# Patient Record
Sex: FEMALE | Race: WHITE | NOT HISPANIC OR LATINO | Employment: OTHER | URBAN - METROPOLITAN AREA
[De-identification: names, ages, dates, MRNs, and addresses within clinical notes are randomized per-mention and may not be internally consistent; named-entity substitution may affect disease eponyms.]

---

## 2017-01-19 ENCOUNTER — OFFICE VISIT (OUTPATIENT)
Dept: FAMILY MEDICINE | Facility: OTHER | Age: 37
End: 2017-01-19
Payer: COMMERCIAL

## 2017-01-19 ENCOUNTER — TELEPHONE (OUTPATIENT)
Dept: FAMILY MEDICINE | Facility: OTHER | Age: 37
End: 2017-01-19

## 2017-01-19 VITALS
SYSTOLIC BLOOD PRESSURE: 114 MMHG | TEMPERATURE: 98.8 F | HEIGHT: 63 IN | WEIGHT: 193.4 LBS | OXYGEN SATURATION: 98 % | HEART RATE: 78 BPM | RESPIRATION RATE: 12 BRPM | BODY MASS INDEX: 34.27 KG/M2 | DIASTOLIC BLOOD PRESSURE: 70 MMHG

## 2017-01-19 DIAGNOSIS — L50.8 URTICARIA, ACUTE: Primary | ICD-10-CM

## 2017-01-19 PROCEDURE — 99213 OFFICE O/P EST LOW 20 MIN: CPT | Performed by: NURSE PRACTITIONER

## 2017-01-19 RX ORDER — HYDROXYZINE HYDROCHLORIDE 25 MG/1
25-50 TABLET, FILM COATED ORAL EVERY 6 HOURS PRN
Qty: 30 TABLET | Refills: 0 | Status: SHIPPED | OUTPATIENT
Start: 2017-01-19 | End: 2017-02-16

## 2017-01-19 RX ORDER — PREDNISONE 20 MG/1
20 TABLET ORAL 2 TIMES DAILY
Qty: 10 TABLET | Refills: 0 | Status: SHIPPED | OUTPATIENT
Start: 2017-01-19 | End: 2017-01-25

## 2017-01-19 ASSESSMENT — PAIN SCALES - GENERAL: PAINLEVEL: MODERATE PAIN (4)

## 2017-01-19 ASSESSMENT — ANXIETY QUESTIONNAIRES
GAD7 TOTAL SCORE: 15
7. FEELING AFRAID AS IF SOMETHING AWFUL MIGHT HAPPEN: 2 = MORE THAN HALF THE DAYS
GAD7 TOTAL SCORE: 15

## 2017-01-19 ASSESSMENT — PATIENT HEALTH QUESTIONNAIRE - PHQ9
SUM OF ALL RESPONSES TO PHQ QUESTIONS 1-9: 5
10. IF YOU CHECKED OFF ANY PROBLEMS, HOW DIFFICULT HAVE THESE PROBLEMS MADE IT FOR YOU TO DO YOUR WORK, TAKE CARE OF THINGS AT HOME, OR GET ALONG WITH OTHER PEOPLE: SOMEWHAT DIFFICULT

## 2017-01-19 NOTE — MR AVS SNAPSHOT
After Visit Summary   1/19/2017    Ms. Ashley Mcdaniels    MRN: [id number]           Patient Information     Date Of Birth          1980        Visit Information        Provider Department      1/19/2017 1:10 PM Rosaura Garcia APRN CNP [facility]        Today's Diagnoses     Hives    -  1       Care Instructions    - Start an antihistamine daily- Try zyrtec or allegra since Claritin did not help.   - Start prednisone medication daily X5 days, start tomorrow AM so you do not have insomnia affects. Let me know if you have any worsening symptoms or side effects from this medication as discussed in clinic.   - Recommend trying benadryl cream over the counter for hives  - Take atarax PRN do not take when driving or operating machinery.   - Return to clinic next week for your allergist appointment- January 25th at 10:00am.     SHARIF Neal CNP          Follow-ups after your visit        Additional Services     ALLERGY/ASTHMA ADULT REFERRAL       Your provider has referred you to: [facility]: [facility] [phone] [email or web address]    Please be aware that coverage of these services is subject to the terms and limitations of your health insurance plan.  Call member services at your health plan with any benefit or coverage questions.      Please bring the following with you to your appointment:    (1) Any X-Rays, CTs or MRIs which have been performed.  Contact the facility where they were done to arrange for  prior to your scheduled appointment.    (2) List of current medications  (3) This referral request   (4) Any documents/labs given to you for this referral                  Your next 10 appointments already scheduled     Jan 25, 2017 10:00 AM   New Visit with Ian Cash DO   [facility] ([facility]    [address]  [facility] [id number] [phone]              Who  "to contact     If you have questions or need follow up information about today's clinic visit or your schedule please contact [facility] directly at [phone].  Normal or non-critical lab and imaging results will be communicated to you by TechLivehart, letter or phone within 4 business days after the clinic has received the results. If you do not hear from us within 7 days, please contact the clinic through TechLivehart or phone. If you have a critical or abnormal lab result, we will notify you by phone as soon as possible.  Submit refill requests through CardioFocus or call your pharmacy and they will forward the refill request to us. Please allow 3 business days for your refill to be completed.          Additional Information About Your Visit        CardioFocus Information     CardioFocus gives you secure access to your electronic health record. If you see a primary care provider, you can also send messages to your care team and make appointments. If you have questions, please call your primary care clinic.  If you do not have a primary care provider, please call [phone] and they will assist you.        Care EveryWhere ID     This is your Care EveryWhere ID. This could be used by other organizations to access your [place] medical records  [id number]        Your Vitals Were     Pulse Temperature Respirations Height BMI (Body Mass Index) Pulse Oximetry    78 98.8  F (37.1  C) (Temporal) 12 5' 2.91\" (1.598 m) 34.35 kg/m2 98%       Blood Pressure from Last 3 Encounters:   01/19/17 114/70   10/26/16 100/70   09/02/16 100/60    Weight from Last 3 Encounters:   01/19/17 193 lb 6.4 oz (87.726 kg)   10/26/16 192 lb 8 oz (87.317 kg)   09/02/16 193 lb (87.544 kg)              We Performed the Following     ALLERGY/ASTHMA ADULT REFERRAL          Today's Medication Changes          These changes are accurate as of: 1/19/17  1:42 PM.  If you have any questions, ask your nurse or doctor.               Start taking these " medicines.        Dose/Directions    hydrOXYzine 25 MG tablet   Commonly known as:  ATARAX   Used for:  Hives   Started by:  Rosaura Garcia APRN CNP        Dose:  25-50 mg   Take 1-2 tablets (25-50 mg) by mouth every 6 hours as needed for itching   Quantity:  30 tablet   Refills:  0       predniSONE 20 MG tablet   Commonly known as:  DELTASONE   Used for:  Hives   Started by:  Rosaura Garcia APRN CNP        Dose:  20 mg   Take 1 tablet (20 mg) by mouth 2 times daily   Quantity:  10 tablet   Refills:  0            Where to get your medicines      These medications were sent to [facility] [place] - [address]   [address] [place] [address]     Phone:  [phone]    - hydrOXYzine 25 MG tablet  - predniSONE 20 MG tablet             Primary Care Provider Office Phone # Fax #    Melva ANDIE You PA-C [phone]       [address]  [facility] [address]        Thank you!     Thank you for choosing [facility]  for your care. Our goal is always to provide you with excellent care. Hearing back from our patients is one way we can continue to improve our services. Please take a few minutes to complete the written survey that you may receive in the mail after your visit with us. Thank you!             Your Updated Medication List - Protect others around you: Learn how to safely use, store and throw away your medicines at www.disposemymeds.org.          This list is accurate as of: 1/19/17  1:42 PM.  Always use your most recent med list.                   Brand Name Dispense Instructions for use    acetaminophen 500 MG tablet    TYLENOL     Take 1,000 mg by mouth       amoxicillin-clavulanate 875-125 MG per tablet    AUGMENTIN         B COMPLEX FORMULA 1 Tabs      Take 1 tablet by mouth       buPROPion 300 MG 24 hr tablet    WELLBUTRIN XL    90 tablet    Take 1 tablet (300 mg) by mouth every  morning       hydrOXYzine 25 MG tablet    ATARAX    30 tablet    Take 1-2 tablets (25-50 mg) by mouth every 6 hours as needed for itching       ibuprofen 200 MG tablet    ADVIL/MOTRIN     Take 400 mg by mouth       levonorgestrel 20 MCG/24HR IUD    MIRENA     1 each (20 mcg) by Intrauterine route continuous       levothyroxine 100 MCG tablet    SYNTHROID/LEVOTHROID    90 tablet    Take 1 tablet (100 mcg) by mouth daily       predniSONE 20 MG tablet    DELTASONE    10 tablet    Take 1 tablet (20 mg) by mouth 2 times daily       valACYclovir 1000 mg tablet    VALTREX    21 tablet    Take 1 tablet (1,000 mg) by mouth 3 times daily

## 2017-01-19 NOTE — PATIENT INSTRUCTIONS
- Start an antihistamine daily- Try zyrtec or allegra since Claritin did not help.   - Start prednisone medication daily X5 days, start tomorrow AM so you do not have insomnia affects. Let me know if you have any worsening symptoms or side effects from this medication as discussed in clinic.   - Recommend trying benadryl cream over the counter for hives  - Take atarax PRN do not take when driving or operating machinery.   - Return to clinic next week for your allergist appointment- January 25th at 10:00am.     SHARIF Neal CNP

## 2017-01-19 NOTE — NURSING NOTE
"Chief Complaint   Patient presents with     Derm Problem       Initial /70 mmHg  Pulse 78  Temp(Src) 98.8  F (37.1  C) (Temporal)  Resp 12  Ht 5' 2.91\" (1.598 m)  Wt 193 lb 6.4 oz (87.726 kg)  BMI 34.35 kg/m2  SpO2 98% Estimated body mass index is 34.35 kg/(m^2) as calculated from the following:    Height as of this encounter: 5' 2.91\" (1.598 m).    Weight as of this encounter: 193 lb 6.4 oz (87.726 kg).  BP completed using cuff size: daniel Welsh CMA (AAMA)    "

## 2017-01-19 NOTE — TELEPHONE ENCOUNTER
I spoke with patient.   She has had what she believes to be hives intermittently for a few months.   Currently she has large, red, blotchy patches on her face and neck.   There are no blisters or lesions.   She will follow up with her appointment that is scheduled at 1:10.     Miladis Robin, RN, BSN

## 2017-01-20 ASSESSMENT — ANXIETY QUESTIONNAIRES: GAD7 TOTAL SCORE: 15

## 2017-01-20 ASSESSMENT — PATIENT HEALTH QUESTIONNAIRE - PHQ9: SUM OF ALL RESPONSES TO PHQ QUESTIONS 1-9: 5

## 2017-01-25 ENCOUNTER — OFFICE VISIT (OUTPATIENT)
Dept: ALLERGY | Facility: OTHER | Age: 37
End: 2017-01-25
Payer: COMMERCIAL

## 2017-01-25 VITALS
SYSTOLIC BLOOD PRESSURE: 100 MMHG | HEIGHT: 63 IN | DIASTOLIC BLOOD PRESSURE: 60 MMHG | BODY MASS INDEX: 34.29 KG/M2 | OXYGEN SATURATION: 99 % | WEIGHT: 193.5 LBS | HEART RATE: 60 BPM

## 2017-01-25 DIAGNOSIS — L50.8 CHRONIC URTICARIA: Primary | ICD-10-CM

## 2017-01-25 LAB
BASOPHILS # BLD AUTO: 0 10E9/L (ref 0–0.2)
BASOPHILS NFR BLD AUTO: 0.2 %
DIFFERENTIAL METHOD BLD: NORMAL
EOSINOPHIL # BLD AUTO: 0.2 10E9/L (ref 0–0.7)
EOSINOPHIL NFR BLD AUTO: 2.3 %
ERYTHROCYTE [DISTWIDTH] IN BLOOD BY AUTOMATED COUNT: 14.6 % (ref 10–15)
HCT VFR BLD AUTO: 41.3 % (ref 35–47)
HGB BLD-MCNC: 13.8 G/DL (ref 11.7–15.7)
LYMPHOCYTES # BLD AUTO: 2.2 10E9/L (ref 0.8–5.3)
LYMPHOCYTES NFR BLD AUTO: 24.4 %
MCH RBC QN AUTO: 28.6 PG (ref 26.5–33)
MCHC RBC AUTO-ENTMCNC: 33.4 G/DL (ref 31.5–36.5)
MCV RBC AUTO: 86 FL (ref 78–100)
MONOCYTES # BLD AUTO: 0.7 10E9/L (ref 0–1.3)
MONOCYTES NFR BLD AUTO: 7.7 %
NEUTROPHILS # BLD AUTO: 5.9 10E9/L (ref 1.6–8.3)
NEUTROPHILS NFR BLD AUTO: 65.4 %
PLATELET # BLD AUTO: 336 10E9/L (ref 150–450)
RBC # BLD AUTO: 4.82 10E12/L (ref 3.8–5.2)
TSH SERPL DL<=0.05 MIU/L-ACNC: 0.37 MU/L (ref 0.4–4)
WBC # BLD AUTO: 9 10E9/L (ref 4–11)

## 2017-01-25 PROCEDURE — 84443 ASSAY THYROID STIM HORMONE: CPT | Performed by: ALLERGY & IMMUNOLOGY

## 2017-01-25 PROCEDURE — 86800 THYROGLOBULIN ANTIBODY: CPT | Performed by: ALLERGY & IMMUNOLOGY

## 2017-01-25 PROCEDURE — 82306 VITAMIN D 25 HYDROXY: CPT | Performed by: ALLERGY & IMMUNOLOGY

## 2017-01-25 PROCEDURE — 88185 FLOWCYTOMETRY/TC ADD-ON: CPT | Mod: 90 | Performed by: ALLERGY & IMMUNOLOGY

## 2017-01-25 PROCEDURE — 99000 SPECIMEN HANDLING OFFICE-LAB: CPT | Performed by: ALLERGY & IMMUNOLOGY

## 2017-01-25 PROCEDURE — 99243 OFF/OP CNSLTJ NEW/EST LOW 30: CPT | Performed by: ALLERGY & IMMUNOLOGY

## 2017-01-25 PROCEDURE — 85025 COMPLETE CBC W/AUTO DIFF WBC: CPT | Performed by: ALLERGY & IMMUNOLOGY

## 2017-01-25 PROCEDURE — 86162 COMPLEMENT TOTAL (CH50): CPT | Mod: 90 | Performed by: ALLERGY & IMMUNOLOGY

## 2017-01-25 PROCEDURE — 36415 COLL VENOUS BLD VENIPUNCTURE: CPT | Performed by: ALLERGY & IMMUNOLOGY

## 2017-01-25 PROCEDURE — 86376 MICROSOMAL ANTIBODY EACH: CPT | Performed by: ALLERGY & IMMUNOLOGY

## 2017-01-25 PROCEDURE — 88184 FLOWCYTOMETRY/ TC 1 MARKER: CPT | Mod: 90 | Performed by: ALLERGY & IMMUNOLOGY

## 2017-01-25 PROCEDURE — 86003 ALLG SPEC IGE CRUDE XTRC EA: CPT | Performed by: ALLERGY & IMMUNOLOGY

## 2017-01-25 RX ORDER — CETIRIZINE HYDROCHLORIDE 10 MG/1
10 TABLET ORAL 2 TIMES DAILY
Qty: 90 TABLET | Refills: 3 | Status: SHIPPED | OUTPATIENT
Start: 2017-01-25 | End: 2017-02-16

## 2017-01-25 NOTE — PATIENT INSTRUCTIONS
If you have any questions regarding your allergies, asthma, or what we discussed during your visit today please call the allergy clinic or contact us via PromoRepublic.    [facility] ([place], [place]): [phone]  [facility]: [phone]  [place] [facility]: [phone]  [facility]: [phone]  [facility]: [phone]  [facility]: [phone]    1. Blood work today.   2. Start taking cetirizine 10mg by mouth twice daily. If hives persist then would increase to 20mg by mouth twice daily. If hives persist call our office.   3. Okay to stay off prednisone and hydroxyzine.   4. Return to clinic in 3 months.

## 2017-01-25 NOTE — MR AVS SNAPSHOT
After Visit Summary   1/25/2017    Ms. Ashley Mcdaniels    MRN: [id number]           Patient Information     Date Of Birth          1980        Visit Information        Provider Department      1/25/2017 10:00 AM Ian Cash, DO [facility]        Today's Diagnoses     Chronic urticaria    -  1       Care Instructions    If you have any questions regarding your allergies, asthma, or what we discussed during your visit today please call the allergy clinic or contact us via Clipik.    [facility] ([place], [place]): [phone]  [facility]: [phone]  [facility]: [phone]  [facility]: [phone]  [facility]: [phone]  [facility]: [phone]    1. Blood work today.   2. Start taking cetirizine 10mg by mouth twice daily. If hives persist then would increase to 20mg by mouth twice daily. If hives persist call our office.   3. Okay to stay off prednisone and hydroxyzine.   4. Return to clinic in 3 months.          Follow-ups after your visit        Who to contact     If you have questions or need follow up information about today's clinic visit or your schedule please contact [facility] directly at [phone].  Normal or non-critical lab and imaging results will be communicated to you by Sc[employer] Music Grouphart, letter or phone within 4 business days after the clinic has received the results. If you do not hear from us within 7 days, please contact the clinic through Sc[employer] Grouphart or phone. If you have a critical or abnormal lab result, we will notify you by phone as soon as possible.  Submit refill requests through Clipik or call your pharmacy and they will forward the refill request to us. Please allow 3 business days for your refill to be completed.          Additional Information About Your Visit        Clipik Information     Clipik gives you secure access to your electronic health  "record. If you see a primary care provider, you can also send messages to your care team and make appointments. If you have questions, please call your primary care clinic.  If you do not have a primary care provider, please call [phone] and they will assist you.        Care EveryWhere ID     This is your Care EveryWhere ID. This could be used by other organizations to access your [place] medical records  [id number]        Your Vitals Were     Pulse Height BMI (Body Mass Index) Pulse Oximetry          60 5' 3\" (1.6 m) 34.29 kg/m2 99%         Blood Pressure from Last 3 Encounters:   01/25/17 100/60   01/19/17 114/70   10/26/16 100/70    Weight from Last 3 Encounters:   01/25/17 193 lb 8 oz (87.771 kg)   01/19/17 193 lb 6.4 oz (87.726 kg)   10/26/16 192 lb 8 oz (87.317 kg)              We Performed the Following     Anti IgE Receptor Antibody     Anti thyroglobulin antibody     CBC with platelets differential     Complement total     Thyroid peroxidase antibody     TSH     Vitamin D Deficiency        Primary Care Provider Office Phone # Fax #    Melva L PENNIE You [phone]       [address]  [facility] [address]        Thank you!     Thank you for choosing [facility]  for your care. Our goal is always to provide you with excellent care. Hearing back from our patients is one way we can continue to improve our services. Please take a few minutes to complete the written survey that you may receive in the mail after your visit with us. Thank you!             Your Updated Medication List - Protect others around you: Learn how to safely use, store and throw away your medicines at www.disposemymeds.org.          This list is accurate as of: 1/25/17 10:27 AM.  Always use your most recent med list.                   Brand Name Dispense Instructions for use    acetaminophen 500 MG tablet    TYLENOL     Take 1,000 mg by mouth       B " COMPLEX FORMULA 1 Tabs      Take 1 tablet by mouth       buPROPion 300 MG 24 hr tablet    WELLBUTRIN XL    90 tablet    Take 1 tablet (300 mg) by mouth every morning       hydrOXYzine 25 MG tablet    ATARAX    30 tablet    Take 1-2 tablets (25-50 mg) by mouth every 6 hours as needed for itching       ibuprofen 200 MG tablet    ADVIL/MOTRIN     Take 400 mg by mouth       levonorgestrel 20 MCG/24HR IUD    MIRENA     1 each (20 mcg) by Intrauterine route continuous       levothyroxine 100 MCG tablet    SYNTHROID/LEVOTHROID    90 tablet    Take 1 tablet (100 mcg) by mouth daily       predniSONE 20 MG tablet    DELTASONE    10 tablet    Take 1 tablet (20 mg) by mouth 2 times daily       valACYclovir 1000 mg tablet    VALTREX    21 tablet    Take 1 tablet (1,000 mg) by mouth 3 times daily

## 2017-01-25 NOTE — ASSESSMENT & PLAN NOTE
Hives daily for the last 2 months. No angioedema. No clear associations with hives. History of hives 10 years ago that last 1-1.5 years. Oral antihistamine is helpful. No scarring or discoloration. Hives last less than 24 hours. Discussed with patient that etiology of hives is most likely autoimmune and that cause is found 1-2% of the time. Tried avoiding wheat and no change in hives. History of hypothyroidism. Has been having hair loss, weight gain and alternating constipation and diarrhea.     - Cetirizine 10 mg by mouth twice daily. If hives persist increase to 20 mg by mouth twice daily. If hives persist instructed her to call our office. At that point would add either montelukast and/or Zantac b.i.d.  - Will taper off cetirizine in 3 months.   - Lab evaluation: TSH, thyroid autoantibodies, vitamin d, total complement and anti-IgE. She requested milk IgE to be checked. I do not think causing hives, but she wished to rule out.   - If patient's thyroid is abnormal she may benefit from medication adjustment. Hives have been correlated with abnormal thyroid in a few studies.   - Return to clinic in 3 months.

## 2017-01-25 NOTE — NURSING NOTE
"Chief Complaint   Patient presents with     Consult     hives       Initial /60 mmHg  Pulse 60  Ht 5' 3\" (1.6 m)  Wt 193 lb 8 oz (87.771 kg)  BMI 34.29 kg/m2  SpO2 99% Estimated body mass index is 34.29 kg/(m^2) as calculated from the following:    Height as of this encounter: 5' 3\" (1.6 m).    Weight as of this encounter: 193 lb 8 oz (87.771 kg).  BP completed using cuff size: daniel Barraza MA      "

## 2017-01-25 NOTE — PROGRESS NOTES
Ashley Mcdaniels is a 36 year old White female with previous medical history significant for Crohn's disease, hypothyroidism on thyroid hormone replacement, depression, anxiety and chronic urticaria. Ashley Mcdaniels is being seen today for evaluation of chronic hives. The patient is being seen in consultation at the request of Rosaura OLGUIN CNP.     The patient reports that for the last couple of months she has had daily erythematous, raised, pruritic lesions. These lesions can occur anywhere on her body including her face. She has denied angioedema occurring. Each individual lesion last less than 24 hours. The lesions are pruritic. They do not burn and they are not painful. The lesions do not scar relief any discoloration. She reports that if she itches at her skin she will develop welts. The patient reports that stress exacerbates the hives. She denies hives being exacerbated by water, heat, cold, pressure, vibration. The patient has been taking cetirizine 10 mg by mouth daily and hydroxyzine on an as needed basis. The hydroxyzine has been helpful, but it makes her tired. She was recently prescribed prednisone, but she did not use. She has been prescribed prednisone on 2 occasions that she her for hives. This has been helpful, but hives returned when she stops. The patient reports alternating constipation and diarrhea. She reports she has been having hair loss. She has gained 30 pounds in the last 1 year. The patient denies fevers or chills. She has a history of hypothyroidism and is on thyroid hormone replacement. Her most recent TSH was near the lower limit of normal. This was last checked in November 2016. The patient is unclear if she has ever had thyroid autoantibodies sent. The patient previously had hives 10 years ago that started during pregnancy and persisted for 1-1.5 years after delivery. These hives are present every day. She had no angioedema associated. These hives resolved with antihistamine. She  has not started any new medications, herbal supplements. No new soaps, lotions, shampoos or detergents. She tried taking gluten out of her diet and this made no difference on hives development. She questions whether or not these hives are associated with milk. She consumes cheese multiple times a day. She has not clearly noted a correlation with milk. She has not avoided milk.     The patient has no history of asthma, eczema, food allergies, medications allergies or hives.     ENVIRONMENTAL HISTORY: [place] is heated with a forced air. They does have central air conditioning. The patient's bedroom is furnished with feather/wool bedding or pillows and carpeting in bedroom.  Pets inside the house include 2 dog(s). There is not history of cockroach or mice infestation. There is/are 0 smokers in the house.  The house does not have a damp basement.           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      Low back pain      GERD (gastroesophageal reflux disease)      Crohn's colitis (H)      Family History   Problem Relation Age of Onset     HEART DISEASE Father      CAD, CHF     DIABETES Maternal Grandmother      Alcoholism Father      Asthma Father      Hypertension Father      Hyperlipidemia Father      Substance Abuse Maternal Grandmother      Substance Abuse Maternal Grandfather      Substance Abuse Paternal Grandfather      Hyperlipidemia Maternal Grandmother      Hypertension Maternal Grandmother      Asthma Paternal Grandmother      HEART DISEASE Paternal Grandmother      DIABETES Paternal Grandmother      Hypertension Paternal Grandmother      Arthritis Mother      MENTAL ILLNESS Brother      Substance Abuse Brother      Past Surgical History   Procedure  Laterality Date     Lap adjustable gastric band  May 2007     Cholecystectomy  Feb 2007     Dilation and curettage  May 2003     Non-OB     Hc tooth extraction w/forcep  Nov 2006       REVIEW OF SYSTEMS:  General: negative for weight gain. negative for weight loss. negative for changes in sleep.   Ears: negative for fullness. negative for hearing loss. negative for dizziness.   Nose: negative for snoring.negative for changes in smell. negative for drainage.   Eyes: negative for eye watering. negative for eye itching. negative for vision changes. negative for eye redness.  Throat: negative for hoarseness. negative for sore throat. negative for trouble swallowing.   Lungs: negative for shortness of breath.negative for wheezing. negative for sputum production.   Cardiovascular: negative for chest pain. negative for swelling of ankles. negative for fast or irregular heartbeat.   Gastrointestinal: negative for nausea. negative for heartburn. positive  for acid reflux.   Musculoskeletal: positive  for joint pain. negative for joint stiffness. negative for joint swelling.   Neurologic: negative for seizures. negative for fainting. negative for weakness.   Psychiatric: positive  for changes in mood. positive  for anxiety.   Endocrine: negative for cold intolerance. negative for heat intolerance. negative for tremors.   Lymphatic: negative for lower extremity swelling. negative for lymph node swelling.   Hematologic: negative for easy bruising. negative for easy bleeding.  Integumentary: positive  for rash. negative for scaling. negative for nail changes.       Current outpatient prescriptions:      cetirizine (ZYRTEC) 10 MG tablet, Take 1 tablet (10 mg) by mouth 2 times daily, Disp: 90 tablet, Rfl: 3     hydrOXYzine (ATARAX) 25 MG tablet, Take 1-2 tablets (25-50 mg) by mouth every 6 hours as needed for itching, Disp: 30 tablet, Rfl: 0     acetaminophen (TYLENOL) 500 MG tablet, Take 1,000 mg by mouth, Disp: , Rfl:       ibuprofen (ADVIL,MOTRIN) 200 MG tablet, Take 400 mg by mouth, Disp: , Rfl:      B Complex-Folic Acid (B COMPLEX FORMULA 1) TABS, Take 1 tablet by mouth, Disp: , Rfl:      valACYclovir (VALTREX) 1000 mg tablet, Take 1 tablet (1,000 mg) by mouth 3 times daily, Disp: 21 tablet, Rfl: 3     levothyroxine (SYNTHROID,LEVOTHROID) 100 MCG tablet, Take 1 tablet (100 mcg) by mouth daily, Disp: 90 tablet, Rfl: 1     buPROPion (WELLBUTRIN XL) 300 MG 24 hr tablet, Take 1 tablet (300 mg) by mouth every morning, Disp: 90 tablet, Rfl: 3     levonorgestrel (MIRENA) 20 MCG/24HR IUD, 1 each (20 mcg) by Intrauterine route continuous, Disp: , Rfl:     There is no immunization history on file for this patient.  No Known Allergies      EXAM:   Constitutional:  Appears well-developed and well-nourished. No distress.   HEENT:   Head: Normocephalic.   Right Ear: External ear normal. TM normal  Left Ear: External ear normal. TM normal  Mouth/Throat: No oropharyngeal exudate present.   No cobblestoning of posterior oropharynx.   Nasal tissue pink and normal appearing.  No rhinorrhea noted.    Eyes: Conjunctivae are non-erythematous   No maxillary or frontal sinus tenderness to palpation.   Cardiovascular: Normal rate, regular rhythm and normal heart sounds. Exam reveals no gallop and no friction rub.   No murmur heard.  Respiratory: Effort normal and breath sounds normal. No respiratory distress. No wheezes. No rales.   Musculoskeletal: Normal range of motion.   Lymphadenopathy:   No cervical adenopathy.   No lower extremity edema.   Neuro: Oriented to person, place, and time.  Skin: Skin is warm and dry. Flat erythema on bilateral forearms. Erythematous patch on upper chest.   Psychiatric: Normal mood and affect.     Nursing note and vitals reviewed.      ASSESSMENT/PLAN:  Problem List Items Addressed This Visit        Musculoskeletal and Integumentary    Chronic urticaria - Primary     Hives daily for the last 2 months. No angioedema. No  clear associations with hives. History of hives 10 years ago that last 1-1.5 years. Oral antihistamine is helpful. No scarring or discoloration. Hives last less than 24 hours. Discussed with patient that etiology of hives is most likely autoimmune and that cause is found 1-2% of the time. Tried avoiding wheat and no change in hives. History of hypothyroidism. Has been having hair loss, weight gain and alternating constipation and diarrhea.     - Cetirizine 10 mg by mouth twice daily. If hives persist increase to 20 mg by mouth twice daily. If hives persist instructed her to call our office. At that point would add either montelukast and/or Zantac b.i.d.  - Will taper off cetirizine in 3 months.   - Lab evaluation: TSH, thyroid autoantibodies, vitamin d, total complement and anti-IgE. She requested milk IgE to be checked. I do not think causing hives, but she wished to rule out.   - If patient's thyroid is abnormal she may benefit from medication adjustment. Hives have been correlated with abnormal thyroid in a few studies.   - Return to clinic in 3 months.          Relevant Medications    cetirizine (ZYRTEC) 10 MG tablet    Other Relevant Orders    Complement total (Completed)    CBC with platelets differential (Completed)    Vitamin D Deficiency (Completed)    Anti IgE Receptor Antibody (Completed)    Thyroid peroxidase antibody (Completed)    Anti thyroglobulin antibody (Completed)    TSH (Completed)    Allergen milk IgE (Completed)          Chart documentation with Dragon Voice recognition Software. Although reviewed after completion, some words and grammatical errors may remain.    Ian Cash,    Allergy/Immunology  [facility], [place] and [place]

## 2017-01-25 NOTE — Clinical Note
Thanks for sending the patient. Please see note for details. I started her on cetirizine 10mg PO bid. Will possibly increased to 20mg PO bid. Want her hive free for 3 months and then will attempt to taper. Lab evaluation is being done. Suspect autoimmune basis to hives. Thanks!  Ian Cash

## 2017-01-26 LAB — DEPRECATED CALCIDIOL+CALCIFEROL SERPL-MC: 32 UG/L (ref 20–75)

## 2017-01-27 ENCOUNTER — TELEPHONE (OUTPATIENT)
Dept: FAMILY MEDICINE | Facility: OTHER | Age: 37
End: 2017-01-27

## 2017-01-27 DIAGNOSIS — E03.9 HYPOTHYROIDISM, UNSPECIFIED TYPE: Primary | ICD-10-CM

## 2017-01-27 LAB
CH50 SERPL-ACNC: 194
COW MILK IGE QN: NORMAL KU/L
THYROGLOB AB SERPL IA-ACNC: <20 IU/ML (ref 0–40)
THYROPEROXIDASE AB SERPL-ACNC: <10 IU/ML

## 2017-01-27 NOTE — TELEPHONE ENCOUNTER
Received staff message from , Patient had TSH checked for work up of her urticaria. Given the TSH is slightly lower than normal I would recommend recheck in 3 weeks if still below normal we will adjust her Synthroid medication.   Please set up lab appointment and notify patient.     SHARIF Neal CNP

## 2017-02-01 LAB
CIU ASSOCIATED BASOPHIL ACTIVATION: 5
IGE RECEP AB COMMENT: NORMAL

## 2017-02-15 DIAGNOSIS — E03.9 HYPOTHYROIDISM, UNSPECIFIED TYPE: ICD-10-CM

## 2017-02-15 LAB — TSH SERPL DL<=0.005 MIU/L-ACNC: 1.14 MU/L (ref 0.4–4)

## 2017-02-15 PROCEDURE — 36415 COLL VENOUS BLD VENIPUNCTURE: CPT | Performed by: NURSE PRACTITIONER

## 2017-02-15 PROCEDURE — 84443 ASSAY THYROID STIM HORMONE: CPT | Performed by: NURSE PRACTITIONER

## 2017-02-16 ENCOUNTER — MYC MEDICAL ADVICE (OUTPATIENT)
Dept: ALLERGY | Facility: OTHER | Age: 37
End: 2017-02-16

## 2017-02-16 DIAGNOSIS — L50.8 URTICARIA, ACUTE: ICD-10-CM

## 2017-02-16 DIAGNOSIS — L50.8 CHRONIC URTICARIA: ICD-10-CM

## 2017-02-16 RX ORDER — CETIRIZINE HYDROCHLORIDE 10 MG/1
10 TABLET ORAL 2 TIMES DAILY
Qty: 90 TABLET | Refills: 3 | Status: SHIPPED | OUTPATIENT
Start: 2017-02-16 | End: 2017-02-20

## 2017-02-16 RX ORDER — HYDROXYZINE HYDROCHLORIDE 25 MG/1
25-50 TABLET, FILM COATED ORAL EVERY 6 HOURS PRN
Qty: 30 TABLET | Refills: 3 | Status: SHIPPED | OUTPATIENT
Start: 2017-02-16 | End: 2018-04-10

## 2017-02-16 NOTE — TELEPHONE ENCOUNTER
Pt states medication was left in [place] while on vacation.  RN replied to patient mychart.  No refills of atarax.  Routing to provider to review/adivse.  Medication/pharmacy enedina'd up.   Char Choi RN

## 2017-02-20 ENCOUNTER — TELEPHONE (OUTPATIENT)
Dept: FAMILY MEDICINE | Facility: OTHER | Age: 37
End: 2017-02-20

## 2017-02-20 DIAGNOSIS — L50.8 CHRONIC URTICARIA: ICD-10-CM

## 2017-02-20 RX ORDER — CETIRIZINE HYDROCHLORIDE 10 MG/1
10 TABLET ORAL DAILY
Qty: 90 TABLET | Refills: 3 | Status: SHIPPED | OUTPATIENT
Start: 2017-02-20 | End: 2018-03-02

## 2017-02-20 NOTE — TELEPHONE ENCOUNTER
Patient's insurance will not cover Cetirizine at twice daily dosing. Please change to once daily or begin PA qty limits process.  [facility] [phone] ID# [id number]  [place], [facility]  [phone]

## 2017-05-26 ENCOUNTER — TELEPHONE (OUTPATIENT)
Dept: FAMILY MEDICINE | Facility: OTHER | Age: 37
End: 2017-05-26

## 2017-05-26 ENCOUNTER — OFFICE VISIT (OUTPATIENT)
Dept: FAMILY MEDICINE | Facility: OTHER | Age: 37
End: 2017-05-26
Payer: COMMERCIAL

## 2017-05-26 VITALS
WEIGHT: 200.8 LBS | BODY MASS INDEX: 35.57 KG/M2 | RESPIRATION RATE: 16 BRPM | HEART RATE: 76 BPM | DIASTOLIC BLOOD PRESSURE: 66 MMHG | SYSTOLIC BLOOD PRESSURE: 118 MMHG | TEMPERATURE: 98.2 F

## 2017-05-26 DIAGNOSIS — F41.1 GAD (GENERALIZED ANXIETY DISORDER): ICD-10-CM

## 2017-05-26 DIAGNOSIS — F33.0 MAJOR DEPRESSIVE DISORDER, RECURRENT EPISODE, MILD (H): Primary | ICD-10-CM

## 2017-05-26 LAB
ALBUMIN SERPL-MCNC: 3.5 G/DL (ref 3.4–5)
ALP SERPL-CCNC: 70 U/L (ref 40–150)
ALT SERPL W P-5'-P-CCNC: 21 U/L (ref 0–50)
ANION GAP SERPL CALCULATED.3IONS-SCNC: 5 MMOL/L (ref 3–14)
AST SERPL W P-5'-P-CCNC: 17 U/L (ref 0–45)
BILIRUB SERPL-MCNC: 0.2 MG/DL (ref 0.2–1.3)
BUN SERPL-MCNC: 8 MG/DL (ref 7–30)
CALCIUM SERPL-MCNC: 8.9 MG/DL (ref 8.5–10.1)
CHLORIDE SERPL-SCNC: 106 MMOL/L (ref 94–109)
CO2 SERPL-SCNC: 30 MMOL/L (ref 20–32)
CREAT SERPL-MCNC: 0.78 MG/DL (ref 0.52–1.04)
GFR SERPL CREATININE-BSD FRML MDRD: 83 ML/MIN/1.7M2
GLUCOSE SERPL-MCNC: 75 MG/DL (ref 70–99)
POTASSIUM SERPL-SCNC: 3.9 MMOL/L (ref 3.4–5.3)
PROT SERPL-MCNC: 7.6 G/DL (ref 6.8–8.8)
SODIUM SERPL-SCNC: 141 MMOL/L (ref 133–144)

## 2017-05-26 PROCEDURE — 99213 OFFICE O/P EST LOW 20 MIN: CPT | Performed by: NURSE PRACTITIONER

## 2017-05-26 PROCEDURE — 36415 COLL VENOUS BLD VENIPUNCTURE: CPT | Performed by: NURSE PRACTITIONER

## 2017-05-26 PROCEDURE — 80053 COMPREHEN METABOLIC PANEL: CPT | Performed by: NURSE PRACTITIONER

## 2017-05-26 RX ORDER — CITALOPRAM HYDROBROMIDE 20 MG/1
20 TABLET ORAL DAILY
Qty: 30 TABLET | Refills: 0 | Status: SHIPPED | OUTPATIENT
Start: 2017-05-26 | End: 2017-08-18

## 2017-05-26 ASSESSMENT — ANXIETY QUESTIONNAIRES
6. BECOMING EASILY ANNOYED OR IRRITABLE: NEARLY EVERY DAY
IF YOU CHECKED OFF ANY PROBLEMS ON THIS QUESTIONNAIRE, HOW DIFFICULT HAVE THESE PROBLEMS MADE IT FOR YOU TO DO YOUR WORK, TAKE CARE OF THINGS AT HOME, OR GET ALONG WITH OTHER PEOPLE: EXTREMELY DIFFICULT
1. FEELING NERVOUS, ANXIOUS, OR ON EDGE: NEARLY EVERY DAY
5. BEING SO RESTLESS THAT IT IS HARD TO SIT STILL: MORE THAN HALF THE DAYS
3. WORRYING TOO MUCH ABOUT DIFFERENT THINGS: NEARLY EVERY DAY
2. NOT BEING ABLE TO STOP OR CONTROL WORRYING: NEARLY EVERY DAY
GAD7 TOTAL SCORE: 20
7. FEELING AFRAID AS IF SOMETHING AWFUL MIGHT HAPPEN: NEARLY EVERY DAY

## 2017-05-26 ASSESSMENT — PATIENT HEALTH QUESTIONNAIRE - PHQ9: 5. POOR APPETITE OR OVEREATING: NEARLY EVERY DAY

## 2017-05-26 NOTE — NURSING NOTE
"Chief Complaint   Patient presents with     Anxiety       Initial /66  Pulse 76  Temp 98.2  F (36.8  C) (Temporal)  Resp 16  Wt 200 lb 12.8 oz (91.1 kg)  BMI 35.57 kg/m2 Estimated body mass index is 35.57 kg/(m^2) as calculated from the following:    Height as of 1/25/17: 5' 3\" (1.6 m).    Weight as of this encounter: 200 lb 12.8 oz (91.1 kg).  Medication Reconciliation: complete   Aga Schaefer CMA    "

## 2017-05-26 NOTE — PATIENT INSTRUCTIONS
- Start celexa 20mg daily in the AM.   - Continue Wellbutrin 300mg XL daily  - Return to clinic in 3-4 weeks.   - Continue with Atarax as needed.   - Continue counseling      SHARIF Neal CNP    Patient Education    Citalopram Hydrobromide Oral solution    Citalopram Hydrobromide Oral tablet  Citalopram Hydrobromide Oral tablet  What is this medicine?  CITALOPRAM (sye JAKE oh pram) is a medicine for depression.  This medicine may be used for other purposes; ask your health care provider or pharmacist if you have questions.  What should I tell my health care provider before I take this medicine?  They need to know if you have any of these conditions:    bipolar disorder or a family history of bipolar disorder    diabetes    glaucoma    heart disease    history of irregular heartbeat    kidney or liver disease    low levels of magnesium or potassium in the blood    receiving electroconvulsive therapy    seizures (convulsions)    suicidal thoughts or a previous suicide attempt    an unusual or allergic reaction to citalopram, escitalopram, other medicines, foods, dyes, or preservatives    pregnant or trying to become pregnant    breast-feeding  How should I use this medicine?  Take this medicine by mouth with a glass of water. Follow the directions on the prescription label. You can take it with or without food. Take your medicine at regular intervals. Do not take your medicine more often than directed. Do not stop taking this medicine suddenly except upon the advice of your doctor. Stopping this medicine too quickly may cause serious side effects or your condition may worsen.  A special MedGuide will be given to you by the pharmacist with each prescription and refill. Be sure to read this information carefully each time.  Talk to your pediatrician regarding the use of this medicine in children. Special care may be needed.  Patients over 60 years old may have a stronger reaction and need a smaller  dose.  Overdosage: If you think you have taken too much of this medicine contact a poison control center or emergency room at once.  NOTE: This medicine is only for you. Do not share this medicine with others.  What if I miss a dose?  If you miss a dose, take it as soon as you can. If it is almost time for your next dose, take only that dose. Do not take double or extra doses.  What may interact with this medicine?  Do not take this medicine with any of the following medications:    cisapride    dofetlide    dronedarone    escitalopram    linezolid    MAOIs like Carbex, Eldepryl, Marplan, Nardil, and Parnate    methylene blue (injected into a vein)    pimozide    posaconazole    thioridazine    ziprasidone  This medicine may also interact with the following medications:    alcohol    aspirin and aspirin-like medicines    carbamazepine    certain medicines for depression, anxiety, or psychotic disturbances    certain medicines for fungal infections like ketoconazole and itraconazole    certain medicines used to treat infections like chloroquine, clarithromycin, erythromycin, pentamidine    certain medicines for migraine headaches like almotriptan, eletriptan, frovatriptan, naratriptan, rizatriptan, sumatriptan, zolmitriptan    cimetidine    diuretics    fentanyl    furazolidone    isoniazid    lithium    medicines for sleep    medicines that treat or prevent blood clots like warfarin, enoxaparin, and dalteparin    methadone    metoprolol    NSAIDs, medicines for pain and inflammation, like ibuprofen or naproxen    omeprazole    other medicines that prolong the QT interval (cause an abnormal heart rhythm)    procarbazine    rasagiline    supplements like Endy's wort, kava kava, valerian    tramadol    tryptophan  This list may not describe all possible interactions. Give your health care provider a list of all the medicines, herbs, non-prescription drugs, or dietary supplements you use. Also tell them if you  smoke, drink alcohol, or use illegal drugs. Some items may interact with your medicine.  What should I watch for while using this medicine?  Tell your doctor if your symptoms do not get better or if they get worse. Visit your doctor or health care professional for regular checks on your progress. Because it may take several weeks to see the full effects of this medicine, it is important to continue your treatment as prescribed by your doctor.  Patients and their families should watch out for new or worsening thoughts of suicide or depression. Also watch out for sudden changes in feelings such as feeling anxious, agitated, panicky, irritable, hostile, aggressive, impulsive, severely restless, overly excited and hyperactive, or not being able to sleep. If this happens, especially at the beginning of treatment or after a change in dose, call your health care professional.  You may get drowsy or dizzy. Do not drive, use machinery, or do anything that needs mental alertness until you know how this medicine affects you. Do not stand or sit up quickly, especially if you are an older patient. This reduces the risk of dizzy or fainting spells. Alcohol may interfere with the effect of this medicine. Avoid alcoholic drinks.  Your mouth may get dry. Chewing sugarless gum or sucking hard candy, and drinking plenty of water will help. Contact your doctor if the problem does not go away or is severe.  What side effects may I notice from receiving this medicine?  Side effects that you should report to your doctor or health care professional as soon as possible:    allergic reactions like skin rash, itching or hives, swelling of the face, lips, or tongue    chest pain    confusion    dizziness    fast, irregular heartbeat    fast talking and excited feelings or actions that are out of control    feeling faint or lightheaded, falls    hallucination, loss of contact with reality    seizures    shortness of breath    suicidal thoughts  or other mood changes    unusual bleeding or bruising  Side effects that usually do not require medical attention (report to your doctor or health care professional if they continue or are bothersome):    blurred vision    change in appetite    change in sex drive or performance    headache    increased sweating    nausea    trouble sleeping  This list may not describe all possible side effects. Call your doctor for medical advice about side effects. You may report side effects to FDA at 1-460-RUO-2106.  Where should I keep my medicine?  Keep out of reach of children.  Store at room temperature between 15 and 30 degrees C (59 and 86 degrees F). Throw away any unused medicine after the expiration date.  NOTE:This sheet is a summary. It may not cover all possible information. If you have questions about this medicine, talk to your doctor, pharmacist, or health care provider. Copyright  2016 [employer]

## 2017-05-26 NOTE — TELEPHONE ENCOUNTER
Got note from KV to call patient and let her know that she can start the citalopram. If patient has any questions please let us know.   LM for patient to return our call, please give below message.   Sayda Welsh CMA ([facility])

## 2017-05-26 NOTE — TELEPHONE ENCOUNTER
Please give both messages,LM for patient to return our call, please give below message.   Sayda Welsh CMA ([facility])

## 2017-05-26 NOTE — TELEPHONE ENCOUNTER
----- Message from SHARIF Gonzalez CNP sent at 5/26/2017  3:43 PM CDT -----  Please let patient know that her chemistry labs are completely normal. Continue with medication Wellbutrin 300mg XL and Celexa 20mg daily follow up win 1 month.     SHARIF Neal CNP

## 2017-05-26 NOTE — PROGRESS NOTES
SUBJECTIVE:                                                    Ashley Mcdaniels is a 36 year old female who presents to clinic today for the following health issues:      HPI      Depression and Anxiety Follow-Up    Status since last visit: Worsened anxiety    Other associated symptoms:shaky    Complicating factors:     Significant life event: Father diagnosed with cancer. Things at home are difficult. Stressed out at work.      Current substance abuse: None    Can't function on the atarax. Only taken at night if needed.   Starting to feel shaky during the day due to anxiety.     PHQ-9 SCORE 9/2/2016 11/3/2016 1/19/2017   Total Score MyChart - - 5 (Mild depression)   Total Score 6 4 -     YUE-7 SCORE 6/3/2016 9/2/2016 1/19/2017   Total Score - - 15 (severe anxiety)   Total Score 12 5 -        PHQ-9  English      PHQ-9   Any Language     GAD7       Problem list and histories reviewed & adjusted, as indicated.  Additional history: as documented      ROS:  C: NEGATIVE for fever, chills, change in weight    OBJECTIVE:                                                    /66  Pulse 76  Temp 98.2  F (36.8  C) (Temporal)  Resp 16  Wt 200 lb 12.8 oz (91.1 kg)  BMI 35.57 kg/m2  Body mass index is 35.57 kg/(m^2).  GENERAL: healthy, alert and no distress  NEURO: Normal strength and tone, mentation intact and speech normal  PSYCH: mentation appears normal, affect normal/bright    Diagnostic Test Results:  CMP pending.      ASSESSMENT/PLAN:                                                        1. Major depressive disorder, recurrent episode, mild (H)  - Discussed adding additional medication to the Wellbutrin for additional coverage of her mood disorder. We discussed that she has given the Wellbutrin 300mg XL enough time without improvements.   - Discussed new medication (Citalopram) and given side effects along with hand out provided.   - Patient will also continue her counseling.   - citalopram (CELEXA) 20 MG tablet;  Take 1 tablet (20 mg) by mouth daily  Dispense: 30 tablet; Refill: 0  - Comprehensive metabolic panel    2. YUE (generalized anxiety disorder)  - Atarax as needed for sleep and anxiety attacks, she declined needing refills of this.   - As noted above will start Celexa.   - citalopram (CELEXA) 20 MG tablet; Take 1 tablet (20 mg) by mouth daily  Dispense: 30 tablet; Refill: 0  - Comprehensive metabolic panel      I also discussed this with patient PCP who is in agreement with the above plan.     The patient indicates understanding of these issues and agrees with the plan.    Patient Instructions   - Start celexa 20mg daily in the AM.   - Continue Wellbutrin 300mg XL daily  - Return to clinic in 3-4 weeks.   - Continue with Atarax as needed.   - Continue counseling      SHARIF Neal CNP    Patient Education    Citalopram Hydrobromide Oral solution    Citalopram Hydrobromide Oral tablet  Citalopram Hydrobromide Oral tablet  What is this medicine?  CITALOPRAM (sye JAKE oh pram) is a medicine for depression.  This medicine may be used for other purposes; ask your health care provider or pharmacist if you have questions.  What should I tell my health care provider before I take this medicine?  They need to know if you have any of these conditions:    bipolar disorder or a family history of bipolar disorder    diabetes    glaucoma    heart disease    history of irregular heartbeat    kidney or liver disease    low levels of magnesium or potassium in the blood    receiving electroconvulsive therapy    seizures (convulsions)    suicidal thoughts or a previous suicide attempt    an unusual or allergic reaction to citalopram, escitalopram, other medicines, foods, dyes, or preservatives    pregnant or trying to become pregnant    breast-feeding  How should I use this medicine?  Take this medicine by mouth with a glass of water. Follow the directions on the prescription label. You can take it with or without food. Take  your medicine at regular intervals. Do not take your medicine more often than directed. Do not stop taking this medicine suddenly except upon the advice of your doctor. Stopping this medicine too quickly may cause serious side effects or your condition may worsen.  A special MedGuide will be given to you by the pharmacist with each prescription and refill. Be sure to read this information carefully each time.  Talk to your pediatrician regarding the use of this medicine in children. Special care may be needed.  Patients over 60 years old may have a stronger reaction and need a smaller dose.  Overdosage: If you think you have taken too much of this medicine contact a poison control center or emergency room at once.  NOTE: This medicine is only for you. Do not share this medicine with others.  What if I miss a dose?  If you miss a dose, take it as soon as you can. If it is almost time for your next dose, take only that dose. Do not take double or extra doses.  What may interact with this medicine?  Do not take this medicine with any of the following medications:    cisapride    dofetlide    dronedarone    escitalopram    linezolid    MAOIs like Carbex, Eldepryl, Marplan, Nardil, and Parnate    methylene blue (injected into a vein)    pimozide    posaconazole    thioridazine    ziprasidone  This medicine may also interact with the following medications:    alcohol    aspirin and aspirin-like medicines    carbamazepine    certain medicines for depression, anxiety, or psychotic disturbances    certain medicines for fungal infections like ketoconazole and itraconazole    certain medicines used to treat infections like chloroquine, clarithromycin, erythromycin, pentamidine    certain medicines for migraine headaches like almotriptan, eletriptan, frovatriptan, naratriptan, rizatriptan, sumatriptan, zolmitriptan    cimetidine    diuretics    fentanyl    furazolidone    isoniazid    lithium    medicines for sleep    medicines  that treat or prevent blood clots like warfarin, enoxaparin, and dalteparin    methadone    metoprolol    NSAIDs, medicines for pain and inflammation, like ibuprofen or naproxen    omeprazole    other medicines that prolong the QT interval (cause an abnormal heart rhythm)    procarbazine    rasagiline    supplements like [place]'s wort, kava kava, valerian    tramadol    tryptophan  This list may not describe all possible interactions. Give your health care provider a list of all the medicines, herbs, non-prescription drugs, or dietary supplements you use. Also tell them if you smoke, drink alcohol, or use illegal drugs. Some items may interact with your medicine.  What should I watch for while using this medicine?  Tell your doctor if your symptoms do not get better or if they get worse. Visit your doctor or health care professional for regular checks on your progress. Because it may take several weeks to see the full effects of this medicine, it is important to continue your treatment as prescribed by your doctor.  Patients and their families should watch out for new or worsening thoughts of suicide or depression. Also watch out for sudden changes in feelings such as feeling anxious, agitated, panicky, irritable, hostile, aggressive, impulsive, severely restless, overly excited and hyperactive, or not being able to sleep. If this happens, especially at the beginning of treatment or after a change in dose, call your health care professional.  You may get drowsy or dizzy. Do not drive, use machinery, or do anything that needs mental alertness until you know how this medicine affects you. Do not stand or sit up quickly, especially if you are an older patient. This reduces the risk of dizzy or fainting spells. Alcohol may interfere with the effect of this medicine. Avoid alcoholic drinks.  Your mouth may get dry. Chewing sugarless gum or sucking hard candy, and drinking plenty of water will help. Contact your doctor  if the problem does not go away or is severe.  What side effects may I notice from receiving this medicine?  Side effects that you should report to your doctor or health care professional as soon as possible:    allergic reactions like skin rash, itching or hives, swelling of the face, lips, or tongue    chest pain    confusion    dizziness    fast, irregular heartbeat    fast talking and excited feelings or actions that are out of control    feeling faint or lightheaded, falls    hallucination, loss of contact with reality    seizures    shortness of breath    suicidal thoughts or other mood changes    unusual bleeding or bruising  Side effects that usually do not require medical attention (report to your doctor or health care professional if they continue or are bothersome):    blurred vision    change in appetite    change in sex drive or performance    headache    increased sweating    nausea    trouble sleeping  This list may not describe all possible side effects. Call your doctor for medical advice about side effects. You may report side effects to FDA at 7-223-FDA-6449.  Where should I keep my medicine?  Keep out of reach of children.  Store at room temperature between 15 and 30 degrees C (59 and 86 degrees F). Throw away any unused medicine after the expiration date.  NOTE:This sheet is a summary. It may not cover all possible information. If you have questions about this medicine, talk to your doctor, pharmacist, or health care provider. Copyright  2016 [employer] Standard            SHARIF Neal [facility] (formerly [facility])

## 2017-05-26 NOTE — MR AVS SNAPSHOT
After Visit Summary   5/26/2017    Ms. Ashley Mcdaniels    MRN: [id number]           Patient Information     Date Of Birth          1980        Visit Information        Provider Department      5/26/2017 11:40 AM Rosaura Garcia APRN CNP [facility]        Today's Diagnoses     Major depressive disorder, recurrent episode, mild (H)    -  1    YUE (generalized anxiety disorder)          Care Instructions    - Start celexa 20mg daily in the AM.   - Continue Wellbutrin 300mg XL daily  - Return to clinic in 3-4 weeks.   - Continue with Atarax as needed.   - Continue counseling      SHARIF Neal CNP    Patient Education    Citalopram Hydrobromide Oral solution    Citalopram Hydrobromide Oral tablet  Citalopram Hydrobromide Oral tablet  What is this medicine?  CITALOPRAM (sye JAKE oh pram) is a medicine for depression.  This medicine may be used for other purposes; ask your health care provider or pharmacist if you have questions.  What should I tell my health care provider before I take this medicine?  They need to know if you have any of these conditions:    bipolar disorder or a family history of bipolar disorder    diabetes    glaucoma    heart disease    history of irregular heartbeat    kidney or liver disease    low levels of magnesium or potassium in the blood    receiving electroconvulsive therapy    seizures (convulsions)    suicidal thoughts or a previous suicide attempt    an unusual or allergic reaction to citalopram, escitalopram, other medicines, foods, dyes, or preservatives    pregnant or trying to become pregnant    breast-feeding  How should I use this medicine?  Take this medicine by mouth with a glass of water. Follow the directions on the prescription label. You can take it with or without food. Take your medicine at regular intervals. Do not take your medicine more often than directed. Do not stop taking this medicine suddenly except upon the advice of your  doctor. Stopping this medicine too quickly may cause serious side effects or your condition may worsen.  A special MedGuide will be given to you by the pharmacist with each prescription and refill. Be sure to read this information carefully each time.  Talk to your pediatrician regarding the use of this medicine in children. Special care may be needed.  Patients over 60 years old may have a stronger reaction and need a smaller dose.  Overdosage: If you think you have taken too much of this medicine contact a poison control center or emergency room at once.  NOTE: This medicine is only for you. Do not share this medicine with others.  What if I miss a dose?  If you miss a dose, take it as soon as you can. If it is almost time for your next dose, take only that dose. Do not take double or extra doses.  What may interact with this medicine?  Do not take this medicine with any of the following medications:    cisapride    dofetlide    dronedarone    escitalopram    linezolid    MAOIs like Carbex, Eldepryl, Marplan, Nardil, and Parnate    methylene blue (injected into a vein)    pimozide    posaconazole    thioridazine    ziprasidone  This medicine may also interact with the following medications:    alcohol    aspirin and aspirin-like medicines    carbamazepine    certain medicines for depression, anxiety, or psychotic disturbances    certain medicines for fungal infections like ketoconazole and itraconazole    certain medicines used to treat infections like chloroquine, clarithromycin, erythromycin, pentamidine    certain medicines for migraine headaches like almotriptan, eletriptan, frovatriptan, naratriptan, rizatriptan, sumatriptan, zolmitriptan    cimetidine    diuretics    fentanyl    furazolidone    isoniazid    lithium    medicines for sleep    medicines that treat or prevent blood clots like warfarin, enoxaparin, and dalteparin    methadone    metoprolol    NSAIDs, medicines for pain and inflammation, like  ibuprofen or naproxen    omeprazole    other medicines that prolong the QT interval (cause an abnormal heart rhythm)    procarbazine    rasagiline    supplements like [place]'s wort, kava kava, valerian    tramadol    tryptophan  This list may not describe all possible interactions. Give your health care provider a list of all the medicines, herbs, non-prescription drugs, or dietary supplements you use. Also tell them if you smoke, drink alcohol, or use illegal drugs. Some items may interact with your medicine.  What should I watch for while using this medicine?  Tell your doctor if your symptoms do not get better or if they get worse. Visit your doctor or health care professional for regular checks on your progress. Because it may take several weeks to see the full effects of this medicine, it is important to continue your treatment as prescribed by your doctor.  Patients and their families should watch out for new or worsening thoughts of suicide or depression. Also watch out for sudden changes in feelings such as feeling anxious, agitated, panicky, irritable, hostile, aggressive, impulsive, severely restless, overly excited and hyperactive, or not being able to sleep. If this happens, especially at the beginning of treatment or after a change in dose, call your health care professional.  You may get drowsy or dizzy. Do not drive, use machinery, or do anything that needs mental alertness until you know how this medicine affects you. Do not stand or sit up quickly, especially if you are an older patient. This reduces the risk of dizzy or fainting spells. Alcohol may interfere with the effect of this medicine. Avoid alcoholic drinks.  Your mouth may get dry. Chewing sugarless gum or sucking hard candy, and drinking plenty of water will help. Contact your doctor if the problem does not go away or is severe.  What side effects may I notice from receiving this medicine?  Side effects that you should report to your  doctor or health care professional as soon as possible:    allergic reactions like skin rash, itching or hives, swelling of the face, lips, or tongue    chest pain    confusion    dizziness    fast, irregular heartbeat    fast talking and excited feelings or actions that are out of control    feeling faint or lightheaded, falls    hallucination, loss of contact with reality    seizures    shortness of breath    suicidal thoughts or other mood changes    unusual bleeding or bruising  Side effects that usually do not require medical attention (report to your doctor or health care professional if they continue or are bothersome):    blurred vision    change in appetite    change in sex drive or performance    headache    increased sweating    nausea    trouble sleeping  This list may not describe all possible side effects. Call your doctor for medical advice about side effects. You may report side effects to FDA at 5-926-OMJ-1277.  Where should I keep my medicine?  Keep out of reach of children.  Store at room temperature between 15 and 30 degrees C (59 and 86 degrees F). Throw away any unused medicine after the expiration date.  NOTE:This sheet is a summary. It may not cover all possible information. If you have questions about this medicine, talk to your doctor, pharmacist, or health care provider. Copyright  2016 Gold Standard                Follow-ups after your visit        Who to contact     If you have questions or need follow up information about today's clinic visit or your schedule please contact [facility] directly at [phone].  Normal or non-critical lab and imaging results will be communicated to you by MyChart, letter or phone within 4 business days after the clinic has received the results. If you do not hear from us within 7 days, please contact the clinic through MyChart or phone. If you have a critical or abnormal lab result, we will notify you by phone as soon as possible.  Submit  refill requests through [employer] or call your pharmacy and they will forward the refill request to us. Please allow 3 business days for your refill to be completed.          Additional Information About Your Visit        [employer] Information     [employer] gives you secure access to your electronic health record. If you see a primary care provider, you can also send messages to your care team and make appointments. If you have questions, please call your primary care clinic.  If you do not have a primary care provider, please call [phone] and they will assist you.        Care EveryWhere ID     This is your Care EveryWhere ID. This could be used by other organizations to access your [place] medical records  [id number]        Your Vitals Were     Pulse Temperature Respirations BMI (Body Mass Index)          76 98.2  F (36.8  C) (Temporal) 16 35.57 kg/m2         Blood Pressure from Last 3 Encounters:   05/26/17 118/66   01/25/17 100/60   01/19/17 114/70    Weight from Last 3 Encounters:   05/26/17 200 lb 12.8 oz (91.1 kg)   01/25/17 193 lb 8 oz (87.8 kg)   01/19/17 193 lb 6.4 oz (87.7 kg)              Today, you had the following     No orders found for display       Primary Care Provider Office Phone # Fax #    Melva ANDIE You PA-C [phone]       [facility] [address]  [place] [address]        Thank you!     Thank you for choosing [facility]  for your care. Our goal is always to provide you with excellent care. Hearing back from our patients is one way we can continue to improve our services. Please take a few minutes to complete the written survey that you may receive in the mail after your visit with us. Thank you!             Your Updated Medication List - Protect others around you: Learn how to safely use, store and throw away your medicines at www.disposemymeds.org.          This list is accurate as of: 5/26/17 12:21 PM.  Always use  your most recent med list.                   Brand Name Dispense Instructions for use    acetaminophen 500 MG tablet    TYLENOL     Take 1,000 mg by mouth       B COMPLEX FORMULA 1 Tabs      Take 1 tablet by mouth       buPROPion 300 MG 24 hr tablet    WELLBUTRIN XL    90 tablet    Take 1 tablet (300 mg) by mouth every morning       cetirizine 10 MG tablet    zyrTEC    90 tablet    Take 1 tablet (10 mg) by mouth daily       hydrOXYzine 25 MG tablet    ATARAX    30 tablet    Take 1-2 tablets (25-50 mg) by mouth every 6 hours as needed for itching       ibuprofen 200 MG tablet    ADVIL/MOTRIN     Take 400 mg by mouth       levonorgestrel 20 MCG/24HR IUD    MIRENA     1 each (20 mcg) by Intrauterine route continuous       levothyroxine 100 MCG tablet    SYNTHROID/LEVOTHROID    90 tablet    Take 1 tablet (100 mcg) by mouth daily       valACYclovir 1000 mg tablet    VALTREX    21 tablet    Take 1 tablet (1,000 mg) by mouth 3 times daily

## 2017-05-27 ASSESSMENT — PATIENT HEALTH QUESTIONNAIRE - PHQ9: SUM OF ALL RESPONSES TO PHQ QUESTIONS 1-9: 16

## 2017-05-27 ASSESSMENT — ANXIETY QUESTIONNAIRES: GAD7 TOTAL SCORE: 20

## 2017-08-15 NOTE — PROGRESS NOTES
SUBJECTIVE:                                                    Ashley Mcdaniels is a 37 year old female who presents to clinic today for the following health issues:  Patient is not taking the Celexa regularly. She is not taking the Levothyroxine     History of Present Illness   Depression & Anxiety Follow-up:     Depression/Anxiety:  Depression & Anxiety    Status since last visit::  Worsened    Other associated symptoms of depression and anxiety::  YES    Significant life event::  YES    Current substance use::  Alcohol    - Dad  a couple weeks ago  - Can't stay focus  - Live feels in chaos   - Used adderall in the past with focus (Dr. 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   - Stopped levothyroxine since didn't think it helped and interacted with omeprazole, tried switching time and still didn't help so just got frustrated and quit     - Got scared after picked up RX for celexa so never took   - Wellbutrin mostly controlling symptoms   - Feels more level   - Feels like can't stay present in a conversation     Patient would like to discuss frequent heartburn   - on and off omeprazole for years   - Worsened recently needing every single day   - Ranitidine didn't work   - Combo pill did   - NO EGD   - By 9 am symptoms are bad   - Rare ETOH use   - Taking omeprazole in morning     - Former smoker now!     - Way better results in the past with [place] [facility]   - Nails dry and brittle, losing hair   - Levo also made GERD worse   - No chocking or feeling like can't swallow         PHQ-9 SCORE 2017   Total Score MyChart 5 (Mild depression) - 15 (Moderately severe depression)   Total Score - 16 15     YUE-7 SCORE 2017   Total Score 15 (severe anxiety) - 16 (severe anxiety)   Total Score - 20 16       PHQ-9  English  PHQ-9   Any Language  GAD7    Answers for HPI/ROS submitted by the patient on 2017    If you checked off any problems, how difficult have these problems made it for you to do your work, take care of things at home, or get along with other people?: Extremely difficult  PHQ9 TOTAL SCORE: 15  YUE 7 TOTAL SCORE: 16    Problem list and histories reviewed & adjusted, as indicated.  Additional history: as documented    Labs reviewed in EPIC    ROS:  Constitutional, HEENT, cardiovascular, pulmonary, gi and gu systems are negative, except as otherwise noted.      OBJECTIVE:   /76 (BP Location: Right arm, Patient Position: Chair, Cuff Size: Adult Regular)  Pulse 69  Temp 98.2  F (36.8  C) (Oral)  Resp 22  Wt 201 lb 3.2 oz (91.3 kg)  SpO2 98%  BMI 35.64 kg/m2  Body mass index is 35.64 kg/(m^2).  GENERAL APPEARANCE: healthy, alert and no distress  EYES: Eyes grossly normal to inspection, PERRLA, conjunctivae and sclerae without injection or discharge, EOM intact   RESP: Lungs clear to auscultation - no rales, rhonchi or wheezes    CV: Regular rates and rhythm, normal S1 S2, no S3 or S4, no murmur, click or rub  MS: No musculoskeletal defects are noted and gait is age appropriate without ataxia   SKIN: No suspicious lesions or rashes, hydration status appears adeuqate with normal skin turgor   PSYCH: Alert and oriented x3; speech- coherent , normal rate and volume; able to articulate logical thoughts, able to abstract reason, no tangential thoughts, no hallucinations or delusions, mentation appears normal, Mood is euthymic. Affect is appropriate for this mood state and bright. Thought content is free of suicidal ideation, hallucinations, and delusions. Dress is adequate and upkept. Eye contact is good during conversation.       Diagnostic Test Results:  none     ASSESSMENT/PLAN:       ICD-10-CM    1. Major depressive disorder, recurrent episode, mild (H) F33.0    2. YUE (generalized anxiety disorder) F41.1    3. Gastroesophageal reflux disease without esophagitis K21.9 esomeprazole (NEXIUM) 40 MG CR  capsule   4. Acquired hypothyroidism E03.9 TSH with free T4 reflex   5. ADHD (attention deficit hyperactivity disorder), inattentive type F90.0 amphetamine-dextroamphetamine (ADDERALL XR) 20 MG per 24 hr capsule     1 & 2. Mood   - Patient feels things are mostly stable with Wellbutrin, declines further treatment   - Continue counseling, will continue to monitor     3. GERD   - Will have patient stop Omeprazole and switch to Nexium, discussed use and side effects   - Discussed PPI vs H2RA, may add in Ranitidine if symptoms not controlled   - Discussed if can't control symptoms, may need EGD  - Recheck 2-4 weeks     4. Hypothyroid   - Patient with adverse reaction to Levothyroxine, did well on [place] thyroid in the past   - Since off medication, will get new labs today and then restart [place] (was previously on 60 mg)   - Recheck labs every 6-8 weeks until stable   - Discussed how thyroid can affect mood and concentration     5. ADHD   - Confirmed diagnosis and testing via Care Everywhere  -  reviewed no concerns   - Was previously on Ritalin (made her angry) and Adderall XR 25 mg   - Will start at Adderall XR 20 mg, reviewed use and side effects   - Recheck in 2-4 weeks   - Plan for CSA and Utox at that point    The patient indicates understanding of these issues and agrees with the plan.    Follow up: 2-4 weeks       Melva You PA-C  [facility]

## 2017-08-18 ENCOUNTER — OFFICE VISIT (OUTPATIENT)
Dept: FAMILY MEDICINE | Facility: OTHER | Age: 37
End: 2017-08-18
Payer: COMMERCIAL

## 2017-08-18 VITALS
DIASTOLIC BLOOD PRESSURE: 76 MMHG | HEART RATE: 69 BPM | OXYGEN SATURATION: 98 % | RESPIRATION RATE: 22 BRPM | TEMPERATURE: 98.2 F | SYSTOLIC BLOOD PRESSURE: 114 MMHG | WEIGHT: 201.2 LBS | BODY MASS INDEX: 35.64 KG/M2

## 2017-08-18 DIAGNOSIS — K21.9 GASTROESOPHAGEAL REFLUX DISEASE WITHOUT ESOPHAGITIS: ICD-10-CM

## 2017-08-18 DIAGNOSIS — F33.0 MAJOR DEPRESSIVE DISORDER, RECURRENT EPISODE, MILD (H): Primary | ICD-10-CM

## 2017-08-18 DIAGNOSIS — F90.0 ADHD (ATTENTION DEFICIT HYPERACTIVITY DISORDER), INATTENTIVE TYPE: ICD-10-CM

## 2017-08-18 DIAGNOSIS — F41.1 GAD (GENERALIZED ANXIETY DISORDER): ICD-10-CM

## 2017-08-18 DIAGNOSIS — E03.9 ACQUIRED HYPOTHYROIDISM: ICD-10-CM

## 2017-08-18 LAB — TSH SERPL DL<=0.005 MIU/L-ACNC: 1.27 MU/L (ref 0.4–4)

## 2017-08-18 PROCEDURE — 36415 COLL VENOUS BLD VENIPUNCTURE: CPT | Performed by: PHYSICIAN ASSISTANT

## 2017-08-18 PROCEDURE — 84443 ASSAY THYROID STIM HORMONE: CPT | Performed by: PHYSICIAN ASSISTANT

## 2017-08-18 PROCEDURE — 99214 OFFICE O/P EST MOD 30 MIN: CPT | Performed by: PHYSICIAN ASSISTANT

## 2017-08-18 RX ORDER — DEXTROAMPHETAMINE SACCHARATE, AMPHETAMINE ASPARTATE MONOHYDRATE, DEXTROAMPHETAMINE SULFATE AND AMPHETAMINE SULFATE 5; 5; 5; 5 MG/1; MG/1; MG/1; MG/1
20 CAPSULE, EXTENDED RELEASE ORAL DAILY
Qty: 30 CAPSULE | Refills: 0 | Status: SHIPPED | OUTPATIENT
Start: 2017-08-18 | End: 2017-09-19

## 2017-08-18 RX ORDER — ESOMEPRAZOLE MAGNESIUM 40 MG/1
40 CAPSULE, DELAYED RELEASE ORAL
Qty: 90 CAPSULE | Refills: 1 | Status: SHIPPED | OUTPATIENT
Start: 2017-08-18 | End: 2019-04-02

## 2017-08-18 ASSESSMENT — ANXIETY QUESTIONNAIRES
7. FEELING AFRAID AS IF SOMETHING AWFUL MIGHT HAPPEN: SEVERAL DAYS
GAD7 TOTAL SCORE: 16
GAD7 TOTAL SCORE: 16
3. WORRYING TOO MUCH ABOUT DIFFERENT THINGS: MORE THAN HALF THE DAYS
GAD7 TOTAL SCORE: 16
1. FEELING NERVOUS, ANXIOUS, OR ON EDGE: NEARLY EVERY DAY
6. BECOMING EASILY ANNOYED OR IRRITABLE: MORE THAN HALF THE DAYS
5. BEING SO RESTLESS THAT IT IS HARD TO SIT STILL: MORE THAN HALF THE DAYS
4. TROUBLE RELAXING: NEARLY EVERY DAY
7. FEELING AFRAID AS IF SOMETHING AWFUL MIGHT HAPPEN: SEVERAL DAYS
2. NOT BEING ABLE TO STOP OR CONTROL WORRYING: NEARLY EVERY DAY

## 2017-08-18 ASSESSMENT — PATIENT HEALTH QUESTIONNAIRE - PHQ9
10. IF YOU CHECKED OFF ANY PROBLEMS, HOW DIFFICULT HAVE THESE PROBLEMS MADE IT FOR YOU TO DO YOUR WORK, TAKE CARE OF THINGS AT HOME, OR GET ALONG WITH OTHER PEOPLE: EXTREMELY DIFFICULT
SUM OF ALL RESPONSES TO PHQ QUESTIONS 1-9: 15
SUM OF ALL RESPONSES TO PHQ QUESTIONS 1-9: 15

## 2017-08-18 NOTE — PATIENT INSTRUCTIONS
- Labs today for thyroid with plan to re-start [place]   - Stop Omeprazole, start Nexium      For breakthrough symptoms, add in Ranitidine 75 mg or 150 mg     - Trial of Adderall XR 20 mg     - Recheck 2-4 weeks

## 2017-08-18 NOTE — LETTER
My Depression Action Plan  Name: Ashley Mcdaniels   Date of Birth 1980  Date: 8/15/2017    My doctor: Melva You   My clinic: [address] [address] [phone]          GREEN    ZONE   Good Control    What it looks like:     Things are going generally well. You have normal up s and down s. You may even feel depressed from time to time, but bad moods usually last less than a day.   What you need to do:  1. Continue to care for yourself (see self care plan)  2. Check your depression survival kit and update it as needed  3. Follow your physician s recommendations including any medication.  4. Do not stop taking medication unless you consult with your physician first.           YELLOW         ZONE Getting Worse    What it looks like:     Depression is starting to interfere with your life.     It may be hard to get out of bed; you may be starting to isolate yourself from others.    Symptoms of depression are starting to last most all day and this has happened for several days.     You may have suicidal thoughts but they are not constant.   What you need to do:     1. Call your care team, your response to treatment will improve if you keep your care team informed of your progress. Yellow periods are signs an adjustment may need to be made.     2. Continue your self-care, even if you have to fake it!    3. Talk to someone in your support network    4. Open up your depression survival kit           RED    ZONE Medical Alert - Get Help    What it looks like:     Depression is seriously interfering with your life.     You may experience these or other symptoms: You can t get out of bed most days, can t work or engage in other necessary activities, you have trouble taking care of basic hygiene, or basic responsibilities, thoughts of suicide or death that will not go away, self-injurious behavior.     What you need to do:  1. Call your  care team and request a same-day appointment. If they are not available (weekends or after hours) call your local crisis line, emergency room or 911.      Electronically signed by: Nivia Golden, August 15, 2017    Depression Self Care Plan / Survival Kit    Self-Care for Depression  Here s the deal. Your body and mind are really not as separate as most people think.  What you do and think affects how you feel and how you feel influences what you do and think. This means if you do things that people who feel good do, it will help you feel better.  Sometimes this is all it takes.  There is also a place for medication and therapy depending on how severe your depression is, so be sure to consult with your medical provider and/ or Behavioral Health Consultant if your symptoms are worsening or not improving.     In order to better manage my stress, I will:    Exercise  Get some form of exercise, every day. This will help reduce pain and release endorphins, the  feel good  chemicals in your brain. This is almost as good as taking antidepressants!  This is not the same as joining a gym and then never going! (they count on that by the way ) It can be as simple as just going for a walk or doing some gardening, anything that will get you moving.      Hygiene   Maintain good hygiene (Get out of bed in the morning, Make your bed, Brush your teeth, Take a shower, and Get dressed like you were going to work, even if you are unemployed).  If your clothes don't fit try to get ones that do.    Diet  I will strive to eat foods that are good for me, drink plenty of water, and avoid excessive sugar, caffeine, alcohol, and other mood-altering substances.  Some foods that are helpful in depression are: complex carbohydrates, B vitamins, flaxseed, fish or fish oil, fresh fruits and vegetables.    Psychotherapy  I agree to participate in Individual Therapy (if recommended).    Medication  If prescribed medications, I agree to take them.   Missing doses can result in serious side effects.  I understand that drinking alcohol, or other illicit drug use, may cause potential side effects.  I will not stop my medication abruptly without first discussing it with my provider.    Staying Connected With Others  I will stay in touch with my friends, family members, and my primary care provider/team.    Use your imagination  Be creative.  We all have a creative side; it doesn t matter if it s oil painting, sand castles, or mud pies! This will also kick up the endorphins.    Witness Beauty  (AKA stop and smell the roses) Take a look outside, even in mid-winter. Notice colors, textures. Watch the squirrels and birds.     Service to others  Be of service to others.  There is always someone else in need.  By helping others we can  get out of ourselves  and remember the really important things.  This also provides opportunities for practicing all the other parts of the program.    Humor  Laugh and be silly!  Adjust your TV habits for less news and crime-drama and more comedy.    Control your stress  Try breathing deep, massage therapy, biofeedback, and meditation. Find time to relax each day.     My support system    Clinic Contact:  Phone number:    Contact 1:  Phone number:    Contact 2:  Phone number:    Jehovah's witness/:  Phone number:    Therapist:  Phone number:    Local crisis center:    Phone number:    Other community support:  Phone number:

## 2017-08-18 NOTE — MR AVS SNAPSHOT
After Visit Summary   8/18/2017    Ms. Ashley Mcdaniels    MRN: [id number]           Patient Information     Date Of Birth          1980        Visit Information        Provider Department      8/18/2017 9:30 AM Melva You PA-C [facility]        Today's Diagnoses     Major depressive disorder, recurrent episode, mild (H)    -  1    YUE (generalized anxiety disorder)        Gastroesophageal reflux disease without esophagitis        Acquired hypothyroidism        ADHD (attention deficit hyperactivity disorder), inattentive type          Care Instructions    - Labs today for thyroid with plan to re-start [place]   - Stop Omeprazole, start Nexium      For breakthrough symptoms, add in Ranitidine 75 mg or 150 mg     - Trial of Adderall XR 20 mg     - Recheck 2-4 weeks               Follow-ups after your visit        Who to contact     If you have questions or need follow up information about today's clinic visit or your schedule please contact [facility] directly at [phone].  Normal or non-critical lab and imaging results will be communicated to you by [employer], letter or phone within 4 business days after the clinic has received the results. If you do not hear from us within 7 days, please contact the clinic through [employer] or phone. If you have a critical or abnormal lab result, we will notify you by phone as soon as possible.  Submit refill requests through [employer] or call your pharmacy and they will forward the refill request to us. Please allow 3 business days for your refill to be completed.          Additional Information About Your Visit        MyChart Information     [employer] gives you secure access to your electronic health record. If you see a primary care provider, you can also send messages to your care team and make appointments. If you have questions, please call your primary care clinic.  If you do not have a primary care provider,  please call [phone] and they will assist you.        Care EveryWhere ID     This is your Care EveryWhere ID. This could be used by other organizations to access your [facility] medical records  [id number]        Your Vitals Were     Pulse Temperature Respirations Pulse Oximetry BMI (Body Mass Index)       69 98.2  F (36.8  C) (Oral) 22 98% 35.64 kg/m2        Blood Pressure from Last 3 Encounters:   08/18/17 114/76   05/26/17 118/66   01/25/17 100/60    Weight from Last 3 Encounters:   08/18/17 201 lb 3.2 oz (91.3 kg)   05/26/17 200 lb 12.8 oz (91.1 kg)   01/25/17 193 lb 8 oz (87.8 kg)              We Performed the Following     DEPRESSION ACTION PLAN (DAP)     TSH with free T4 reflex          Today's Medication Changes          These changes are accurate as of: 8/18/17 10:21 AM.  If you have any questions, ask your nurse or doctor.               Start taking these medicines.        Dose/Directions    amphetamine-dextroamphetamine 20 MG per 24 hr capsule   Commonly known as:  ADDERALL XR   Used for:  ADHD (attention deficit hyperactivity disorder), inattentive type   Started by:  Melva You PA-C        Dose:  20 mg   Take 1 capsule (20 mg) by mouth daily   Quantity:  30 capsule   Refills:  0       esomeprazole 40 MG CR capsule   Commonly known as:  nexIUM   Used for:  Gastroesophageal reflux disease without esophagitis   Started by:  Melva You PA-C        Dose:  40 mg   Take 1 capsule (40 mg) by mouth every morning (before breakfast) Take 30-60 minutes before eating.   Quantity:  90 capsule   Refills:  1            Where to get your medicines      These medications were sent to [facility] - [id number]   [address] [place] [address]     Phone:  [phone]     esomeprazole 40 MG CR capsule         Some of these will need a paper prescription and others can be bought over the counter.  Ask your nurse if you have  questions.     Bring a paper prescription for each of these medications     amphetamine-dextroamphetamine 20 MG per 24 hr capsule                Primary Care Provider Office Phone # Fax #    Melva CHAVES PENNIE You [phone]       [address] [place] [address] [address]  [facility] [address]        Equal Access to Services     ERICKA WEINSTEIN : Hadii aad ku hadasho Soomaali, waaxda luqadaha, qaybta kaalmada adeegyada, waxay jooin hayaan adechris naranjojusticeoleg emmanuel . So [facility] [phone].    ATENCIÓN: Si habla español, tiene a keita disposición servicios gratuitos de asistencia lingüística. Alexi al [phone].    We comply with applicable federal civil rights laws and [place] laws. We do not discriminate on the basis of race, color, national origin, age, disability sex, sexual orientation or gender identity.            Thank you!     Thank you for choosing [facility]  for your care. Our goal is always to provide you with excellent care. Hearing back from our patients is one way we can continue to improve our services. Please take a few minutes to complete the written survey that you may receive in the mail after your visit with us. Thank you!             Your Updated Medication List - Protect others around you: Learn how to safely use, store and throw away your medicines at www.disposemymeds.org.          This list is accurate as of: 8/18/17 10:21 AM.  Always use your most recent med list.                   Brand Name Dispense Instructions for use Diagnosis    acetaminophen 500 MG tablet    TYLENOL     Take 1,000 mg by mouth        amphetamine-dextroamphetamine 20 MG per 24 hr capsule    ADDERALL XR    30 capsule    Take 1 capsule (20 mg) by mouth daily    ADHD (attention deficit hyperactivity disorder), inattentive type       B COMPLEX FORMULA 1 Tabs      Take 1 tablet by mouth        buPROPion 300 MG 24 hr tablet    WELLBUTRIN XL    90 tablet    Take 1 tablet (300 mg) by mouth every morning    YUE  (generalized anxiety disorder), Major depressive disorder, recurrent episode, mild (H)       cetirizine 10 MG tablet    zyrTEC    90 tablet    Take 1 tablet (10 mg) by mouth daily    Chronic urticaria       esomeprazole 40 MG CR capsule    nexIUM    90 capsule    Take 1 capsule (40 mg) by mouth every morning (before breakfast) Take 30-60 minutes before eating.    Gastroesophageal reflux disease without esophagitis       hydrOXYzine 25 MG tablet    ATARAX    30 tablet    Take 1-2 tablets (25-50 mg) by mouth every 6 hours as needed for itching    Urticaria, acute       ibuprofen 200 MG tablet    ADVIL/MOTRIN     Take 400 mg by mouth        levonorgestrel 20 MCG/24HR IUD    MIRENA     1 each (20 mcg) by Intrauterine route continuous    Encounter for IUD removal, Encounter for IUD insertion, IUD (intrauterine device) in place       OMEPRAZOLE PO      Take 20 mg by mouth every morning        valACYclovir 1000 mg tablet    VALTREX    21 tablet    Take 1 tablet (1,000 mg) by mouth 3 times daily    HSV (herpes simplex virus) infection

## 2017-08-18 NOTE — NURSING NOTE
"Chief Complaint   Patient presents with     Recheck Medication     Panel Management       Initial /76 (BP Location: Right arm, Patient Position: Chair, Cuff Size: Adult Regular)  Pulse 69  Temp 98.2  F (36.8  C) (Oral)  Resp 22  Wt 201 lb 3.2 oz (91.3 kg)  SpO2 98%  BMI 35.64 kg/m2 Estimated body mass index is 35.64 kg/(m^2) as calculated from the following:    Height as of 1/25/17: 5' 3\" (1.6 m).    Weight as of this encounter: 201 lb 3.2 oz (91.3 kg).  Medication Reconciliation: complete    "

## 2017-08-19 ASSESSMENT — ANXIETY QUESTIONNAIRES: GAD7 TOTAL SCORE: 16

## 2017-08-19 ASSESSMENT — PATIENT HEALTH QUESTIONNAIRE - PHQ9: SUM OF ALL RESPONSES TO PHQ QUESTIONS 1-9: 15

## 2017-08-24 ENCOUNTER — MYC MEDICAL ADVICE (OUTPATIENT)
Dept: FAMILY MEDICINE | Facility: OTHER | Age: 37
End: 2017-08-24

## 2017-08-24 DIAGNOSIS — E03.9 ACQUIRED HYPOTHYROIDISM: Primary | ICD-10-CM

## 2017-08-24 RX ORDER — THYROID 15 MG/1
15 TABLET ORAL DAILY
Qty: 30 TABLET | Refills: 3 | Status: SHIPPED | OUTPATIENT
Start: 2017-08-24 | End: 2018-01-29

## 2017-08-24 NOTE — PROGRESS NOTES
Hello Tia    Your results were normal.     The results are attached for your review.       Rene You PA-C

## 2017-08-25 ENCOUNTER — TELEPHONE (OUTPATIENT)
Dept: FAMILY MEDICINE | Facility: OTHER | Age: 37
End: 2017-08-25

## 2017-08-25 NOTE — TELEPHONE ENCOUNTER
[place] Thyroid (and its generic) is not on the insurance formulary.  Consider a change in medication or a prior auth.    [employer] [place]  [phone]  ID [id number]    Thanks.

## 2017-08-28 ENCOUNTER — OFFICE VISIT (OUTPATIENT)
Dept: FAMILY MEDICINE | Facility: OTHER | Age: 37
End: 2017-08-28
Payer: COMMERCIAL

## 2017-08-28 VITALS
DIASTOLIC BLOOD PRESSURE: 70 MMHG | SYSTOLIC BLOOD PRESSURE: 104 MMHG | TEMPERATURE: 98.9 F | WEIGHT: 202 LBS | BODY MASS INDEX: 35.78 KG/M2 | HEART RATE: 80 BPM

## 2017-08-28 DIAGNOSIS — S05.01XA CORNEAL ABRASION, RIGHT, INITIAL ENCOUNTER: Primary | ICD-10-CM

## 2017-08-28 PROCEDURE — 99213 OFFICE O/P EST LOW 20 MIN: CPT | Performed by: NURSE PRACTITIONER

## 2017-08-28 RX ORDER — POLYMYXIN B SULFATE AND TRIMETHOPRIM 1; 10000 MG/ML; [USP'U]/ML
1 SOLUTION OPHTHALMIC 4 TIMES DAILY
Qty: 2 ML | Refills: 0 | Status: SHIPPED | OUTPATIENT
Start: 2017-08-28 | End: 2017-09-04

## 2017-08-28 RX ORDER — POLYMYXIN B SULFATE AND TRIMETHOPRIM 1; 10000 MG/ML; [USP'U]/ML
1 SOLUTION OPHTHALMIC
Qty: 1 BOTTLE | Refills: 0 | Status: CANCELLED | OUTPATIENT
Start: 2017-08-28 | End: 2017-09-04

## 2017-08-28 ASSESSMENT — PAIN SCALES - GENERAL: PAINLEVEL: MODERATE PAIN (5)

## 2017-08-28 NOTE — PATIENT INSTRUCTIONS
- If you develop increased redness, severe pain, blurred vision, fever or chills, increased swelling please be seen immediately  - Please follow up with an opthmologist for further evaluation  - Start eye drops today and continue for the next 7 days  - Use eye glasses instead of contacts until your symptoms have resolved.     [phone] [facility] comes to our [facility].     SHARIF Neal CNP

## 2017-08-28 NOTE — MR AVS SNAPSHOT
After Visit Summary   8/28/2017    Ms. Ashley Mcdaniels    MRN: [id number]           Patient Information     Date Of Birth          1980        Visit Information        Provider Department      8/28/2017 8:00 AM Rosaura Garcia APRN CNP [facility]        Today's Diagnoses     Corneal abrasion, right, initial encounter    -  1      Care Instructions    - If you develop increased redness, severe pain, blurred vision, fever or chills, increased swelling please be seen immediately  - Please follow up with an opthmologist for further evaluation  - Start eye drops today and continue for the next 7 days  - Use eye glasses instead of contacts until your symptoms have resolved.     [phone] [facility] comes to our [facility] clinic.     SHARIF Neal CNP              Follow-ups after your visit        Follow-up notes from your care team     Return if symptoms worsen or fail to improve.      Who to contact     If you have questions or need follow up information about today's clinic visit or your schedule please contact [facility] directly at [phone].  Normal or non-critical lab and imaging results will be communicated to you by [employer], letter or phone within 4 business days after the clinic has received the results. If you do not hear from us within 7 days, please contact the clinic through [employer] or phone. If you have a critical or abnormal lab result, we will notify you by phone as soon as possible.  Submit refill requests through [employer] or call your pharmacy and they will forward the refill request to us. Please allow 3 business days for your refill to be completed.          Additional Information About Your Visit        MyChart Information     [employer] gives you secure access to your electronic health record. If you see a primary care provider, you can also send messages to your care team and make appointments. If you have questions, please call  your primary care clinic.  If you do not have a primary care provider, please call [phone] and they will assist you.        Care EveryWhere ID     This is your Care EveryWhere ID. This could be used by other organizations to access your [facility] medical records  [id number]        Your Vitals Were     Pulse Temperature BMI (Body Mass Index)             80 98.9  F (37.2  C) 35.78 kg/m2          Blood Pressure from Last 3 Encounters:   08/28/17 104/70   08/18/17 114/76   05/26/17 118/66    Weight from Last 3 Encounters:   08/28/17 202 lb (91.6 kg)   08/18/17 201 lb 3.2 oz (91.3 kg)   05/26/17 200 lb 12.8 oz (91.1 kg)              Today, you had the following     No orders found for display         Today's Medication Changes          These changes are accurate as of: 8/28/17  8:30 AM.  If you have any questions, ask your nurse or doctor.               Start taking these medicines.        Dose/Directions    trimethoprim-polymyxin b ophthalmic solution   Commonly known as:  POLYTRIM   Used for:  Corneal abrasion, right, initial encounter   Started by:  Rosaura Garcia APRN CNP        Dose:  1 drop   Apply 1 drop to eye 4 times daily for 7 days   Quantity:  2 mL   Refills:  0            Where to get your medicines      These medications were sent to [facility] [place], [place] - [address] [place] [address]  [address] [facility] [address]     Phone:  [phone]     trimethoprim-polymyxin b ophthalmic solution                Primary Care Provider Office Phone # Fax #    Melva You PA-C [phone]       [address] [facility] [address]  [facility] [address]        Equal Access to Services     JAMAL WEINSTEIN AH: Breanna Purdy, vishal carmona, monalisata brenda newell. So [facility] [phone].    ATENCIÓN: Si habla español, tiene a keita disposición servicios gratuitos de asistencia lingüística. Llame al [phone].    We comply with  applicable federal civil rights laws and [place] laws. We do not discriminate on the basis of race, color, national origin, age, disability sex, sexual orientation or gender identity.            Thank you!     Thank you for choosing [facility]  for your care. Our goal is always to provide you with excellent care. Hearing back from our patients is one way we can continue to improve our services. Please take a few minutes to complete the written survey that you may receive in the mail after your visit with us. Thank you!             Your Updated Medication List - Protect others around you: Learn how to safely use, store and throw away your medicines at www.disposemymeds.org.          This list is accurate as of: 8/28/17  8:30 AM.  Always use your most recent med list.                   Brand Name Dispense Instructions for use Diagnosis    acetaminophen 500 MG tablet    TYLENOL     Take 1,000 mg by mouth        amphetamine-dextroamphetamine 20 MG per 24 hr capsule    ADDERALL XR    30 capsule    Take 1 capsule (20 mg) by mouth daily    ADHD (attention deficit hyperactivity disorder), inattentive type       B COMPLEX FORMULA 1 Tabs      Take 1 tablet by mouth        buPROPion 300 MG 24 hr tablet    WELLBUTRIN XL    90 tablet    Take 1 tablet (300 mg) by mouth every morning    YUE (generalized anxiety disorder), Major depressive disorder, recurrent episode, mild (H)       cetirizine 10 MG tablet    zyrTEC    90 tablet    Take 1 tablet (10 mg) by mouth daily    Chronic urticaria       esomeprazole 40 MG CR capsule    nexIUM    90 capsule    Take 1 capsule (40 mg) by mouth every morning (before breakfast) Take 30-60 minutes before eating.    Gastroesophageal reflux disease without esophagitis       hydrOXYzine 25 MG tablet    ATARAX    30 tablet    Take 1-2 tablets (25-50 mg) by mouth every 6 hours as needed for itching    Urticaria, acute       ibuprofen 200 MG tablet    ADVIL/MOTRIN     Take 400 mg by  mouth        levonorgestrel 20 MCG/24HR IUD    MIRENA     1 each (20 mcg) by Intrauterine route continuous    Encounter for IUD removal, Encounter for IUD insertion, IUD (intrauterine device) in place       OMEPRAZOLE PO      Take 20 mg by mouth every morning        thyroid 15 MG Tabs tablet    ARMOUR THYROID    30 tablet    Take 1 tablet (15 mg) by mouth daily    Acquired hypothyroidism       trimethoprim-polymyxin b ophthalmic solution    POLYTRIM    2 mL    Apply 1 drop to eye 4 times daily for 7 days    Corneal abrasion, right, initial encounter       valACYclovir 1000 mg tablet    VALTREX    21 tablet    Take 1 tablet (1,000 mg) by mouth 3 times daily    HSV (herpes simplex virus) infection

## 2017-08-28 NOTE — PROGRESS NOTES
SUBJECTIVE:   Ashley Mcdaniels is a 37 year old female who presents to clinic today for the following health issues:      Eye(s) Problem      Duration: Last evening    Description:  Location: right  Pain: YES  Redness: YES  Discharge: no     Accompanying signs and symptoms: swelling     History (Trauma, foreign body exposure,): has uveitis    Precipitating or alleviating factors (contact use): None    Therapies tried and outcome: None    Painful, scratchy feeling, mild pressure/discomfort, able to read- does throb more with focusing, redness, very watery eyes. Started yesterday evening and worsened over night. Has a history of blephritis this is currently different then normal symptoms. She normally does not have swelling. She does wear contacts and is curious if she got something underneath the contact that has caused irritation.     Problem list and histories reviewed & adjusted, as indicated.  Additional history: as documented      Reviewed and updated as needed this visit by clinical staffTobacco  Allergies  Meds  Med Hx  Surg Hx  Fam Hx  Soc Hx      Reviewed and updated as needed this visit by Provider         ROS:  CONSTITUTIONAL:NEGATIVE for fever, chills, change in weight    OBJECTIVE:     /70  Pulse 80  Temp 98.9  F (37.2  C)  Wt 202 lb (91.6 kg)  BMI 35.78 kg/m2  Body mass index is 35.78 kg/(m^2).  Physical Exam   Eyes: Pupils are equal, round, and reactive to light. Right eye exhibits chemosis. Right eye exhibits no discharge, no exudate and no hordeolum. No foreign body present in the right eye. Left eye exhibits no chemosis, no discharge and no exudate. Right conjunctiva is injected. Right conjunctiva has no hemorrhage. Left conjunctiva is not injected. Left conjunctiva has no hemorrhage. Right eye exhibits normal extraocular motion. Left eye exhibits normal extraocular motion.   Right eye lower lid swelling and redness along with increased tear productive. Conjunctiva redness.     Flu-mo  staining does not specifically show any tears or foreign objects         Diagnostic Test Results:  See above for right eye.   ASSESSMENT/PLAN:     1. Corneal abrasion, right, initial encounter  - Unsure etiology of symptoms, likely there could be a scratch vs irritation from contacts. I would recommend starting Polytrim drops and following up with opthalmology for eye exam. Hold off on contacts and wear glasses until healed.   - If increased redness, pain, swelling, or blurry vision please be seen immediately as this could be a medical emergency.   - trimethoprim-polymyxin b (POLYTRIM) ophthalmic solution; Apply 1 drop to eye 4 times daily for 7 days  Dispense: 2 mL; Refill: 0    See Patient Instructions  The patient indicates understanding of these issues and agrees with the plan.      SHARIF Neal [facility]

## 2017-08-28 NOTE — NURSING NOTE
"Chief Complaint   Patient presents with     Eye Problem     right eye       Initial /70  Pulse 80  Temp 98.9  F (37.2  C)  Wt 202 lb (91.6 kg)  BMI 35.78 kg/m2 Estimated body mass index is 35.78 kg/(m^2) as calculated from the following:    Height as of 1/25/17: 5' 3\" (1.6 m).    Weight as of this encounter: 202 lb (91.6 kg).  Medication Reconciliation: complete  "

## 2017-08-28 NOTE — TELEPHONE ENCOUNTER
Please do prior auth. Patient and I were expecting this. Please report failure on Levothyroxine - uncontrolled symptoms and hard time getting labs to stabilize.     Rene Damon-PENNIE Nunez  [facility]

## 2017-09-15 NOTE — PROGRESS NOTES
SUBJECTIVE:                                                    Ashley Mcdaniels is a 37 year old female who presents to clinic today for the following health issues:  HPI     Ashley is here today to recheck ADHD/ADD.    Updates since last visit: none    Routine for taking medicine, including time: first thing in the am by 630am  Time medicine wears off: early evening  Issues at school:   Issues at home:   Control of symptoms: much better control    Side effects:  Headaches: Yes occasional, first couple of days only  Stomach aches: No  Irritability/mood swings: No  Difficulties with sleep: No  Social withdrawal: No  Unusual movements/tics: No  Decreased appetite: No      Medication Followup of Omeprazole and Ranitidine     Taking Medication as prescribed: yes    Side Effects:  None    Medication Helping Symptoms:  hasnt need since starting nexium       Depression and Anxiety Follow-Up    Status since last visit: Improved     Other associated symptoms:None    Complicating factors:     Significant life event: No     Current substance abuse: None    - Improving with ADHD   - Continues on Wellbutrin 30 mg   - Occasional Atarax, not even 1 per week     PHQ-9 SCORE 5/26/2017 8/18/2017 9/19/2017   Total Score MyChart - 15 (Moderately severe depression) 4 (Minimal depression)   Total Score 16 15 4     YUE-7 SCORE 5/26/2017 8/18/2017 9/19/2017   Total Score - 16 (severe anxiety) 9 (mild anxiety)   Total Score 20 16 9         Hypothyroidism Follow-up      Since last visit, patient describes the following symptoms: Weight stable, no hair loss, no skin changes, no constipation, no loose stools        Answers for HPI/ROS submitted by the patient on 9/19/2017   YUE 7 TOTAL SCORE: 9  If you checked off any problems, how difficult have these problems made it for you to do your work, take care of things at home, or get along with other people?: Somewhat difficult  PHQ9 TOTAL SCORE: 4      Plan from last visit on 8/18/17  1 & 2. Mood   -  Patient feels things are mostly stable with Wellbutrin, declines further treatment   - Continue counseling, will continue to monitor      3. GERD   - Will have patient stop Omeprazole and switch to Nexium, discussed use and side effects   - Discussed PPI vs H2RA, may add in Ranitidine if symptoms not controlled   - Discussed if can't control symptoms, may need EGD  - Recheck 2-4 weeks      4. Hypothyroid   - Patient with adverse reaction to Levothyroxine, did well on [place] thyroid in the past   - Since off medication, will get new labs today and then restart [place] (was previously on 60 mg)   - Recheck labs every 6-8 weeks until stable   - Discussed how thyroid can affect mood and concentration      5. ADHD   - Confirmed diagnosis and testing via Care Everywhere  -  reviewed no concerns   - Was previously on Ritalin (made her angry) and Adderall XR 25 mg   - Will start at Adderall XR 20 mg, reviewed use and side effects   - Recheck in 2-4 weeks   - Plan for CSA and Utox at that point      Problem list and histories reviewed & adjusted, as indicated.  Additional history: as documented    Labs reviewed in EPIC    ROS:  Constitutional, HEENT, cardiovascular, pulmonary, gi and gu systems are negative, except as otherwise noted.      OBJECTIVE:   /76 (BP Location: Right arm, Patient Position: Chair, Cuff Size: Adult Large)  Pulse 87  Temp 98.3  F (36.8  C) (Oral)  Resp 16  Wt 195 lb 9.6 oz (88.7 kg)  SpO2 94%  BMI 34.65 kg/m2  Body mass index is 34.65 kg/(m^2).  GENERAL APPEARANCE: healthy, alert and no distress  EYES: Eyes grossly normal to inspection, PERRLA, conjunctivae and sclerae without injection or discharge, EOM intact   PSYCH: Alert and oriented x3; speech- coherent , normal rate and volume; able to articulate logical thoughts, able to abstract reason, no tangential thoughts, no hallucinations or delusions, mentation appears normal, Mood is euthymic. Affect is appropriate for this mood state and  bright. Thought content is free of suicidal ideation, hallucinations, and delusions. Dress is adequate and upkept. Eye contact is good during conversation.       Diagnostic Test Results:  none     ASSESSMENT/PLAN:       ICD-10-CM    1. ADHD (attention deficit hyperactivity disorder), inattentive type F90.0 amphetamine-dextroamphetamine (ADDERALL XR) 20 MG per 24 hr capsule     Pain Drug Scr UR W Rptd Meds   2. YUE (generalized anxiety disorder) F41.1 Pain Drug Scr UR W Rptd Meds     buPROPion (WELLBUTRIN XL) 300 MG 24 hr tablet   3. Major depressive disorder, recurrent episode, mild (H) F33.0 Pain Drug Scr UR W Rptd Meds     buPROPion (WELLBUTRIN XL) 300 MG 24 hr tablet   4. Acquired hypothyroidism E03.9    5. Gastroesophageal reflux disease without esophagitis K21.9      1. ADHD   - Improved and now stable since re-starting Adderall XR 20 mg   - CareEverywhere confirmed diagnosis at last visit   - Refilled for 1 month, reviewed use and side effects  - CSA discussed and signed today - 9/19/17 Adderall XR 20 mg, 30/month   - Utox collected today  -  reviewed no concerns     2 & 3. Mood   - Stable and improving as ADHD symptoms do   - Continue Wellbutrin 300 mg, reviewed use and side effects, refilled   - Continue occasional Atarax as needed, no refills needed today per patient     4. Thyroid   - Improving back on [place] thyroid, too early to check lab  - Plan to check lab at next visit     5. GERD  - Improved on Nexium, reviewed use and side effects    The patient indicates understanding of these issues and agrees with the plan.    Follow up: 1 month         Melva You PA-C  [facility]

## 2017-09-19 ENCOUNTER — OFFICE VISIT (OUTPATIENT)
Dept: FAMILY MEDICINE | Facility: OTHER | Age: 37
End: 2017-09-19
Payer: COMMERCIAL

## 2017-09-19 VITALS
OXYGEN SATURATION: 94 % | DIASTOLIC BLOOD PRESSURE: 76 MMHG | BODY MASS INDEX: 34.65 KG/M2 | RESPIRATION RATE: 16 BRPM | SYSTOLIC BLOOD PRESSURE: 108 MMHG | TEMPERATURE: 98.3 F | WEIGHT: 195.6 LBS | HEART RATE: 87 BPM

## 2017-09-19 DIAGNOSIS — K21.9 GASTROESOPHAGEAL REFLUX DISEASE WITHOUT ESOPHAGITIS: ICD-10-CM

## 2017-09-19 DIAGNOSIS — F41.1 GAD (GENERALIZED ANXIETY DISORDER): ICD-10-CM

## 2017-09-19 DIAGNOSIS — E03.9 ACQUIRED HYPOTHYROIDISM: ICD-10-CM

## 2017-09-19 DIAGNOSIS — F33.0 MAJOR DEPRESSIVE DISORDER, RECURRENT EPISODE, MILD (H): ICD-10-CM

## 2017-09-19 DIAGNOSIS — F90.0 ADHD (ATTENTION DEFICIT HYPERACTIVITY DISORDER), INATTENTIVE TYPE: Primary | ICD-10-CM

## 2017-09-19 PROCEDURE — 99000 SPECIMEN HANDLING OFFICE-LAB: CPT | Performed by: PHYSICIAN ASSISTANT

## 2017-09-19 PROCEDURE — 80307 DRUG TEST PRSMV CHEM ANLYZR: CPT | Mod: 90 | Performed by: PHYSICIAN ASSISTANT

## 2017-09-19 PROCEDURE — 99214 OFFICE O/P EST MOD 30 MIN: CPT | Performed by: PHYSICIAN ASSISTANT

## 2017-09-19 RX ORDER — DEXTROAMPHETAMINE SACCHARATE, AMPHETAMINE ASPARTATE MONOHYDRATE, DEXTROAMPHETAMINE SULFATE AND AMPHETAMINE SULFATE 5; 5; 5; 5 MG/1; MG/1; MG/1; MG/1
20 CAPSULE, EXTENDED RELEASE ORAL DAILY
Qty: 30 CAPSULE | Refills: 0 | Status: SHIPPED | OUTPATIENT
Start: 2017-09-19 | End: 2018-01-31

## 2017-09-19 RX ORDER — BUPROPION HYDROCHLORIDE 300 MG/1
300 TABLET ORAL EVERY MORNING
Qty: 90 TABLET | Refills: 3 | Status: SHIPPED | OUTPATIENT
Start: 2017-09-19 | End: 2018-04-10

## 2017-09-19 ASSESSMENT — PATIENT HEALTH QUESTIONNAIRE - PHQ9
SUM OF ALL RESPONSES TO PHQ QUESTIONS 1-9: 4
SUM OF ALL RESPONSES TO PHQ QUESTIONS 1-9: 4
10. IF YOU CHECKED OFF ANY PROBLEMS, HOW DIFFICULT HAVE THESE PROBLEMS MADE IT FOR YOU TO DO YOUR WORK, TAKE CARE OF THINGS AT HOME, OR GET ALONG WITH OTHER PEOPLE: SOMEWHAT DIFFICULT

## 2017-09-19 ASSESSMENT — ANXIETY QUESTIONNAIRES
7. FEELING AFRAID AS IF SOMETHING AWFUL MIGHT HAPPEN: SEVERAL DAYS
5. BEING SO RESTLESS THAT IT IS HARD TO SIT STILL: SEVERAL DAYS
GAD7 TOTAL SCORE: 9
3. WORRYING TOO MUCH ABOUT DIFFERENT THINGS: SEVERAL DAYS
4. TROUBLE RELAXING: MORE THAN HALF THE DAYS
1. FEELING NERVOUS, ANXIOUS, OR ON EDGE: SEVERAL DAYS
GAD7 TOTAL SCORE: 9
6. BECOMING EASILY ANNOYED OR IRRITABLE: MORE THAN HALF THE DAYS
GAD7 TOTAL SCORE: 9
7. FEELING AFRAID AS IF SOMETHING AWFUL MIGHT HAPPEN: SEVERAL DAYS
2. NOT BEING ABLE TO STOP OR CONTROL WORRYING: SEVERAL DAYS

## 2017-09-19 NOTE — MR AVS SNAPSHOT
After Visit Summary   9/19/2017    Ms. Ashley Mcdaniels    MRN: [id number]           Patient Information     Date Of Birth          1980        Visit Information        Provider Department      9/19/2017 10:45 AM Melva You PA-C [facility]        Today's Diagnoses     ADHD (attention deficit hyperactivity disorder), inattentive type    -  1    YUE (generalized anxiety disorder)        Major depressive disorder, recurrent episode, mild (H)        Acquired hypothyroidism        Gastroesophageal reflux disease without esophagitis          Care Instructions    - Recheck 1 month               Follow-ups after your visit        Who to contact     If you have questions or need follow up information about today's clinic visit or your schedule please contact [facility] directly at [phone].  Normal or non-critical lab and imaging results will be communicated to you by MyChart, letter or phone within 4 business days after the clinic has received the results. If you do not hear from us within 7 days, please contact the clinic through MyChart or phone. If you have a critical or abnormal lab result, we will notify you by phone as soon as possible.  Submit refill requests through [employer] or call your pharmacy and they will forward the refill request to us. Please allow 3 business days for your refill to be completed.          Additional Information About Your Visit        MyChart Information     [employer] gives you secure access to your electronic health record. If you see a primary care provider, you can also send messages to your care team and make appointments. If you have questions, please call your primary care clinic.  If you do not have a primary care provider, please call [phone] and they will assist you.        Care EveryWhere ID     This is your Care EveryWhere ID. This could be used by other organizations to access your [facility]  records  [id number]        Your Vitals Were     Pulse Temperature Respirations Pulse Oximetry BMI (Body Mass Index)       87 98.3  F (36.8  C) (Oral) 16 94% 34.65 kg/m2        Blood Pressure from Last 3 Encounters:   09/19/17 108/76   08/28/17 104/70   08/18/17 114/76    Weight from Last 3 Encounters:   09/19/17 195 lb 9.6 oz (88.7 kg)   08/28/17 202 lb (91.6 kg)   08/18/17 201 lb 3.2 oz (91.3 kg)              We Performed the Following     Pain Drug Scr UR W Rptd Meds          Where to get your medicines      These medications were sent to [facility] - [address]   [address] [place] [address]     Phone:  [phone]     buPROPion 300 MG 24 hr tablet         Some of these will need a paper prescription and others can be bought over the counter.  Ask your nurse if you have questions.     Bring a paper prescription for each of these medications     amphetamine-dextroamphetamine 20 MG per 24 hr capsule          Primary Care Provider Office Phone # Fax #    Melva ANDIE You PA-C [phone]       [address] [place] [address]        Equal Access to Services     ERICKA WEINSTEIN AH: Hadii rupinder ku hadasho Soomaali, waaxda luqadaha, qaybta kaalmada adeegyada, brenda gein hayrenee emmanuel . So [facility] [phone].    ATENCIÓN: Si habla español, tiene a keita disposición servicios gratuitos de asistencia lingüística. Llame al [phone].    We comply with applicable federal civil rights laws and [place] laws. We do not discriminate on the basis of race, color, national origin, age, disability sex, sexual orientation or gender identity.            Thank you!     Thank you for choosing [facility]  for your care. Our goal is always to provide you with excellent care. Hearing back from our patients is one way we can continue to improve our services. Please take a few minutes to complete the written survey that you  may receive in the mail after your visit with us. Thank you!             Your Updated Medication List - Protect others around you: Learn how to safely use, store and throw away your medicines at www.disposemymeds.org.          This list is accurate as of: 9/19/17 11:39 AM.  Always use your most recent med list.                   Brand Name Dispense Instructions for use Diagnosis    acetaminophen 500 MG tablet    TYLENOL     Take 1,000 mg by mouth        amphetamine-dextroamphetamine 20 MG per 24 hr capsule    ADDERALL XR    30 capsule    Take 1 capsule (20 mg) by mouth daily    ADHD (attention deficit hyperactivity disorder), inattentive type       B COMPLEX FORMULA 1 Tabs      Take 1 tablet by mouth        buPROPion 300 MG 24 hr tablet    WELLBUTRIN XL    90 tablet    Take 1 tablet (300 mg) by mouth every morning    YUE (generalized anxiety disorder), Major depressive disorder, recurrent episode, mild (H)       cetirizine 10 MG tablet    zyrTEC    90 tablet    Take 1 tablet (10 mg) by mouth daily    Chronic urticaria       esomeprazole 40 MG CR capsule    nexIUM    90 capsule    Take 1 capsule (40 mg) by mouth every morning (before breakfast) Take 30-60 minutes before eating.    Gastroesophageal reflux disease without esophagitis       hydrOXYzine 25 MG tablet    ATARAX    30 tablet    Take 1-2 tablets (25-50 mg) by mouth every 6 hours as needed for itching    Urticaria, acute       ibuprofen 200 MG tablet    ADVIL/MOTRIN     Take 400 mg by mouth        levonorgestrel 20 MCG/24HR IUD    MIRENA     1 each (20 mcg) by Intrauterine route continuous    Encounter for IUD removal, Encounter for IUD insertion, IUD (intrauterine device) in place       thyroid 15 MG Tabs tablet    ARMOUR THYROID    30 tablet    Take 1 tablet (15 mg) by mouth daily    Acquired hypothyroidism

## 2017-09-19 NOTE — LETTER
[facility]  -- Controlled Medication Agreement    9/19/2017   Ashley Mcdaniels   1980   [id number]       I understand that my provider is prescribing controlled medications to assist me in managing my ADHD.  The risks, benefits, and side effects of these medications have been explained to me and I agree to the following conditions for this type of treatment.    Stimulant Medication Prescribed: Adderall XR 20 mg     1.  I will take my medications exactly as prescribed and will not change the medication dosage or schedule without my provider's approval.  Refills will not be given if I  runs out early.     2.  I will keep all regular appointments at this clinic.  If there are three or more missed appointments or appointments canceled less than 2 hours before the scheduled time, my medication may be discontinued.    3.  I understand that prescriptions may only be written for one month at a time, and a written prescription is required each month.  Prescriptions cannot be called in or faxed to the pharmacy.    4.  If the prescription is lost or stolen, replacement is at the discretion of my provider.  I understand that this may mean the prescription might not be replaced.    5.  If I am late for scheduled follow up, I understand that I must make an appointment and that another refill is at the discretion of my provider.  This may mean a prescription for only the amount required until the appointment, regardless of prescription co-pay.  For example, if an appointment is made in 1 week, a prescription might only be written for 7 pills.      I understand that if I violate any of the above conditions, my prescription medications and/or treatment may be terminated.  If the violation includes providing controlled substances to anyone other than to whom the medication is prescribed, a report may be made to my child's physician, pharmacy, and other authorities, including the police.    I have read this contract and it has  been explained to me.  I fully understand the consequences of violating this agreement.    _________________________________/_____________  Patient signature/Date

## 2017-09-19 NOTE — NURSING NOTE
"Chief Complaint   Patient presents with     Recheck Medication     Panel Management     phq/ngozi, flu       Initial /76 (BP Location: Right arm, Patient Position: Chair, Cuff Size: Adult Large)  Pulse 87  Temp 98.3  F (36.8  C) (Oral)  Resp 16  Wt 195 lb 9.6 oz (88.7 kg)  SpO2 94%  BMI 34.65 kg/m2 Estimated body mass index is 34.65 kg/(m^2) as calculated from the following:    Height as of 1/25/17: 5' 3\" (1.6 m).    Weight as of this encounter: 195 lb 9.6 oz (88.7 kg).  Medication Reconciliation: complete  "

## 2017-09-20 ASSESSMENT — ANXIETY QUESTIONNAIRES: GAD7 TOTAL SCORE: 9

## 2017-09-20 ASSESSMENT — PATIENT HEALTH QUESTIONNAIRE - PHQ9: SUM OF ALL RESPONSES TO PHQ QUESTIONS 1-9: 4

## 2017-09-24 LAB — PAIN DRUG SCR UR W RPTD MEDS: NORMAL

## 2017-09-25 ENCOUNTER — MYC MEDICAL ADVICE (OUTPATIENT)
Dept: FAMILY MEDICINE | Facility: OTHER | Age: 37
End: 2017-09-25

## 2017-09-25 NOTE — PROGRESS NOTES
Hello Tia    Your urine results were normal/as expected.    The results are attached for your review.       Rene You PA-C

## 2017-10-30 ENCOUNTER — TELEPHONE (OUTPATIENT)
Dept: FAMILY MEDICINE | Facility: OTHER | Age: 37
End: 2017-10-30

## 2017-10-30 NOTE — PROGRESS NOTES
SUBJECTIVE:                                                    Ashley Mcdaniels is a 37 year old female who presents to clinic today for the following health issues:      Please have patient leave UA if indicated -CDL     HPI    ABDOMINAL   PAIN     Onset: Started 3 weeks ago     Description:   Character: Dull ache (constantly) , Burning( up higher- intermittent)  and Cramping (intermittent)   Location: epigastric region pelvic region  Radiation: Back and both thighs are tender to the touch     Intensity: severe, 8/10 (worse) ; 6-7/10 (currently)    Progression of Symptoms:  worsening, constant and intermittent    Accompanying Signs & Symptoms:  Fever/Chills?: no   Gas/Bloating: YES- sometimes  Nausea: no   Vomitting: no   Diarrhea?: no   Constipation:no   Dysuria or Hematuria: no    History:   Trauma: no   Previous similar pain: no    Previous tests done: none    Precipitating factors:   Does the pain change with:     Food: YES- sometimes the burning is there right away after eating, but that isn't always the case. Sometimes it is there when she hasn't eaten anything.     BM: YES- right before, gets worse right before, doesn't seem to change after     Urination: YES- right before, gets worse right before, doesn't change after     Alleviating factors:  NA    Therapies Tried and outcome: Pepto, Tylenol, IBU- nothing worked.     LMP:  PT hasn't had her cycle in months. Pt has the Mirena.      Answers for HPI/ROS submitted by the patient on 10/31/2017   PHQ9 TOTAL SCORE: 5  YUE 7 TOTAL SCORE: 6    - Bowel movements been mostly the same, maybe less often      2-4x/day soft easy to push out      Type 4 Lee normally     - No change to urination   - Diet same   - History of  Crohn's, doesn't feel the same with bowel movements, maybe pain the same   - Also had colitis in the past   - Doesn't believe this diagnosis   - Doesn't follow with anyone   - Hasn't had symptoms in 2 years   - During all this diagnosis was told yes  and no for colitis vs crohn's, was in hospital for pancreatitis, they wanted to her to go on Humera but patient elected nothing    - No gallbladder   - Doesn't change with movement       Problem list and histories reviewed & adjusted, as indicated.  Additional history: as documented    Labs reviewed in EPIC    ROS:  Constitutional, HEENT, cardiovascular, pulmonary, gi and gu systems are negative, except as otherwise noted.      OBJECTIVE:   /70 (BP Location: Right arm, Patient Position: Sitting, Cuff Size: Adult Regular)  Pulse 97  Temp 98  F (36.7  C) (Temporal)  Wt 195 lb 3.2 oz (88.5 kg)  SpO2 98%  BMI 34.58 kg/m2  Body mass index is 34.58 kg/(m^2).  GENERAL APPEARANCE: healthy, alert and no distress  EYES: Eyes grossly normal to inspection, PERRLA, conjunctivae and sclerae without injection or discharge, EOM intact   RESP: Lungs clear to auscultation - no rales, rhonchi or wheezes   CV: Regular rates and rhythm, normal S1 S2, no S3 or S4, no murmur, click or rub  ABDOMEN: Soft, mildly tender to palpation of left lower quadrant and suprapubic areas, no hepatosplenomegaly, no masses and bowel sounds normal, port from gastric banding in the left lower quadrant    MS: No musculoskeletal defects are noted and gait is age appropriate without ataxia   SKIN: No suspicious lesions or rashes, hydration status appears adeuqate with normal skin turgor   NEURO: Strength 5+ bilateral upper and lower extremities, sensation intact in distal bilateral upper and lower extremities, mentation- intact, speech- normal, reflexes- symmetric in bilateral upper and lower extremities, cranial nerves II-XII tested and are intact   BACK: No CVA tenderness, no paralumbar tenderness, no midline tenderness, normal ROM   PSYCH: Alert and oriented x3; speech- coherent , normal rate and volume; able to articulate logical thoughts, able to abstract reason, no tangential thoughts, no hallucinations or delusions, mentation appears normal,  Mood is euthymic. Affect is appropriate for this mood state and bright. Thought content is free of suicidal ideation, hallucinations, and delusions. Dress is adequate and upkept. Eye contact is good during conversation.       Diagnostic Test Results:  Results for orders placed or performed in visit on 10/31/17 (from the past 24 hour(s))   ESR: Erythrocyte sedimentation rate   Result Value Ref Range    Sed Rate 18 0 - 20 mm/h   *UA reflex to Microscopic   Result Value Ref Range    Color Urine Yellow     Appearance Urine Clear     Glucose Urine Negative NEG^Negative mg/dL    Bilirubin Urine Negative NEG^Negative    Ketones Urine Negative NEG^Negative mg/dL    Specific Gravity Urine <=1.005 1.003 - 1.035    Blood Urine Trace (A) NEG^Negative    pH Urine 6.0 5.0 - 7.0 pH    Protein Albumin Urine Negative NEG^Negative mg/dL    Urobilinogen Urine 0.2 0.2 - 1.0 EU/dL    Nitrite Urine Negative NEG^Negative    Leukocyte Esterase Urine Negative NEG^Negative    Source Unspecified Urine    Urine Microscopic   Result Value Ref Range    WBC Urine O - 2 OTO2^O - 2 /HPF    RBC Urine O - 2 OTO2^O - 2 /HPF       ASSESSMENT/PLAN:       ICD-10-CM    1. Abdominal pain, generalized R10.84 *UA reflex to Microscopic     XR Abdomen 2 Views     Comprehensive metabolic panel     CBC with platelets     ESR: Erythrocyte sedimentation rate     CRP, inflammation     Urine Microscopic     US Abdomen Complete     US Pelvic Complete w Transvaginal     GASTROENTEROLOGY ADULT REF CONSULT ONLY   2. Crohn's disease of both small and large intestine without complication (H) K50.80 US Abdomen Complete     US Pelvic Complete w Transvaginal     GASTROENTEROLOGY ADULT REF CONSULT ONLY     - No signs of acute or surgical abdomen, no signs of UTI   - Complex history with possible colitis or crohn's diagnosis, patient s/p cholecystectomy, etiology remains elusive, will get labs to rule out infection, anemia, liver disease, but all these seem unlikely   -  Recommend ultrasound abdominal, pelvic, and transvaginal to rule out uterine, ovarian, liver, or colon etiology   - Also recommend patient get re-established with GI specialist to figure out if really has Crohn's or not, referral placed   - Patient declines anything for symptom management at this time, states can manage OTC, also worried this is just stress   - Discussed warning signs that would warrant return to clinic       The patient indicates understanding of these issues and agrees with the plan.    Follow up: pending imaging and labs, should schedule early with GI since booked several months out         Melva You PA-C  [facility]

## 2017-10-30 NOTE — TELEPHONE ENCOUNTER
"I spoke with patient.   She has had lower abdominal pain in the center for several weeks.   She also notes that her back pain is worsening.   It is constant in nature, but the intensity varies.   She is still having bowel movements, but admits to not having \"as many as usual\".   She is not dizzy, weak, or experiencing numbness/tingling.   She would like to follow up at tomorrow's appointment.     Miladis Robin RN, BSN    "

## 2017-10-30 NOTE — TELEPHONE ENCOUNTER
"Patient on CDL schedule tomorrow for \"low back pain and lower abdominal pain, moderate to severe at times.\"    Will flag for RN to review/advise to see if appropriate to wait until tomorrow.  Christie Man, [facility]    "

## 2017-10-31 ENCOUNTER — RADIANT APPOINTMENT (OUTPATIENT)
Dept: GENERAL RADIOLOGY | Facility: OTHER | Age: 37
End: 2017-10-31
Attending: PHYSICIAN ASSISTANT
Payer: COMMERCIAL

## 2017-10-31 ENCOUNTER — OFFICE VISIT (OUTPATIENT)
Dept: FAMILY MEDICINE | Facility: OTHER | Age: 37
End: 2017-10-31
Payer: COMMERCIAL

## 2017-10-31 VITALS
WEIGHT: 195.2 LBS | OXYGEN SATURATION: 98 % | HEART RATE: 97 BPM | DIASTOLIC BLOOD PRESSURE: 70 MMHG | SYSTOLIC BLOOD PRESSURE: 110 MMHG | BODY MASS INDEX: 34.58 KG/M2 | TEMPERATURE: 98 F

## 2017-10-31 DIAGNOSIS — R10.84 ABDOMINAL PAIN, GENERALIZED: Primary | ICD-10-CM

## 2017-10-31 DIAGNOSIS — R10.84 ABDOMINAL PAIN, GENERALIZED: ICD-10-CM

## 2017-10-31 DIAGNOSIS — K50.80 CROHN'S DISEASE OF BOTH SMALL AND LARGE INTESTINE WITHOUT COMPLICATION (H): ICD-10-CM

## 2017-10-31 LAB
ALBUMIN SERPL-MCNC: 3.6 G/DL (ref 3.4–5)
ALBUMIN UR-MCNC: NEGATIVE MG/DL
ALP SERPL-CCNC: 82 U/L (ref 40–150)
ALT SERPL W P-5'-P-CCNC: 17 U/L (ref 0–50)
ANION GAP SERPL CALCULATED.3IONS-SCNC: 9 MMOL/L (ref 3–14)
APPEARANCE UR: CLEAR
AST SERPL W P-5'-P-CCNC: 10 U/L (ref 0–45)
BILIRUB SERPL-MCNC: 0.2 MG/DL (ref 0.2–1.3)
BILIRUB UR QL STRIP: NEGATIVE
BUN SERPL-MCNC: 7 MG/DL (ref 7–30)
CALCIUM SERPL-MCNC: 8.6 MG/DL (ref 8.5–10.1)
CHLORIDE SERPL-SCNC: 103 MMOL/L (ref 94–109)
CO2 SERPL-SCNC: 25 MMOL/L (ref 20–32)
COLOR UR AUTO: YELLOW
CREAT SERPL-MCNC: 0.84 MG/DL (ref 0.52–1.04)
CRP SERPL-MCNC: 22.7 MG/L (ref 0–8)
ERYTHROCYTE [DISTWIDTH] IN BLOOD BY AUTOMATED COUNT: 13.9 % (ref 10–15)
ERYTHROCYTE [SEDIMENTATION RATE] IN BLOOD BY WESTERGREN METHOD: 18 MM/H (ref 0–20)
GFR SERPL CREATININE-BSD FRML MDRD: 77 ML/MIN/1.7M2
GLUCOSE SERPL-MCNC: 106 MG/DL (ref 70–99)
GLUCOSE UR STRIP-MCNC: NEGATIVE MG/DL
HCT VFR BLD AUTO: 42.2 % (ref 35–47)
HGB BLD-MCNC: 13.9 G/DL (ref 11.7–15.7)
HGB UR QL STRIP: ABNORMAL
KETONES UR STRIP-MCNC: NEGATIVE MG/DL
LEUKOCYTE ESTERASE UR QL STRIP: NEGATIVE
MCH RBC QN AUTO: 28.8 PG (ref 26.5–33)
MCHC RBC AUTO-ENTMCNC: 32.9 G/DL (ref 31.5–36.5)
MCV RBC AUTO: 87 FL (ref 78–100)
NITRATE UR QL: NEGATIVE
PH UR STRIP: 6 PH (ref 5–7)
PLATELET # BLD AUTO: 377 10E9/L (ref 150–450)
POTASSIUM SERPL-SCNC: 3.9 MMOL/L (ref 3.4–5.3)
PROT SERPL-MCNC: 8 G/DL (ref 6.8–8.8)
RBC # BLD AUTO: 4.83 10E12/L (ref 3.8–5.2)
RBC #/AREA URNS AUTO: NORMAL /HPF
SODIUM SERPL-SCNC: 137 MMOL/L (ref 133–144)
SOURCE: ABNORMAL
SP GR UR STRIP: <=1.005 (ref 1–1.03)
UROBILINOGEN UR STRIP-ACNC: 0.2 EU/DL (ref 0.2–1)
WBC # BLD AUTO: 12.4 10E9/L (ref 4–11)
WBC #/AREA URNS AUTO: NORMAL /HPF

## 2017-10-31 PROCEDURE — 85027 COMPLETE CBC AUTOMATED: CPT | Performed by: PHYSICIAN ASSISTANT

## 2017-10-31 PROCEDURE — 81001 URINALYSIS AUTO W/SCOPE: CPT | Performed by: PHYSICIAN ASSISTANT

## 2017-10-31 PROCEDURE — 99214 OFFICE O/P EST MOD 30 MIN: CPT | Performed by: PHYSICIAN ASSISTANT

## 2017-10-31 PROCEDURE — 86140 C-REACTIVE PROTEIN: CPT | Performed by: PHYSICIAN ASSISTANT

## 2017-10-31 PROCEDURE — 36415 COLL VENOUS BLD VENIPUNCTURE: CPT | Performed by: PHYSICIAN ASSISTANT

## 2017-10-31 PROCEDURE — 80053 COMPREHEN METABOLIC PANEL: CPT | Performed by: PHYSICIAN ASSISTANT

## 2017-10-31 PROCEDURE — 85652 RBC SED RATE AUTOMATED: CPT | Performed by: PHYSICIAN ASSISTANT

## 2017-10-31 PROCEDURE — 74020 XR ABDOMEN 2 VW: CPT

## 2017-10-31 ASSESSMENT — ANXIETY QUESTIONNAIRES
5. BEING SO RESTLESS THAT IT IS HARD TO SIT STILL: NOT AT ALL
1. FEELING NERVOUS, ANXIOUS, OR ON EDGE: SEVERAL DAYS
6. BECOMING EASILY ANNOYED OR IRRITABLE: SEVERAL DAYS
GAD7 TOTAL SCORE: 6
3. WORRYING TOO MUCH ABOUT DIFFERENT THINGS: MORE THAN HALF THE DAYS
GAD7 TOTAL SCORE: 6
7. FEELING AFRAID AS IF SOMETHING AWFUL MIGHT HAPPEN: NOT AT ALL
GAD7 TOTAL SCORE: 6
4. TROUBLE RELAXING: SEVERAL DAYS
2. NOT BEING ABLE TO STOP OR CONTROL WORRYING: SEVERAL DAYS
7. FEELING AFRAID AS IF SOMETHING AWFUL MIGHT HAPPEN: NOT AT ALL

## 2017-10-31 ASSESSMENT — PATIENT HEALTH QUESTIONNAIRE - PHQ9
SUM OF ALL RESPONSES TO PHQ QUESTIONS 1-9: 5
SUM OF ALL RESPONSES TO PHQ QUESTIONS 1-9: 5

## 2017-10-31 ASSESSMENT — PAIN SCALES - GENERAL: PAINLEVEL: SEVERE PAIN (7)

## 2017-10-31 NOTE — NURSING NOTE
"Chief Complaint   Patient presents with     Abdominal Pain     Back Pain     Panel Management     flu, phq/ngozi       Initial /70 (BP Location: Right arm, Patient Position: Sitting, Cuff Size: Adult Regular)  Pulse 97  Temp 98  F (36.7  C) (Temporal)  Wt 195 lb 3.2 oz (88.5 kg)  SpO2 98%  BMI 34.58 kg/m2 Estimated body mass index is 34.58 kg/(m^2) as calculated from the following:    Height as of 1/25/17: 5' 3\" (1.6 m).    Weight as of this encounter: 195 lb 3.2 oz (88.5 kg).  Medication Reconciliation: complete     Pt declined flu injection.   Mirna Correa MA  October 31, 2017      "

## 2017-10-31 NOTE — MR AVS SNAPSHOT
After Visit Summary   10/31/2017    Ms. Ashley Mcdaniels    MRN: [id number]           Patient Information     Date Of Birth          1980        Visit Information        Provider Department      10/31/2017 1:00 PM Melva You PA-C [facility]        Today's Diagnoses     Abdominal pain, generalized    -  1    Crohn's disease of both small and large intestine without complication (H)          Care Instructions    - Schedule ultrasound, await rest of labs     - Call to get appointment with [facility]               Follow-ups after your visit        Additional Services     GASTROENTEROLOGY ADULT REF CONSULT ONLY       Preferred Location: [facility] [phone]      Please be aware that coverage of these services is subject to the terms and limitations of your health insurance plan.  Call member services at your health plan with any benefit or coverage questions.  Any procedures must be performed at a [facility] facility OR coordinated by your clinic's referral office.    Please bring the following with you to your appointment:    (1) Any X-Rays, CTs or MRIs which have been performed.  Contact the facility where they were done to arrange for  prior to your scheduled appointment.    (2) List of current medications   (3) This referral request   (4) Any documents/labs given to you for this referral                  Your next 10 appointments already scheduled     Nov 07, 2017  8:30 AM CST   US [facility]BDOMEN COMPLETE with [email or web address]   [facility]    [address] [place] [id number] [phone]           Please bring a list of your medicines (including vitamins, minerals and over-the-counter drugs). Also, tell your doctor about any allergies you may have. Wear comfortable clothes and leave your valuables at home.  Adults: No eating or drinking for 8 hours before the exam. You may take medicine with a small sip of water.  Children: -  Children 6+ years: No food or drink for 6 hours before exam. - Children 1-5 years: No food or drink for 4 hours before exam. - Infants, breast-fed: may have breast milk up to 2 hours before exam. - Infants, formula: may have bottle until 4 hours before exam.  Please call the Imaging Department at your exam site with any questions.            Nov 07, 2017  9:10 AM CST   US PELVIC COMPLETE W TRANSVAGINAL with ERUS1   [facility]    [address] [place] [id number] [phone]           Please bring a list of your medicines (including vitamins, minerals and over-the-counter drugs). Also, tell your doctor about any allergies you may have. Wear comfortable clothes and leave your valuables at home.  Adults: Drink six 8-ounce glasses of fluid one hour before your exam. Do NOT empty your bladder.  If you need to empty your bladder before your exam, try to release only a little bit of urine. Then, drink another 8oz glass of fluid.  Children: Children who are potty trained should drink at least 4 cups (32 oz) of liquid 45 minutes to one hour prior to the exam. The child s bladder must be full in order to achieve a diagnostic exam. If your child is very uncomfortable or has an urgent need to pee, please notify a technologist; they will try to find out how much longer the wait may be and provide instructions to help relieve the pressure. Occasionally it is medically necessary to insert a urinary catheter to fill the bladder.  Please call the Imaging Department at your exam site with any questions.              Future tests that were ordered for you today     Open Future Orders        Priority Expected Expires Ordered    US Pelvic Complete w Transvaginal Routine  10/31/2018 10/31/2017    US Abdomen Complete Routine  10/31/2018 10/31/2017            Who to contact     If you have questions or need follow up information about today's clinic visit or your schedule please  contact [facility] directly at [phone].  Normal or non-critical lab and imaging results will be communicated to you by MyChart, letter or phone within 4 business days after the clinic has received the results. If you do not hear from us within 7 days, please contact the clinic through [employer] or phone. If you have a critical or abnormal lab result, we will notify you by phone as soon as possible.  Submit refill requests through [employer] or call your pharmacy and they will forward the refill request to us. Please allow 3 business days for your refill to be completed.          Additional Information About Your Visit        [employer] Traveller Information     [employer] gives you secure access to your electronic health record. If you see a primary care provider, you can also send messages to your care team and make appointments. If you have questions, please call your primary care clinic.  If you do not have a primary care provider, please call [phone] and they will assist you.        Care EveryWhere ID     This is your Care EveryWhere ID. This could be used by other organizations to access your [facility] medical records  [id number]        Your Vitals Were     Pulse Temperature Pulse Oximetry BMI (Body Mass Index)          97 98  F (36.7  C) (Temporal) 98% 34.58 kg/m2         Blood Pressure from Last 3 Encounters:   10/31/17 110/70   09/19/17 108/76   08/28/17 104/70    Weight from Last 3 Encounters:   10/31/17 195 lb 3.2 oz (88.5 kg)   09/19/17 195 lb 9.6 oz (88.7 kg)   08/28/17 202 lb (91.6 kg)              We Performed the Following     *UA reflex to Microscopic     CBC with platelets     Comprehensive metabolic panel     CRP, inflammation     ESR: Erythrocyte sedimentation rate     GASTROENTEROLOGY ADULT REF CONSULT ONLY     Urine Microscopic        Primary Care Provider Office Phone # Fax #    Melva You PA-C [phone]       [address]  [facility]  [address]        Equal Access to Services     [facility]: Hadii rupinder alston marjoriemyra Seraali, waaxda luqadaha, qaybta kaalmagretta bowman, brenda coronado. So [facility] [phone].    ATENCIÓN: Si habla español, tiene a keita disposición servicios gratuitos de asistencia lingüística. Nakulame al [phone].    We comply with applicable federal civil rights laws and [place] laws. We do not discriminate on the basis of race, color, national origin, age, disability, sex, sexual orientation, or gender identity.            Thank you!     Thank you for choosing [facility]  for your care. Our goal is always to provide you with excellent care. Hearing back from our patients is one way we can continue to improve our services. Please take a few minutes to complete the written survey that you may receive in the mail after your visit with us. Thank you!             Your Updated Medication List - Protect others around you: Learn how to safely use, store and throw away your medicines at www.disposemymeds.org.          This list is accurate as of: 10/31/17  2:22 PM.  Always use your most recent med list.                   Brand Name Dispense Instructions for use Diagnosis    acetaminophen 500 MG tablet    TYLENOL     Take 1,000 mg by mouth        amphetamine-dextroamphetamine 20 MG per 24 hr capsule    ADDERALL XR    30 capsule    Take 1 capsule (20 mg) by mouth daily    ADHD (attention deficit hyperactivity disorder), inattentive type       B COMPLEX FORMULA 1 Tabs      Take 1 tablet by mouth        buPROPion 300 MG 24 hr tablet    WELLBUTRIN XL    90 tablet    Take 1 tablet (300 mg) by mouth every morning    YUE (generalized anxiety disorder), Major depressive disorder, recurrent episode, mild (H)       cetirizine 10 MG tablet    zyrTEC    90 tablet    Take 1 tablet (10 mg) by mouth daily    Chronic urticaria       esomeprazole 40 MG CR capsule    nexIUM    90 capsule    Take 1 capsule (40 mg) by  mouth every morning (before breakfast) Take 30-60 minutes before eating.    Gastroesophageal reflux disease without esophagitis       hydrOXYzine 25 MG tablet    ATARAX    30 tablet    Take 1-2 tablets (25-50 mg) by mouth every 6 hours as needed for itching    Urticaria, acute       ibuprofen 200 MG tablet    ADVIL/MOTRIN     Take 400 mg by mouth        levonorgestrel 20 MCG/24HR IUD    MIRENA     1 each (20 mcg) by Intrauterine route continuous    Encounter for IUD removal, Encounter for IUD insertion, IUD (intrauterine device) in place       thyroid 15 MG Tabs tablet    ARMOUR THYROID    30 tablet    Take 1 tablet (15 mg) by mouth daily    Acquired hypothyroidism

## 2017-11-01 ASSESSMENT — ANXIETY QUESTIONNAIRES: GAD7 TOTAL SCORE: 6

## 2017-11-01 ASSESSMENT — PATIENT HEALTH QUESTIONNAIRE - PHQ9: SUM OF ALL RESPONSES TO PHQ QUESTIONS 1-9: 5

## 2017-11-02 ENCOUNTER — OFFICE VISIT (OUTPATIENT)
Dept: FAMILY MEDICINE | Facility: OTHER | Age: 37
End: 2017-11-02
Payer: COMMERCIAL

## 2017-11-02 ENCOUNTER — TELEPHONE (OUTPATIENT)
Dept: FAMILY MEDICINE | Facility: OTHER | Age: 37
End: 2017-11-02

## 2017-11-02 ENCOUNTER — RADIANT APPOINTMENT (OUTPATIENT)
Dept: ULTRASOUND IMAGING | Facility: OTHER | Age: 37
End: 2017-11-02
Attending: PHYSICIAN ASSISTANT
Payer: COMMERCIAL

## 2017-11-02 VITALS
DIASTOLIC BLOOD PRESSURE: 78 MMHG | SYSTOLIC BLOOD PRESSURE: 110 MMHG | HEART RATE: 88 BPM | BODY MASS INDEX: 34.19 KG/M2 | WEIGHT: 193 LBS | RESPIRATION RATE: 20 BRPM | TEMPERATURE: 98.1 F | OXYGEN SATURATION: 99 %

## 2017-11-02 DIAGNOSIS — K50.80 CROHN'S DISEASE OF BOTH SMALL AND LARGE INTESTINE WITHOUT COMPLICATION (H): ICD-10-CM

## 2017-11-02 DIAGNOSIS — R10.32 ABDOMINAL PAIN, LEFT LOWER QUADRANT: Primary | ICD-10-CM

## 2017-11-02 DIAGNOSIS — B37.31 YEAST INFECTION OF THE VAGINA: ICD-10-CM

## 2017-11-02 DIAGNOSIS — N83.202 LEFT OVARIAN CYST: ICD-10-CM

## 2017-11-02 DIAGNOSIS — R10.84 ABDOMINAL PAIN, GENERALIZED: ICD-10-CM

## 2017-11-02 LAB
BETA HCG QUAL IFA URINE: NEGATIVE
HEMOCCULT STL QL: NEGATIVE
SPECIMEN SOURCE: ABNORMAL
WET PREP SPEC: ABNORMAL

## 2017-11-02 PROCEDURE — 87491 CHLMYD TRACH DNA AMP PROBE: CPT | Performed by: PHYSICIAN ASSISTANT

## 2017-11-02 PROCEDURE — 76856 US EXAM PELVIC COMPLETE: CPT

## 2017-11-02 PROCEDURE — 87591 N.GONORRHOEAE DNA AMP PROB: CPT | Performed by: PHYSICIAN ASSISTANT

## 2017-11-02 PROCEDURE — 87210 SMEAR WET MOUNT SALINE/INK: CPT | Performed by: PHYSICIAN ASSISTANT

## 2017-11-02 PROCEDURE — 76830 TRANSVAGINAL US NON-OB: CPT

## 2017-11-02 PROCEDURE — 84703 CHORIONIC GONADOTROPIN ASSAY: CPT | Performed by: PHYSICIAN ASSISTANT

## 2017-11-02 PROCEDURE — 99214 OFFICE O/P EST MOD 30 MIN: CPT | Performed by: PHYSICIAN ASSISTANT

## 2017-11-02 PROCEDURE — 82272 OCCULT BLD FECES 1-3 TESTS: CPT | Performed by: PHYSICIAN ASSISTANT

## 2017-11-02 RX ORDER — FLUCONAZOLE 150 MG/1
150 TABLET ORAL ONCE
Qty: 3 TABLET | Refills: 0 | Status: SHIPPED | OUTPATIENT
Start: 2017-11-02 | End: 2017-11-02

## 2017-11-02 ASSESSMENT — PAIN SCALES - GENERAL: PAINLEVEL: SEVERE PAIN (6)

## 2017-11-02 NOTE — TELEPHONE ENCOUNTER
Please call patient     White cell count and CRP (inflammatory marker) are both elevated. This along with the fact she has a Mirena IUD makes ovarian/pelvic infection become more likely the cause for her pain. I would like her to come in today or tomorrow for a pelvic exam so we can collect swabs for infection and figure out if we need to start antibiotics. I also see her abdominal ultrasound is next Tuesday, I would like her to get this done today or tomorrow if possible.     Rene You PA-C  [facility]

## 2017-11-02 NOTE — TELEPHONE ENCOUNTER
Nivia SKELTON called patient. Coming in today for pelvic and ultrasound.    Rene You PA-C  [facility]

## 2017-11-02 NOTE — PROGRESS NOTES
SUBJECTIVE:                                                    Ashley Mcdaniels is a 37 year old female who presents to clinic today for the following health issues:      HPI  Abdominal pain right lower quadrant   - More diarrhea  - No fever   - Pain continues to come and go mostly in the LLQ   - Sexually active with same boyfriend for 2 years, no concerns for STDS         Problem list and histories reviewed & adjusted, as indicated.  Additional history: as documented    BP Readings from Last 3 Encounters:   11/02/17 110/78   10/31/17 110/70   09/19/17 108/76    Wt Readings from Last 3 Encounters:   11/02/17 193 lb (87.5 kg)   10/31/17 195 lb 3.2 oz (88.5 kg)   09/19/17 195 lb 9.6 oz (88.7 kg)               Labs reviewed in EPIC      ROS:  Constitutional, HEENT, cardiovascular, pulmonary, gi and gu systems are negative, except as otherwise noted.      OBJECTIVE:   /78 (BP Location: Right arm, Patient Position: Chair, Cuff Size: Adult Large)  Pulse 88  Temp 98.1  F (36.7  C) (Oral)  Resp 20  Wt 193 lb (87.5 kg)  SpO2 99%  BMI 34.19 kg/m2  Body mass index is 34.19 kg/(m^2).  GENERAL APPEARANCE: healthy, alert and no distress  EYES: Eyes grossly normal to inspection, PERRLA, conjunctivae and sclerae without injection or discharge, EOM intact   ABDOMEN: Soft, midly tender to deep palpation of left lower quadrant and suprapubic areas, no rebound, no guarding, no hepatosplenomegaly, no masses and bowel sounds normal    (FEMALE): Normal female external genitalia, vaginal mucosa pink, moist, well rugated, small amount of white normal vaginal discharge present, normal cervix visualized without any signs of abnormal discharge, bimanual exam reveals normal adnexae and uterus without masses, no cervical motion tenderness  MS: No musculoskeletal defects are noted and gait is age appropriate without ataxia   SKIN: No suspicious lesions or rashes, hydration status appears adeuqate with normal skin turgor   PSYCH: Alert  and oriented x3; speech- coherent , normal rate and volume; able to articulate logical thoughts, able to abstract reason, no tangential thoughts, no hallucinations or delusions, mentation appears normal, Mood is euthymic. Affect is appropriate for this mood state and bright. Thought content is free of suicidal ideation, hallucinations, and delusions. Dress is adequate and upkept. Eye contact is good during conversation.       Diagnostic Test Results:  Results for orders placed or performed in visit on 11/02/17 (from the past 24 hour(s))   Wet prep   Result Value Ref Range    Specimen Description Vagina     Wet Prep No Trichomonas seen     Wet Prep No clue cells seen     Wet Prep Yeast seen (A)    Occult blood stool   Result Value Ref Range    Occult Blood Negative NEG^Negative   Beta HCG qual IFA urine - [facility] [place]   Result Value Ref Range    Beta HCG Qual IFA Urine Negative NEG^Negative          ASSESSMENT/PLAN:       ICD-10-CM    1. Abdominal pain, left lower quadrant R10.32 Wet prep     Neisseria gonorrhoeae PCR     Chlamydia trachomatis PCR     Occult blood stool     Beta HCG qual IFA urine - [facility] [place]   2. Yeast infection of the vagina B37.3 fluconazole (DIFLUCAN) 150 MG tablet   3. Left ovarian cyst N83.202      - Patient returned to clinic today for pelvic exam after elevated white count and CRP found on blood work 2 days ago    - No signs of acute or surgical abdomen, no signs of UTI   - No signs of PID or GI bleeding confirmed after today's exam   - Pelvic ultrasound with transvag also completed today        Left ovary - couple of cysts 1.8 cm vascular, plus possible dominant follicle        Spoke to radiologist at 3:12        Left ovary bears follow up - hemorraghic cyst        Follow up ultrasound 6-8 weeks        Doesn't think is causing pain, doesn't recommend CT scan   - As before, also recommend patient get re-established with GI specialist to figure out if really has Crohn's or  not, referral placed previously, patient has not called yet but will   - Discussed did find yeast infection on wet prep today, will treat for this but still seems unlikely to be causing symptoms of this severity    - Patient to take Diflucan, discussed use and side effects  - Await rest of lab results - G & C, though PID unlikely with normal exam today and patient in monogamous relationship   - Complex history with possible colitis or crohn's diagnosis, patient s/p cholecystectomy, labs and exam as above, ultrasound results, etiology remains elusive - unlikely but still possible to be PID, still could be Crohn's flare, also unlikely but could to be due to left ovarian cyst  - Will recheck Ultrasound pelvic with transvag in 6-8 weeks per radiology recommendation    Patient plan  - Call to get appointment with [facility]   - Take Diflucan, can repeat in 3 days   - Monitor symptoms   - Call with update mid next week         The patient indicates understanding of these issues and agrees with the plan.    Follow up: pending       Melva You PA-C  [facility]

## 2017-11-02 NOTE — MR AVS SNAPSHOT
After Visit Summary   11/2/2017    Ms. Ashley Mcdaniels    MRN: [id number]           Patient Information     Date Of Birth          1980        Visit Information        Provider Department      11/2/2017 4:15 PM Melva You PA-C [facility]        Today's Diagnoses     Abdominal pain, left lower quadrant    -  1    Yeast infection of the vagina        Left ovarian cyst        Crohn's disease of both small and large intestine without complication (H)           Follow-ups after your visit        Who to contact     If you have questions or need follow up information about today's clinic visit or your schedule please contact [facility] directly at [phone].  Normal or non-critical lab and imaging results will be communicated to you by JUNIQEhart, letter or phone within 4 business days after the clinic has received the results. If you do not hear from us within 7 days, please contact the clinic through JUNIQEhart or phone. If you have a critical or abnormal lab result, we will notify you by phone as soon as possible.  Submit refill requests through [facility] or call your pharmacy and they will forward the refill request to us. Please allow 3 business days for your refill to be completed.          Additional Information About Your Visit        MyChart Information     [facility] gives you secure access to your electronic health record. If you see a primary care provider, you can also send messages to your care team and make appointments. If you have questions, please call your primary care clinic.  If you do not have a primary care provider, please call [phone] and they will assist you.        Care EveryWhere ID     This is your Care EveryWhere ID. This could be used by other organizations to access your [place] medical records  [id number]        Your Vitals Were     Pulse Temperature Respirations Pulse Oximetry BMI (Body Mass Index)       88 98.1  F (36.7   C) (Oral) 20 99% 34.19 kg/m2        Blood Pressure from Last 3 Encounters:   11/02/17 110/78   10/31/17 110/70   09/19/17 108/76    Weight from Last 3 Encounters:   11/02/17 193 lb (87.5 kg)   10/31/17 195 lb 3.2 oz (88.5 kg)   09/19/17 195 lb 9.6 oz (88.7 kg)              We Performed the Following     Beta HCG qual IFA urine - [facility] and [place]     Chlamydia trachomatis PCR     Neisseria gonorrhoeae PCR     Occult blood stool     Wet prep          Today's Medication Changes          These changes are accurate as of: 11/2/17  4:21 PM.  If you have any questions, ask your nurse or doctor.               Start taking these medicines.        Dose/Directions    fluconazole 150 MG tablet   Commonly known as:  DIFLUCAN   Used for:  Yeast infection of the vagina   Started by:  Melva You PA-C        Dose:  150 mg   Take 1 tablet (150 mg) by mouth once for 1 dose May repeat after 3 days   Quantity:  3 tablet   Refills:  0            Where to get your medicines      These medications were sent to [facility] - [address]   [address] [place] [address]     Phone:  [phone]     fluconazole 150 MG tablet                Primary Care Provider Office Phone # Fax #    Melva You PA-C [phone]       [address]  [facility] [address]        Equal Access to Services     JAMAL WEINSTEIN AH: Hadii rupinder ku hadasho Soomaali, waaxda luqadaha, qaybta kaalmada adeegyada, waxay ayan coronado. So [facility] [phone].    ATENCIÓN: Si habla español, tiene a keita disposición servicios gratuitos de asistencia lingüística. Llame al [phone].    We comply with applicable federal civil rights laws and [place] laws. We do not discriminate on the basis of race, color, national origin, age, disability, sex, sexual orientation, or gender identity.            Thank you!     Thank you for choosing [facility]  ADELITA SANCHEZ  for your care. Our goal is always to provide you with excellent care. Hearing back from our patients is one way we can continue to improve our services. Please take a few minutes to complete the written survey that you may receive in the mail after your visit with us. Thank you!             Your Updated Medication List - Protect others around you: Learn how to safely use, store and throw away your medicines at www.disposemymeds.org.          This list is accurate as of: 11/2/17  4:21 PM.  Always use your most recent med list.                   Brand Name Dispense Instructions for use Diagnosis    acetaminophen 500 MG tablet    TYLENOL     Take 1,000 mg by mouth        amphetamine-dextroamphetamine 20 MG per 24 hr capsule    ADDERALL XR    30 capsule    Take 1 capsule (20 mg) by mouth daily    ADHD (attention deficit hyperactivity disorder), inattentive type       B COMPLEX FORMULA 1 Tabs      Take 1 tablet by mouth        buPROPion 300 MG 24 hr tablet    WELLBUTRIN XL    90 tablet    Take 1 tablet (300 mg) by mouth every morning    YUE (generalized anxiety disorder), Major depressive disorder, recurrent episode, mild (H)       cetirizine 10 MG tablet    zyrTEC    90 tablet    Take 1 tablet (10 mg) by mouth daily    Chronic urticaria       esomeprazole 40 MG CR capsule    nexIUM    90 capsule    Take 1 capsule (40 mg) by mouth every morning (before breakfast) Take 30-60 minutes before eating.    Gastroesophageal reflux disease without esophagitis       fluconazole 150 MG tablet    DIFLUCAN    3 tablet    Take 1 tablet (150 mg) by mouth once for 1 dose May repeat after 3 days    Yeast infection of the vagina       hydrOXYzine 25 MG tablet    ATARAX    30 tablet    Take 1-2 tablets (25-50 mg) by mouth every 6 hours as needed for itching    Urticaria, acute       ibuprofen 200 MG tablet    ADVIL/MOTRIN     Take 400 mg by mouth        levonorgestrel 20 MCG/24HR IUD    MIRENA     1 each (20 mcg) by Intrauterine  route continuous    Encounter for IUD removal, Encounter for IUD insertion, IUD (intrauterine device) in place       thyroid 15 MG Tabs tablet    ARMOUR THYROID    30 tablet    Take 1 tablet (15 mg) by mouth daily    Acquired hypothyroidism

## 2017-11-02 NOTE — NURSING NOTE
"No chief complaint on file.      Initial /78 (BP Location: Right arm, Patient Position: Chair, Cuff Size: Adult Large)  Pulse 88  Temp 98.1  F (36.7  C) (Oral)  Resp 20  Wt 193 lb (87.5 kg)  SpO2 99%  BMI 34.19 kg/m2 Estimated body mass index is 34.19 kg/(m^2) as calculated from the following:    Height as of 1/25/17: 5' 3\" (1.6 m).    Weight as of this encounter: 193 lb (87.5 kg).  Medication Reconciliation: complete  "

## 2017-11-03 LAB
C TRACH DNA SPEC QL NAA+PROBE: NEGATIVE
N GONORRHOEA DNA SPEC QL NAA+PROBE: NEGATIVE
SPECIMEN SOURCE: NORMAL
SPECIMEN SOURCE: NORMAL

## 2017-11-06 NOTE — PROGRESS NOTES
Mariano Ramirez    Your gonorrhea and chlamydia results were negative.     The results are attached for your review.       Rene You PA-C

## 2017-11-07 ENCOUNTER — TELEPHONE (OUTPATIENT)
Dept: FAMILY MEDICINE | Facility: OTHER | Age: 37
End: 2017-11-07

## 2017-11-07 NOTE — TELEPHONE ENCOUNTER
Please call patient and get update on her condition/symptoms and route back to provider.     Rene You PA-C  [facility]

## 2017-11-07 NOTE — TELEPHONE ENCOUNTER
LM for the patient to return call to the clinic to discuss the below. Will await to hear from patient. Monik White RN, BSN

## 2017-11-08 NOTE — TELEPHONE ENCOUNTER
Ashley Mcdaniels is a 37 year old female who calls with update on abdominal pain.    NURSING ASSESSMENT:  Description:  Still has abdominal pain but is getting better. At this time the abdominal pain is in the middle. Pain lasts for about 5 minutes that is achy and cramping, only up to 5/10 on the pain scale. Denies vaginal concerns, lightheadedness, dizziness, worsening symptoms, fever, BM changes, nausea, vomiting.   Onset/duration:  ongoing since LOV  Pain scale (0-10)   5/10 coming and goes  LMP/preg/breast feeding:  No LMP recorded. Patient is not currently having periods (Reason: IUD).  Last exam/Treatment:  11/02/2017  Allergies: No Known Allergies    NURSING PLAN: Routed to provider Yes FYI    RECOMMENDED DISPOSITION:  Routing FYI to provider   Will comply with recommendation: Yes  If further questions/concerns or if symptoms do not improve, worsen or new symptoms develop, call your PCP or [facility] as soon as possible.    NOTES:  Disposition was determined by the first positive assessment question, therefore all previous assessment questions were negative    Guideline used:  Telephone Triage Protocols for Nurses, Fifth Edition, Tammy Guzman  Abdominal pain, Adult  Nursing Judgment  Routing to CDL    Monik White, RN, BSN

## 2017-11-20 ENCOUNTER — TELEPHONE (OUTPATIENT)
Dept: FAMILY MEDICINE | Facility: OTHER | Age: 37
End: 2017-11-20

## 2017-11-20 DIAGNOSIS — B00.9 HSV (HERPES SIMPLEX VIRUS) INFECTION: ICD-10-CM

## 2017-11-20 RX ORDER — VALACYCLOVIR HYDROCHLORIDE 1 G/1
500 TABLET, FILM COATED ORAL 2 TIMES DAILY
Qty: 6 TABLET | Refills: 11 | Status: SHIPPED | OUTPATIENT
Start: 2017-11-20 | End: 2018-10-16

## 2017-11-20 NOTE — TELEPHONE ENCOUNTER
Called pt, left message that the dose will be adjusted to recurrent HSV>   SHARIF Banerjee, LIZM  Rx to pharmacy.

## 2017-11-20 NOTE — TELEPHONE ENCOUNTER
valACYclovir (VALTREX) 1000 mg tablet (Discontinued)  Routing refill request to provider for review/approval because:  Drug not active on patient's medication list  Discontinued by CDL 09/19/2017      Creatinine   Date Value Ref Range Status   10/31/2017 0.84 0.52 - 1.04 mg/dL Final     Monik White, RN, BSN

## 2017-11-20 NOTE — TELEPHONE ENCOUNTER
Patient is requesting a refill of valacyclovir 1000mg -- take 1 tablet three times a day.  This med is no longer on her active med list.

## 2017-12-06 ENCOUNTER — TELEPHONE (OUTPATIENT)
Dept: SURGERY | Facility: CLINIC | Age: 37
End: 2017-12-06

## 2017-12-06 NOTE — TELEPHONE ENCOUNTER
Pt called to schedule colonoscopy. Orders faxed order from [facility] from [place]. Pt wanted Monday (12/11/2017) but we are all full. Pt is going to [place]

## 2017-12-07 DIAGNOSIS — R19.7 DIARRHEA: ICD-10-CM

## 2017-12-07 DIAGNOSIS — K50.80 CROHN'S DISEASE OF BOTH SMALL AND LG INT W/O COMPLICATIONS (H): Primary | ICD-10-CM

## 2017-12-07 LAB
C DIFF TOX B STL QL: NEGATIVE
SPECIMEN SOURCE: NORMAL

## 2017-12-07 PROCEDURE — 83993 ASSAY FOR CALPROTECTIN FECAL: CPT | Performed by: PHYSICIAN ASSISTANT

## 2017-12-07 PROCEDURE — 87506 IADNA-DNA/RNA PROBE TQ 6-11: CPT | Performed by: PHYSICIAN ASSISTANT

## 2017-12-07 PROCEDURE — 87493 C DIFF AMPLIFIED PROBE: CPT | Performed by: PHYSICIAN ASSISTANT

## 2017-12-11 LAB — CALPROTECTIN STL-MCNT: 237.7 MG/KG (ref 0–49.9)

## 2017-12-19 ENCOUNTER — TELEPHONE (OUTPATIENT)
Dept: FAMILY MEDICINE | Facility: OTHER | Age: 37
End: 2017-12-19

## 2017-12-19 NOTE — TELEPHONE ENCOUNTER
Prior Authorization Retail Medication Request  Medication/Dose: Esomeprazole  Diagnosis and ICD code:   New/Renewal/Insurance Change PA:   Previously Tried and Failed Therapies:     Insurance ID (if provided):   Insurance Phone (if provided):     Any additional info from fax request:     If you received a fax notification from an outside Pharmacy:  Pharmacy Name:  Pharmacy #:  Pharmacy Fax:

## 2017-12-19 NOTE — TELEPHONE ENCOUNTER
Central Prior Authorization Team   Phone: [phone]    PA Initiation    Medication: Esomeprazole  Insurance Company: [employer] [place] - Phone [phone] Fax [phone]  Pharmacy Filling the Rx: [facility] - [id number]  Filling Pharmacy Phone: [phone]  Filling Pharmacy Fax:    Start Date: 12/19/2017

## 2017-12-19 NOTE — TELEPHONE ENCOUNTER
Prior Authorization Retail Medication Request  Medication/Dose: Esomeprazole  Diagnosis and ICD code: GERD K21.9  New/Renewal/Insurance Change PA: new  Previously Tried and Failed Therapies: unknown    Insurance ID (if provided): [id number]  Insurance Phone (if provided): [phone]    Max 120 caps per 365 days     Aria Del Rio, [facility]  [phone]

## 2017-12-20 NOTE — TELEPHONE ENCOUNTER
Central Prior Authorization Team   Phone: [phone]      PRIOR AUTHORIZATION DENIED    Medication: Esomeprazole -Denied    Denial Date: 12/19/2017    Denial Rational:           Appeal Information:

## 2018-01-03 ENCOUNTER — TELEPHONE (OUTPATIENT)
Dept: FAMILY MEDICINE | Facility: OTHER | Age: 38
End: 2018-01-03

## 2018-01-03 NOTE — TELEPHONE ENCOUNTER
Unsure why she would need Pneumo vaccine yet, but will route to CDL to review/advise.    Patient can be put on float schedule for flu shot (and Pneumo if CDL orders/suggests.)  Christie Man, CMA

## 2018-01-03 NOTE — TELEPHONE ENCOUNTER
OK for pneumonia vaccine due to chron's disease and immunosuppression. However, patient should check with her insurance for coverage. Needs Pneumovax 23.     Rene Damon-PENNIE Nunez  [facility]

## 2018-01-03 NOTE — TELEPHONE ENCOUNTER
Reason for Call:  Other     Detailed comments: pt calling states needs flu shot and pnuemonia vaccine due to being put on humera. Please advise and contact pt in regards     Phone Number Patient can be reached at: Home number on file [phone] (home)    Best Time: ANY    Can we leave a detailed message on this number? YES    Call taken on 1/3/2018 at 8:56 AM by Halle Hazel

## 2018-01-11 ENCOUNTER — MYC MEDICAL ADVICE (OUTPATIENT)
Dept: FAMILY MEDICINE | Facility: OTHER | Age: 38
End: 2018-01-11

## 2018-01-11 NOTE — TELEPHONE ENCOUNTER
Patient can schedule Pneumovax 23. The code is just what we order when it is given.     Rene You PA-C  [facility]

## 2018-01-15 ENCOUNTER — MYC MEDICAL ADVICE (OUTPATIENT)
Dept: FAMILY MEDICINE | Facility: OTHER | Age: 38
End: 2018-01-15

## 2018-01-15 DIAGNOSIS — K21.9 GASTROESOPHAGEAL REFLUX DISEASE WITHOUT ESOPHAGITIS: Primary | ICD-10-CM

## 2018-01-15 RX ORDER — PANTOPRAZOLE SODIUM 40 MG/1
40 TABLET, DELAYED RELEASE ORAL DAILY
Qty: 90 TABLET | Refills: 1 | Status: SHIPPED | OUTPATIENT
Start: 2018-01-15 | End: 2018-04-10

## 2018-01-15 NOTE — TELEPHONE ENCOUNTER
PA was denied on 12/19/17 due to quantity limitations.   Previously failed on Omeprazole.     Please let patient know I have sent alternative - Protonix     Rene Damon-PENNIE Nunez  [facility]

## 2018-01-16 ENCOUNTER — TELEPHONE (OUTPATIENT)
Dept: FAMILY MEDICINE | Facility: OTHER | Age: 38
End: 2018-01-16

## 2018-01-16 NOTE — TELEPHONE ENCOUNTER
Prior Authorization Retail Medication Request  Medication/Dose: Pantoprozole  Diagnosis and ICD code: K21.9 GERD  New/Renewal/Insurance Change PA: new  Previously Tried and Failed Therapies: Nexium    Insurance ID (if provided): [facility]  Insurance Phone (if provided): [phone]    Any additional info from fax request: max 120 in 365 days any ppi  Aria Del Rio, [facility]  [phone]

## 2018-01-29 DIAGNOSIS — E03.9 ACQUIRED HYPOTHYROIDISM: ICD-10-CM

## 2018-01-30 NOTE — PROGRESS NOTES
SUBJECTIVE:   Ashley Mcdaniels is a 37 year old female who presents to clinic today for the following health issues:      HPI  Acute Illness   Acute illness concerns: sinus inf  Onset: over the weekend while on a cruise     Fever: no     Chills/Sweats: YES- sweats     Headache (location?): YES    Sinus Pressure:YES    Conjunctivitis:  no    Ear Pain: YES- both     Rhinorrhea: YES     Congestion: no     Sore Throat: YES     Cough: YES-productive of green sputum    Wheeze: YES    Decreased Appetite: YES    Nausea: no     Vomiting: no     Diarrhea:  no     Dysuria/Freq.: no     Fatigue/Achiness: YES, fatigue     Sick/Strep Exposure: no      Therapies Tried and outcome: nyquil, mucinex     Problem list and histories reviewed & adjusted, as indicated.  Additional history: as documented    Patient Active Problem List   Diagnosis     Acquired hypothyroidism     YUE (generalized anxiety disorder)     Major depressive disorder, recurrent episode, mild (H)     Tobacco use disorder     Crohn's disease of both small and large intestine without complication (H)     IUD (intrauterine device) in place     Chronic urticaria     ADHD (attention deficit hyperactivity disorder), inattentive type     Gastroesophageal reflux disease without esophagitis     Past Surgical History:   Procedure Laterality Date     CHOLECYSTECTOMY  Feb 2007     DILATION AND CURETTAGE  May 2003    Non-OB     HC TOOTH EXTRACTION W/FORCEP  Nov 2006     LAP ADJUSTABLE GASTRIC BAND  May 2007       Social History   Substance Use Topics     Smoking status: Former Smoker     Quit date: 5/1/2016     Smokeless tobacco: Never Used     Alcohol use 0.0 oz/week     0 Standard drinks or equivalent per week      Comment: occasional      Family History   Problem Relation Age of Onset     Arthritis Mother      HEART DISEASE Father      CAD, CHF     Alcoholism Father      Asthma Father      Hypertension Father      Hyperlipidemia Father      Pancreatic Cancer Father      Liver  Cancer Father      DIABETES Maternal Grandmother      Substance Abuse Maternal Grandmother      Hyperlipidemia Maternal Grandmother      Hypertension Maternal Grandmother      Substance Abuse Maternal Grandfather      Asthma Paternal Grandmother      HEART DISEASE Paternal Grandmother      DIABETES Paternal Grandmother      Hypertension Paternal Grandmother      Substance Abuse Paternal Grandfather      MENTAL ILLNESS Brother      Substance Abuse Brother          Current Outpatient Prescriptions   Medication Sig Dispense Refill     PREDNISONE PO Take 40 mg by mouth       pantoprazole (PROTONIX) 40 MG EC tablet Take 1 tablet (40 mg) by mouth daily Take 30-60 minutes before a meal. 90 tablet 1     buPROPion (WELLBUTRIN XL) 300 MG 24 hr tablet Take 1 tablet (300 mg) by mouth every morning 90 tablet 3     thyroid (ARMOUR THYROID) 15 MG TABS tablet Take 1 tablet (15 mg) by mouth daily 30 tablet 3     esomeprazole (NEXIUM) 40 MG CR capsule Take 1 capsule (40 mg) by mouth every morning (before breakfast) Take 30-60 minutes before eating. 90 capsule 1     cetirizine (ZYRTEC) 10 MG tablet Take 1 tablet (10 mg) by mouth daily 90 tablet 3     hydrOXYzine (ATARAX) 25 MG tablet Take 1-2 tablets (25-50 mg) by mouth every 6 hours as needed for itching 30 tablet 3     acetaminophen (TYLENOL) 500 MG tablet Take 1,000 mg by mouth       ibuprofen (ADVIL,MOTRIN) 200 MG tablet Take 400 mg by mouth       B Complex-Folic Acid (B COMPLEX FORMULA 1) TABS Take 1 tablet by mouth       levonorgestrel (MIRENA) 20 MCG/24HR IUD 1 each (20 mcg) by Intrauterine route continuous       valACYclovir (VALTREX) 1000 mg tablet Take 0.5 tablets (500 mg) by mouth 2 times daily for 6 days 6 tablet 11     amphetamine-dextroamphetamine (ADDERALL XR) 20 MG per 24 hr capsule Take 1 capsule (20 mg) by mouth daily (Patient not taking: Reported on 1/31/2018) 30 capsule 0     No Known Allergies  BP Readings from Last 3 Encounters:   01/31/18 122/78   11/02/17  "110/78   10/31/17 110/70    Wt Readings from Last 3 Encounters:   01/31/18 199 lb (90.3 kg)   11/02/17 193 lb (87.5 kg)   10/31/17 195 lb 3.2 oz (88.5 kg)                  Labs reviewed in EPIC    ROS:  Constitutional, HEENT, cardiovascular, pulmonary, gi and gu systems are negative, except as otherwise noted.    OBJECTIVE:     /78 (BP Location: Left arm, Patient Position: Chair, Cuff Size: Adult Large)  Pulse 68  Temp 98.6  F (37  C) (Oral)  Resp 16  Ht 5' 3.39\" (1.61 m)  Wt 199 lb (90.3 kg)  BMI 34.82 kg/m2  Body mass index is 34.82 kg/(m^2).  GENERAL: healthy, alert and no distress  EYES: Eyes grossly normal to inspection, PERRL and conjunctivae and sclerae normal  HENT: normal cephalic/atraumatic, both ears: erythematous and bulging membrane, nose and mouth without ulcers or lesions, nasal mucosa edematous , rhinorrhea green, oropharynx clear, oral mucous membranes moist and tonsillar erythema  NECK: bilateral anterior cervical adenopathy, no asymmetry, masses, or scars and thyroid normal to palpation  RESP: lungs clear to auscultation - no rales, rhonchi or wheezes  CV: regular rate and rhythm, normal S1 S2, no S3 or S4, no murmur, click or rub, no peripheral edema and peripheral pulses strong  MS: no gross musculoskeletal defects noted, no edema    ASSESSMENT/PLAN:     1. Sinusitis, bacterial  Recommend treatment as prescribed.  Home instructions reviewed  - amoxicillin-clavulanate (AUGMENTIN) 875-125 MG per tablet; Take 1 tablet by mouth 2 times daily  Dispense: 20 tablet; Refill: 0    2. Yeast infection of the vagina  States that every time she takes an antibiotic she does get a vaginal yeast infection and is requesting Diflucan.  Adriana probiotic usage with antibiotic.  - fluconazole (DIFLUCAN) 150 MG tablet; Take 1 tablet (150 mg) by mouth once for 1 dose  Dispense: 1 tablet; Refill: 0      Home care instructions were reviewed with the patient. The risks, benefits and treatment options of " prescribed medications or other treatments have been discussed with the patient. The patient verbalized their understanding and should call or follow up if no improvement or if they develop further problems.  Return to clinic prn    Patient Instructions   Please take antibiotic with a probiotic or yogurt (3 small containers) daily not directly with the antibiotic for optimal good bacterial protection.     You can use Afrin nasal spray for symptoms of sinuses no longer than two weeks.     If symptoms worsen or do not improve in 3-5 days please return to clinic for further evaluation.    Thank you  Marcela Mac [facility]  Answers for HPI/ROS submitted by the patient on 1/31/2018   YUE 7 TOTAL SCORE: 10  If you checked off any problems, how difficult have these problems made it for you to do your work, take care of things at home, or get along with other people?: Somewhat difficult  PHQ9 TOTAL SCORE: 5

## 2018-01-31 ENCOUNTER — OFFICE VISIT (OUTPATIENT)
Dept: FAMILY MEDICINE | Facility: OTHER | Age: 38
End: 2018-01-31
Payer: COMMERCIAL

## 2018-01-31 VITALS
DIASTOLIC BLOOD PRESSURE: 78 MMHG | TEMPERATURE: 98.6 F | SYSTOLIC BLOOD PRESSURE: 122 MMHG | BODY MASS INDEX: 35.26 KG/M2 | HEIGHT: 63 IN | HEART RATE: 68 BPM | WEIGHT: 199 LBS | RESPIRATION RATE: 16 BRPM

## 2018-01-31 DIAGNOSIS — B96.89 SINUSITIS, BACTERIAL: Primary | ICD-10-CM

## 2018-01-31 DIAGNOSIS — B37.31 YEAST INFECTION OF THE VAGINA: ICD-10-CM

## 2018-01-31 DIAGNOSIS — J32.9 SINUSITIS, BACTERIAL: Primary | ICD-10-CM

## 2018-01-31 PROCEDURE — 99213 OFFICE O/P EST LOW 20 MIN: CPT | Performed by: NURSE PRACTITIONER

## 2018-01-31 RX ORDER — LEVOTHYROXINE, LIOTHYRONINE 9.5; 2.25 UG/1; UG/1
TABLET ORAL
Qty: 90 TABLET | Refills: 1 | Status: SHIPPED | OUTPATIENT
Start: 2018-01-31 | End: 2019-06-04

## 2018-01-31 RX ORDER — FLUCONAZOLE 150 MG/1
150 TABLET ORAL ONCE
Qty: 1 TABLET | Refills: 0 | Status: SHIPPED | OUTPATIENT
Start: 2018-01-31 | End: 2018-01-31

## 2018-01-31 ASSESSMENT — ANXIETY QUESTIONNAIRES
6. BECOMING EASILY ANNOYED OR IRRITABLE: SEVERAL DAYS
7. FEELING AFRAID AS IF SOMETHING AWFUL MIGHT HAPPEN: SEVERAL DAYS
5. BEING SO RESTLESS THAT IT IS HARD TO SIT STILL: MORE THAN HALF THE DAYS
1. FEELING NERVOUS, ANXIOUS, OR ON EDGE: MORE THAN HALF THE DAYS
7. FEELING AFRAID AS IF SOMETHING AWFUL MIGHT HAPPEN: SEVERAL DAYS
GAD7 TOTAL SCORE: 10
4. TROUBLE RELAXING: MORE THAN HALF THE DAYS
GAD7 TOTAL SCORE: 10
GAD7 TOTAL SCORE: 10
2. NOT BEING ABLE TO STOP OR CONTROL WORRYING: SEVERAL DAYS
3. WORRYING TOO MUCH ABOUT DIFFERENT THINGS: SEVERAL DAYS

## 2018-01-31 ASSESSMENT — PATIENT HEALTH QUESTIONNAIRE - PHQ9
SUM OF ALL RESPONSES TO PHQ QUESTIONS 1-9: 5
10. IF YOU CHECKED OFF ANY PROBLEMS, HOW DIFFICULT HAVE THESE PROBLEMS MADE IT FOR YOU TO DO YOUR WORK, TAKE CARE OF THINGS AT HOME, OR GET ALONG WITH OTHER PEOPLE: SOMEWHAT DIFFICULT
SUM OF ALL RESPONSES TO PHQ QUESTIONS 1-9: 5

## 2018-01-31 NOTE — TELEPHONE ENCOUNTER
Thyroid:  Prescription approved per [facility] [place] Refill Protocol.    TSH   Date Value Ref Range Status   08/18/2017 1.27 0.40 - 4.00 mU/L Final     Miladis Robin, RN, BSN

## 2018-01-31 NOTE — PATIENT INSTRUCTIONS
Please take antibiotic with a probiotic or yogurt (3 small containers) daily not directly with the antibiotic for optimal good bacterial protection.     You can use Afrin nasal spray for symptoms of sinuses no longer than two weeks.     If symptoms worsen or do not improve in 3-5 days please return to clinic for further evaluation.    Thank you  Marcela Mca CNP

## 2018-01-31 NOTE — MR AVS SNAPSHOT
After Visit Summary   1/31/2018    Ms. Ashley Mcdaniels    MRN: [id number]           Patient Information     Date Of Birth          1980        Visit Information        Provider Department      1/31/2018 11:20 AM Marcela Mac APRN CNP [facility]        Today's Diagnoses     Sinusitis, bacterial    -  1    Yeast infection of the vagina          Care Instructions    Please take antibiotic with a probiotic or yogurt (3 small containers) daily not directly with the antibiotic for optimal good bacterial protection.     You can use Afrin nasal spray for symptoms of sinuses no longer than two weeks.     If symptoms worsen or do not improve in 3-5 days please return to clinic for further evaluation.    Thank you  Marcela Mac CNP            Follow-ups after your visit        Who to contact     If you have questions or need follow up information about today's clinic visit or your schedule please contact [facility] directly at [phone].  Normal or non-critical lab and imaging results will be communicated to you by Ruby Groupehart, letter or phone within 4 business days after the clinic has received the results. If you do not hear from us within 7 days, please contact the clinic through Ruby Groupehart or phone. If you have a critical or abnormal lab result, we will notify you by phone as soon as possible.  Submit refill requests through [employer] or call your pharmacy and they will forward the refill request to us. Please allow 3 business days for your refill to be completed.          Additional Information About Your Visit        Ruby Groupehart Information     [employer] gives you secure access to your electronic health record. If you see a primary care provider, you can also send messages to your care team and make appointments. If you have questions, please call your primary care clinic.  If you do not have a primary care provider, please call [phone] and they will assist you.    "     Care EveryWhere ID     This is your Care EveryWhere ID. This could be used by other organizations to access your [facility] medical records  [id number]        Your Vitals Were     Pulse Temperature Respirations Height BMI (Body Mass Index)       68 98.6  F (37  C) (Oral) 16 5' 3.39\" (1.61 m) 34.82 kg/m2        Blood Pressure from Last 3 Encounters:   01/31/18 122/78   11/02/17 110/78   10/31/17 110/70    Weight from Last 3 Encounters:   01/31/18 199 lb (90.3 kg)   11/02/17 193 lb (87.5 kg)   10/31/17 195 lb 3.2 oz (88.5 kg)              Today, you had the following     No orders found for display         Today's Medication Changes          These changes are accurate as of 1/31/18 12:01 PM.  If you have any questions, ask your nurse or doctor.               Start taking these medicines.        Dose/Directions    amoxicillin-clavulanate 875-125 MG per tablet   Commonly known as:  AUGMENTIN   Used for:  Sinusitis, bacterial   Started by:  Marcela Mac APRN CNP        Dose:  1 tablet   Take 1 tablet by mouth 2 times daily   Quantity:  20 tablet   Refills:  0       fluconazole 150 MG tablet   Commonly known as:  DIFLUCAN   Used for:  Yeast infection of the vagina   Started by:  Marcela Mac APRN CNP        Dose:  150 mg   Take 1 tablet (150 mg) by mouth once for 1 dose   Quantity:  1 tablet   Refills:  0            Where to get your medicines      These medications were sent to [facility] - [address]   [address] [place] [address]     Phone:  [phone]     amoxicillin-clavulanate 875-125 MG per tablet    fluconazole 150 MG tablet                Primary Care Provider Office Phone # Fax #    Melva You PA-C [phone]       [address] [facility] [address]  [facility] [address]        Equal Access to Services     ERICKA WEINSTEIN AH: Breanna Purdy, vishal carmona, qaybta kabrenda stallworth " lauri naranjoreynaldo laChadwickrenee ah. So [facility] [phone].    ATENCIÓN: Si shireenla miles, tiene a keita disposición servicios gratuitos de asistencia lingüística. Alexi cazares [phone].    We comply with applicable federal civil rights laws and [place] laws. We do not discriminate on the basis of race, color, national origin, age, disability, sex, sexual orientation, or gender identity.            Thank you!     Thank you for choosing [facility]  for your care. Our goal is always to provide you with excellent care. Hearing back from our patients is one way we can continue to improve our services. Please take a few minutes to complete the written survey that you may receive in the mail after your visit with us. Thank you!             Your Updated Medication List - Protect others around you: Learn how to safely use, store and throw away your medicines at www.disposemymeds.org.          This list is accurate as of 1/31/18 12:01 PM.  Always use your most recent med list.                   Brand Name Dispense Instructions for use Diagnosis    acetaminophen 500 MG tablet    TYLENOL     Take 1,000 mg by mouth        amoxicillin-clavulanate 875-125 MG per tablet    AUGMENTIN    20 tablet    Take 1 tablet by mouth 2 times daily    Sinusitis, bacterial       B COMPLEX FORMULA 1 Tabs      Take 1 tablet by mouth        buPROPion 300 MG 24 hr tablet    WELLBUTRIN XL    90 tablet    Take 1 tablet (300 mg) by mouth every morning    YUE (generalized anxiety disorder), Major depressive disorder, recurrent episode, mild (H)       cetirizine 10 MG tablet    zyrTEC    90 tablet    Take 1 tablet (10 mg) by mouth daily    Chronic urticaria       esomeprazole 40 MG CR capsule    nexIUM    90 capsule    Take 1 capsule (40 mg) by mouth every morning (before breakfast) Take 30-60 minutes before eating.    Gastroesophageal reflux disease without esophagitis       fluconazole 150 MG tablet    DIFLUCAN    1 tablet    Take 1 tablet (150  mg) by mouth once for 1 dose    Yeast infection of the vagina       hydrOXYzine 25 MG tablet    ATARAX    30 tablet    Take 1-2 tablets (25-50 mg) by mouth every 6 hours as needed for itching    Urticaria, acute       ibuprofen 200 MG tablet    ADVIL/MOTRIN     Take 400 mg by mouth        levonorgestrel 20 MCG/24HR IUD    MIRENA     1 each (20 mcg) by Intrauterine route continuous    Encounter for IUD removal, Encounter for IUD insertion, IUD (intrauterine device) in place       pantoprazole 40 MG EC tablet    PROTONIX    90 tablet    Take 1 tablet (40 mg) by mouth daily Take 30-60 minutes before a meal.    Gastroesophageal reflux disease without esophagitis       PREDNISONE PO      Take 40 mg by mouth        thyroid 15 MG Tabs tablet    ARMOUR THYROID    30 tablet    Take 1 tablet (15 mg) by mouth daily    Acquired hypothyroidism       valACYclovir 1000 mg tablet    VALTREX    6 tablet    Take 0.5 tablets (500 mg) by mouth 2 times daily for 6 days    HSV (herpes simplex virus) infection

## 2018-01-31 NOTE — NURSING NOTE
"Chief Complaint   Patient presents with     Sinus Problem     Panel Management     flu       Initial /78 (BP Location: Left arm, Patient Position: Chair, Cuff Size: Adult Large)  Pulse 68  Temp 98.6  F (37  C) (Oral)  Resp 16  Ht 5' 3.39\" (1.61 m)  Wt 199 lb (90.3 kg)  BMI 34.82 kg/m2 Estimated body mass index is 34.82 kg/(m^2) as calculated from the following:    Height as of this encounter: 5' 3.39\" (1.61 m).    Weight as of this encounter: 199 lb (90.3 kg).  Medication Reconciliation: complete    "

## 2018-02-01 ASSESSMENT — PATIENT HEALTH QUESTIONNAIRE - PHQ9: SUM OF ALL RESPONSES TO PHQ QUESTIONS 1-9: 5

## 2018-02-01 ASSESSMENT — ANXIETY QUESTIONNAIRES: GAD7 TOTAL SCORE: 10

## 2018-02-09 ENCOUNTER — ALLIED HEALTH/NURSE VISIT (OUTPATIENT)
Dept: FAMILY MEDICINE | Facility: OTHER | Age: 38
End: 2018-02-09
Payer: COMMERCIAL

## 2018-02-09 DIAGNOSIS — Z23 NEED FOR VACCINATION: Primary | ICD-10-CM

## 2018-02-09 PROCEDURE — 99207 ZZC NO CHARGE NURSE ONLY: CPT

## 2018-02-09 PROCEDURE — 90471 IMMUNIZATION ADMIN: CPT

## 2018-02-09 PROCEDURE — 90732 PPSV23 VACC 2 YRS+ SUBQ/IM: CPT

## 2018-02-09 NOTE — PROGRESS NOTES
Ashley Mcdaniels is here today for Humira self injection training.    Check List:   Injection teaching:  Patient education materials used: Humira training pen, patient brought her own Humira pen as well.  Patient instructed on medication storage, double checking dose and injection device, hand washing, injection sites and injection site rotation, injection technique, and medication disposal. We discussed this verbally, the patient watched RN demonstration, as well as practiced with her own pens. The patient was able to successfully give the very first self injection today. Advised to remain in clinic for 20 minutes and report any side effects/adverse reaction immediately. Encouraged to please call with any questions or symptoms in the future.

## 2018-02-09 NOTE — MR AVS SNAPSHOT
After Visit Summary   2/9/2018    Ms. Ashley Mcdaniels    MRN: [id number]           Patient Information     Date Of Birth          1980        Visit Information        Provider Department      2/9/2018 3:30 PM NL RN TEAM A, [facility]        Today's Diagnoses     Need for vaccination    -  1      Care Instructions      Giving a Subcutaneous (Sub-Q) Injection (Single Medicine)  Giving yourself a subcutaneous injection (also called a sub-Q injection) means inserting medicine into the fatty areas just under your skin. The needle used for a sub-Q injection is very small and doesn t cause much pain. Many medicines are given in this way.  Your healthcare provider has prescribed the amount and times you need to give the medicine.  The name of my medicine is: ______________________  Amount per injection: ____________________________  Times per day: _________________________________       Step 1. Preparing a work area    Put any pets in another room.    Wash your hands for 1 to 2 minutes with liquid soap.    Clean your area with soap and water. Dry with a paper towel.  Step 2. Gathering your supplies  Collect the following items:    Your medicine. Keep in mind that some medicine vials must be taken out of the refrigerator at a specific amount of time before you inject them. Read the label and follow the instructions.    A sterile disposable syringe (don t reuse your syringes)    Alcohol wipes or swabs    Special container to throw out the used syringe (sharps container). You can buy a sharps container at a pharmacy or medical supply store. You can also use an empty laundry detergent bottle, or any other puncture-proof container and lid.  Then wash your hands again.  Step 3. Choosing your injection site    You may find that the easiest and safest places to inject medicine are these areas:    Back of your upper arms    Upper thighs    Belly (abdomen), but avoid the belly button and waist  area    If you are very thin, don t use your belly for your injection site, unless your healthcare provider tells you to.    Avoid areas that are red, swollen, or bruised.    Don't inject in the same site twice in a row. Choose a site that is at least 2 inches away from your last injection site.  Step 4. Getting the medicine ready    Check the medicine in the vial. Look for changes in color, cloudiness, or something floating in it.    Don t use the medicine if you think it looks different.    If you are using insulin, ask your healthcare provider or pharmacist how it should look in the vial. Some insulin is normally cloudy.    Call your provider or pharmacist if you are not sure if the medicine is safe to use.    Remove the cap from the vial. Clean the rubber stopper on top of the vial with an alcohol wipe.    Remove the syringe from its package. Don t use a syringe from a previously opened package or a package with holes in it.    Take the cap off the needle. Pull back the plunger, drawing air into the syringe. The amount of air should be the same as the amount of medicine your health care provider has prescribed for you.    Push the needle into the rubber stopper of the vial. Once the needle is through the stopper, push the plunger on the syringe so that the air goes into the vial.    Keep the needle in the stopper and turn the vial upside down.    Keep the tip of the needle in the liquid and pull back on the plunger. The medicine will flow into the syringe.    Fill the syringe to your prescribed dose amount.    If you get too much, push some medicine back into the vial with the plunger.    If you didn t get enough, keep pulling on the plunger.    Check for air bubbles in the syringe.    If you see air bubbles in the syringe, gently tap the syringe while the needle is still in the stopper. The air bubbles will move to the top of the syringe.    Push the plunger slightly, and the air will go back into the  vial.    Check to make sure the syringe contains the prescribed amount of medicine. Then pull the needle out of the vial.  Step 5. Giving the injection    Using an alcohol swab, clean the injection site. Make sure the cleaned area is about 2 inches in diameter.    While the injection site dries, double-check that you have the right amount of medicine in your syringe.    Place your thumb and forefinger on either side of the clean injection site. Pinch up about 2 inches of skin.    Hold the syringe like a pencil. Insert the needle straight into your pinched-up skin. Insert the needle quickly. It will hurt less. Note: The best angle for inserting the needle will depend on your body type, the length of the needle, and the injection site. Your healthcare provider will help you find which angle is best for you.    Be sure to insert the needle with the bevel up and insert all the way to the end of the needle. This will help you inject the medicine correctly.    Release the skin, holding the syringe in place.    If your healthcare provider has told you to pull back on the plunger to check for blood, then do so.    If you see blood in the syringe, don t inject. This means that the needle has entered a blood vessel. Withdraw the needle, select a new injection site, and repeat the steps above for getting the site ready.    If there is no blood in the syringe, continue with the injection. To inject the medicine, slowly push the plunger all the way down.    If you are injecting insulin, don't pull back on the plunger to check for blood. Inject the insulin by slowly pushing the plunger all the way down.  Step 6. Removing the needle    Remove the needle from your skin and hold a gauze or cotton ball on the injection site for a few seconds. Don t rub the injection site.    If you see blood or clear fluid, press on the injection site with the gauze or cotton ball for 5 to 8 minutes. Don t rub while pressing. Apply a bandage if you  wish.    Don t recap the needle.  Step 7. After the injection    Put the needle and syringe in the sharps container. Make sure the needle points down. Never put your fingers into the container. When the container is full, take it back to your healthcare facility for proper disposal.    Record the site, date, and time of each injection.  Follow-up care  Follow up with your healthcare provider, or as advised.  When to seek medical advice  Call your healthcare provider right away if any of these occur:    You are unable to give your injection    Bleeding at the injection site for more than 10 minutes    Injection of medicine in the wrong area    Injection of too much medicine    Rash at the injection site    Redness, warmth, swelling, or drainage at the injection site    Fever of 100.4 F (38.0 C), or higher    Signs of allergic reaction. These include trouble breathing, hives, or rash.   Date Last Reviewed: 7/1/2016 2000-2017 [employer]. [address] [place] [address]. All rights reserved. This information is not intended as a substitute for professional medical care. Always follow your healthcare professional's instructions.                    Follow-ups after your visit        Who to contact     If you have questions or need follow up information about today's clinic visit or your schedule please contact [facility] directly at [phone].  Normal or non-critical lab and imaging results will be communicated to you by MyChart, letter or phone within 4 business days after the clinic has received the results. If you do not hear from us within 7 days, please contact the clinic through MyChart or phone. If you have a critical or abnormal lab result, we will notify you by phone as soon as possible.  Submit refill requests through [employer] or call your pharmacy and they will forward the refill request to us. Please allow 3 business days for your refill to be completed.           Additional Information About Your Visit        MyChart Information     [employer] gives you secure access to your electronic health record. If you see a primary care provider, you can also send messages to your care team and make appointments. If you have questions, please call your primary care clinic.  If you do not have a primary care provider, please call [phone] and they will assist you.        Care EveryWhere ID     This is your Care EveryWhere ID. This could be used by other organizations to access your [facility] medical records  [id number]         Blood Pressure from Last 3 Encounters:   01/31/18 122/78   11/02/17 110/78   10/31/17 110/70    Weight from Last 3 Encounters:   01/31/18 199 lb (90.3 kg)   11/02/17 193 lb (87.5 kg)   10/31/17 195 lb 3.2 oz (88.5 kg)              We Performed the Following     1st  Administration  [73927]     Pneumococcal vaccine 23 valent PPSV23  (Pneumovax) [93899]        Primary Care Provider Office Phone # Fax #    Melva ANDIE You PA-C [phone]       [address] [facility] [address]  [facility] [address]        Equal Access to Services     JAMAL WEINSTEIN : Hadii aad ku hadasho Soomaali, waaxda luqadaha, qaybta kaalmada adeegyada, brenda emmanuel . So [facility] [phone].    ATENCIÓN: Si habla español, tiene a keita disposición servicios gratuitos de asistencia lingüística. [facility] [phone].    We comply with applicable federal civil rights laws and [place] laws. We do not discriminate on the basis of race, color, national origin, age, disability, sex, sexual orientation, or gender identity.            Thank you!     Thank you for choosing [facility]  for your care. Our goal is always to provide you with excellent care. Hearing back from our patients is one way we can continue to improve our services. Please take a few minutes to complete the written survey that you may receive in the mail after your visit with us.  Thank you!             Your Updated Medication List - Protect others around you: Learn how to safely use, store and throw away your medicines at www.disposemymeds.org.          This list is accurate as of 2/9/18  4:33 PM.  Always use your most recent med list.                   Brand Name Dispense Instructions for use Diagnosis    acetaminophen 500 MG tablet    TYLENOL     Take 1,000 mg by mouth        amoxicillin-clavulanate 875-125 MG per tablet    AUGMENTIN    20 tablet    Take 1 tablet by mouth 2 times daily    Sinusitis, bacterial       B COMPLEX FORMULA 1 Tabs      Take 1 tablet by mouth        buPROPion 300 MG 24 hr tablet    WELLBUTRIN XL    90 tablet    Take 1 tablet (300 mg) by mouth every morning    YUE (generalized anxiety disorder), Major depressive disorder, recurrent episode, mild (H)       cetirizine 10 MG tablet    zyrTEC    90 tablet    Take 1 tablet (10 mg) by mouth daily    Chronic urticaria       esomeprazole 40 MG CR capsule    nexIUM    90 capsule    Take 1 capsule (40 mg) by mouth every morning (before breakfast) Take 30-60 minutes before eating.    Gastroesophageal reflux disease without esophagitis       hydrOXYzine 25 MG tablet    ATARAX    30 tablet    Take 1-2 tablets (25-50 mg) by mouth every 6 hours as needed for itching    Urticaria, acute       ibuprofen 200 MG tablet    ADVIL/MOTRIN     Take 400 mg by mouth        levonorgestrel 20 MCG/24HR IUD    MIRENA     1 each (20 mcg) by Intrauterine route continuous    Encounter for IUD removal, Encounter for IUD insertion, IUD (intrauterine device) in place       NP THYROID 15 MG Tabs tablet   Generic drug:  thyroid     90 tablet    TAKE ONE TABLET BY MOUTH EVERY DAY    Acquired hypothyroidism       pantoprazole 40 MG EC tablet    PROTONIX    90 tablet    Take 1 tablet (40 mg) by mouth daily Take 30-60 minutes before a meal.    Gastroesophageal reflux disease without esophagitis       PREDNISONE PO      Take 40 mg by mouth         valACYclovir 1000 mg tablet    VALTREX    6 tablet    Take 0.5 tablets (500 mg) by mouth 2 times daily for 6 days    HSV (herpes simplex virus) infection

## 2018-02-09 NOTE — NURSING NOTE
Screening Questionnaire for Adult Immunization    Are you sick today?   No   Do you have allergies to medications, food, a vaccine component or latex?   No   Have you ever had a serious reaction after receiving a vaccination?   No   Do you have a long-term health problem with heart disease, lung disease, asthma, kidney disease, metabolic disease (e.g. diabetes), anemia, or other blood disorder?   No   Do you have cancer, leukemia, HIV/AIDS, or any other immune system problem?   No   In the past 3 months, have you taken medications that affect  your immune system, such as prednisone, other steroids, or anticancer drugs; drugs for the treatment of rheumatoid arthritis, Crohn s disease, or psoriasis; or have you had radiation treatments?   No   Have you had a seizure, or a brain or other nervous system problem?   No   During the past year, have you received a transfusion of blood or blood     products, or been given immune (gamma) globulin or antiviral drug?   No   For women: Are you pregnant or is there a chance you could become        pregnant during the next month?   No   Have you received any vaccinations in the past 4 weeks?   No     Immunization questionnaire answers were all negative.        Prior to injection verified patient identity using patient's name and date of birth. Patient instructed to remain in clinic for 15 minutes afterwards, and to report any adverse reaction to me immediately.       Screening performed by Melida Basilio on 2/9/2018 at 4:16 PM.

## 2018-02-09 NOTE — PATIENT INSTRUCTIONS
Giving a Subcutaneous (Sub-Q) Injection (Single Medicine)  Giving yourself a subcutaneous injection (also called a sub-Q injection) means inserting medicine into the fatty areas just under your skin. The needle used for a sub-Q injection is very small and doesn t cause much pain. Many medicines are given in this way.  Your healthcare provider has prescribed the amount and times you need to give the medicine.  The name of my medicine is: ______________________  Amount per injection: ____________________________  Times per day: _________________________________       Step 1. Preparing a work area    Put any pets in another room.    Wash your hands for 1 to 2 minutes with liquid soap.    Clean your area with soap and water. Dry with a paper towel.  Step 2. Gathering your supplies  Collect the following items:    Your medicine. Keep in mind that some medicine vials must be taken out of the refrigerator at a specific amount of time before you inject them. Read the label and follow the instructions.    A sterile disposable syringe (don t reuse your syringes)    Alcohol wipes or swabs    Special container to throw out the used syringe (sharps container). You can buy a sharps container at a pharmacy or medical supply store. You can also use an empty laundry detergent bottle, or any other puncture-proof container and lid.  Then wash your hands again.  Step 3. Choosing your injection site    You may find that the easiest and safest places to inject medicine are these areas:    Back of your upper arms    Upper thighs    Belly (abdomen), but avoid the belly button and waist area    If you are very thin, don t use your belly for your injection site, unless your healthcare provider tells you to.    Avoid areas that are red, swollen, or bruised.    Don't inject in the same site twice in a row. Choose a site that is at least 2 inches away from your last injection site.  Step 4. Getting the medicine ready    Check the medicine in  the vial. Look for changes in color, cloudiness, or something floating in it.    Don t use the medicine if you think it looks different.    If you are using insulin, ask your healthcare provider or pharmacist how it should look in the vial. Some insulin is normally cloudy.    Call your provider or pharmacist if you are not sure if the medicine is safe to use.    Remove the cap from the vial. Clean the rubber stopper on top of the vial with an alcohol wipe.    Remove the syringe from its package. Don t use a syringe from a previously opened package or a package with holes in it.    Take the cap off the needle. Pull back the plunger, drawing air into the syringe. The amount of air should be the same as the amount of medicine your health care provider has prescribed for you.    Push the needle into the rubber stopper of the vial. Once the needle is through the stopper, push the plunger on the syringe so that the air goes into the vial.    Keep the needle in the stopper and turn the vial upside down.    Keep the tip of the needle in the liquid and pull back on the plunger. The medicine will flow into the syringe.    Fill the syringe to your prescribed dose amount.    If you get too much, push some medicine back into the vial with the plunger.    If you didn t get enough, keep pulling on the plunger.    Check for air bubbles in the syringe.    If you see air bubbles in the syringe, gently tap the syringe while the needle is still in the stopper. The air bubbles will move to the top of the syringe.    Push the plunger slightly, and the air will go back into the vial.    Check to make sure the syringe contains the prescribed amount of medicine. Then pull the needle out of the vial.  Step 5. Giving the injection    Using an alcohol swab, clean the injection site. Make sure the cleaned area is about 2 inches in diameter.    While the injection site dries, double-check that you have the right amount of medicine in your  syringe.    Place your thumb and forefinger on either side of the clean injection site. Pinch up about 2 inches of skin.    Hold the syringe like a pencil. Insert the needle straight into your pinched-up skin. Insert the needle quickly. It will hurt less. Note: The best angle for inserting the needle will depend on your body type, the length of the needle, and the injection site. Your healthcare provider will help you find which angle is best for you.    Be sure to insert the needle with the bevel up and insert all the way to the end of the needle. This will help you inject the medicine correctly.    Release the skin, holding the syringe in place.    If your healthcare provider has told you to pull back on the plunger to check for blood, then do so.    If you see blood in the syringe, don t inject. This means that the needle has entered a blood vessel. Withdraw the needle, select a new injection site, and repeat the steps above for getting the site ready.    If there is no blood in the syringe, continue with the injection. To inject the medicine, slowly push the plunger all the way down.    If you are injecting insulin, don't pull back on the plunger to check for blood. Inject the insulin by slowly pushing the plunger all the way down.  Step 6. Removing the needle    Remove the needle from your skin and hold a gauze or cotton ball on the injection site for a few seconds. Don t rub the injection site.    If you see blood or clear fluid, press on the injection site with the gauze or cotton ball for 5 to 8 minutes. Don t rub while pressing. Apply a bandage if you wish.    Don t recap the needle.  Step 7. After the injection    Put the needle and syringe in the sharps container. Make sure the needle points down. Never put your fingers into the container. When the container is full, take it back to your healthcare facility for proper disposal.    Record the site, date, and time of each injection.  Follow-up care  Follow  up with your healthcare provider, or as advised.  When to seek medical advice  Call your healthcare provider right away if any of these occur:    You are unable to give your injection    Bleeding at the injection site for more than 10 minutes    Injection of medicine in the wrong area    Injection of too much medicine    Rash at the injection site    Redness, warmth, swelling, or drainage at the injection site    Fever of 100.4 F (38.0 C), or higher    Signs of allergic reaction. These include trouble breathing, hives, or rash.   Date Last Reviewed: 7/1/2016 2000-2017 [employer]. [address] [place] [id number]. All rights reserved. This information is not intended as a substitute for professional medical care. Always follow your healthcare professional's instructions.

## 2018-03-02 ENCOUNTER — MYC MEDICAL ADVICE (OUTPATIENT)
Dept: ALLERGY | Facility: OTHER | Age: 38
End: 2018-03-02

## 2018-03-02 DIAGNOSIS — L50.8 CHRONIC URTICARIA: ICD-10-CM

## 2018-03-02 RX ORDER — CETIRIZINE HYDROCHLORIDE 10 MG/1
10 TABLET ORAL DAILY
Qty: 30 TABLET | Refills: 0 | Status: SHIPPED | OUTPATIENT
Start: 2018-03-02

## 2018-03-02 NOTE — TELEPHONE ENCOUNTER
Signed Prescriptions:                        Disp   Refills    cetirizine (ZYRTEC) 10 MG tablet           30 tab*0        Sig: Take 1 tablet (10 mg) by mouth daily  Authorizing Provider: SHAYNE HUBER  Ordering User: MILADIS FERRER    Patient due for follow up. Responded to amSTATZ messages. Refilled for 1 month.     Miladis Ferrer RN

## 2018-03-14 ENCOUNTER — MYC MEDICAL ADVICE (OUTPATIENT)
Dept: FAMILY MEDICINE | Facility: OTHER | Age: 38
End: 2018-03-14

## 2018-04-09 NOTE — PROGRESS NOTES
"  SUBJECTIVE:   Ashley Mcdaniels is a 37 year old female who presents to clinic today for the following health issues:    History of Present Illness     Depression & Anxiety Follow-up:     Depression/Anxiety:  Depression & Anxiety    Status since last visit::  Worsened    Other associated symptoms of depression and anxiety::  YES    Significant life event::  YES    Current substance use::  None       Today's PHQ-9         PHQ-9 Total Score:     (P) 16   PHQ-9 Q9 Suicidal ideation:   (P) Not at all   Thoughts of suicide or self harm:      Self-harm Plan:        Self-harm Action:          Safety concerns for self or others:       YUE-7 Total Score: (P) 19    Pt needs refill of Protonix, Wellbutrin, atarax     Pt would like hormone levels checked, has been going to the gym for the last 8 weeks and has not lost any weight, crying spells, everything feels to get her emotional    - Wellbutrin not working as well   - Breaking out in hives daily from anxiety   - Household is crazy, boyfriend's daughter is 17, treatment for drug use   - Anxiety so bad shakes throughout the day   - Goes to gym regularly - 5-6 days a week, does classes 3 days a week     - Never started Celexa last year, side effects \"freaked me out\"   - Effexor helped for awhile, then stopped working in the past   - Started therapy 1 month ago, going once a week   - Hard time going to sleep     - Eating healthy, 21 day fix diet most of the time, 5643-7655 calories per day   - Vitamin D 10,000 IU once a day       PHQ-9 10/31/2017 1/31/2018 4/10/2018   Total Score 5 5 16   Q9: Suicide Ideation Not at all Not at all Not at all     YUE-7 SCORE 10/31/2017 1/31/2018 4/10/2018   Total Score 6 (mild anxiety) 10 (moderate anxiety) 19 (severe anxiety)   Total Score 6 10 19     In the past two weeks have you had thoughts of suicide or self-harm?  No.    Do you have concerns about your personal safety or the safety of others?   No        Problem list and histories reviewed & " adjusted, as indicated.  Additional history: as documented    ROS:  Constitutional, HEENT, cardiovascular, pulmonary, gi and gu systems are negative, except as otherwise noted.    OBJECTIVE:   /76 (BP Location: Left arm, Patient Position: Chair, Cuff Size: Adult Regular)  Pulse 84  Temp 98.1  F (36.7  C) (Oral)  Resp 16  Wt 202 lb (91.6 kg)  SpO2 99%  BMI 35.35 kg/m2  Body mass index is 35.35 kg/(m^2).  GENERAL APPEARANCE: healthy, alert and no distress  EYES: Eyes grossly normal to inspection, PERRLA, conjunctivae and sclerae without injection or discharge, EOM intact   MS: No musculoskeletal defects are noted and gait is age appropriate without ataxia   SKIN: No suspicious lesions or rashes, hydration status appears adeuqate with normal skin turgor   PSYCH: Alert and oriented x3; speech- coherent , normal rate and volume; able to articulate logical thoughts, able to abstract reason, no tangential thoughts, no hallucinations or delusions, mentation appears normal, Mood is euthymic. Affect is appropriate for this mood state and bright. Thought content is free of suicidal ideation, hallucinations, and delusions. Dress is adequate and upkept. Eye contact is good during conversation.       Diagnostic Test Results:  none     ASSESSMENT/PLAN:       ICD-10-CM    1. YUE (generalized anxiety disorder) F41.1 buPROPion (WELLBUTRIN XL) 300 MG 24 hr tablet     ARIPiprazole (ABILIFY) 2 MG tablet     Vitamin D Deficiency   2. Major depressive disorder, recurrent episode, mild (H) F33.0 buPROPion (WELLBUTRIN XL) 300 MG 24 hr tablet     ARIPiprazole (ABILIFY) 2 MG tablet     Vitamin D Deficiency   3. Urticaria, acute L50.8 hydrOXYzine (ATARAX) 25 MG tablet   4. Gastroesophageal reflux disease without esophagitis K21.9 pantoprazole (PROTONIX) 40 MG EC tablet   5. Acquired hypothyroidism E03.9 TSH     T4, free     Thyroxine total     T3 Free     T3 total   6. Fatigue, unspecified type R53.83 Estradiol     Testosterone  total     Parathyroid Hormone Intact     Lutropin     Follicle stimulating hormone     Calcium     - Mood worsening   - Also frustrated with weight loss   - Continue Wellbutrin   - Discussed SSRI vs SNRI vs Abilify   - Will start Abilify, reviewed use and side effects  - Recheck in 3-4 weeks   - Patient does have known hypothyroidism, last TSH in august was 1.27, will update  - Will also get labs to rule out reversible causes of fatigue and weight gain   - Patient is taking 10,000 IU of vitamin D daily, discussed risks of this high dose      Will get level today and calcium   - Patient doing exercise 45 min -1 hour 5-6 days a week, [id number] calorie diet   - If labs normal, could consider nutrition referral vs. Pharmacologic intervention vs. bariatric referral   - reviewed all medications use and side effects, refilled as needed     The patient indicates understanding of these issues and agrees with the plan.    Follow up: pending labs and 3-4 weeks for mood recheck         Melva You PA-C  [facility]

## 2018-04-10 ENCOUNTER — OFFICE VISIT (OUTPATIENT)
Dept: FAMILY MEDICINE | Facility: OTHER | Age: 38
End: 2018-04-10
Payer: COMMERCIAL

## 2018-04-10 VITALS
WEIGHT: 202 LBS | RESPIRATION RATE: 16 BRPM | SYSTOLIC BLOOD PRESSURE: 120 MMHG | TEMPERATURE: 98.1 F | OXYGEN SATURATION: 99 % | DIASTOLIC BLOOD PRESSURE: 76 MMHG | BODY MASS INDEX: 35.35 KG/M2 | HEART RATE: 84 BPM

## 2018-04-10 DIAGNOSIS — F41.1 GAD (GENERALIZED ANXIETY DISORDER): Primary | ICD-10-CM

## 2018-04-10 DIAGNOSIS — K21.9 GASTROESOPHAGEAL REFLUX DISEASE WITHOUT ESOPHAGITIS: ICD-10-CM

## 2018-04-10 DIAGNOSIS — F33.0 MAJOR DEPRESSIVE DISORDER, RECURRENT EPISODE, MILD (H): ICD-10-CM

## 2018-04-10 DIAGNOSIS — E03.9 ACQUIRED HYPOTHYROIDISM: ICD-10-CM

## 2018-04-10 DIAGNOSIS — R53.83 FATIGUE, UNSPECIFIED TYPE: ICD-10-CM

## 2018-04-10 DIAGNOSIS — L50.8 URTICARIA, ACUTE: ICD-10-CM

## 2018-04-10 LAB
CALCIUM SERPL-MCNC: 8.3 MG/DL (ref 8.5–10.1)
DEPRECATED CALCIDIOL+CALCIFEROL SERPL-MC: 38 UG/L (ref 20–75)
ESTRADIOL SERPL-MCNC: 112 PG/ML
FSH SERPL-ACNC: 3.8 IU/L
LH SERPL-ACNC: 2.7 IU/L
PTH-INTACT SERPL-MCNC: 38 PG/ML (ref 18–80)
T3FREE SERPL-MCNC: 3.3 PG/ML (ref 2.3–4.2)
T4 FREE SERPL-MCNC: 1.06 NG/DL (ref 0.76–1.46)
T4 SERPL-MCNC: 10.5 UG/DL (ref 4.5–13.9)
TSH SERPL DL<=0.005 MIU/L-ACNC: 0.7 MU/L (ref 0.4–4)

## 2018-04-10 PROCEDURE — 82306 VITAMIN D 25 HYDROXY: CPT | Performed by: PHYSICIAN ASSISTANT

## 2018-04-10 PROCEDURE — 36415 COLL VENOUS BLD VENIPUNCTURE: CPT | Performed by: PHYSICIAN ASSISTANT

## 2018-04-10 PROCEDURE — 82670 ASSAY OF TOTAL ESTRADIOL: CPT | Performed by: PHYSICIAN ASSISTANT

## 2018-04-10 PROCEDURE — 99214 OFFICE O/P EST MOD 30 MIN: CPT | Performed by: PHYSICIAN ASSISTANT

## 2018-04-10 PROCEDURE — 83001 ASSAY OF GONADOTROPIN (FSH): CPT | Performed by: PHYSICIAN ASSISTANT

## 2018-04-10 PROCEDURE — 83970 ASSAY OF PARATHORMONE: CPT | Performed by: PHYSICIAN ASSISTANT

## 2018-04-10 PROCEDURE — 83002 ASSAY OF GONADOTROPIN (LH): CPT | Performed by: PHYSICIAN ASSISTANT

## 2018-04-10 PROCEDURE — 84480 ASSAY TRIIODOTHYRONINE (T3): CPT | Performed by: PHYSICIAN ASSISTANT

## 2018-04-10 PROCEDURE — 84443 ASSAY THYROID STIM HORMONE: CPT | Performed by: PHYSICIAN ASSISTANT

## 2018-04-10 PROCEDURE — 84439 ASSAY OF FREE THYROXINE: CPT | Performed by: PHYSICIAN ASSISTANT

## 2018-04-10 PROCEDURE — 84436 ASSAY OF TOTAL THYROXINE: CPT | Performed by: PHYSICIAN ASSISTANT

## 2018-04-10 PROCEDURE — 84481 FREE ASSAY (FT-3): CPT | Performed by: PHYSICIAN ASSISTANT

## 2018-04-10 PROCEDURE — 84403 ASSAY OF TOTAL TESTOSTERONE: CPT | Performed by: PHYSICIAN ASSISTANT

## 2018-04-10 PROCEDURE — 82310 ASSAY OF CALCIUM: CPT | Performed by: PHYSICIAN ASSISTANT

## 2018-04-10 RX ORDER — HYDROXYZINE HYDROCHLORIDE 25 MG/1
25-50 TABLET, FILM COATED ORAL EVERY 6 HOURS PRN
Qty: 60 TABLET | Refills: 5 | Status: SHIPPED | OUTPATIENT
Start: 2018-04-10 | End: 2018-06-22

## 2018-04-10 RX ORDER — PANTOPRAZOLE SODIUM 40 MG/1
40 TABLET, DELAYED RELEASE ORAL DAILY
Qty: 90 TABLET | Refills: 3 | Status: SHIPPED | OUTPATIENT
Start: 2018-04-10 | End: 2018-06-22

## 2018-04-10 RX ORDER — BUPROPION HYDROCHLORIDE 300 MG/1
300 TABLET ORAL EVERY MORNING
Qty: 90 TABLET | Refills: 3 | Status: SHIPPED | OUTPATIENT
Start: 2018-04-10 | End: 2018-10-16

## 2018-04-10 RX ORDER — ARIPIPRAZOLE 2 MG/1
2 TABLET ORAL AT BEDTIME
Qty: 30 TABLET | Refills: 1 | Status: SHIPPED | OUTPATIENT
Start: 2018-04-10 | End: 2018-06-22

## 2018-04-10 ASSESSMENT — ANXIETY QUESTIONNAIRES
5. BEING SO RESTLESS THAT IT IS HARD TO SIT STILL: MORE THAN HALF THE DAYS
4. TROUBLE RELAXING: NEARLY EVERY DAY
1. FEELING NERVOUS, ANXIOUS, OR ON EDGE: NEARLY EVERY DAY
7. FEELING AFRAID AS IF SOMETHING AWFUL MIGHT HAPPEN: MORE THAN HALF THE DAYS
GAD7 TOTAL SCORE: 19
7. FEELING AFRAID AS IF SOMETHING AWFUL MIGHT HAPPEN: MORE THAN HALF THE DAYS
GAD7 TOTAL SCORE: 19
GAD7 TOTAL SCORE: 19
3. WORRYING TOO MUCH ABOUT DIFFERENT THINGS: NEARLY EVERY DAY
2. NOT BEING ABLE TO STOP OR CONTROL WORRYING: NEARLY EVERY DAY
6. BECOMING EASILY ANNOYED OR IRRITABLE: NEARLY EVERY DAY

## 2018-04-10 ASSESSMENT — PATIENT HEALTH QUESTIONNAIRE - PHQ9
SUM OF ALL RESPONSES TO PHQ QUESTIONS 1-9: 16
SUM OF ALL RESPONSES TO PHQ QUESTIONS 1-9: 16
10. IF YOU CHECKED OFF ANY PROBLEMS, HOW DIFFICULT HAVE THESE PROBLEMS MADE IT FOR YOU TO DO YOUR WORK, TAKE CARE OF THINGS AT HOME, OR GET ALONG WITH OTHER PEOPLE: EXTREMELY DIFFICULT

## 2018-04-10 NOTE — NURSING NOTE
"Chief Complaint   Patient presents with     Recheck Medication     Panel Management     phq9/ngozi       Initial /76 (BP Location: Left arm, Patient Position: Chair, Cuff Size: Adult Regular)  Pulse 84  Temp 98.1  F (36.7  C) (Oral)  Resp 16  Wt 202 lb (91.6 kg)  SpO2 99%  BMI 35.35 kg/m2 Estimated body mass index is 35.35 kg/(m^2) as calculated from the following:    Height as of 1/31/18: 5' 3.39\" (1.61 m).    Weight as of this encounter: 202 lb (91.6 kg).  Medication Reconciliation: complete  "

## 2018-04-10 NOTE — PATIENT INSTRUCTIONS
- Continue Wellbutrin   - Start Abilify 2 mg at night   - Recheck ~4 weeks     - Await lab results

## 2018-04-10 NOTE — MR AVS SNAPSHOT
After Visit Summary   4/10/2018    Ms. Ashley Mcdaniels    MRN: [id number]           Patient Information     Date Of Birth          1980        Visit Information        Provider Department      4/10/2018 9:00 AM Melva You PA-C [facility]        Today's Diagnoses     YUE (generalized anxiety disorder)    -  1    Major depressive disorder, recurrent episode, mild (H)        Urticaria, acute        Gastroesophageal reflux disease without esophagitis        Acquired hypothyroidism        Fatigue, unspecified type          Care Instructions    - Continue Wellbutrin   - Start Abilify 2 mg at night   - Recheck ~4 weeks     - Await lab results               Follow-ups after your visit        Follow-up notes from your care team     Return in about 1 month (around 5/10/2018).      Who to contact     If you have questions or need follow up information about today's clinic visit or your schedule please contact [facility] directly at [phone].  Normal or non-critical lab and imaging results will be communicated to you by MyChart, letter or phone within 4 business days after the clinic has received the results. If you do not hear from us within 7 days, please contact the clinic through [employer] or phone. If you have a critical or abnormal lab result, we will notify you by phone as soon as possible.  Submit refill requests through [employer] or call your pharmacy and they will forward the refill request to us. Please allow 3 business days for your refill to be completed.          Additional Information About Your Visit        [employer] Information     [employer] gives you secure access to your electronic health record. If you see a primary care provider, you can also send messages to your care team and make appointments. If you have questions, please call your primary care clinic.  If you do not have a primary care provider, please call [phone] and they will  assist you.        Care EveryWhere ID     This is your Care EveryWhere ID. This could be used by other organizations to access your [facility] medical records  [id number]        Your Vitals Were     Pulse Temperature Respirations Pulse Oximetry BMI (Body Mass Index)       84 98.1  F (36.7  C) (Oral) 16 99% 35.35 kg/m2        Blood Pressure from Last 3 Encounters:   04/10/18 120/76   01/31/18 122/78   11/02/17 110/78    Weight from Last 3 Encounters:   04/10/18 202 lb (91.6 kg)   01/31/18 199 lb (90.3 kg)   11/02/17 193 lb (87.5 kg)              We Performed the Following     Calcium     Estradiol     Follicle stimulating hormone     Lutropin     Parathyroid Hormone Intact     T3 Free     T3 total     T4, free     Testosterone total     Thyroxine total     TSH     Vitamin D Deficiency          Today's Medication Changes          These changes are accurate as of 4/10/18  9:52 AM.  If you have any questions, ask your nurse or doctor.               Start taking these medicines.        Dose/Directions    ARIPiprazole 2 MG tablet   Commonly known as:  ABILIFY   Used for:  YUE (generalized anxiety disorder), Major depressive disorder, recurrent episode, mild (H)   Started by:  Melva You PA-C        Dose:  2 mg   Take 1 tablet (2 mg) by mouth At Bedtime   Quantity:  30 tablet   Refills:  1            Where to get your medicines      These medications were sent to [facility] - [address]   [address] [place] [address]     Phone:  [phone]     ARIPiprazole 2 MG tablet    buPROPion 300 MG 24 hr tablet    hydrOXYzine 25 MG tablet    pantoprazole 40 MG EC tablet                Primary Care Provider Office Phone # Fax #    Melva You PA-C [phone]       [address] [facility] [address]  [facility] [address]        Equal Access to Services     ERICKA WEINSTEIN AH: Breanna Purdy, vishal carmona, orestes ghosh  brenda bowmanchris salinasaan ah. So [facility] [phone].    ATENCIÓN: Si habla miles, tiene a keita disposición servicios gratuitos de asistencia lingüística. Alexi al [phone].    We comply with applicable federal civil rights laws and [place] laws. We do not discriminate on the basis of race, color, national origin, age, disability, sex, sexual orientation, or gender identity.            Thank you!     Thank you for choosing [facility]  for your care. Our goal is always to provide you with excellent care. Hearing back from our patients is one way we can continue to improve our services. Please take a few minutes to complete the written survey that you may receive in the mail after your visit with us. Thank you!             Your Updated Medication List - Protect others around you: Learn how to safely use, store and throw away your medicines at www.disposemymeds.org.          This list is accurate as of 4/10/18  9:52 AM.  Always use your most recent med list.                   Brand Name Dispense Instructions for use Diagnosis    acetaminophen 500 MG tablet    TYLENOL     Take 1,000 mg by mouth        ARIPiprazole 2 MG tablet    ABILIFY    30 tablet    Take 1 tablet (2 mg) by mouth At Bedtime    YUE (generalized anxiety disorder), Major depressive disorder, recurrent episode, mild (H)       B COMPLEX FORMULA 1 Tabs      Take 1 tablet by mouth        buPROPion 300 MG 24 hr tablet    WELLBUTRIN XL    90 tablet    Take 1 tablet (300 mg) by mouth every morning    YUE (generalized anxiety disorder), Major depressive disorder, recurrent episode, mild (H)       cetirizine 10 MG tablet    zyrTEC    30 tablet    Take 1 tablet (10 mg) by mouth daily    Chronic urticaria       esomeprazole 40 MG CR capsule    nexIUM    90 capsule    Take 1 capsule (40 mg) by mouth every morning (before breakfast) Take 30-60 minutes before eating.    Gastroesophageal reflux disease without esophagitis        HUMIRA PEN 40 MG/0.8ML pen kit   Generic drug:  adalimumab           hydrOXYzine 25 MG tablet    ATARAX    60 tablet    Take 1-2 tablets (25-50 mg) by mouth every 6 hours as needed for itching    Urticaria, acute       ibuprofen 200 MG tablet    ADVIL/MOTRIN     Take 400 mg by mouth        levonorgestrel 20 MCG/24HR IUD    MIRENA     1 each (20 mcg) by Intrauterine route continuous    Encounter for IUD removal, Encounter for IUD insertion, IUD (intrauterine device) in place       NP THYROID 15 MG Tabs tablet   Generic drug:  thyroid     90 tablet    TAKE ONE TABLET BY MOUTH EVERY DAY    Acquired hypothyroidism       pantoprazole 40 MG EC tablet    PROTONIX    90 tablet    Take 1 tablet (40 mg) by mouth daily Take 30-60 minutes before a meal.    Gastroesophageal reflux disease without esophagitis       valACYclovir 1000 mg tablet    VALTREX    6 tablet    Take 0.5 tablets (500 mg) by mouth 2 times daily for 6 days    HSV (herpes simplex virus) infection

## 2018-04-11 LAB — T3 SERPL-MCNC: 147 NG/DL (ref 60–181)

## 2018-04-11 ASSESSMENT — ANXIETY QUESTIONNAIRES: GAD7 TOTAL SCORE: 19

## 2018-04-11 ASSESSMENT — PATIENT HEALTH QUESTIONNAIRE - PHQ9: SUM OF ALL RESPONSES TO PHQ QUESTIONS 1-9: 16

## 2018-04-12 LAB — TESTOST SERPL-MCNC: 20 NG/DL (ref 8–60)

## 2018-04-12 NOTE — PROGRESS NOTES
Hello Tia    The rest of your results were normal.     The results are attached for your review.       Rene You PA-C

## 2018-04-12 NOTE — PROGRESS NOTES
Helcarmenza Tia    Your results so far are normal. Vitamin D in normal range but calcium a little low, so I would recommend taking 5,000 IU daily instead of the 10,000 IU. I'm still awaiting 1 last lab.     The results are attached for your review.       Rene You PA-C

## 2018-05-01 ENCOUNTER — OFFICE VISIT (OUTPATIENT)
Dept: ALLERGY | Facility: OTHER | Age: 38
End: 2018-05-01
Payer: COMMERCIAL

## 2018-05-01 ENCOUNTER — TRANSFERRED RECORDS (OUTPATIENT)
Dept: HEALTH INFORMATION MANAGEMENT | Facility: CLINIC | Age: 38
End: 2018-05-01

## 2018-05-01 ENCOUNTER — TELEPHONE (OUTPATIENT)
Dept: ALLERGY | Facility: OTHER | Age: 38
End: 2018-05-01

## 2018-05-01 VITALS
TEMPERATURE: 97.6 F | DIASTOLIC BLOOD PRESSURE: 60 MMHG | SYSTOLIC BLOOD PRESSURE: 106 MMHG | BODY MASS INDEX: 34.65 KG/M2 | OXYGEN SATURATION: 97 % | WEIGHT: 198 LBS | HEART RATE: 80 BPM

## 2018-05-01 DIAGNOSIS — L50.8 CHRONIC URTICARIA: Primary | ICD-10-CM

## 2018-05-01 PROCEDURE — 99214 OFFICE O/P EST MOD 30 MIN: CPT | Performed by: ALLERGY & IMMUNOLOGY

## 2018-05-01 RX ORDER — EPINEPHRINE 0.3 MG/.3ML
0.3 INJECTION SUBCUTANEOUS PRN
Qty: 0.6 ML | Refills: 1 | Status: SHIPPED | OUTPATIENT
Start: 2018-05-01 | End: 2019-12-11

## 2018-05-01 NOTE — LETTER
5/1/2018         RE: Ashley Mcdaniels  [address]  [facility] [address]        Dear Colleague,    Thank you for referring your patient, Ashley Mcdaniels, to the [facility]. Please see a copy of my visit note below.    Ashley Mcdaniels is a 37 year old White female with previous medical history significant for chronic urticaria, hypothyroidism, Crohn's disease, GERD who returns for a follow up visit. Ashley Mcdaniels is being seen today for chronic hives.     The patient returns for follow-up.  She was last seen in January 2017.  As you may recall she has had hives that began 10 years ago and persisted for 1-1.5 years.  Hives had returned for a few months prior to last visit.  She was started on Zyrtec 10 mg by mouth daily and had significant improvement in hives.  She subsequently decreased her Zyrtec dose to 10 mg by mouth daily.  Over the last 2 months she has had return of hives despite being on Zyrtec 10 mg by mouth twice daily and hydroxyzine 25 mg p.o. twice daily.  No use of Zantac or Singulair.  Hives occur all over her body.  No angioedema.  Hives are pruritic and last less than 24 hours.  No scarring or discoloration.  No use of cyclosporine or Xolair.  No clear etiology of symptoms.    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      Crohn's colitis (H)      YUE (generalized anxiety disorder)      GERD (gastroesophageal reflux disease)      Low back pain      Major depressive disorder, recurrent episode, mild (H)      Family History   Problem Relation Age of Onset     Arthritis Mother      HEART DISEASE Father      CAD, CHF     Alcoholism Father      Asthma Father      Hypertension Father      Hyperlipidemia Father      Pancreatic Cancer Father      Liver Cancer Father      DIABETES Maternal Grandmother      Substance Abuse Maternal Grandmother      Hyperlipidemia Maternal Grandmother       Hypertension Maternal Grandmother      Substance Abuse Maternal Grandfather      Asthma Paternal Grandmother      HEART DISEASE Paternal Grandmother      DIABETES Paternal Grandmother      Hypertension Paternal Grandmother      Substance Abuse Paternal Grandfather      MENTAL ILLNESS Brother      Substance Abuse Brother      Past Surgical History:   Procedure Laterality Date     CHOLECYSTECTOMY  Feb 2007     DILATION AND CURETTAGE  May 2003    Non-OB     HC TOOTH EXTRACTION W/FORCEP  Nov 2006     LAP ADJUSTABLE GASTRIC BAND  May 2007       REVIEW OF SYSTEMS:  General: negative for weight gain. negative for weight loss. negative for changes in sleep.   Ears: negative for fullness. negative for hearing loss. negative for dizziness.   Nose: negative for snoring.negative for changes in smell. negative for drainage.   Throat: negative for hoarseness. negative for sore throat. negative for trouble swallowing.   Lungs: negative for shortness of breath.negative for wheezing. negative for sputum production.   Cardiovascular: negative for chest pain. negative for swelling of ankles. negative for fast or irregular heartbeat.   Gastrointestinal: negative for nausea. positive  for heartburn. positive  for acid reflux.   Musculoskeletal: positive  for joint pain. negative for joint stiffness. negative for joint swelling.   Neurologic: negative for seizures. negative for fainting. negative for weakness.   Psychiatric: negative for changes in mood. positive  for anxiety.   Endocrine: negative for cold intolerance. negative for heat intolerance. negative for tremors.   Hematologic: negative for easy bruising. negative for easy bleeding.  Integumentary: positive  for rash. negative for scaling. negative for nail changes.       Current Outpatient Prescriptions:      acetaminophen (TYLENOL) 500 MG tablet, Take 1,000 mg by mouth, Disp: , Rfl:      ARIPiprazole (ABILIFY) 2 MG tablet, Take 1 tablet (2 mg) by mouth At Bedtime, Disp: 30  tablet, Rfl: 1     B Complex-Folic Acid (B COMPLEX FORMULA 1) TABS, Take 1 tablet by mouth, Disp: , Rfl:      buPROPion (WELLBUTRIN XL) 300 MG 24 hr tablet, Take 1 tablet (300 mg) by mouth every morning, Disp: 90 tablet, Rfl: 3     cetirizine (ZYRTEC) 10 MG tablet, Take 1 tablet (10 mg) by mouth daily, Disp: 30 tablet, Rfl: 0     EPINEPHrine (EPIPEN/ADRENACLICK/OR ANY BX GENERIC EQUIV) 0.3 MG/0.3ML injection 2-pack, Inject 0.3 mLs (0.3 mg) into the muscle as needed for anaphylaxis, Disp: 0.6 mL, Rfl: 1     esomeprazole (NEXIUM) 40 MG CR capsule, Take 1 capsule (40 mg) by mouth every morning (before breakfast) Take 30-60 minutes before eating., Disp: 90 capsule, Rfl: 1     HUMIRA PEN 40 MG/0.8ML pen kit, , Disp: , Rfl: 2     hydrOXYzine (ATARAX) 25 MG tablet, Take 1-2 tablets (25-50 mg) by mouth every 6 hours as needed for itching, Disp: 60 tablet, Rfl: 5     ibuprofen (ADVIL,MOTRIN) 200 MG tablet, Take 400 mg by mouth, Disp: , Rfl:      levonorgestrel (MIRENA) 20 MCG/24HR IUD, 1 each (20 mcg) by Intrauterine route continuous, Disp: , Rfl:      NP THYROID 15 MG TABS tablet, TAKE ONE TABLET BY MOUTH EVERY DAY, Disp: 90 tablet, Rfl: 1     pantoprazole (PROTONIX) 40 MG EC tablet, Take 1 tablet (40 mg) by mouth daily Take 30-60 minutes before a meal. (Patient not taking: Reported on 5/1/2018), Disp: 90 tablet, Rfl: 3     valACYclovir (VALTREX) 1000 mg tablet, Take 0.5 tablets (500 mg) by mouth 2 times daily for 6 days, Disp: 6 tablet, Rfl: 11  Immunization History   Administered Date(s) Administered     FLU 6-35 months 10/16/2002, 11/28/2003     HepA-Adult 12/05/2017     Influenza (IIV3) PF 11/02/2005, 10/17/2006, 11/07/2007, 10/21/2008     Influenza Vaccine IM 3yrs+ 4 Valent IIV4 10/12/2015     Mantoux Tuberculin Skin Test 10/26/2015     Pneumococcal 23 valent 02/09/2018     TDAP Vaccine (Adacel) 09/07/2012     No Known Allergies      EXAM:   Constitutional:  Appears well-developed and well-nourished. No distress.    HEENT:   Normocephalic.   No cobblestoning of posterior oropharynx.   Nasal tissue pink and normal appearing.  No rhinorrhea noted.    Eyes: Conjunctivae are non-erythematous   Cardiovascular: Normal rate, regular rhythm and normal heart sounds. Exam reveals no gallop and no friction rub.   No murmur heard.  Respiratory: Effort normal and breath sounds normal. No respiratory distress. No wheezes. No rales.   Musculoskeletal: Normal range of motion.   Neuro: Oriented to person, place, and time.  Skin: Erythematous welts noted on neck and bilateral arms consistent with hives.  Psychiatric: Normal mood and affect.     Nursing note and vitals reviewed.    ASSESSMENT/PLAN:  Problem List Items Addressed This Visit        Musculoskeletal and Integumentary    Chronic urticaria - Primary     Hives daily for the last 2 months. No angioedema. No clear associations with hives. History of hives 10 years ago that last 1-1.5 years. Hives additionally present last year and resolved with increased dose of oral antihistamines. Now with hives despite Zyrtec 20mg PO daily and Hydroxyzine 50mg PO daily.     - Cetirizine 20 mg by mouth twice daily.  - Zantac 150mg PO bid.   - If still with hives she can take hydroxyzine 25-100mg PO qhs. Titrate to effect/sedation.   - Start Xolair 300mg monthly.  Discussed risk/benefits.  Risk reviewed include anaphylaxis and increased rate of cardiovascular disease.  -Epinephrine autoinjector was prescribed given risk of anaphylaxis.    - Return to clinic in 3 months.          Relevant Medications    EPINEPHrine (EPIPEN/ADRENACLICK/OR ANY BX GENERIC EQUIV) 0.3 MG/0.3ML injection 2-pack          Chart documentation with Dragon Voice recognition Software. Although reviewed after completion, some words and grammatical errors may remain.    Ian Cash, DO   Allergy/Immunology  [facility], [place] and [place], [place]      Again, thank you for allowing me to participate in the care of your  patient.        Sincerely,        Ian Cash, DO

## 2018-05-01 NOTE — PATIENT INSTRUCTIONS
Allergy Staff Appt Hours Shot Hours Locations    Physician     Ian Cash DO       Support Staff     Chelsea DOHERTY, RN      Nolvia DOHERTY, [facility]  Monday:                      [place] 8-7     Tuesday:         [place] 8-5     Wednesday:        [place]: 7-5     Friday:        [place] 7-5   [place]        Monday: 9-5:50        Wednesday: 2-5:50        Friday: 7-12:50     [place]        Tuesday: 7-10:50        Thursday: 1:30-6:30        Friday: 8:00-3:50     [place] Monday: 7:10-4:50        Tuesday: 12:30-6:30        Thursday: 7-11:50 [facility]  [address] [place] [address]  Appt Line: [phone]  Allergy RN (Monday):  [phone]    [facility]  [address] [place] [address]  Appt Line: [phone]  Allergy RN (Tues & Wed):  [phone]    [facility]  [address] [place] [address]  Appt Line: [phone]  Allergy RN (Friday):  [phone]       Important Scheduling Information  Aspirin Desensitization: Appt will last 2 clinic days. Please call the Allergy RN line for your clinic to schedule. Discontinue antihistamines 7 days prior to the appointment.     Food Challenges: Appt will last 3-4 hours. Please call the Allergy RN line for your clinic to schedule. Discontinue antihistamines 7 days prior to the appointment.     Penicillin Testing: Appt will last 2-3 hours. Please call the Allergy RN line for your clinic to schedule. Discontinue antihistamines 7 days prior to the appointment.     Skin Testing: Appt will about 40 minutes. Call the appointment line for your clinic to schedule. Discontinue antihistamines 7 days prior to the appointment.     Venom Testing: Appt will last 2-3 hours. Please call the Allergy RN line for your clinic to schedule. Discontinue antihistamines 7 days prior to the appointment.     Thank you for trusting us with your Allergy, Asthma, and Immunology care. Please feel free to contact us with any questions or concerns you may  have.      - Zyrtec 20mg by mouth twice daily.   - Zantac 150mg by mouth twice daily.   - Start Xolair 300mg monthly.   - Script for EpiPen provided.     Anaphylaxis Action Plan for Immunotherapy Patients    There is risk of systemic reactions when receiving immunotherapy injections. Local reactions such as a wheal (hive) smaller than a quarter, redness, swelling, and soreness are common. If the wheal is greater than the size of a half dollar (3.4 cm) please contact our clinic (numbers below), as we will need to adjust the dose that you receive at your next injection. Waiting until the next appointment to inform us of the reaction could increase the wait time that you experience, because your allergist will need to be contacted for new orders prior to giving the injection.  If you have symptoms not localized to the injection site, please follow the anaphylaxis plan, and contact our office to update after you have received emergency medical treatment. Please ask to speak to an Allergy RN with any questions or updates.     [facility]: [phone]  [facility]: [phone]  [facility]: [phone]  [place]: [phone]  [phone]

## 2018-05-01 NOTE — ASSESSMENT & PLAN NOTE
Hives daily for the last 2 months. No angioedema. No clear associations with hives. History of hives 10 years ago that last 1-1.5 years. Hives additionally present last year and resolved with increased dose of oral antihistamines. Now with hives despite Zyrtec 20mg PO daily and Hydroxyzine 50mg PO daily.     - Cetirizine 20 mg by mouth twice daily.  - Zantac 150mg PO bid.   - If still with hives she can take hydroxyzine 25-100mg PO qhs. Titrate to effect/sedation.   - Start Xolair 300mg monthly.  Discussed risk/benefits.  Risk reviewed include anaphylaxis and increased rate of cardiovascular disease.  -Epinephrine autoinjector was prescribed given risk of anaphylaxis.    - Return to clinic in 3 months.

## 2018-05-01 NOTE — MR AVS SNAPSHOT
After Visit Summary   5/1/2018    Ms. Ashley Mcdaniels    MRN: [id number]           Patient Information     Date Of Birth          1980        Visit Information        Provider Department      5/1/2018 9:20 AM Ian Cash DO [facility]        Today's Diagnoses     Chronic urticaria    -  1      Care Instructions    Allergy Staff Appt Hours Shot Hours Locations    Physician     Ian Cash DO       Support Staff     BARBIE Lowe [facility]  Monday:                      [place] 8-7     Tuesday:         [place] 8-5     Wednesday:        [place]: 7-5     Friday:        [place] 7-5   [place] Monday: 9-5:50        Wednesday: 2-5:50        Friday: 7-12:50     [place]        Tuesday: 7-10:50        Thursday: 1:30-6:30        Friday: 8:00-3:50     [place] Monday: 7:10-4:50        Tuesday: 12:30-6:30        Thursday: 7-11:50 [facility]  [id number] [place] [address]  Appt Line: [phone]  Allergy RN (Monday):  [phone]    [facility]  [address] [place] [address]  Appt Line: [phone]  Allergy RN (Tues & Wed):  [phone]    [facility]  [address] [place] [address]  Appt Line: [phone]  Allergy RN (Friday):  [phone]       Important Scheduling Information  Aspirin Desensitization: Appt will last 2 clinic days. Please call the Allergy RN line for your clinic to schedule. Discontinue antihistamines 7 days prior to the appointment.     Food Challenges: Appt will last 3-4 hours. Please call the Allergy RN line for your clinic to schedule. Discontinue antihistamines 7 days prior to the appointment.     Penicillin Testing: Appt will last 2-3 hours. Please call the Allergy RN line for your clinic to schedule. Discontinue antihistamines 7 days prior to the appointment.     Skin Testing: Appt will about 40 minutes. Call the appointment line for your clinic to schedule. Discontinue  antihistamines 7 days prior to the appointment.     Venom Testing: Appt will last 2-3 hours. Please call the Allergy RN line for your clinic to schedule. Discontinue antihistamines 7 days prior to the appointment.     Thank you for trusting us with your Allergy, Asthma, and Immunology care. Please feel free to contact us with any questions or concerns you may have.      - Zyrtec 20mg by mouth twice daily.   - Zantac 150mg by mouth twice daily.   - Start Xolair 300mg monthly.   - Script for EpiPen provided.     Anaphylaxis Action Plan for Immunotherapy Patients    There is risk of systemic reactions when receiving immunotherapy injections. Local reactions such as a wheal (hive) smaller than a quarter, redness, swelling, and soreness are common. If the wheal is greater than the size of a half dollar (3.4 cm) please contact our clinic (numbers below), as we will need to adjust the dose that you receive at your next injection. Waiting until the next appointment to inform us of the reaction could increase the wait time that you experience, because your allergist will need to be contacted for new orders prior to giving the injection.  If you have symptoms not localized to the injection site, please follow the anaphylaxis plan, and contact our office to update after you have received emergency medical treatment. Please ask to speak to an Allergy RN with any questions or updates.     [facility]: [phone]  [facility]3]: [phone]  [facility]: [phone]  [place]: [phone]  [phone]                Follow-ups after your visit        Follow-up notes from your care team     Return in about 3 months (around 2018).      Who to contact     If you have questions or need follow up information about today's clinic visit or your schedule please contact [facility] directly at [phone].  Normal or non-critical lab and imaging results will be communicated to you by Basilio  letter or phone within 4 business days after the clinic has received the results. If you do not hear from us within 7 days, please contact the clinic through Netrada or phone. If you have a critical or abnormal lab result, we will notify you by phone as soon as possible.  Submit refill requests through Netrada or call your pharmacy and they will forward the refill request to us. Please allow 3 business days for your refill to be completed.          Additional Information About Your Visit        Netrada Information     Netrada gives you secure access to your electronic health record. If you see a primary care provider, you can also send messages to your care team and make appointments. If you have questions, please call your primary care clinic.  If you do not have a primary care provider, please call [phone] and they will assist you.        Care EveryWhere ID     This is your Care EveryWhere ID. This could be used by other organizations to access your [facility] medical records  [id number]        Your Vitals Were     Pulse Temperature Pulse Oximetry BMI (Body Mass Index)          80 97.6  F (36.4  C) (Oral) 97% 34.65 kg/m2         Blood Pressure from Last 3 Encounters:   05/01/18 106/60   04/10/18 120/76   01/31/18 122/78    Weight from Last 3 Encounters:   05/01/18 89.8 kg (198 lb)   04/10/18 91.6 kg (202 lb)   01/31/18 90.3 kg (199 lb)              Today, you had the following     No orders found for display         Today's Medication Changes          These changes are accurate as of 5/1/18  9:48 AM.  If you have any questions, ask your nurse or doctor.               Start taking these medicines.        Dose/Directions    EPINEPHrine 0.3 MG/0.3ML injection 2-pack   Commonly known as:  EPIPEN/ADRENACLICK/or ANY BX GENERIC EQUIV   Used for:  Chronic urticaria   Started by:  Ian Cash DO        Dose:  0.3 mg   Inject 0.3 mLs (0.3 mg) into the muscle as needed for anaphylaxis   Quantity:  0.6 mL    Refills:  1            Where to get your medicines      These medications were sent to [facility] - [address]   [address] [place] [address]     Phone:  [phone]     EPINEPHrine 0.3 MG/0.3ML injection 2-pack                Primary Care Provider Office Phone # Fax #    Melva CHAVES PENNIE You [phone]       [address] [place] [address] [address]  [facility] [address]        Equal Access to Services     [facility]: Hadii aad ku hadasho Soomaali, waaxda luqadaha, qaybta kaalmada adeegyada, waxay idiin hayaan adeeg alfonso emmanuel . So [facility] [phone].    ATENCIÓN: Si habla español, tiene a keita disposición servicios gratuitos de asistencia lingüística. [facility] [phone].    We comply with applicable federal civil rights laws and [place] laws. We do not discriminate on the basis of race, color, national origin, age, disability, sex, sexual orientation, or gender identity.            Thank you!     Thank you for choosing [facility]  for your care. Our goal is always to provide you with excellent care. Hearing back from our patients is one way we can continue to improve our services. Please take a few minutes to complete the written survey that you may receive in the mail after your visit with us. Thank you!             Your Updated Medication List - Protect others around you: Learn how to safely use, store and throw away your medicines at www.disposemymeds.org.          This list is accurate as of 5/1/18  9:48 AM.  Always use your most recent med list.                   Brand Name Dispense Instructions for use Diagnosis    acetaminophen 500 MG tablet    TYLENOL     Take 1,000 mg by mouth        ARIPiprazole 2 MG tablet    ABILIFY    30 tablet    Take 1 tablet (2 mg) by mouth At Bedtime    YUE (generalized anxiety disorder), Major depressive disorder, recurrent episode, mild (H)       B COMPLEX FORMULA 1 Tabs      Take 1 tablet  by mouth        buPROPion 300 MG 24 hr tablet    WELLBUTRIN XL    90 tablet    Take 1 tablet (300 mg) by mouth every morning    YUE (generalized anxiety disorder), Major depressive disorder, recurrent episode, mild (H)       cetirizine 10 MG tablet    zyrTEC    30 tablet    Take 1 tablet (10 mg) by mouth daily    Chronic urticaria       EPINEPHrine 0.3 MG/0.3ML injection 2-pack    EPIPEN/ADRENACLICK/or ANY BX GENERIC EQUIV    0.6 mL    Inject 0.3 mLs (0.3 mg) into the muscle as needed for anaphylaxis    Chronic urticaria       esomeprazole 40 MG CR capsule    nexIUM    90 capsule    Take 1 capsule (40 mg) by mouth every morning (before breakfast) Take 30-60 minutes before eating.    Gastroesophageal reflux disease without esophagitis       HUMIRA PEN 40 MG/0.8ML pen kit   Generic drug:  adalimumab           hydrOXYzine 25 MG tablet    ATARAX    60 tablet    Take 1-2 tablets (25-50 mg) by mouth every 6 hours as needed for itching    Urticaria, acute       ibuprofen 200 MG tablet    ADVIL/MOTRIN     Take 400 mg by mouth        levonorgestrel 20 MCG/24HR IUD    MIRENA     1 each (20 mcg) by Intrauterine route continuous    Encounter for IUD removal, Encounter for IUD insertion, IUD (intrauterine device) in place       NP THYROID 15 MG Tabs tablet   Generic drug:  thyroid     90 tablet    TAKE ONE TABLET BY MOUTH EVERY DAY    Acquired hypothyroidism       pantoprazole 40 MG EC tablet    PROTONIX    90 tablet    Take 1 tablet (40 mg) by mouth daily Take 30-60 minutes before a meal.    Gastroesophageal reflux disease without esophagitis       valACYclovir 1000 mg tablet    VALTREX    6 tablet    Take 0.5 tablets (500 mg) by mouth 2 times daily for 6 days    HSV (herpes simplex virus) infection

## 2018-05-01 NOTE — PROGRESS NOTES
Ashley Mcdaniels is a 37 year old White female with previous medical history significant for chronic urticaria, hypothyroidism, Crohn's disease, GERD who returns for a follow up visit. Ashley Mcdaniels is being seen today for chronic hives.     The patient returns for follow-up.  She was last seen in January 2017.  As you may recall she has had hives that began 10 years ago and persisted for 1-1.5 years.  Hives had returned for a few months prior to last visit.  She was started on Zyrtec 10 mg by mouth daily and had significant improvement in hives.  She subsequently decreased her Zyrtec dose to 10 mg by mouth daily.  Over the last 2 months she has had return of hives despite being on Zyrtec 10 mg by mouth twice daily and hydroxyzine 25 mg p.o. twice daily.  No use of Zantac or Singulair.  Hives occur all over her body.  No angioedema.  Hives are pruritic and last less than 24 hours.  No scarring or discoloration.  No use of cyclosporine or Xolair.  No clear etiology of symptoms.    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      Crohn's colitis (H)      YUE (generalized anxiety disorder)      GERD (gastroesophageal reflux disease)      Low back pain      Major depressive disorder, recurrent episode, mild (H)      Family History   Problem Relation Age of Onset     Arthritis Mother      HEART DISEASE Father      CAD, CHF     Alcoholism Father      Asthma Father      Hypertension Father      Hyperlipidemia Father      Pancreatic Cancer Father      Liver Cancer Father      DIABETES Maternal Grandmother      Substance Abuse Maternal Grandmother      Hyperlipidemia Maternal Grandmother      Hypertension Maternal Grandmother      Substance Abuse Maternal Grandfather      Asthma Paternal Grandmother      HEART DISEASE Paternal Grandmother      DIABETES Paternal Grandmother      Hypertension Paternal Grandmother      Substance  Abuse Paternal Grandfather      MENTAL ILLNESS Brother      Substance Abuse Brother      Past Surgical History:   Procedure Laterality Date     CHOLECYSTECTOMY  Feb 2007     DILATION AND CURETTAGE  May 2003    Non-OB     HC TOOTH EXTRACTION W/FORCEP  Nov 2006     LAP ADJUSTABLE GASTRIC BAND  May 2007       REVIEW OF SYSTEMS:  General: negative for weight gain. negative for weight loss. negative for changes in sleep.   Ears: negative for fullness. negative for hearing loss. negative for dizziness.   Nose: negative for snoring.negative for changes in smell. negative for drainage.   Throat: negative for hoarseness. negative for sore throat. negative for trouble swallowing.   Lungs: negative for shortness of breath.negative for wheezing. negative for sputum production.   Cardiovascular: negative for chest pain. negative for swelling of ankles. negative for fast or irregular heartbeat.   Gastrointestinal: negative for nausea. positive  for heartburn. positive  for acid reflux.   Musculoskeletal: positive  for joint pain. negative for joint stiffness. negative for joint swelling.   Neurologic: negative for seizures. negative for fainting. negative for weakness.   Psychiatric: negative for changes in mood. positive  for anxiety.   Endocrine: negative for cold intolerance. negative for heat intolerance. negative for tremors.   Hematologic: negative for easy bruising. negative for easy bleeding.  Integumentary: positive  for rash. negative for scaling. negative for nail changes.       Current Outpatient Prescriptions:      acetaminophen (TYLENOL) 500 MG tablet, Take 1,000 mg by mouth, Disp: , Rfl:      ARIPiprazole (ABILIFY) 2 MG tablet, Take 1 tablet (2 mg) by mouth At Bedtime, Disp: 30 tablet, Rfl: 1     B Complex-Folic Acid (B COMPLEX FORMULA 1) TABS, Take 1 tablet by mouth, Disp: , Rfl:      buPROPion (WELLBUTRIN XL) 300 MG 24 hr tablet, Take 1 tablet (300 mg) by mouth every morning, Disp: 90 tablet, Rfl: 3      cetirizine (ZYRTEC) 10 MG tablet, Take 1 tablet (10 mg) by mouth daily, Disp: 30 tablet, Rfl: 0     EPINEPHrine (EPIPEN/ADRENACLICK/OR ANY BX GENERIC EQUIV) 0.3 MG/0.3ML injection 2-pack, Inject 0.3 mLs (0.3 mg) into the muscle as needed for anaphylaxis, Disp: 0.6 mL, Rfl: 1     esomeprazole (NEXIUM) 40 MG CR capsule, Take 1 capsule (40 mg) by mouth every morning (before breakfast) Take 30-60 minutes before eating., Disp: 90 capsule, Rfl: 1     HUMIRA PEN 40 MG/0.8ML pen kit, , Disp: , Rfl: 2     hydrOXYzine (ATARAX) 25 MG tablet, Take 1-2 tablets (25-50 mg) by mouth every 6 hours as needed for itching, Disp: 60 tablet, Rfl: 5     ibuprofen (ADVIL,MOTRIN) 200 MG tablet, Take 400 mg by mouth, Disp: , Rfl:      levonorgestrel (MIRENA) 20 MCG/24HR IUD, 1 each (20 mcg) by Intrauterine route continuous, Disp: , Rfl:      NP THYROID 15 MG TABS tablet, TAKE ONE TABLET BY MOUTH EVERY DAY, Disp: 90 tablet, Rfl: 1     pantoprazole (PROTONIX) 40 MG EC tablet, Take 1 tablet (40 mg) by mouth daily Take 30-60 minutes before a meal. (Patient not taking: Reported on 5/1/2018), Disp: 90 tablet, Rfl: 3     valACYclovir (VALTREX) 1000 mg tablet, Take 0.5 tablets (500 mg) by mouth 2 times daily for 6 days, Disp: 6 tablet, Rfl: 11  Immunization History   Administered Date(s) Administered     FLU 6-35 months 10/16/2002, 11/28/2003     HepA-Adult 12/05/2017     Influenza (IIV3) PF 11/02/2005, 10/17/2006, 11/07/2007, 10/21/2008     Influenza Vaccine IM 3yrs+ 4 Valent IIV4 10/12/2015     Mantoux Tuberculin Skin Test 10/26/2015     Pneumococcal 23 valent 02/09/2018     TDAP Vaccine (Adacel) 09/07/2012     No Known Allergies      EXAM:   Constitutional:  Appears well-developed and well-nourished. No distress.   HEENT:   Normocephalic.   No cobblestoning of posterior oropharynx.   Nasal tissue pink and normal appearing.  No rhinorrhea noted.    Eyes: Conjunctivae are non-erythematous   Cardiovascular: Normal rate, regular rhythm and normal  heart sounds. Exam reveals no gallop and no friction rub.   No murmur heard.  Respiratory: Effort normal and breath sounds normal. No respiratory distress. No wheezes. No rales.   Musculoskeletal: Normal range of motion.   Neuro: Oriented to person, place, and time.  Skin: Erythematous welts noted on neck and bilateral arms consistent with hives.  Psychiatric: Normal mood and affect.     Nursing note and vitals reviewed.    ASSESSMENT/PLAN:  Problem List Items Addressed This Visit        Musculoskeletal and Integumentary    Chronic urticaria - Primary     Hives daily for the last 2 months. No angioedema. No clear associations with hives. History of hives 10 years ago that last 1-1.5 years. Hives additionally present last year and resolved with increased dose of oral antihistamines. Now with hives despite Zyrtec 20mg PO daily and Hydroxyzine 50mg PO daily.     - Cetirizine 20 mg by mouth twice daily.  - Zantac 150mg PO bid.   - If still with hives she can take hydroxyzine 25-100mg PO qhs. Titrate to effect/sedation.   - Start Xolair 300mg monthly.  Discussed risk/benefits.  Risk reviewed include anaphylaxis and increased rate of cardiovascular disease.  -Epinephrine autoinjector was prescribed given risk of anaphylaxis.    - Return to clinic in 3 months.          Relevant Medications    EPINEPHrine (EPIPEN/ADRENACLICK/OR ANY BX GENERIC EQUIV) 0.3 MG/0.3ML injection 2-pack          Chart documentation with Dragon Voice recognition Software. Although reviewed after completion, some words and grammatical errors may remain.    Ian Cash DO   Allergy/Immunology  [facility] [place]

## 2018-05-02 ENCOUNTER — TELEPHONE (OUTPATIENT)
Dept: ALLERGY | Facility: OTHER | Age: 38
End: 2018-05-02

## 2018-05-02 NOTE — TELEPHONE ENCOUNTER
Reason for call:  Form  Reason for Call:  Form, our goal is to have forms completed with 72 hours, however, some forms may require a visit or additional information.    Type of letter, form or note:  medical    Who is the form from?: [employer]    Where did the form come from: form was faxed in    What clinic location was the form placed at?:     Where the form was placed: [facility] in box    What number is listed as a contact on the form?: [phone]       Additional comments: Fax to [phone]    Call taken on 11/8/2016 at 3:08 PM by Monique Mckeon

## 2018-05-03 ENCOUNTER — TRANSFERRED RECORDS (OUTPATIENT)
Dept: HEALTH INFORMATION MANAGEMENT | Facility: CLINIC | Age: 38
End: 2018-05-03

## 2018-05-03 DIAGNOSIS — K50.80 CROHN'S DISEASE OF BOTH SMALL AND LARGE INTESTINE WITHOUT COMPLICATION (H): Primary | ICD-10-CM

## 2018-05-03 NOTE — TELEPHONE ENCOUNTER
Will continue documentation under encounter that is opened for prior authorization of Xolair. Closing encounter.    Miladis Ramos RN

## 2018-05-08 DIAGNOSIS — L50.1 CHRONIC IDIOPATHIC URTICARIA: Primary | ICD-10-CM

## 2018-05-08 NOTE — TELEPHONE ENCOUNTER
Patient was approved for Xolair through [facility]. Approval number was not on fax. A prior authorization was not required for this medication. Medication is being used to treat Chronic Idiopathic Urticaria.  The patient is aware that deductible and OOP max must be reached before insurance will cover Xolair at 100%.  Specialist copay listed on fax is listed at $3.00    Medication will be obtained by Buy and Bill, and an ABN IS required before each injection.. This patient will be receiving Xolair 300 mg every 28 days. Standing orders have been placed. All paperwork has been sent to scanning.    Patient was notified of approval, and her first appointment was scheduled for 5/15/18.  She is aware of the 2.5hr wait time. Patient information was added to the IT Patient log. Xolair was not ordered.    Paperwork was sent to scanning.  Closing encounter.    Chelsea Camara RN

## 2018-05-09 DIAGNOSIS — K50.80 CROHN'S DISEASE OF BOTH SMALL AND LARGE INTESTINE WITHOUT COMPLICATION (H): ICD-10-CM

## 2018-05-09 LAB
ALBUMIN SERPL-MCNC: 4.1 G/DL (ref 3.4–5)
ALP SERPL-CCNC: 75 U/L (ref 40–150)
ALT SERPL W P-5'-P-CCNC: 21 U/L (ref 0–50)
AST SERPL W P-5'-P-CCNC: 15 U/L (ref 0–45)
BASOPHILS # BLD AUTO: 0 10E9/L (ref 0–0.2)
BASOPHILS NFR BLD AUTO: 0.1 %
BILIRUB DIRECT SERPL-MCNC: <0.1 MG/DL (ref 0–0.2)
BILIRUB SERPL-MCNC: 0.2 MG/DL (ref 0.2–1.3)
CREAT SERPL-MCNC: 0.93 MG/DL (ref 0.52–1.04)
CRP SERPL-MCNC: 6.9 MG/L (ref 0–8)
DIFFERENTIAL METHOD BLD: ABNORMAL
EOSINOPHIL # BLD AUTO: 0.1 10E9/L (ref 0–0.7)
EOSINOPHIL NFR BLD AUTO: 0.9 %
ERYTHROCYTE [DISTWIDTH] IN BLOOD BY AUTOMATED COUNT: 13.1 % (ref 10–15)
GFR SERPL CREATININE-BSD FRML MDRD: 68 ML/MIN/1.7M2
HCT VFR BLD AUTO: 41.8 % (ref 35–47)
HGB BLD-MCNC: 14.1 G/DL (ref 11.7–15.7)
LYMPHOCYTES # BLD AUTO: 1.8 10E9/L (ref 0.8–5.3)
LYMPHOCYTES NFR BLD AUTO: 12.3 %
MCH RBC QN AUTO: 29 PG (ref 26.5–33)
MCHC RBC AUTO-ENTMCNC: 33.7 G/DL (ref 31.5–36.5)
MCV RBC AUTO: 86 FL (ref 78–100)
MONOCYTES # BLD AUTO: 1.1 10E9/L (ref 0–1.3)
MONOCYTES NFR BLD AUTO: 7.3 %
NEUTROPHILS # BLD AUTO: 11.4 10E9/L (ref 1.6–8.3)
NEUTROPHILS NFR BLD AUTO: 79.4 %
PLATELET # BLD AUTO: 341 10E9/L (ref 150–450)
PROT SERPL-MCNC: 8 G/DL (ref 6.8–8.8)
RBC # BLD AUTO: 4.86 10E12/L (ref 3.8–5.2)
WBC # BLD AUTO: 14.3 10E9/L (ref 4–11)

## 2018-05-09 PROCEDURE — 86352 CELL FUNCTION ASSAY W/STIM: CPT | Mod: 90 | Performed by: PHYSICIAN ASSISTANT

## 2018-05-09 PROCEDURE — 82565 ASSAY OF CREATININE: CPT | Performed by: PHYSICIAN ASSISTANT

## 2018-05-09 PROCEDURE — 86140 C-REACTIVE PROTEIN: CPT | Performed by: PHYSICIAN ASSISTANT

## 2018-05-09 PROCEDURE — 36415 COLL VENOUS BLD VENIPUNCTURE: CPT | Performed by: PHYSICIAN ASSISTANT

## 2018-05-09 PROCEDURE — 85025 COMPLETE CBC W/AUTO DIFF WBC: CPT | Performed by: PHYSICIAN ASSISTANT

## 2018-05-09 PROCEDURE — 80076 HEPATIC FUNCTION PANEL: CPT | Performed by: PHYSICIAN ASSISTANT

## 2018-05-09 PROCEDURE — 99000 SPECIMEN HANDLING OFFICE-LAB: CPT | Performed by: PHYSICIAN ASSISTANT

## 2018-05-11 LAB
ADALIMUMAB NEUTRALIZING ANTIBODY: NOT DETECTED
ADALIMUMAB SERPL-MCNC: 9.33 UG/ML
PATHOLOGY STUDY: NORMAL

## 2018-05-15 ENCOUNTER — ALLIED HEALTH/NURSE VISIT (OUTPATIENT)
Dept: ALLERGY | Facility: OTHER | Age: 38
End: 2018-05-15
Payer: COMMERCIAL

## 2018-05-15 DIAGNOSIS — L50.1 CHRONIC IDIOPATHIC URTICARIA: ICD-10-CM

## 2018-05-15 PROCEDURE — 96372 THER/PROPH/DIAG INJ SC/IM: CPT

## 2018-05-15 PROCEDURE — 99207 ZZC DROP WITH A PROCEDURE: CPT

## 2018-05-15 NOTE — PROGRESS NOTES
The following medication was given:     MEDICATION: Xolair (Omalizumab)  ROUTE: SQ  SITE: Left upper and right upper arm  DOSE: 300 mg  Both Vials:   LOT #: [id number]   :  AOL  EXPIRATION DATE:  Sep 2021   NDC#: 66905-919-83      Marylu Correa RN on 5/15/2018 at 8:13 AM

## 2018-05-15 NOTE — PROGRESS NOTES
The following information was verified prior to Xolair (Omalizumab):    ABN signed:  YES - Date: 5/15/18  Vial Expiration: 9/2021 x2  Lot #: [id number]   Dose: 300mg (split into 2 doses of 150 mg each)  Amount: 2.4 mL (split into 2 injections of 1.2 mL each)  Interval between injections: Every 28 days    Miguel Luna RN....5/15/2018 8:10 AM

## 2018-05-15 NOTE — MR AVS SNAPSHOT
After Visit Summary   5/15/2018    Ms. Ashley Mcdaniels    MRN: [id number]           Patient Information     Date Of Birth          1980        Visit Information        Provider Department      5/15/2018 7:30 AM ER [facility]        Today's Diagnoses     Chronic idiopathic urticaria           Follow-ups after your visit        Who to contact     If you have questions or need follow up information about today's clinic visit or your schedule please contact [facility] directly at [phone].  Normal or non-critical lab and imaging results will be communicated to you by Promisechart, letter or phone within 4 business days after the clinic has received the results. If you do not hear from us within 7 days, please contact the clinic through MessagePartyt or phone. If you have a critical or abnormal lab result, we will notify you by phone as soon as possible.  Submit refill requests through [employer] or call your pharmacy and they will forward the refill request to us. Please allow 3 business days for your refill to be completed.          Additional Information About Your Visit        MyChart Information     [employer] gives you secure access to your electronic health record. If you see a primary care provider, you can also send messages to your care team and make appointments. If you have questions, please call your primary care clinic.  If you do not have a primary care provider, please call [phone] and they will assist you.        Care EveryWhere ID     This is your Care EveryWhere ID. This could be used by other organizations to access your [place] medical records  [id number]         Blood Pressure from Last 3 Encounters:   05/01/18 106/60   04/10/18 120/76   01/31/18 122/78    Weight from Last 3 Encounters:   05/01/18 89.8 kg (198 lb)   04/10/18 91.6 kg (202 lb)   01/31/18 90.3 kg (199 lb)              We Performed the Following     OMALIZUMAB INJECTION []      THER/PROPH/DIAG INJ, SC/IM [23031]        Primary Care Provider Office Phone # Fax #    Melva CHAVES PENNIE You [phone]       [address] [place] [address]        Equal Access to Services     ERICKA WEINSTEIN : Hadii rupinder ku hadasho Soomaali, waaxda luqadaha, qaybta kaalmada adeegyada, waxay ayan emmanuel . So [facility] [phone].    ATENCIÓN: Si habla español, tiene a keita disposición servicios gratuitos de asistencia lingüística. Alexi al [phone].    We comply with applicable federal civil rights laws and [place] laws. We do not discriminate on the basis of race, color, national origin, age, disability, sex, sexual orientation, or gender identity.            Thank you!     Thank you for choosing [facility]  for your care. Our goal is always to provide you with excellent care. Hearing back from our patients is one way we can continue to improve our services. Please take a few minutes to complete the written survey that you may receive in the mail after your visit with us. Thank you!             Your Updated Medication List - Protect others around you: Learn how to safely use, store and throw away your medicines at www.disposemymeds.org.          This list is accurate as of 5/15/18 10:16 AM.  Always use your most recent med list.                   Brand Name Dispense Instructions for use Diagnosis    acetaminophen 500 MG tablet    TYLENOL     Take 1,000 mg by mouth        ARIPiprazole 2 MG tablet    ABILIFY    30 tablet    Take 1 tablet (2 mg) by mouth At Bedtime    YUE (generalized anxiety disorder), Major depressive disorder, recurrent episode, mild (H)       B COMPLEX FORMULA 1 Tabs      Take 1 tablet by mouth        buPROPion 300 MG 24 hr tablet    WELLBUTRIN XL    90 tablet    Take 1 tablet (300 mg) by mouth every morning    YUE (generalized anxiety disorder), Major depressive disorder, recurrent episode, mild (H)       cetirizine  10 MG tablet    zyrTEC    30 tablet    Take 1 tablet (10 mg) by mouth daily    Chronic urticaria       EPINEPHrine 0.3 MG/0.3ML injection 2-pack    EPIPEN/ADRENACLICK/or ANY BX GENERIC EQUIV    0.6 mL    Inject 0.3 mLs (0.3 mg) into the muscle as needed for anaphylaxis    Chronic urticaria       esomeprazole 40 MG CR capsule    nexIUM    90 capsule    Take 1 capsule (40 mg) by mouth every morning (before breakfast) Take 30-60 minutes before eating.    Gastroesophageal reflux disease without esophagitis       HUMIRA PEN 40 MG/0.8ML pen kit   Generic drug:  adalimumab           hydrOXYzine 25 MG tablet    ATARAX    60 tablet    Take 1-2 tablets (25-50 mg) by mouth every 6 hours as needed for itching    Urticaria, acute       ibuprofen 200 MG tablet    ADVIL/MOTRIN     Take 400 mg by mouth        levonorgestrel 20 MCG/24HR IUD    MIRENA     1 each (20 mcg) by Intrauterine route continuous    Encounter for IUD removal, Encounter for IUD insertion, IUD (intrauterine device) in place       NP THYROID 15 MG Tabs tablet   Generic drug:  thyroid     90 tablet    TAKE ONE TABLET BY MOUTH EVERY DAY    Acquired hypothyroidism       pantoprazole 40 MG EC tablet    PROTONIX    90 tablet    Take 1 tablet (40 mg) by mouth daily Take 30-60 minutes before a meal.    Gastroesophageal reflux disease without esophagitis       valACYclovir 1000 mg tablet    VALTREX    6 tablet    Take 0.5 tablets (500 mg) by mouth 2 times daily for 6 days    HSV (herpes simplex virus) infection       XOLAIR 150 MG injection   Generic drug:  omalizumab     2 each    Inject 2.4 mLs (300 mg) Subcutaneous every 28 days    Chronic idiopathic urticaria

## 2018-05-15 NOTE — PROGRESS NOTES
Date of Last Xolair injection: First injection    Interval between injections: 28 days    ABN signed (For buy and bill patients only)? Yes      Does this patient need to be billed for Xolair Medication?  Yes, this is a buy and bill patient      Prior Auth valid? No PA required per her insurance      Is the vial ? No      Does patient have Epinephrine Auto Injector?  Yes    Expiration Date: Oct 19      Symptoms of systemic reaction with last injection? N/A    Are you ill today? No    If female, are you pregnant or breastfeeding? No  **Call Provider if yes to any question.**        Marylu Correa, RN on 5/15/2018

## 2018-05-15 NOTE — PROGRESS NOTES
Patient presented after waiting 120 minutes with no reaction to xolair injections. Discharged from clinic.    Marylu Correa RN ............   5/15/2018...10:16 AM

## 2018-06-18 DIAGNOSIS — R19.7 DIARRHEA: Primary | ICD-10-CM

## 2018-06-19 ENCOUNTER — ALLIED HEALTH/NURSE VISIT (OUTPATIENT)
Dept: ALLERGY | Facility: OTHER | Age: 38
End: 2018-06-19
Payer: COMMERCIAL

## 2018-06-19 DIAGNOSIS — L50.1 CHRONIC IDIOPATHIC URTICARIA: ICD-10-CM

## 2018-06-19 PROCEDURE — 96372 THER/PROPH/DIAG INJ SC/IM: CPT

## 2018-06-19 PROCEDURE — 99207 ZZC DROP WITH A PROCEDURE: CPT

## 2018-06-19 NOTE — MR AVS SNAPSHOT
After Visit Summary   6/19/2018    Ms. Ashley Mcdaniels    MRN: [id number]           Patient Information     Date Of Birth          1980        Visit Information        Provider Department      6/19/2018 7:50 AM ER [facility]        Today's Diagnoses     Chronic idiopathic urticaria           Follow-ups after your visit        Your next 10 appointments already scheduled     Jun 22, 2018  9:00 AM CDT   [facility] with Melva You PA-C   [facility]    [address]  [facility] [address]   [phone]           Bring a current list of meds and any records pertaining to this visit. For Physicals, please bring immunization records and any forms needing to be filled out. Please arrive 10 minutes early to complete paperwork.              Future tests that were ordered for you today     Open Future Orders        Priority Expected Expires Ordered    CRP, inflammation Routine  6/18/2019 6/18/2018    Enteric Bacteria and Virus Panel by [facility] Stool Routine  6/18/2019 6/18/2018    Clostridium difficile Toxin B PCR Routine  6/18/2019 6/18/2018    CBC with platelets and differential Routine  6/18/2019 6/18/2018    Creatinine Routine  6/18/2019 6/18/2018    Hepatic panel (Albumin, ALT, AST, Bili, Alk Phos, TP) Routine  6/18/2019 6/18/2018    Lipase Routine  6/18/2019 6/18/2018            Who to contact     If you have questions or need follow up information about today's clinic visit or your schedule please contact [facility] directly at [phone].  Normal or non-critical lab and imaging results will be communicated to you by MyChart, letter or phone within 4 business days after the clinic has received the results. If you do not hear from us within 7 days, please contact the clinic through MyChart or phone. If you have a critical or abnormal lab result, we will notify you by phone as soon as  possible.  Submit refill requests through [employer] or call your pharmacy and they will forward the refill request to us. Please allow 3 business days for your refill to be completed.          Additional Information About Your Visit        Tk20hart Information     [employer] gives you secure access to your electronic health record. If you see a primary care provider, you can also send messages to your care team and make appointments. If you have questions, please call your primary care clinic.  If you do not have a primary care provider, please call [phone] and they will assist you.        Care EveryWhere ID     This is your Care EveryWhere ID. This could be used by other organizations to access your [place] medical records  [id number]         Blood Pressure from Last 3 Encounters:   05/01/18 106/60   04/10/18 120/76   01/31/18 122/78    Weight from Last 3 Encounters:   05/01/18 89.8 kg (198 lb)   04/10/18 91.6 kg (202 lb)   01/31/18 90.3 kg (199 lb)              We Performed the Following     OMALIZUMAB INJECTION []     THER/PROPH/DIAG INJ, SC/IM [28985]        Primary Care Provider Office Phone # Fax #    Melva CHAVES PENNIE You [phone]       [address] [place] [address]        Equal Access to Services     ERICKA WEINSTEIN : Hadii aad ku hadasho Soomaali, waaxda luqadaha, qaybta kaalmada adeegyada, waxay ayan coronado. So [place] [phone].    ATENCIÓN: Si habla español, tiene a keita disposición servicios gratuitos de asistencia lingüística. Llame al [phone].    We comply with applicable federal civil rights laws and [place] laws. We do not discriminate on the basis of race, color, national origin, age, disability, sex, sexual orientation, or gender identity.            Thank you!     Thank you for choosing [facility]  for your care. Our goal is always to provide you with excellent care. Hearing back from our patients is one  way we can continue to improve our services. Please take a few minutes to complete the written survey that you may receive in the mail after your visit with us. Thank you!             Your Updated Medication List - Protect others around you: Learn how to safely use, store and throw away your medicines at www.disposemymeds.org.          This list is accurate as of 6/19/18 10:33 AM.  Always use your most recent med list.                   Brand Name Dispense Instructions for use Diagnosis    acetaminophen 500 MG tablet    TYLENOL     Take 1,000 mg by mouth        ARIPiprazole 2 MG tablet    ABILIFY    30 tablet    Take 1 tablet (2 mg) by mouth At Bedtime    YUE (generalized anxiety disorder), Major depressive disorder, recurrent episode, mild (H)       B COMPLEX FORMULA 1 Tabs      Take 1 tablet by mouth        buPROPion 300 MG 24 hr tablet    WELLBUTRIN XL    90 tablet    Take 1 tablet (300 mg) by mouth every morning    YUE (generalized anxiety disorder), Major depressive disorder, recurrent episode, mild (H)       cetirizine 10 MG tablet    zyrTEC    30 tablet    Take 1 tablet (10 mg) by mouth daily    Chronic urticaria       EPINEPHrine 0.3 MG/0.3ML injection 2-pack    EPIPEN/ADRENACLICK/or ANY BX GENERIC EQUIV    0.6 mL    Inject 0.3 mLs (0.3 mg) into the muscle as needed for anaphylaxis    Chronic urticaria       esomeprazole 40 MG CR capsule    nexIUM    90 capsule    Take 1 capsule (40 mg) by mouth every morning (before breakfast) Take 30-60 minutes before eating.    Gastroesophageal reflux disease without esophagitis       HUMIRA PEN 40 MG/0.8ML pen kit   Generic drug:  adalimumab           hydrOXYzine 25 MG tablet    ATARAX    60 tablet    Take 1-2 tablets (25-50 mg) by mouth every 6 hours as needed for itching    Urticaria, acute       ibuprofen 200 MG tablet    ADVIL/MOTRIN     Take 400 mg by mouth        levonorgestrel 20 MCG/24HR IUD    MIRENA     1 each (20 mcg) by Intrauterine route continuous     Encounter for IUD removal, Encounter for IUD insertion, IUD (intrauterine device) in place       NP THYROID 15 MG Tabs tablet   Generic drug:  thyroid     90 tablet    TAKE ONE TABLET BY MOUTH EVERY DAY    Acquired hypothyroidism       pantoprazole 40 MG EC tablet    PROTONIX    90 tablet    Take 1 tablet (40 mg) by mouth daily Take 30-60 minutes before a meal.    Gastroesophageal reflux disease without esophagitis       valACYclovir 1000 mg tablet    VALTREX    6 tablet    Take 0.5 tablets (500 mg) by mouth 2 times daily for 6 days    HSV (herpes simplex virus) infection       XOLAIR 150 MG injection   Generic drug:  omalizumab     2 each    Inject 2.4 mLs (300 mg) Subcutaneous every 28 days    Chronic idiopathic urticaria

## 2018-06-19 NOTE — PROGRESS NOTES
The following medication was given:     MEDICATION: Xolair (Omaluzumab)  ROUTE: SQ  SITE: SAMMIE SHELDON  DOSE: 300mg  LOT #: [id number] x2   :  Recorded Future  EXPIRATION DATE:  10/2021 x2   NDC#: 03961-161-71      Erendira Kemp

## 2018-06-19 NOTE — PROGRESS NOTES
Patient presented after waiting two hours with no reaction to allergy injections. Discharged from clinic.    Marylu Correa RN ............   6/19/2018...10:32 AM

## 2018-06-19 NOTE — PROGRESS NOTES
Date of Last Xolair injection: 5/15/2018    Interval between injections: 28 days, it has been 35 days    ABN signed (For buy and bill patients only)? Yes      Does this patient need to be billed for Xolair Medication?  Yes, this is a buy and bill patient      Prior Auth valid? Yes      Is the vial ? No      Does patient have Epinephrine Auto Injector?  Yes    Expiration Date: 10/1/2019      Symptoms of systemic reaction with last injection? No    Are you ill today? No    If female, are you pregnant or breastfeeding? No  **Call Provider if yes to any question.**      Marylu Correa RN on 2018 at 7:55 AM

## 2018-06-19 NOTE — PROGRESS NOTES
The following information was verified prior to Xolair (Omalizumab):    ABN signed:  YES - Date: 6/19/2018  Both vials:    Vial Expiration: Oct 2021  Lot #: [id number]   Dose: 300 mg (split into two doses of 150 mg each)  Amount: 2.4 mL (split into two injections of 1.2 mL each)  Interval between injections: Every 28 days    Marylu Correa RN

## 2018-06-22 ENCOUNTER — OFFICE VISIT (OUTPATIENT)
Dept: FAMILY MEDICINE | Facility: OTHER | Age: 38
End: 2018-06-22
Payer: COMMERCIAL

## 2018-06-22 VITALS
RESPIRATION RATE: 16 BRPM | SYSTOLIC BLOOD PRESSURE: 100 MMHG | TEMPERATURE: 97.8 F | BODY MASS INDEX: 33.95 KG/M2 | DIASTOLIC BLOOD PRESSURE: 72 MMHG | WEIGHT: 194 LBS | HEART RATE: 72 BPM | OXYGEN SATURATION: 100 %

## 2018-06-22 DIAGNOSIS — F41.1 GAD (GENERALIZED ANXIETY DISORDER): Primary | ICD-10-CM

## 2018-06-22 DIAGNOSIS — F33.0 MAJOR DEPRESSIVE DISORDER, RECURRENT EPISODE, MILD (H): ICD-10-CM

## 2018-06-22 PROCEDURE — 99214 OFFICE O/P EST MOD 30 MIN: CPT | Performed by: PHYSICIAN ASSISTANT

## 2018-06-22 RX ORDER — ARIPIPRAZOLE 2 MG/1
2 TABLET ORAL AT BEDTIME
Qty: 90 TABLET | Refills: 1 | Status: SHIPPED | OUTPATIENT
Start: 2018-06-22 | End: 2018-08-24

## 2018-06-22 ASSESSMENT — ANXIETY QUESTIONNAIRES
6. BECOMING EASILY ANNOYED OR IRRITABLE: SEVERAL DAYS
1. FEELING NERVOUS, ANXIOUS, OR ON EDGE: SEVERAL DAYS
2. NOT BEING ABLE TO STOP OR CONTROL WORRYING: SEVERAL DAYS
GAD7 TOTAL SCORE: 4
4. TROUBLE RELAXING: NOT AT ALL
7. FEELING AFRAID AS IF SOMETHING AWFUL MIGHT HAPPEN: NOT AT ALL
7. FEELING AFRAID AS IF SOMETHING AWFUL MIGHT HAPPEN: NOT AT ALL
5. BEING SO RESTLESS THAT IT IS HARD TO SIT STILL: NOT AT ALL
GAD7 TOTAL SCORE: 4
3. WORRYING TOO MUCH ABOUT DIFFERENT THINGS: SEVERAL DAYS
GAD7 TOTAL SCORE: 4

## 2018-06-22 ASSESSMENT — PATIENT HEALTH QUESTIONNAIRE - PHQ9
10. IF YOU CHECKED OFF ANY PROBLEMS, HOW DIFFICULT HAVE THESE PROBLEMS MADE IT FOR YOU TO DO YOUR WORK, TAKE CARE OF THINGS AT HOME, OR GET ALONG WITH OTHER PEOPLE: NOT DIFFICULT AT ALL
SUM OF ALL RESPONSES TO PHQ QUESTIONS 1-9: 7
SUM OF ALL RESPONSES TO PHQ QUESTIONS 1-9: 7

## 2018-06-22 ASSESSMENT — PAIN SCALES - GENERAL: PAINLEVEL: MILD PAIN (3)

## 2018-06-22 NOTE — NURSING NOTE
"Chief Complaint   Patient presents with     Recheck Medication     Panel Management     hiv, ngozi, phq9       Initial /72 (BP Location: Left arm, Patient Position: Chair, Cuff Size: Adult Regular)  Pulse 72  Temp 97.8  F (36.6  C) (Oral)  Resp 16  Wt 194 lb (88 kg)  SpO2 100%  BMI 33.95 kg/m2 Estimated body mass index is 33.95 kg/(m^2) as calculated from the following:    Height as of 1/31/18: 5' 3.39\" (1.61 m).    Weight as of this encounter: 194 lb (88 kg).  Medication Reconciliation: complete  "

## 2018-06-22 NOTE — MR AVS SNAPSHOT
After Visit Summary   6/22/2018    Ms. Ashley Mcdaniels    MRN: [id number]           Patient Information     Date Of Birth          1980        Visit Information        Provider Department      6/22/2018 9:00 AM Melva You PA-C [facility]        Today's Diagnoses     YUE (generalized anxiety disorder)    -  1    Major depressive disorder, recurrent episode, mild (H)          Care Instructions    - Max dose Melatonin 12 mg                   Follow-ups after your visit        Follow-up notes from your care team     Return in about 6 months (around 12/22/2018).      Who to contact     If you have questions or need follow up information about today's clinic visit or your schedule please contact [facility] directly at [phone].  Normal or non-critical lab and imaging results will be communicated to you by MyChart, letter or phone within 4 business days after the clinic has received the results. If you do not hear from us within 7 days, please contact the clinic through [employer] or phone. If you have a critical or abnormal lab result, we will notify you by phone as soon as possible.  Submit refill requests through [employer] or call your pharmacy and they will forward the refill request to us. Please allow 3 business days for your refill to be completed.          Additional Information About Your Visit        MyChart Information     [employer] gives you secure access to your electronic health record. If you see a primary care provider, you can also send messages to your care team and make appointments. If you have questions, please call your primary care clinic.  If you do not have a primary care provider, please call [phone] and they will assist you.        Care EveryWhere ID     This is your Care EveryWhere ID. This could be used by other organizations to access your [facility] medical records  [id number]        Your Vitals Were     Pulse Temperature  Respirations Pulse Oximetry BMI (Body Mass Index)       72 97.8  F (36.6  C) (Oral) 16 100% 33.95 kg/m2        Blood Pressure from Last 3 Encounters:   06/22/18 100/72   05/01/18 106/60   04/10/18 120/76    Weight from Last 3 Encounters:   06/22/18 194 lb (88 kg)   05/01/18 198 lb (89.8 kg)   04/10/18 202 lb (91.6 kg)              Today, you had the following     No orders found for display         Where to get your medicines      These medications were sent to [facility] - [id number]   [address] [place] [address]     Phone:  [phone]     ARIPiprazole 2 MG tablet          Primary Care Provider Office Phone # Fax #    Melva CHAVES PENNIE You [phone]       [address]  [facility] [address]        Equal Access to Services     [facility] : Hadii aad ku hadasho Soomaali, waaxda luqadaha, qaybta kaalmada adeegyada, waxay idiin hayarenn [facility] emmanuel . So [facility] [phone].    ATENCIÓN: Si habla español, tiene a keita disposición servicios gratuitos de asistencia lingüística. Alexi al [phone].    We comply with applicable federal civil rights laws and [place] laws. We do not discriminate on the basis of race, color, national origin, age, disability, sex, sexual orientation, or gender identity.            Thank you!     Thank you for choosing [facility]  for your care. Our goal is always to provide you with excellent care. Hearing back from our patients is one way we can continue to improve our services. Please take a few minutes to complete the written survey that you may receive in the mail after your visit with us. Thank you!             Your Updated Medication List - Protect others around you: Learn how to safely use, store and throw away your medicines at www.disposemymeds.org.          This list is accurate as of 6/22/18  9:25 AM.  Always use your most recent med list.                   Brand Name  Dispense Instructions for use Diagnosis    acetaminophen 500 MG tablet    TYLENOL     Take 1,000 mg by mouth        ARIPiprazole 2 MG tablet    ABILIFY    90 tablet    Take 1 tablet (2 mg) by mouth At Bedtime    YUE (generalized anxiety disorder), Major depressive disorder, recurrent episode, mild (H)       B COMPLEX FORMULA 1 Tabs      Take 1 tablet by mouth        buPROPion 300 MG 24 hr tablet    WELLBUTRIN XL    90 tablet    Take 1 tablet (300 mg) by mouth every morning    YUE (generalized anxiety disorder), Major depressive disorder, recurrent episode, mild (H)       cetirizine 10 MG tablet    zyrTEC    30 tablet    Take 1 tablet (10 mg) by mouth daily    Chronic urticaria       EPINEPHrine 0.3 MG/0.3ML injection 2-pack    EPIPEN/ADRENACLICK/or ANY BX GENERIC EQUIV    0.6 mL    Inject 0.3 mLs (0.3 mg) into the muscle as needed for anaphylaxis    Chronic urticaria       esomeprazole 40 MG CR capsule    nexIUM    90 capsule    Take 1 capsule (40 mg) by mouth every morning (before breakfast) Take 30-60 minutes before eating.    Gastroesophageal reflux disease without esophagitis       HUMIRA PEN 40 MG/0.8ML pen kit   Generic drug:  adalimumab           ibuprofen 200 MG tablet    ADVIL/MOTRIN     Take 400 mg by mouth        levonorgestrel 20 MCG/24HR IUD    MIRENA     1 each (20 mcg) by Intrauterine route continuous    Encounter for IUD removal, Encounter for IUD insertion, IUD (intrauterine device) in place       NP THYROID 15 MG Tabs tablet   Generic drug:  thyroid     90 tablet    TAKE ONE TABLET BY MOUTH EVERY DAY    Acquired hypothyroidism       valACYclovir 1000 mg tablet    VALTREX    6 tablet    Take 0.5 tablets (500 mg) by mouth 2 times daily for 6 days    HSV (herpes simplex virus) infection       XOLAIR 150 MG injection   Generic drug:  omalizumab     2 each    Inject 2.4 mLs (300 mg) Subcutaneous every 28 days    Chronic idiopathic urticaria

## 2018-06-22 NOTE — PROGRESS NOTES
SUBJECTIVE:   Ashley Mcdaniels is a 37 year old female who presents to clinic today for the following health issues:    History of Present Illness     Depression & Anxiety Follow-up:     Depression/Anxiety:  Depression & Anxiety    Status since last visit::  Improved    Other associated symptoms of depression and anxiety::  None    Significant life event::  YES    Current substance use::  Alcohol       Today's PHQ-9         PHQ-9 Total Score:     (P) 7   PHQ-9 Q9 Suicidal ideation:   (P) Not at all   Thoughts of suicide or self harm:      Self-harm Plan:        Self-harm Action:          Safety concerns for self or others:       YUE-7 Total Score: (P) 4    - Teenager out of house, much calmer now   - Did acupuncture, seemed to help, did every week for awhile, then every other week, then 3 weeks and doing that still    - Not sleeping the best, falls asleep but then wakes up      TV on from boyfriend, wakes her up   - Doesn't want to increase   - Humira increase was denied by insurance       PHQ-9 1/31/2018 4/10/2018 6/22/2018   Total Score 5 16 7   Q9: Suicide Ideation Not at all Not at all Not at all     YUE-7 SCORE 1/31/2018 4/10/2018 6/22/2018   Total Score 10 (moderate anxiety) 19 (severe anxiety) 4 (minimal anxiety)   Total Score 10 19 4     In the past two weeks have you had thoughts of suicide or self-harm?  No.    Do you have concerns about your personal safety or the safety of others?   No      Plan from last visit 4/10/18   - Mood worsening   - Also frustrated with weight loss   - Continue Wellbutrin   - Discussed SSRI vs SNRI vs Abilify   - Will start Abilify, reviewed use and side effects  - Recheck in 3-4 weeks   - Patient does have known hypothyroidism, last TSH in august was 1.27, will update  - Will also get labs to rule out reversible causes of fatigue and weight gain   - Patient is taking 10,000 IU of vitamin D daily, discussed risks of this high dose      Will get level today and calcium   - Patient  doing exercise 45 min -1 hour 5-6 days a week, [id number] calorie diet   - If labs normal, could consider nutrition referral vs. Pharmacologic intervention vs. bariatric referral   - reviewed all medications use and side effects, refilled as needed       Problem list and histories reviewed & adjusted, as indicated.  Additional history: as documented    Labs reviewed in EPIC    ROS:  Constitutional, HEENT, cardiovascular, pulmonary, gi and gu systems are negative, except as otherwise noted.    OBJECTIVE:   /72 (BP Location: Left arm, Patient Position: Chair, Cuff Size: Adult Regular)  Pulse 72  Temp 97.8  F (36.6  C) (Oral)  Resp 16  Wt 194 lb (88 kg)  SpO2 100%  BMI 33.95 kg/m2  Body mass index is 33.95 kg/(m^2).  GENERAL APPEARANCE: healthy, alert and no distress  EYES: Eyes grossly normal to inspection, PERRLA, conjunctivae and sclerae without injection or discharge, EOM intact   MS: No musculoskeletal defects are noted and gait is age appropriate without ataxia   SKIN: No suspicious lesions or rashes, hydration status appears adeuqate with normal skin turgor   PSYCH: Alert and oriented x3; speech- coherent , normal rate and volume; able to articulate logical thoughts, able to abstract reason, no tangential thoughts, no hallucinations or delusions, mentation appears normal, Mood is euthymic. Affect is appropriate for this mood state and bright. Thought content is free of suicidal ideation, hallucinations, and delusions. Dress is adequate and upkept. Eye contact is good during conversation.       Diagnostic Test Results:  none     ASSESSMENT/PLAN:       ICD-10-CM    1. YUE (generalized anxiety disorder) F41.1 ARIPiprazole (ABILIFY) 2 MG tablet   2. Major depressive disorder, recurrent episode, mild (H) F33.0 ARIPiprazole (ABILIFY) 2 MG tablet     - Patient much improved with addition of Abilify 2 mg to her Wellbutrin 300 mg   - Patient desires no change   - Reviewed both use and side effects, refilled    - Recheck 6 months or PRN     The patient indicates understanding of these issues and agrees with the plan.    Follow up: 6 months         Melva You PA-C  [facility]

## 2018-06-23 ASSESSMENT — PATIENT HEALTH QUESTIONNAIRE - PHQ9: SUM OF ALL RESPONSES TO PHQ QUESTIONS 1-9: 7

## 2018-06-23 ASSESSMENT — ANXIETY QUESTIONNAIRES: GAD7 TOTAL SCORE: 4

## 2018-07-31 ENCOUNTER — OFFICE VISIT (OUTPATIENT)
Dept: PODIATRY | Facility: OTHER | Age: 38
End: 2018-07-31
Payer: COMMERCIAL

## 2018-07-31 ENCOUNTER — RADIANT APPOINTMENT (OUTPATIENT)
Dept: GENERAL RADIOLOGY | Facility: OTHER | Age: 38
End: 2018-07-31
Attending: PODIATRIST
Payer: COMMERCIAL

## 2018-07-31 ENCOUNTER — ALLIED HEALTH/NURSE VISIT (OUTPATIENT)
Dept: ALLERGY | Facility: OTHER | Age: 38
End: 2018-07-31
Payer: COMMERCIAL

## 2018-07-31 VITALS
WEIGHT: 198 LBS | DIASTOLIC BLOOD PRESSURE: 66 MMHG | SYSTOLIC BLOOD PRESSURE: 104 MMHG | BODY MASS INDEX: 35.08 KG/M2 | HEIGHT: 63 IN

## 2018-07-31 DIAGNOSIS — M84.374A STRESS FRACTURE OF METATARSAL BONE, RIGHT, INITIAL ENCOUNTER: Primary | ICD-10-CM

## 2018-07-31 DIAGNOSIS — M79.671 RIGHT FOOT PAIN: ICD-10-CM

## 2018-07-31 DIAGNOSIS — M72.2 PLANTAR FASCIAL FIBROMATOSIS: ICD-10-CM

## 2018-07-31 DIAGNOSIS — Z79.899 LONG TERM CURRENT USE OF IMMUNOSUPPRESSIVE DRUG: ICD-10-CM

## 2018-07-31 DIAGNOSIS — L50.1 CHRONIC IDIOPATHIC URTICARIA: ICD-10-CM

## 2018-07-31 PROCEDURE — 99203 OFFICE O/P NEW LOW 30 MIN: CPT | Performed by: PODIATRIST

## 2018-07-31 PROCEDURE — 96372 THER/PROPH/DIAG INJ SC/IM: CPT

## 2018-07-31 PROCEDURE — 73630 X-RAY EXAM OF FOOT: CPT | Mod: 59

## 2018-07-31 ASSESSMENT — PAIN SCALES - GENERAL: PAINLEVEL: MODERATE PAIN (5)

## 2018-07-31 NOTE — PROGRESS NOTES
HPI:  Ashley Mcdaniels is a 37 year old female who is seen in consultation at the request of self.    Pt presents for eval of:   (Onset, Location, L/R, Character, Treatments, Injury if yes)     XR Left and Right foot today, 7/31/2018    Presents today with slip on flat unsupportive shoes    1. Onset mid July 2018, dorsal Right foot pain, no injury noted.  Constant, swelling, dull ache, pain 5 - 8  Intermittent numbness when using eliptical, throbbing  Tylenol  2. Ongoing, plantar Left and Right heel pain.   Sharp pain with first steps in the morning.  Less pain when exercising regularly    Works as a psycho therapist.    Weight management plan: Patient was referred to their PCP to discuss a diet and exercise plan.     Patient to follow up with Primary Care provider regarding elevated blood pressure.    ROS:  10 point ROS neg other than the symptoms noted above in the HPI.    Patient Active Problem List   Diagnosis     Acquired hypothyroidism     YUE (generalized anxiety disorder)     Major depressive disorder, recurrent episode, mild (H)     Crohn's disease of both small and large intestine without complication (H)     IUD (intrauterine device) in place     Chronic urticaria     ADHD (attention deficit hyperactivity disorder), inattentive type     Gastroesophageal reflux disease without esophagitis       PAST MEDICAL HISTORY:   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      Crohn's colitis (H)      YUE (generalized anxiety disorder)      GERD (gastroesophageal reflux disease)      Low back pain      Major depressive disorder, recurrent episode, mild (H)         PAST SURGICAL HISTORY:   Past Surgical History:   Procedure Laterality Date     CHOLECYSTECTOMY  Feb 2007     DILATION AND CURETTAGE  May 2003    Non-OB     HC TOOTH EXTRACTION W/FORCEP  Nov 2006     LAP ADJUSTABLE GASTRIC BAND  May 2007        MEDICATIONS:    Current Outpatient Prescriptions:      acetaminophen (TYLENOL) 500 MG tablet, Take 1,000 mg by mouth, Disp: , Rfl:      ARIPiprazole (ABILIFY) 2 MG tablet, Take 1 tablet (2 mg) by mouth At Bedtime, Disp: 90 tablet, Rfl: 1     B Complex-Folic Acid (B COMPLEX FORMULA 1) TABS, Take 1 tablet by mouth, Disp: , Rfl:      buPROPion (WELLBUTRIN XL) 300 MG 24 hr tablet, Take 1 tablet (300 mg) by mouth every morning, Disp: 90 tablet, Rfl: 3     cetirizine (ZYRTEC) 10 MG tablet, Take 1 tablet (10 mg) by mouth daily, Disp: 30 tablet, Rfl: 0     esomeprazole (NEXIUM) 40 MG CR capsule, Take 1 capsule (40 mg) by mouth every morning (before breakfast) Take 30-60 minutes before eating., Disp: 90 capsule, Rfl: 1     HUMIRA PEN 40 MG/0.8ML pen kit, , Disp: , Rfl: 2     ibuprofen (ADVIL,MOTRIN) 200 MG tablet, Take 400 mg by mouth, Disp: , Rfl:      levonorgestrel (MIRENA) 20 MCG/24HR IUD, 1 each (20 mcg) by Intrauterine route continuous, Disp: , Rfl:      NP THYROID 15 MG TABS tablet, TAKE ONE TABLET BY MOUTH EVERY DAY, Disp: 90 tablet, Rfl: 1     omalizumab (XOLAIR) 150 MG injection, Inject 2.4 mLs (300 mg) Subcutaneous every 28 days, Disp: 2 each, Rfl:      EPINEPHrine (EPIPEN/ADRENACLICK/OR ANY BX GENERIC EQUIV) 0.3 MG/0.3ML injection 2-pack, Inject 0.3 mLs (0.3 mg) into the muscle as needed for anaphylaxis, Disp: 0.6 mL, Rfl: 1     valACYclovir (VALTREX) 1000 mg tablet, Take 0.5 tablets (500 mg) by mouth 2 times daily for 6 days, Disp: 6 tablet, Rfl: 11     ALLERGIES:    Allergies   Allergen Reactions     Azathioprine Other (See Comments)     pancreatitis        SOCIAL HISTORY:   Social History     Social History     Marital status:      Spouse name: N/A     Number of children: N/A     Years of education: N/A     Occupational History     Not on file.     Social History Main Topics     Smoking status: Former Smoker     Quit date: 5/1/2016     Smokeless tobacco: Never Used     Alcohol use 0.0 oz/week     0 Standard drinks or  "equivalent per week      Comment: occasional      Drug use: No     Sexual activity: Yes     Partners: Male     Birth control/ protection: IUD     Other Topics Concern     Not on file     Social History Narrative        FAMILY HISTORY:   Family History   Problem Relation Age of Onset     Arthritis Mother      HEART DISEASE Father      CAD, CHF     Alcoholism Father      Asthma Father      Hypertension Father      Hyperlipidemia Father      Pancreatic Cancer Father      Liver Cancer Father      Diabetes Maternal Grandmother      Substance Abuse Maternal Grandmother      Hyperlipidemia Maternal Grandmother      Hypertension Maternal Grandmother      Substance Abuse Maternal Grandfather      Asthma Paternal Grandmother      HEART DISEASE Paternal Grandmother      Diabetes Paternal Grandmother      Hypertension Paternal Grandmother      Substance Abuse Paternal Grandfather      Mental Illness Brother      Substance Abuse Brother         EXAM:Vitals: /66 (BP Location: Right arm, Cuff Size: Adult Regular)  Ht 5' 3.39\" (1.61 m)  Wt 198 lb (89.8 kg)  BMI 34.65 kg/m2  BMI= Body mass index is 34.65 kg/(m^2).    General appearance: Patient is alert and fully cooperative with history & exam.  No sign of distress is noted during the visit.     Psychiatric: Affect is pleasant & appropriate.  Patient appears motivated to improve health.     Respiratory: Breathing is regular & unlabored while sitting.     HEENT: Hearing is intact to spoken word.  Speech is clear.  No gross evidence of visual impairment that would impact ambulation.     Vascular: DP & PT pulses are intact & regular bilaterally.  No significant edema or varicosities noted.  CFT and skin temperature is normal to both lower extremities.     Neurologic: Lower extremity sensation is intact to light touch.  No evidence of weakness or contracture in the lower extremities.  No evidence of neuropathy.    Dermatologic: Skin is intact to both lower extremities with " adequate texture, turgor and tone about the integument.  No paronychia or evidence of soft tissue infection is noted.     Musculoskeletal: Patient is ambulatory without assistive device or brace.  Gross area of pinpoint pain and swelling and erythema over the dorsal second metatarsal phalangeal joint extending 3 cm proximal right foot only.  Guarding with any palpation of the right second metatarsal.    Radiographs 3 views right foot demonstrate acute cortical reaction and thickening along the medial aspect of the neck distal diaphysis of the right second metatarsal.     ASSESSMENT:       ICD-10-CM    1. Stress fracture of metatarsal bone, right, initial encounter M84.374A XR Foot Right G/E 3 Views   2. Long term current use of immunosuppressive drug Z79.899         PLAN:  Reviewed patient's chart in [facility].      7/31/2018   Obtained interpreted radiographs  Stress fracture second metatarsal right foot  Patient is on immunosuppressive medication  Fracture boot right foot  Follow-up in 4 weeks and likely begin sleep without the fracture boot and more weightbearing activity.  Weightbearing now to tolerance in the fracture boot likely 1 or 2 hours per day.  Repeat imaging in 1 month.  Ordered future today.    Torito Caldwell DPM

## 2018-07-31 NOTE — MR AVS SNAPSHOT
After Visit Summary   7/31/2018    Ms. Ashley Mcdaniels    MRN: [id number]           Patient Information     Date Of Birth          1980        Visit Information        Provider Department      7/31/2018 2:00 PM Torito Caldwell DPM [facility] Diagnoses     Right foot pain    -  1    Plantar fascial fibromatosis          Care Instructions    Reliable shoe stores: To maximize your experience and provide the best possible fit.  Be sure to show them your foot concerns and tell them Dr. Caldwell sent you.      Stores listed in bold have only athletic shoes, and stores that are not bold are mostly casual or variety of shoes    [employer]  [address] [place] [address]  [phone]    [employer]  [address] [place] [address]  [phone]    [employer] [place]  [address] [place] [address]  [phone]    [employer]  [address] [facility]  [facility] [address]  [phone]    [employer]  [address] [place] [address]  [phone]    [employer]  [address] [place] [address]  [phone]                         [employer] Locations:     [address] [place] [address]   [phone]     [address] [place] [address] [address] [place] [address]   [phone]     [address] [place] [address]   [phone]     [address] [place] [address]   [phone]     [address] [place] [address]   [phone]     [address] [facility] [place] [address]   [phone]     [address] [facility] [address]   [place] [address]   [phone]     [address]   [place], [place] [id number] [phone]             How to find reasonable shoes          The correct width    Correct Fitting    Correct Length      Foot Distortion    Posture Distortion                          Torsional Rigidity      Grasp behind the heel and  underneath the foot and twist      Bad    Excessive torsion/twist in midfoot     Less torsion/twist in midfoot is better                   Heel Counter Rigidity      Grasp just above   midsole and squeeze      Bad    Soft heel counter      Good    Rigid Heel Counter      Flexion Rigidity      Grasp shoe and bend from forefoot to rearfoot                        Follow-ups after your visit        Your next 10 appointments already scheduled     Jul 31, 2018  2:00 PM CDT   [facility]dwell [facility]    [address]  [facility] [id number] [phone]            Aug 28, 2018  7:10 AM CDT   Nurse Only with ER ALLERGY SHOTS   [facility]    [address]  [facility] [id number] [phone]              Who to contact     If you have questions or need follow up information about today's clinic visit or your schedule please contact [facility] directly at [phone].  Normal or non-critical lab and imaging results will be communicated to you by [employer], letter or phone within 4 business days after the clinic has received the results. If you do not hear from us within 7 days, please contact the clinic through [employer] or phone. If you have a critical or abnormal lab result, we will notify you by phone as soon as possible.  Submit refill requests through [employer] or call your pharmacy and they will forward the refill request to us. Please allow 3 business days for your refill to be completed.          Additional Information About Your Visit        [employer] Information     [employer] gives you secure access to your electronic health record. If you see a primary care provider, you can also send messages to your care team and make appointments. If you have questions, please call your primary care clinic.  If you do not have a primary care provider, please call [phone] and they  "will assist you.        Care EveryWhere ID     This is your Care EveryWhere ID. This could be used by other organizations to access your [facility] medical records  [id number]        Your Vitals Were     Height BMI (Body Mass Index)                5' 3.39\" (1.61 m) 34.65 kg/m2           Blood Pressure from Last 3 Encounters:   07/31/18 104/66   06/22/18 100/72   05/01/18 106/60    Weight from Last 3 Encounters:   07/31/18 198 lb (89.8 kg)   06/22/18 194 lb (88 kg)   05/01/18 198 lb (89.8 kg)               Primary Care Provider Office Phone # Fax #    Melva CHAVES PENNIE You [phone]       [address] [place] [address]        Equal Access to Services     ERICKA WEINSTEIN : Hadii rupinder alston hadasho Soomaali, waaxda luqadaha, qaybta kaalmada adeegyada, brenda emmanuel . So [facility] [phone].    ATENCIÓN: Si habla español, tiene a keita disposición servicios gratuitos de asistencia lingüística. Alexi al [phone].    We comply with applicable federal civil rights laws and [place] laws. We do not discriminate on the basis of race, color, national origin, age, disability, sex, sexual orientation, or gender identity.            Thank you!     Thank you for choosing [facility]  for your care. Our goal is always to provide you with excellent care. Hearing back from our patients is one way we can continue to improve our services. Please take a few minutes to complete the written survey that you may receive in the mail after your visit with us. Thank you!             Your Updated Medication List - Protect others around you: Learn how to safely use, store and throw away your medicines at www.disposemymeds.org.          This list is accurate as of 7/31/18 10:36 AM.  Always use your most recent med list.                   Brand Name Dispense Instructions for use Diagnosis    acetaminophen 500 MG tablet    TYLENOL     Take 1,000 mg by mouth        " ARIPiprazole 2 MG tablet    ABILIFY    90 tablet    Take 1 tablet (2 mg) by mouth At Bedtime    YUE (generalized anxiety disorder), Major depressive disorder, recurrent episode, mild (H)       B COMPLEX FORMULA 1 Tabs      Take 1 tablet by mouth        buPROPion 300 MG 24 hr tablet    WELLBUTRIN XL    90 tablet    Take 1 tablet (300 mg) by mouth every morning    YUE (generalized anxiety disorder), Major depressive disorder, recurrent episode, mild (H)       cetirizine 10 MG tablet    zyrTEC    30 tablet    Take 1 tablet (10 mg) by mouth daily    Chronic urticaria       EPINEPHrine 0.3 MG/0.3ML injection 2-pack    EPIPEN/ADRENACLICK/or ANY BX GENERIC EQUIV    0.6 mL    Inject 0.3 mLs (0.3 mg) into the muscle as needed for anaphylaxis    Chronic urticaria       esomeprazole 40 MG CR capsule    nexIUM    90 capsule    Take 1 capsule (40 mg) by mouth every morning (before breakfast) Take 30-60 minutes before eating.    Gastroesophageal reflux disease without esophagitis       HUMIRA PEN 40 MG/0.8ML pen kit   Generic drug:  adalimumab           ibuprofen 200 MG tablet    ADVIL/MOTRIN     Take 400 mg by mouth        levonorgestrel 20 MCG/24HR IUD    MIRENA     1 each (20 mcg) by Intrauterine route continuous    Encounter for IUD removal, Encounter for IUD insertion, IUD (intrauterine device) in place       NP THYROID 15 MG Tabs tablet   Generic drug:  thyroid     90 tablet    TAKE ONE TABLET BY MOUTH EVERY DAY    Acquired hypothyroidism       valACYclovir 1000 mg tablet    VALTREX    6 tablet    Take 0.5 tablets (500 mg) by mouth 2 times daily for 6 days    HSV (herpes simplex virus) infection       XOLAIR 150 MG injection   Generic drug:  omalizumab     2 each    Inject 2.4 mLs (300 mg) Subcutaneous every 28 days    Chronic idiopathic urticaria

## 2018-07-31 NOTE — PATIENT INSTRUCTIONS
Reliable shoe stores: To maximize your experience and provide the best possible fit.  Be sure to show them your foot concerns and tell them Dr. Caldwell sent you.      Stores listed in bold have only athletic shoes, and stores that are not bold are mostly casual or variety of shoes    [employer]  [address] [place] [address]  [phone]    [employer] [place]  [address] [place] [address]  [phone]     [employer] [place]  [address] [place] [address]  [phone]    [employer]  [address] [facility]  [facility] [address]  [phone]    [employer]  [address] [place], [place] [address] [phone]    [employer]  [address] [place] [address]  [phone]                         [employer] Locations:     [address] [place] [address]   [phone]     [address] [place] [address] [address] [place] [address]   [phone]     [address] [place] [address]   [phone]     [address] [facility] [place] [address]   [phone]     [address] [facility] [place] [address]   [phone]     [address] [place] [address]   [phone]     [address] [facility] [address]   [place] [address]   [phone]     [address] [facility]   [place] [address]   [phone]             How to find reasonable shoes          The correct width    Correct Fitting    Correct Length      Foot Distortion    Posture Distortion                          Torsional Rigidity      Grasp behind the heel and underneath the foot and twist      Bad    Excessive torsion/twist in midfoot     Less torsion/twist in midfoot is better                   Heel Counter Rigidity      Grasp just above   midsole and squeeze      Bad    Soft heel counter      Good    Rigid Heel Counter      Flexion Rigidity      Grasp shoe and bend from forefoot to rearfoot

## 2018-07-31 NOTE — PROGRESS NOTES
The following medication was given:     MEDICATION: Xolair (Omaluzumab)  ROUTE: SQ  SITE: SAMMIE SHELDON  DOSE: 300mg  LOT #: [id number]   :  [facility]  EXPIRATION DATE:  10/2021 x2   NDC#: 47040-069-85      Erendira Kemp CMA ....... 7/31/2018 7:46 AM

## 2018-07-31 NOTE — MR AVS SNAPSHOT
After Visit Summary   7/31/2018    Ms. Ashley Mcdaniels    MRN: [id number]           Patient Information     Date Of Birth          1980        Visit Information        Provider Department      7/31/2018 7:20 AM ER ALLERGY SHOTS [facility]        Today's Diagnoses     Chronic idiopathic urticaria           Follow-ups after your visit        Your next 10 appointments already scheduled     Jul 31, 2018  2:00 PM CDT   New Visit with Torito Caldwell DPM   [facility] ([facility]    [address]  [facility] [id number] [phone]            Aug 28, 2018  7:10 AM CDT   Nurse Only with ER ALLERGY SHOTS   [facility] ([facility])    [address]  [facility] [id number] [phone]              Future tests that were ordered for you today     Open Future Orders        Priority Expected Expires Ordered    XR Foot Bilateral G/E 3 Views Routine 7/31/2018 7/31/2019 7/31/2018            Who to contact     If you have questions or need follow up information about today's clinic visit or your schedule please contact [facility] directly at [phone].  Normal or non-critical lab and imaging results will be communicated to you by [employer], letter or phone within 4 business days after the clinic has received the results. If you do not hear from us within 7 days, please contact the clinic through [employer] or phone. If you have a critical or abnormal lab result, we will notify you by phone as soon as possible.  Submit refill requests through [employer] or call your pharmacy and they will forward the refill request to us. Please allow 3 business days for your refill to be completed.          Additional Information About Your Visit        [employer] Information     [employer] gives you secure access to your electronic health record. If you see a primary care provider, you can also send messages to your care  team and make appointments. If you have questions, please call your primary care clinic.  If you do not have a primary care provider, please call [phone] and they will assist you.        Care EveryWhere ID     This is your Care EveryWhere ID. This could be used by other organizations to access your [facility] medical records  [id number]         Blood Pressure from Last 3 Encounters:   06/22/18 100/72   05/01/18 106/60   04/10/18 120/76    Weight from Last 3 Encounters:   06/22/18 88 kg (194 lb)   05/01/18 89.8 kg (198 lb)   04/10/18 91.6 kg (202 lb)              We Performed the Following     OMALIZUMAB INJECTION []     THER/PROPH/DIAG INJ, SC/IM [03386]        Primary Care Provider Office Phone # Fax #    Melva ANDIE You PA-C [phone]       [address] [facility] [address]  [facility] [address]        Equal Access to Services     ERICKA WEINSTEIN : Hadii aad ku hadasho Soomaali, waaxda luqadaha, qaybta kaalmada adeegyada, waxay idiin hayarenn [facility] emmanuel . So [facility] [phone].    ATENCIÓN: Si habla español, tiene a keita disposición servicios gratuitos de asistencia lingüística. [facility] [phone].    We comply with applicable federal civil rights laws and [place] laws. We do not discriminate on the basis of race, color, national origin, age, disability, sex, sexual orientation, or gender identity.            Thank you!     Thank you for choosing [facility]  for your care. Our goal is always to provide you with excellent care. Hearing back from our patients is one way we can continue to improve our services. Please take a few minutes to complete the written survey that you may receive in the mail after your visit with us. Thank you!             Your Updated Medication List - Protect others around you: Learn how to safely use, store and throw away your medicines at www.disposemymeds.org.          This list is accurate as of 7/31/18 10:06 AM.  Always use your most  recent med list.                   Brand Name Dispense Instructions for use Diagnosis    acetaminophen 500 MG tablet    TYLENOL     Take 1,000 mg by mouth        ARIPiprazole 2 MG tablet    ABILIFY    90 tablet    Take 1 tablet (2 mg) by mouth At Bedtime    YUE (generalized anxiety disorder), Major depressive disorder, recurrent episode, mild (H)       B COMPLEX FORMULA 1 Tabs      Take 1 tablet by mouth        buPROPion 300 MG 24 hr tablet    WELLBUTRIN XL    90 tablet    Take 1 tablet (300 mg) by mouth every morning    YUE (generalized anxiety disorder), Major depressive disorder, recurrent episode, mild (H)       cetirizine 10 MG tablet    zyrTEC    30 tablet    Take 1 tablet (10 mg) by mouth daily    Chronic urticaria       EPINEPHrine 0.3 MG/0.3ML injection 2-pack    EPIPEN/ADRENACLICK/or ANY BX GENERIC EQUIV    0.6 mL    Inject 0.3 mLs (0.3 mg) into the muscle as needed for anaphylaxis    Chronic urticaria       esomeprazole 40 MG CR capsule    nexIUM    90 capsule    Take 1 capsule (40 mg) by mouth every morning (before breakfast) Take 30-60 minutes before eating.    Gastroesophageal reflux disease without esophagitis       HUMIRA PEN 40 MG/0.8ML pen kit   Generic drug:  adalimumab           ibuprofen 200 MG tablet    ADVIL/MOTRIN     Take 400 mg by mouth        levonorgestrel 20 MCG/24HR IUD    MIRENA     1 each (20 mcg) by Intrauterine route continuous    Encounter for IUD removal, Encounter for IUD insertion, IUD (intrauterine device) in place       NP THYROID 15 MG Tabs tablet   Generic drug:  thyroid     90 tablet    TAKE ONE TABLET BY MOUTH EVERY DAY    Acquired hypothyroidism       valACYclovir 1000 mg tablet    VALTREX    6 tablet    Take 0.5 tablets (500 mg) by mouth 2 times daily for 6 days    HSV (herpes simplex virus) infection       XOLAIR 150 MG injection   Generic drug:  omalizumab     2 each    Inject 2.4 mLs (300 mg) Subcutaneous every 28 days    Chronic idiopathic urticaria

## 2018-07-31 NOTE — PROGRESS NOTES
The following information was verified prior to Xolair (Omalizumab):    ABN signed:  YES - Date: 7/31/2018   Both Vials:   Vial Expiration: Oct 2021  Lot #: [id number]   Dose: 300 mg (split into two doses of 150 mg each)  Amount: 2.4 mL (split into two injections of 1.2 mL each)  Interval between injections: Every 28 days (it has been 42)    Patient time stamped for 7:46 am, to wait two hours until 9:46 am.     Marylu Correa RN

## 2018-07-31 NOTE — LETTER
7/31/2018         RE: Ashley Mcdaniels  [address]  [facility] [address]        Dear Colleague,    Thank you for referring your patient, Ashley Mcdaniels, to the [facility]. Please see a copy of my visit note below.    HPI:  Ashley Mcdaniels is a 37 year old female who is seen in consultation at the request of self.    Pt presents for eval of:   (Onset, Location, L/R, Character, Treatments, Injury if yes)     XR Left and Right foot today, 7/31/2018    Presents today with slip on flat unsupportive shoes    1. Onset mid July 2018, dorsal Right foot pain, no injury noted.  Constant, swelling, dull ache, pain 5 - 8  Intermittent numbness when using eliptical, throbbing  Tylenol  2. Ongoing, plantar Left and Right heel pain.   Sharp pain with first steps in the morning.  Less pain when exercising regularly    Works as a psycho therapist.    Weight management plan: Patient was referred to their PCP to discuss a diet and exercise plan.     Patient to follow up with Primary Care provider regarding elevated blood pressure.    ROS:  10 point ROS neg other than the symptoms noted above in the HPI.    Patient Active Problem List   Diagnosis     Acquired hypothyroidism     YUE (generalized anxiety disorder)     Major depressive disorder, recurrent episode, mild (H)     Crohn's disease of both small and large intestine without complication (H)     IUD (intrauterine device) in place     Chronic urticaria     ADHD (attention deficit hyperactivity disorder), inattentive type     Gastroesophageal reflux disease without esophagitis       PAST MEDICAL HISTORY:   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      Crohn's colitis (H)      YUE (generalized anxiety disorder)      GERD (gastroesophageal reflux disease)      Low back pain      Major depressive disorder, recurrent episode, mild (H)         PAST SURGICAL HISTORY:    Past Surgical History:   Procedure Laterality Date     CHOLECYSTECTOMY  Feb 2007     DILATION AND CURETTAGE  May 2003    Non-OB     HC TOOTH EXTRACTION W/FORCEP  Nov 2006     LAP ADJUSTABLE GASTRIC BAND  May 2007        MEDICATIONS:   Current Outpatient Prescriptions:      acetaminophen (TYLENOL) 500 MG tablet, Take 1,000 mg by mouth, Disp: , Rfl:      ARIPiprazole (ABILIFY) 2 MG tablet, Take 1 tablet (2 mg) by mouth At Bedtime, Disp: 90 tablet, Rfl: 1     B Complex-Folic Acid (B COMPLEX FORMULA 1) TABS, Take 1 tablet by mouth, Disp: , Rfl:      buPROPion (WELLBUTRIN XL) 300 MG 24 hr tablet, Take 1 tablet (300 mg) by mouth every morning, Disp: 90 tablet, Rfl: 3     cetirizine (ZYRTEC) 10 MG tablet, Take 1 tablet (10 mg) by mouth daily, Disp: 30 tablet, Rfl: 0     esomeprazole (NEXIUM) 40 MG CR capsule, Take 1 capsule (40 mg) by mouth every morning (before breakfast) Take 30-60 minutes before eating., Disp: 90 capsule, Rfl: 1     HUMIRA PEN 40 MG/0.8ML pen kit, , Disp: , Rfl: 2     ibuprofen (ADVIL,MOTRIN) 200 MG tablet, Take 400 mg by mouth, Disp: , Rfl:      levonorgestrel (MIRENA) 20 MCG/24HR IUD, 1 each (20 mcg) by Intrauterine route continuous, Disp: , Rfl:      NP THYROID 15 MG TABS tablet, TAKE ONE TABLET BY MOUTH EVERY DAY, Disp: 90 tablet, Rfl: 1     omalizumab (XOLAIR) 150 MG injection, Inject 2.4 mLs (300 mg) Subcutaneous every 28 days, Disp: 2 each, Rfl:      EPINEPHrine (EPIPEN/ADRENACLICK/OR ANY BX GENERIC EQUIV) 0.3 MG/0.3ML injection 2-pack, Inject 0.3 mLs (0.3 mg) into the muscle as needed for anaphylaxis, Disp: 0.6 mL, Rfl: 1     valACYclovir (VALTREX) 1000 mg tablet, Take 0.5 tablets (500 mg) by mouth 2 times daily for 6 days, Disp: 6 tablet, Rfl: 11     ALLERGIES:    Allergies   Allergen Reactions     Azathioprine Other (See Comments)     pancreatitis        SOCIAL HISTORY:   Social History     Social History     Marital status:      Spouse name: N/A     Number of children: N/A     Years  "of education: N/A     Occupational History     Not on file.     Social History Main Topics     Smoking status: Former Smoker     Quit date: 5/1/2016     Smokeless tobacco: Never Used     Alcohol use 0.0 oz/week     0 Standard drinks or equivalent per week      Comment: occasional      Drug use: No     Sexual activity: Yes     Partners: Male     Birth control/ protection: IUD     Other Topics Concern     Not on file     Social History Narrative        FAMILY HISTORY:   Family History   Problem Relation Age of Onset     Arthritis Mother      HEART DISEASE Father      CAD, CHF     Alcoholism Father      Asthma Father      Hypertension Father      Hyperlipidemia Father      Pancreatic Cancer Father      Liver Cancer Father      Diabetes Maternal Grandmother      Substance Abuse Maternal Grandmother      Hyperlipidemia Maternal Grandmother      Hypertension Maternal Grandmother      Substance Abuse Maternal Grandfather      Asthma Paternal Grandmother      HEART DISEASE Paternal Grandmother      Diabetes Paternal Grandmother      Hypertension Paternal Grandmother      Substance Abuse Paternal Grandfather      Mental Illness Brother      Substance Abuse Brother         EXAM:Vitals: /66 (BP Location: Right arm, Cuff Size: Adult Regular)  Ht 5' 3.39\" (1.61 m)  Wt 198 lb (89.8 kg)  BMI 34.65 kg/m2  BMI= Body mass index is 34.65 kg/(m^2).    General appearance: Patient is alert and fully cooperative with history & exam.  No sign of distress is noted during the visit.     Psychiatric: Affect is pleasant & appropriate.  Patient appears motivated to improve health.     Respiratory: Breathing is regular & unlabored while sitting.     HEENT: Hearing is intact to spoken word.  Speech is clear.  No gross evidence of visual impairment that would impact ambulation.     Vascular: DP & PT pulses are intact & regular bilaterally.  No significant edema or varicosities noted.  CFT and skin temperature is normal to both lower " extremities.     Neurologic: Lower extremity sensation is intact to light touch.  No evidence of weakness or contracture in the lower extremities.  No evidence of neuropathy.    Dermatologic: Skin is intact to both lower extremities with adequate texture, turgor and tone about the integument.  No paronychia or evidence of soft tissue infection is noted.     Musculoskeletal: Patient is ambulatory without assistive device or brace.  Gross area of pinpoint pain and swelling and erythema over the dorsal second metatarsal phalangeal joint extending 3 cm proximal right foot only.  Guarding with any palpation of the right second metatarsal.    Radiographs 3 views right foot demonstrate acute cortical reaction and thickening along the medial aspect of the neck distal diaphysis of the right second metatarsal.     ASSESSMENT:       ICD-10-CM    1. Stress fracture of metatarsal bone, right, initial encounter M84.374A XR Foot Right G/E 3 Views   2. Long term current use of immunosuppressive drug Z79.899         PLAN:  [facility]      7/31/2018   Obtained interpreted radiographs  Stress fracture second metatarsal right foot  Patient is on immunosuppressive medication  Fracture boot right foot  Follow-up in 4 weeks and likely begin sleep without the fracture boot and more weightbearing activity.  Weightbearing now to tolerance in the fracture boot likely 1 or 2 hours per day.  Repeat imaging in 1 month.  Ordered future today.    Torito Caldwell DPM      Again, thank you for allowing me to participate in the care of your patient.        Sincerely,        Torito Caldwell DPM

## 2018-07-31 NOTE — NURSING NOTE
Dispensed 1 Pneumatic Walking Brace, Size Small, with [facility] agreement signed by patient. Kristin Mazariegos CMA, July 31, 2018

## 2018-07-31 NOTE — PROGRESS NOTES
Date of Last Xolair injection: 2018    Interval between injections: 28 days (has been 42)    ABN signed (For buy and bill patients only)? Yes      Does this patient need to be billed for Xolair Medication?  Yes, this is a buy and bill patient      Prior Auth valid? PA not required per insurance       Is the vial ? No      Does patient have Epinephrine Auto Injector?  Yes    Expiration Date: Oct 2019      Symptoms of systemic reaction with last injection? No    Are you ill today? No    If female, are you pregnant or breastfeeding? No  **Call Provider if yes to any question.**    Marylu Correa RN on 2018 at 7:24 AM

## 2018-07-31 NOTE — PROGRESS NOTES
Patient presented after waiting 30 minutes with normal reaction to Xolair injections. Discharged from clinic.    Marylu Correa RN ............   7/31/2018...10:06 AM

## 2018-08-22 NOTE — PROGRESS NOTES
SUBJECTIVE:   Ashley Mcdaniels is a 38 year old female who presents to clinic today for the following health issues:    HPI  Depression and Anxiety Follow-Up    Status since last visit: Improved     Other associated symptoms:None    Complicating factors:     Significant life event: Yes-  moving     Current substance abuse: None    - Moved and they got lost   - Chiro and acupuncture regularly   - Hasn't had medications in 3+ weeks   - Wondering about Contrave     Can't lose weight     Was working out routinely until got stress fracture in foot    Has gastric bypass     Eats very small portions     Eats dairy and gluten free     Has isogenics type shakes that drinks in AM        PHQ-9 1/31/2018 4/10/2018 6/22/2018   Total Score 5 16 7   Q9: Suicide Ideation Not at all Not at all Not at all     YUE-7 SCORE 1/31/2018 4/10/2018 6/22/2018   Total Score 10 (moderate anxiety) 19 (severe anxiety) 4 (minimal anxiety)   Total Score 10 19 4     In the past two weeks have you had thoughts of suicide or self-harm?  No.    Do you have concerns about your personal safety or the safety of others?   No      Plan from last visit 6/22/18  - Patient much improved with addition of Abilify 2 mg to her Wellbutrin 300 mg   - Patient desires no change   - Reviewed both use and side effects, refilled   - Recheck 6 months or PRN       Problem list and histories reviewed & adjusted, as indicated.  Additional history: as documented    ROS:  Constitutional, HEENT, cardiovascular, pulmonary, gi and gu systems are negative, except as otherwise noted.    OBJECTIVE:   /68  Pulse 78  Temp 97.8  F (36.6  C)  Wt 203 lb 3.2 oz (92.2 kg)  SpO2 99%  BMI 35.56 kg/m2  Body mass index is 35.56 kg/(m^2).  GENERAL APPEARANCE: healthy, alert and no distress  EYES: Eyes grossly normal to inspection, PERRLA, conjunctivae and sclerae without injection or discharge, EOM intact   MS: No musculoskeletal defects are noted and gait is age appropriate without  ataxia   SKIN: No suspicious lesions or rashes, hydration status appears adeuqate with normal skin turgor   PSYCH: Alert and oriented x3; speech- coherent , normal rate and volume; able to articulate logical thoughts, able to abstract reason, no tangential thoughts, no hallucinations or delusions, mentation appears normal, Mood is euthymic. Affect is appropriate for this mood state and bright. Thought content is free of suicidal ideation, hallucinations, and delusions. Dress is adequate and upkept. Eye contact is good during conversation.       Diagnostic Test Results:  none     ASSESSMENT/PLAN:       ICD-10-CM    1. YUE (generalized anxiety disorder) F41.1 naltrexone-bupropion (CONTRAVE) 8-90 MG per 12 hr tablet   2. Major depressive disorder, recurrent episode, mild (H) F33.0 naltrexone-bupropion (CONTRAVE) 8-90 MG per 12 hr tablet   3. Morbid obesity (H) E66.01 naltrexone-bupropion (CONTRAVE) 8-90 MG per 12 hr tablet     - Patient with stable anxiety and depression off Abilify and Wellbutrin   - Would like to try something to help lose weight and possibly mood control   - As above, great healthy diet with portion control and usually exercises (until recent foot fracture) is s/p gastric band   - Will do trial of contrave   - Reviewed use, side effects, and taper plan (see patient instructions)   - Gave hand out on [employer]     The patient indicates understanding of these issues and agrees with the plan.    Follow up: 1-2 months       Melva You PA-C  [facility]

## 2018-08-24 ENCOUNTER — OFFICE VISIT (OUTPATIENT)
Dept: FAMILY MEDICINE | Facility: OTHER | Age: 38
End: 2018-08-24
Payer: COMMERCIAL

## 2018-08-24 ENCOUNTER — TELEPHONE (OUTPATIENT)
Dept: FAMILY MEDICINE | Facility: OTHER | Age: 38
End: 2018-08-24

## 2018-08-24 VITALS
SYSTOLIC BLOOD PRESSURE: 108 MMHG | TEMPERATURE: 97.8 F | OXYGEN SATURATION: 99 % | DIASTOLIC BLOOD PRESSURE: 68 MMHG | WEIGHT: 203.2 LBS | BODY MASS INDEX: 35.56 KG/M2 | HEART RATE: 78 BPM

## 2018-08-24 DIAGNOSIS — F41.1 GAD (GENERALIZED ANXIETY DISORDER): ICD-10-CM

## 2018-08-24 DIAGNOSIS — F33.0 MAJOR DEPRESSIVE DISORDER, RECURRENT EPISODE, MILD (H): ICD-10-CM

## 2018-08-24 DIAGNOSIS — F41.1 GAD (GENERALIZED ANXIETY DISORDER): Primary | ICD-10-CM

## 2018-08-24 DIAGNOSIS — F33.0 MAJOR DEPRESSIVE DISORDER, RECURRENT EPISODE, MILD (H): Primary | ICD-10-CM

## 2018-08-24 DIAGNOSIS — E66.01 MORBID OBESITY (H): ICD-10-CM

## 2018-08-24 PROCEDURE — 99214 OFFICE O/P EST MOD 30 MIN: CPT | Performed by: PHYSICIAN ASSISTANT

## 2018-08-24 ASSESSMENT — PAIN SCALES - GENERAL: PAINLEVEL: NO PAIN (0)

## 2018-08-24 NOTE — PATIENT INSTRUCTIONS
Initial: One tablet (naltrexone 8 mg/bupropion 90 mg) once daily in the morning for 1 week;   at week 2, increase to 1 tablet twice daily administered in the morning and evening and continue for 1 week;   at week 3, increase to 2 tablets in the morning and 1 tablet in the evening and continue for 1 week;   at week 4, increase to 2 tablets twice daily administered in the morning and evening and continue for the remainder of the treatment course.  Usual dosage: Two tablets (naltrexone 16 mg/bupropion 180 mg) twice daily (maximum dose: naltrexone 32 mg/bupropion 360 mg/day).

## 2018-08-24 NOTE — TELEPHONE ENCOUNTER
Prior Authorization Retail Medication Request    Medication/Dose: Contrave 8-90mg TB12  ICD code (if different than what is on RX):    Previously Tried and Failed:    Rationale:  ANOREXIC, ANTI-OBESITY NOT COVERED    Insurance Name:  INO ABBASI  Insurance ID:  [id number]      Pharmacy Information (if different than what is on RX)  Name:  [facility]  Phone:  [phone]

## 2018-08-24 NOTE — LETTER
[facility]  [address]   [place] [address]  Phone: [phone]    August 28, 2018      RE:   Ashley Mcdaniels  [address] [address]          To whom it may concern:    Ashley Mcdaniels is a patient at our clinic and under my care. The patient carries the diagnoses of morbid obesity (BMI >35), generalized anxiety disorder, and mild major depression. The patient is s/p gastric band in 2007. Despite this band, exercise, and diet, patient is unable to lose weight. She additionally carries the diagnoses of hypothyroidism and Crohn's disease, both of which additionally make it hard for her to lose weight. Patient has been on Wellbutrin (Buproprion) in the past and has helped tremendously with her mood. Therefore, it is in my medical opinion that Contrave (Naltrexone-Bupropion) would be the best medication for her as not only would it stabilize her mood but it would additionally help her to lose weight. Patient desires to be healthy as evidence by her exercise and diet. I also think there would be minimal to no side effects since she has already tolerated one ingredient (Buproprion) in the past.       Please contact me for questions or concerns.      Sincerely,        Melva You PA-C

## 2018-08-24 NOTE — MR AVS SNAPSHOT
After Visit Summary   8/24/2018    Ms. Ashley Mcdaniels    MRN: [id number]           Patient Information     Date Of Birth          1980        Visit Information        Provider Department      8/24/2018 11:45 AM Melva You PA-C [facility]        Today's Diagnoses     YUE (generalized anxiety disorder)    -  1    Major depressive disorder, recurrent episode, mild (H)        Morbid obesity (H)          Care Instructions    Initial: One tablet (naltrexone 8 mg/bupropion 90 mg) once daily in the morning for 1 week;   at week 2, increase to 1 tablet twice daily administered in the morning and evening and continue for 1 week;   at week 3, increase to 2 tablets in the morning and 1 tablet in the evening and continue for 1 week;   at week 4, increase to 2 tablets twice daily administered in the morning and evening and continue for the remainder of the treatment course.  Usual dosage: Two tablets (naltrexone 16 mg/bupropion 180 mg) twice daily (maximum dose: naltrexone 32 mg/bupropion 360 mg/day).            Follow-ups after your visit        Follow-up notes from your care team     Return in about 2 months (around 10/24/2018).      Your next 10 appointments already scheduled     Aug 28, 2018  7:10 AM CDT   Nurse Only with ER ALLERGY SHOTS   [facility]    [address] [id number] [phone]            Aug 28, 2018  8:00 AM CDT   Return Visit with Torito Caldwell DPM   [facility]    [address] [facility] [id number] [phone]              Who to contact     If you have questions or need follow up information about today's clinic visit or your schedule please contact [facility] directly at [phone].  Normal or non-critical lab and imaging results will be communicated to you by MyChart, letter or phone within 4 business  days after the clinic has received the results. If you do not hear from us within 7 days, please contact the clinic through [employer] or phone. If you have a critical or abnormal lab result, we will notify you by phone as soon as possible.  Submit refill requests through [employer] or call your pharmacy and they will forward the refill request to us. Please allow 3 business days for your refill to be completed.          Additional Information About Your Visit        [employer] Information     [employer] gives you secure access to your electronic health record. If you see a primary care provider, you can also send messages to your care team and make appointments. If you have questions, please call your primary care clinic.  If you do not have a primary care provider, please call [phone] and they will assist you.        Care EveryWhere ID     This is your Care EveryWhere ID. This could be used by other organizations to access your [place] medical records  [id number]        Your Vitals Were     Pulse Temperature Pulse Oximetry BMI (Body Mass Index)          78 97.8  F (36.6  C) 99% 35.56 kg/m2         Blood Pressure from Last 3 Encounters:   08/24/18 108/68   07/31/18 104/66   06/22/18 100/72    Weight from Last 3 Encounters:   08/24/18 203 lb 3.2 oz (92.2 kg)   07/31/18 198 lb (89.8 kg)   06/22/18 194 lb (88 kg)              Today, you had the following     No orders found for display         Today's Medication Changes          These changes are accurate as of 8/24/18 12:13 PM.  If you have any questions, ask your nurse or doctor.               Start taking these medicines.        Dose/Directions    naltrexone-bupropion 8-90 MG per 12 hr tablet   Commonly known as:  CONTRAVE   Used for:  YUE (generalized anxiety disorder), Major depressive disorder, recurrent episode, mild (H), Morbid obesity (H)   Started by:  Melva You PA-C        Dose:  2 tablet   Take 2 tablets by mouth 2 times daily   Quantity:   120 tablet   Refills:  1            Where to get your medicines      These medications were sent to [facility] - [address]   [address] [place] [address]     Phone:  [phone]     naltrexone-bupropion 8-90 MG per 12 hr tablet                Primary Care Provider Office Phone # Fax #    Melva CHAVES PENNIE You [phone]       [address] [place] [address] [address]  [facility] [address]        Equal Access to Services     [facility]: Hadii aad ku hadasho Soomaali, waaxda luqadaha, qaybta kaalmada adeegyada, waxay idiin hayaan ambrosioeg alfonso emmanuel . So [facility] [place] [facility] [phone].    ATENCIÓN: Si habla español, tiene a keita disposición servicios gratuitos de asistencia lingüística. [facility] [phone].    We comply with applicable federal civil rights laws and [place] laws. We do not discriminate on the basis of race, color, national origin, age, disability, sex, sexual orientation, or gender identity.            Thank you!     Thank you for choosing [facility]  for your care. Our goal is always to provide you with excellent care. Hearing back from our patients is one way we can continue to improve our services. Please take a few minutes to complete the written survey that you may receive in the mail after your visit with us. Thank you!             Your Updated Medication List - Protect others around you: Learn how to safely use, store and throw away your medicines at www.disposemymeds.org.          This list is accurate as of 8/24/18 12:13 PM.  Always use your most recent med list.                   Brand Name Dispense Instructions for use Diagnosis    acetaminophen 500 MG tablet    TYLENOL     Take 1,000 mg by mouth        B COMPLEX FORMULA 1 Tabs      Take 1 tablet by mouth        buPROPion 300 MG 24 hr tablet    WELLBUTRIN XL    90 tablet    Take 1 tablet (300 mg) by mouth every morning    YUE (generalized anxiety disorder), Major  depressive disorder, recurrent episode, mild (H)       cetirizine 10 MG tablet    zyrTEC    30 tablet    Take 1 tablet (10 mg) by mouth daily    Chronic urticaria       EPINEPHrine 0.3 MG/0.3ML injection 2-pack    EPIPEN/ADRENACLICK/or ANY BX GENERIC EQUIV    0.6 mL    Inject 0.3 mLs (0.3 mg) into the muscle as needed for anaphylaxis    Chronic urticaria       esomeprazole 40 MG CR capsule    nexIUM    90 capsule    Take 1 capsule (40 mg) by mouth every morning (before breakfast) Take 30-60 minutes before eating.    Gastroesophageal reflux disease without esophagitis       HUMIRA PEN 40 MG/0.8ML pen kit   Generic drug:  adalimumab           ibuprofen 200 MG tablet    ADVIL/MOTRIN     Take 400 mg by mouth        levonorgestrel 20 MCG/24HR IUD    MIRENA     1 each (20 mcg) by Intrauterine route continuous    Encounter for IUD removal, Encounter for IUD insertion, IUD (intrauterine device) in place       naltrexone-bupropion 8-90 MG per 12 hr tablet    CONTRAVE    120 tablet    Take 2 tablets by mouth 2 times daily    YUE (generalized anxiety disorder), Major depressive disorder, recurrent episode, mild (H), Morbid obesity (H)       NP THYROID 15 MG Tabs tablet   Generic drug:  thyroid     90 tablet    TAKE ONE TABLET BY MOUTH EVERY DAY    Acquired hypothyroidism       valACYclovir 1000 mg tablet    VALTREX    6 tablet    Take 0.5 tablets (500 mg) by mouth 2 times daily for 6 days    HSV (herpes simplex virus) infection       XOLAIR 150 MG injection   Generic drug:  omalizumab     2 each    Inject 2.4 mLs (300 mg) Subcutaneous every 28 days    Chronic idiopathic urticaria

## 2018-08-28 ENCOUNTER — ALLIED HEALTH/NURSE VISIT (OUTPATIENT)
Dept: ALLERGY | Facility: OTHER | Age: 38
End: 2018-08-28
Payer: COMMERCIAL

## 2018-08-28 ENCOUNTER — OFFICE VISIT (OUTPATIENT)
Dept: PODIATRY | Facility: OTHER | Age: 38
End: 2018-08-28
Payer: COMMERCIAL

## 2018-08-28 VITALS
DIASTOLIC BLOOD PRESSURE: 72 MMHG | BODY MASS INDEX: 36 KG/M2 | SYSTOLIC BLOOD PRESSURE: 116 MMHG | HEIGHT: 63 IN | WEIGHT: 203.2 LBS

## 2018-08-28 DIAGNOSIS — M84.374A STRESS FRACTURE OF METATARSAL BONE, RIGHT, INITIAL ENCOUNTER: ICD-10-CM

## 2018-08-28 DIAGNOSIS — Z79.899 LONG TERM CURRENT USE OF IMMUNOSUPPRESSIVE DRUG: ICD-10-CM

## 2018-08-28 DIAGNOSIS — M84.374A STRESS FRACTURE OF RIGHT FOOT, INITIAL ENCOUNTER: ICD-10-CM

## 2018-08-28 DIAGNOSIS — L50.1 CHRONIC IDIOPATHIC URTICARIA: ICD-10-CM

## 2018-08-28 DIAGNOSIS — Q66.6 PES VALGUS OF RIGHT FOOT: Primary | ICD-10-CM

## 2018-08-28 PROCEDURE — 99213 OFFICE O/P EST LOW 20 MIN: CPT | Performed by: PODIATRIST

## 2018-08-28 PROCEDURE — 73630 X-RAY EXAM OF FOOT: CPT | Mod: RT

## 2018-08-28 PROCEDURE — 99207 ZZC DROP WITH A PROCEDURE: CPT

## 2018-08-28 PROCEDURE — 96372 THER/PROPH/DIAG INJ SC/IM: CPT

## 2018-08-28 ASSESSMENT — PAIN SCALES - GENERAL: PAINLEVEL: NO PAIN (0)

## 2018-08-28 NOTE — PROGRESS NOTES
Chief Complaint   Patient presents with     RECHECK     (approx 6w) WB w/tall gray fx boot, no pain -  Right 2nd metatarsal stress fx, onset mid July 2018; XR R foot today, 8/28/18; LOV 7/31/2018       Weight management plan: Patient was referred to their PCP to discuss a diet and exercise plan.     HPI:  Ashley Mcdaniels is a 37 year old female who is seen in consultation at the request of self.    Pt presents for eval of:   (Onset, Location, L/R, Character, Treatments, Injury if yes)     XR Left and Right foot today, 7/31/2018    Presents today with slip on flat unsupportive shoes    1. Onset mid July 2018, dorsal Right foot pain, no injury noted.  Constant, swelling, dull ache, pain 5 - 8  Intermittent numbness when using eliptical, throbbing  Tylenol  2. Ongoing, plantar Left and Right heel pain.   Sharp pain with first steps in the morning.  Less pain when exercising regularly    Works as a psycho therapist.    Patient to follow up with Primary Care provider regarding elevated blood pressure.    ROS:  10 point ROS neg other than the symptoms noted above in the HPI.    Patient Active Problem List   Diagnosis     Acquired hypothyroidism     YUE (generalized anxiety disorder)     Major depressive disorder, recurrent episode, mild (H)     Crohn's disease of both small and large intestine without complication (H)     IUD (intrauterine device) in place     Chronic urticaria     ADHD (attention deficit hyperactivity disorder), inattentive type     Gastroesophageal reflux disease without esophagitis     Morbid obesity (H)       PAST MEDICAL HISTORY:   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      Crohn's colitis (H)      YUE (generalized anxiety disorder)      GERD (gastroesophageal reflux disease)      Low back pain      Major depressive disorder, recurrent episode, mild (H)         PAST SURGICAL HISTORY:   Past  Surgical History:   Procedure Laterality Date     CHOLECYSTECTOMY  Feb 2007     DILATION AND CURETTAGE  May 2003    Non-OB     HC TOOTH EXTRACTION W/FORCEP  Nov 2006     LAP ADJUSTABLE GASTRIC BAND  May 2007        MEDICATIONS:   Current Outpatient Prescriptions:      acetaminophen (TYLENOL) 500 MG tablet, Take 1,000 mg by mouth, Disp: , Rfl:      B Complex-Folic Acid (B COMPLEX FORMULA 1) TABS, Take 1 tablet by mouth, Disp: , Rfl:      buPROPion (WELLBUTRIN XL) 300 MG 24 hr tablet, Take 1 tablet (300 mg) by mouth every morning, Disp: 90 tablet, Rfl: 3     cetirizine (ZYRTEC) 10 MG tablet, Take 1 tablet (10 mg) by mouth daily, Disp: 30 tablet, Rfl: 0     esomeprazole (NEXIUM) 40 MG CR capsule, Take 1 capsule (40 mg) by mouth every morning (before breakfast) Take 30-60 minutes before eating., Disp: 90 capsule, Rfl: 1     HUMIRA PEN 40 MG/0.8ML pen kit, , Disp: , Rfl: 2     ibuprofen (ADVIL,MOTRIN) 200 MG tablet, Take 400 mg by mouth, Disp: , Rfl:      levonorgestrel (MIRENA) 20 MCG/24HR IUD, 1 each (20 mcg) by Intrauterine route continuous, Disp: , Rfl:      naltrexone-bupropion (CONTRAVE) 8-90 MG per 12 hr tablet, Take 2 tablets by mouth 2 times daily, Disp: 120 tablet, Rfl: 1     NP THYROID 15 MG TABS tablet, TAKE ONE TABLET BY MOUTH EVERY DAY, Disp: 90 tablet, Rfl: 1     omalizumab (XOLAIR) 150 MG injection, Inject 2.4 mLs (300 mg) Subcutaneous every 28 days, Disp: 2 each, Rfl:      EPINEPHrine (EPIPEN/ADRENACLICK/OR ANY BX GENERIC EQUIV) 0.3 MG/0.3ML injection 2-pack, Inject 0.3 mLs (0.3 mg) into the muscle as needed for anaphylaxis, Disp: 0.6 mL, Rfl: 1     valACYclovir (VALTREX) 1000 mg tablet, Take 0.5 tablets (500 mg) by mouth 2 times daily for 6 days, Disp: 6 tablet, Rfl: 11     ALLERGIES:    Allergies   Allergen Reactions     Azathioprine Other (See Comments)     pancreatitis        SOCIAL HISTORY:   Social History     Social History     Marital status:      Spouse name: N/A     Number of children:  "N/A     Years of education: N/A     Occupational History     Not on file.     Social History Main Topics     Smoking status: Former Smoker     Quit date: 5/1/2016     Smokeless tobacco: Never Used     Alcohol use 0.0 oz/week     0 Standard drinks or equivalent per week      Comment: occasional      Drug use: No     Sexual activity: Yes     Partners: Male     Birth control/ protection: IUD     Other Topics Concern     Parent/Sibling W/ Cabg, Mi Or Angioplasty Before 65f 55m? No     Social History Narrative        FAMILY HISTORY:   Family History   Problem Relation Age of Onset     Arthritis Mother      HEART DISEASE Father      CAD, CHF     Alcoholism Father      Asthma Father      Hypertension Father      Hyperlipidemia Father      Pancreatic Cancer Father      Liver Cancer Father      Diabetes Maternal Grandmother      Substance Abuse Maternal Grandmother      Hyperlipidemia Maternal Grandmother      Hypertension Maternal Grandmother      Substance Abuse Maternal Grandfather      Asthma Paternal Grandmother      HEART DISEASE Paternal Grandmother      Diabetes Paternal Grandmother      Hypertension Paternal Grandmother      Substance Abuse Paternal Grandfather      Mental Illness Brother      Substance Abuse Brother         EXAM:Vitals: /72 (BP Location: Right arm, Cuff Size: Adult Regular)  Ht 5' 3.39\" (1.61 m)  Wt 203 lb 3.2 oz (92.2 kg)  BMI 35.55 kg/m2  BMI= Body mass index is 35.55 kg/(m^2).    General appearance: Patient is alert and fully cooperative with history & exam.  No sign of distress is noted during the visit.     Psychiatric: Affect is pleasant & appropriate.  Patient appears motivated to improve health.     Respiratory: Breathing is regular & unlabored while sitting.     HEENT: Hearing is intact to spoken word.  Speech is clear.  No gross evidence of visual impairment that would impact ambulation.     Vascular: DP & PT pulses are intact & regular bilaterally.  No significant edema or " varicosities noted.  CFT and skin temperature is normal to both lower extremities.     Neurologic: Lower extremity sensation is intact to light touch.  No evidence of weakness or contracture in the lower extremities.  No evidence of neuropathy.    Dermatologic: Skin is intact to both lower extremities with adequate texture, turgor and tone about the integument.  No paronychia or evidence of soft tissue infection is noted.     Musculoskeletal: Patient is ambulatory without assistive device or brace.  Pinpoint area of discomfort is much improved at the dorsal neck of the second metatarsal right foot.  Edema is resolved.  No erythema.  No pain or symptoms at the second metatarsal phalangeal joint first or third metatarsals.  All symptoms with very firm palpation of the second metatarsal shaft at the distal neck metaphysis diaphysis junction.    Radiographs 3 views right foot demonstrate acute cortical reaction and thickening along the medial aspect of the neck distal diaphysis of the right second metatarsal.     ASSESSMENT:       ICD-10-CM    1. Pes valgus of right foot Q66.6 ORTHOTICS REFERRAL   2. Stress fracture of right foot, initial encounter M84.374A ORTHOTICS REFERRAL   3. Long term current use of immunosuppressive drug Z79.899         PLAN:  [facility]      7/31/2018   Obtained interpreted radiographs  Stress fracture second metatarsal right foot  Patient is on immunosuppressive medication  Fracture boot right foot  Follow-up in 4 weeks and likely begin sleep without the fracture boot and more weightbearing activity.  Weightbearing now to tolerance in the fracture boot likely 1 or 2 hours per day.  Repeat imaging in 1 month.  Ordered future today.    8/28/2018  Pain and edema is now resolved  Obtained interpreted radiographs and there appears to be cortical thickening along the medial shaft distal second metatarsal.  No reaction laterally however.  Questionable stress reaction to this  area.  Therefore we will begin coming out of the fracture boot for light duty short periods of time.  Order for custom molded orthotics to provide support and protection during bone metabolism.  Follow-up in 3-4 weeks.  Expect to progress into regular shoe gear and return to the gym at that time  Dispensed a pedometer and how to utilize it    Torito Caldwell DPM

## 2018-08-28 NOTE — TELEPHONE ENCOUNTER
Please appeal. Letter written under letters tab.     Rene Damon-PENNIE Nunez  [facility]

## 2018-08-28 NOTE — TELEPHONE ENCOUNTER
Central Prior Authorization Team   Phone: [phone]    PA Initiation    Medication: Contrave 8-90mg TB12  Insurance Company: [employer] [place] - Phone [phone] Fax [phone]  Pharmacy Filling the Rx: [facility] - [address]  Filling Pharmacy Phone: [phone]  Filling Pharmacy Fax: [phone]  Start Date: 8/28/2018

## 2018-08-28 NOTE — TELEPHONE ENCOUNTER
PRIOR AUTHORIZATION DENIED    Medication: Contrave 8-90mg [id number]    Denial Date: 8/28/2018    Denial Rational: MEDICATION IS NOT A COVERED BENEFIT UNDER PATIENT'S PLAN.        Appeal Information:  IF YOU WOULD LIKE TO APPEAL PLEASE SUPPLY PA TEAM WITH A LETTER OF MEDICAL NECESSITY WITH CLINICAL REASON.

## 2018-08-28 NOTE — MR AVS SNAPSHOT
After Visit Summary   8/28/2018    Ms. Ashley Mcdaniels    MRN: [id number]           Patient Information     Date Of Birth          1980        Visit Information        Provider Department      8/28/2018 9:50 AM ER ALLERGY SHOTS [facility]        Today's Diagnoses     Chronic idiopathic urticaria           Follow-ups after your visit        Your next 10 appointments already scheduled     Sep 25, 2018  8:10 AM CDT   Nurse Only with ER ALLERGY SHOTS   [facility]    [address]  [facility] [id number] [phone]            Sep 25, 2018  9:15 AM CDT   Return Visit with Torito Caldwell DPM   [facility]    [address]  [facility] [id number] [phone]              Who to contact     If you have questions or need follow up information about today's clinic visit or your schedule please contact [facility] directly at [phone].  Normal or non-critical lab and imaging results will be communicated to you by [employer], letter or phone within 4 business days after the clinic has received the results. If you do not hear from us within 7 days, please contact the clinic through [employer] or phone. If you have a critical or abnormal lab result, we will notify you by phone as soon as possible.  Submit refill requests through [employer] or call your pharmacy and they will forward the refill request to us. Please allow 3 business days for your refill to be completed.          Additional Information About Your Visit        [employer] Information     [employer] gives you secure access to your electronic health record. If you see a primary care provider, you can also send messages to your care team and make appointments. If you have questions, please call your primary care clinic.  If you do not have a primary care provider, please call [phone] and they will assist you.         Care EveryWhere ID     This is your Care EveryWhere ID. This could be used by other organizations to access your [facility] medical records  [id number]         Blood Pressure from Last 3 Encounters:   08/28/18 116/72   08/24/18 108/68   07/31/18 104/66    Weight from Last 3 Encounters:   08/28/18 92.2 kg (203 lb 3.2 oz)   08/24/18 92.2 kg (203 lb 3.2 oz)   07/31/18 89.8 kg (198 lb)              We Performed the Following     OMALIZUMAB INJECTION []     THER/PROPH/DIAG INJ, SC/IM [73200]        Primary Care Provider Office Phone # Fax #    Melva ANDIE You PA-C [phone]       [address] [place] [address]        Equal Access to Services     [facility]: Hadii aad ku hadasho Soomaali, waaxda luqadaha, qaybta kaalmada adeegyada, brenda gein hayrenee emmanuel . So [facility] [phone].    ATENCIÓN: Si habla español, tiene a keita disposición servicios gratuitos de asistencia lingüística. Llame al [phone].    We comply with applicable federal civil rights laws and [place] laws. We do not discriminate on the basis of race, color, national origin, age, disability, sex, sexual orientation, or gender identity.            Thank you!     Thank you for choosing [facility]  for your care. Our goal is always to provide you with excellent care. Hearing back from our patients is one way we can continue to improve our services. Please take a few minutes to complete the written survey that you may receive in the mail after your visit with us. Thank you!             Your Updated Medication List - Protect others around you: Learn how to safely use, store and throw away your medicines at www.disposemymeds.org.          This list is accurate as of 8/28/18 10:30 AM.  Always use your most recent med list.                   Brand Name Dispense Instructions for use Diagnosis    acetaminophen 500 MG tablet    TYLENOL     Take 1,000 mg by mouth        B COMPLEX  FORMULA 1 Tabs      Take 1 tablet by mouth        buPROPion 300 MG 24 hr tablet    WELLBUTRIN XL    90 tablet    Take 1 tablet (300 mg) by mouth every morning    YUE (generalized anxiety disorder), Major depressive disorder, recurrent episode, mild (H)       cetirizine 10 MG tablet    zyrTEC    30 tablet    Take 1 tablet (10 mg) by mouth daily    Chronic urticaria       EPINEPHrine 0.3 MG/0.3ML injection 2-pack    EPIPEN/ADRENACLICK/or ANY BX GENERIC EQUIV    0.6 mL    Inject 0.3 mLs (0.3 mg) into the muscle as needed for anaphylaxis    Chronic urticaria       esomeprazole 40 MG CR capsule    nexIUM    90 capsule    Take 1 capsule (40 mg) by mouth every morning (before breakfast) Take 30-60 minutes before eating.    Gastroesophageal reflux disease without esophagitis       HUMIRA PEN 40 MG/0.8ML pen kit   Generic drug:  adalimumab           ibuprofen 200 MG tablet    ADVIL/MOTRIN     Take 400 mg by mouth        levonorgestrel 20 MCG/24HR IUD    MIRENA     1 each (20 mcg) by Intrauterine route continuous    Encounter for IUD removal, Encounter for IUD insertion, IUD (intrauterine device) in place       naltrexone-bupropion 8-90 MG per 12 hr tablet    CONTRAVE    120 tablet    Take 2 tablets by mouth 2 times daily    YUE (generalized anxiety disorder), Major depressive disorder, recurrent episode, mild (H), Morbid obesity (H)       NP THYROID 15 MG Tabs tablet   Generic drug:  thyroid     90 tablet    TAKE ONE TABLET BY MOUTH EVERY DAY    Acquired hypothyroidism       valACYclovir 1000 mg tablet    VALTREX    6 tablet    Take 0.5 tablets (500 mg) by mouth 2 times daily for 6 days    HSV (herpes simplex virus) infection       XOLAIR 150 MG injection   Generic drug:  omalizumab     2 each    Inject 2.4 mLs (300 mg) Subcutaneous every 28 days    Chronic idiopathic urticaria

## 2018-08-28 NOTE — PROGRESS NOTES
Patient presented after waiting 30 minutes with no reaction to Xolair injection. Discharged from clinic.    Miladis Ramos RN

## 2018-08-28 NOTE — MR AVS SNAPSHOT
"              After Visit Summary   8/28/2018    Ms. Ashley Mcdaniels    MRN: [id number]           Patient Information     Date Of Birth          1980        Visit Information        Provider Department      8/28/2018 8:00 AM Torito Caldwell DPM [facility]        Today's Diagnoses     Pes valgus of right foot    -  1    Stress fracture of right foot, initial encounter        Long term current use of immunosuppressive drug          Care Instructions    Follow-up in 3-4 weeks  Obtain the orthotics  Progress out of the boot          Follow-ups after your visit        Additional Services     ORTHOTICS REFERRAL       [facility] scheduling staff may contact patient to arrange appointments for casting of orthotics and often do not leave messages.  The patient may call this number for scheduling at their convenience. Scheduling Phone [phone].      One pair custom functional foot orthotics.   Flexible polypropylene shell with 1/8\" Spenco cushioned top cover, crepe rearfoot post, medial density arch fill, TOMI bottom cover.  Aerobic model.                  Your next 10 appointments already scheduled     Aug 28, 2018  9:50 AM CDT   Nurse Only with ER ALLERGY SHOTS   [facility]    [address] [place] [id number] [phone]              Who to contact     If you have questions or need follow up information about today's clinic visit or your schedule please contact [facility] directly at [phone].  Normal or non-critical lab and imaging results will be communicated to you by MyChart, letter or phone within 4 business days after the clinic has received the results. If you do not hear from us within 7 days, please contact the clinic through MyChart or phone. If you have a critical or abnormal lab result, we will notify you by phone as soon as possible.  Submit refill requests through [employer] or call your pharmacy and " "they will forward the refill request to us. Please allow 3 business days for your refill to be completed.          Additional Information About Your Visit        [employer] Information     [employer] gives you secure access to your electronic health record. If you see a primary care provider, you can also send messages to your care team and make appointments. If you have questions, please call your primary care clinic.  If you do not have a primary care provider, please call [phone] and they will assist you.        Care EveryWhere ID     This is your Care EveryWhere ID. This could be used by other organizations to access your [facility] medical records  [id number]        Your Vitals Were     Height BMI (Body Mass Index)                5' 3.39\" (1.61 m) 35.55 kg/m2           Blood Pressure from Last 3 Encounters:   08/28/18 116/72   08/24/18 108/68   07/31/18 104/66    Weight from Last 3 Encounters:   08/28/18 203 lb 3.2 oz (92.2 kg)   08/24/18 203 lb 3.2 oz (92.2 kg)   07/31/18 198 lb (89.8 kg)              We Performed the Following     ORTHOTICS REFERRAL        Primary Care Provider Office Phone # Fax #    Melva ANDIE You PA-C [phone]       [address] [place] [address]        Equal Access to Services     ERCIKA WEINSTEIN : Hadii aad ku hadasho Soomaali, waaxda luqadaha, qaybta kaalmada adeegyada, brenda emmanuel . So [facility] [phone].    ATENCIÓN: Si habla español, tiene a keita disposición servicios gratuitos de asistencia lingüística. Llame al [phone].    We comply with applicable federal civil rights laws and [place] laws. We do not discriminate on the basis of race, color, national origin, age, disability, sex, sexual orientation, or gender identity.            Thank you!     Thank you for choosing [facility]  for your care. Our goal is always to provide you with excellent care. Hearing back from our patients is one way we " can continue to improve our services. Please take a few minutes to complete the written survey that you may receive in the mail after your visit with us. Thank you!             Your Updated Medication List - Protect others around you: Learn how to safely use, store and throw away your medicines at www.disposemymeds.org.          This list is accurate as of 8/28/18  9:22 AM.  Always use your most recent med list.                   Brand Name Dispense Instructions for use Diagnosis    acetaminophen 500 MG tablet    TYLENOL     Take 1,000 mg by mouth        B COMPLEX FORMULA 1 Tabs      Take 1 tablet by mouth        buPROPion 300 MG 24 hr tablet    WELLBUTRIN XL    90 tablet    Take 1 tablet (300 mg) by mouth every morning    YUE (generalized anxiety disorder), Major depressive disorder, recurrent episode, mild (H)       cetirizine 10 MG tablet    zyrTEC    30 tablet    Take 1 tablet (10 mg) by mouth daily    Chronic urticaria       EPINEPHrine 0.3 MG/0.3ML injection 2-pack    EPIPEN/ADRENACLICK/or ANY BX GENERIC EQUIV    0.6 mL    Inject 0.3 mLs (0.3 mg) into the muscle as needed for anaphylaxis    Chronic urticaria       esomeprazole 40 MG CR capsule    nexIUM    90 capsule    Take 1 capsule (40 mg) by mouth every morning (before breakfast) Take 30-60 minutes before eating.    Gastroesophageal reflux disease without esophagitis       HUMIRA PEN 40 MG/0.8ML pen kit   Generic drug:  adalimumab           ibuprofen 200 MG tablet    ADVIL/MOTRIN     Take 400 mg by mouth        levonorgestrel 20 MCG/24HR IUD    MIRENA     1 each (20 mcg) by Intrauterine route continuous    Encounter for IUD removal, Encounter for IUD insertion, IUD (intrauterine device) in place       naltrexone-bupropion 8-90 MG per 12 hr tablet    CONTRAVE    120 tablet    Take 2 tablets by mouth 2 times daily    YUE (generalized anxiety disorder), Major depressive disorder, recurrent episode, mild (H), Morbid obesity (H)       NP THYROID 15 MG Tabs  tablet   Generic drug:  thyroid     90 tablet    TAKE ONE TABLET BY MOUTH EVERY DAY    Acquired hypothyroidism       valACYclovir 1000 mg tablet    VALTREX    6 tablet    Take 0.5 tablets (500 mg) by mouth 2 times daily for 6 days    HSV (herpes simplex virus) infection       XOLAIR 150 MG injection   Generic drug:  omalizumab     2 each    Inject 2.4 mLs (300 mg) Subcutaneous every 28 days    Chronic idiopathic urticaria

## 2018-08-29 NOTE — TELEPHONE ENCOUNTER
Medication Appeal Initiation    We have initiated an appeal for the requested medication:  Medication: Contrave 8-90mg TB12-APPEAL INITIATED  Appeal Start Date:  8/29/2018  Insurance Company: [employer] [place] - Phone [phone] Fax [phone]  Comments:  LETTER OF MEDICAL NECESSITY FAXED.

## 2018-08-31 ENCOUNTER — TELEPHONE (OUTPATIENT)
Dept: FAMILY MEDICINE | Facility: OTHER | Age: 38
End: 2018-08-31

## 2018-08-31 NOTE — TELEPHONE ENCOUNTER
Prior Authorization Retail Medication Request    Medication/Dose: NP Thyroid  ICD code (if different than what is on RX):  Acquired hypothyroidism (E03.9)    Insurance Name:  [employer] [phone]  Insurance ID:  [id number]      Aria Del Rio, [facility]  [phone]

## 2018-09-04 NOTE — TELEPHONE ENCOUNTER
CDL pt. Reviewed. Please notify pt of the denial . Will route to PCP to review and consider alternatives . Ok to wait until  Her return.

## 2018-09-04 NOTE — TELEPHONE ENCOUNTER
MEDICATION APPEAL DENIED    Medication: Contrave 8-90mg TB12-APPEAL DENIED    Denial Date: 9/4/2018    Denial Rational: MEDICATION IS NOT COVERED UNDER PATIENT'S PLAN.        Second Level Appeal Information:        Second level appeals will be managed by the clinic staff and provider. Please contact the [facility] Prior Authorization Team if additional information about the denial is needed.

## 2018-09-04 NOTE — TELEPHONE ENCOUNTER
Central Prior Authorization Team   Phone: [phone]    PA Initiation    Medication: NP Thyroid  Insurance Company: [employer] [place] - Phone [phone] Fax [phone]  Pharmacy Filling the Rx: [facility] - [id number]  Filling Pharmacy Phone: [phone]  Filling Pharmacy Fax: [phone]  Start Date: 9/4/2018

## 2018-09-05 NOTE — TELEPHONE ENCOUNTER
Prior Authorization Approval    Authorization Effective Date: 8/31/2018  Authorization Expiration Date: 8/31/2019  Medication: NP Thyroid-APPROVED  Approved Dose/Quantity:   Reference #: [id number]   Insurance Company: [employer] [place] - Phone [phone] Fax [phone]  Expected CoPay:       CoPay Card Available:      Foundation Assistance Needed:    Which Pharmacy is filling the prescription (Not needed for infusion/clinic administered): [facility] [place], [place] - [address]   Pharmacy Notified: Yes  Patient Notified: Yes

## 2018-09-10 RX ORDER — PHENTERMINE HYDROCHLORIDE 15 MG/1
15 CAPSULE ORAL EVERY MORNING
Qty: 30 CAPSULE | Refills: 0 | Status: SHIPPED | OUTPATIENT
Start: 2018-09-10 | End: 2018-10-16

## 2018-09-10 NOTE — TELEPHONE ENCOUNTER
Have patient do a trial of low dose Phentermine. This medication will only be used for a total of 12 weeks to increase metabolism and jump start weight loss. I would like to see her 1 month after starting.     Rx signed and placed in MA task.     Rene You PA-C  [facility]

## 2018-09-10 NOTE — TELEPHONE ENCOUNTER
Rx walked to [facility] and patient notified.  She will scheduled schedule follow up in 1 month.

## 2018-09-14 ENCOUNTER — TRANSFERRED RECORDS (OUTPATIENT)
Dept: HEALTH INFORMATION MANAGEMENT | Facility: CLINIC | Age: 38
End: 2018-09-14

## 2018-09-18 ENCOUNTER — TRANSFERRED RECORDS (OUTPATIENT)
Dept: HEALTH INFORMATION MANAGEMENT | Facility: CLINIC | Age: 38
End: 2018-09-18

## 2018-09-19 ENCOUNTER — MEDICAL CORRESPONDENCE (OUTPATIENT)
Dept: HEALTH INFORMATION MANAGEMENT | Facility: CLINIC | Age: 38
End: 2018-09-19

## 2018-09-20 ENCOUNTER — TELEPHONE (OUTPATIENT)
Dept: FAMILY MEDICINE | Facility: OTHER | Age: 38
End: 2018-09-20

## 2018-09-20 DIAGNOSIS — K50.80 CROHN'S DISEASE OF BOTH SMALL AND LARGE INTESTINE WITHOUT COMPLICATION (H): Primary | ICD-10-CM

## 2018-09-20 NOTE — TELEPHONE ENCOUNTER
Summary:    Patient is due/failing the following:   Depression follow up in October and PHQ9    Action needed:   Patient needs office visit for Depression follow up. and Patient needs to do PHQ9.    Type of outreach:    Phone, spoke to patient.  patient scheduled OV and will send PHQ9 through My Chart    Questions for provider review:    None                                                                                                                                    Ashely Barr       Chart routed to Care Team .        Panel Management Review      Patient has the following on her problem list:     Depression / Dysthymia review    Measure:  Needs PHQ-9 score of 4 or less during index window.  Administer PHQ-9 and if score is 5 or more, send encounter to provider for next steps.        PHQ-9 SCORE 1/31/2018 4/10/2018 6/22/2018   Total Score MyChart 5 (Mild depression) 16 (Moderately severe depression) 7 (Mild depression)   Total Score 5 16 7       If PHQ-9 recheck is 5 or more, route to provider for next steps.    Patient is due for:  PHQ9      Composite cancer screening  Chart review shows that this patient is due/due soon for the following None

## 2018-09-25 ENCOUNTER — ALLIED HEALTH/NURSE VISIT (OUTPATIENT)
Dept: ALLERGY | Facility: OTHER | Age: 38
End: 2018-09-25
Payer: COMMERCIAL

## 2018-09-25 ENCOUNTER — OFFICE VISIT (OUTPATIENT)
Dept: PODIATRY | Facility: OTHER | Age: 38
End: 2018-09-25
Payer: COMMERCIAL

## 2018-09-25 VITALS
SYSTOLIC BLOOD PRESSURE: 110 MMHG | DIASTOLIC BLOOD PRESSURE: 72 MMHG | WEIGHT: 197 LBS | HEIGHT: 63 IN | BODY MASS INDEX: 34.91 KG/M2

## 2018-09-25 DIAGNOSIS — M84.374A STRESS FRACTURE OF RIGHT FOOT, INITIAL ENCOUNTER: Primary | ICD-10-CM

## 2018-09-25 DIAGNOSIS — Q66.6 PES VALGUS OF RIGHT FOOT: ICD-10-CM

## 2018-09-25 DIAGNOSIS — L50.1 CHRONIC IDIOPATHIC URTICARIA: ICD-10-CM

## 2018-09-25 PROCEDURE — 99213 OFFICE O/P EST LOW 20 MIN: CPT | Performed by: PODIATRIST

## 2018-09-25 PROCEDURE — 99207 ZZC DROP WITH A PROCEDURE: CPT

## 2018-09-25 PROCEDURE — 96372 THER/PROPH/DIAG INJ SC/IM: CPT

## 2018-09-25 ASSESSMENT — PAIN SCALES - GENERAL: PAINLEVEL: NO PAIN (0)

## 2018-09-25 NOTE — PROGRESS NOTES
Chief Complaint   Patient presents with     RECHECK     (approx 10w) D/C boot 2 weeks ago, no pain -  Right 2nd metatarsal stress fx, onset mid July 2018; ; LOV 8/28/2018       Weight management plan: Patient was referred to their PCP to discuss a diet and exercise plan.      HPI:  Ashley Mcdaniels is a 37 year old female who is seen in consultation at the request of self.    Pt presents for eval of:   (Onset, Location, L/R, Character, Treatments, Injury if yes)     XR Left and Right foot today, 7/31/2018    Presents today with slip on flat unsupportive shoes    1. Onset mid July 2018, dorsal Right foot pain, no injury noted.  Constant, swelling, dull ache, pain 5 - 8  Intermittent numbness when using eliptical, throbbing  Tylenol  2. Ongoing, plantar Left and Right heel pain.   Sharp pain with first steps in the morning.  Less pain when exercising regularly    Works as a psycho therapist.    Patient to follow up with Primary Care provider regarding elevated blood pressure.    ROS:  10 point ROS neg other than the symptoms noted above in the HPI.    Patient Active Problem List   Diagnosis     Acquired hypothyroidism     YUE (generalized anxiety disorder)     Major depressive disorder, recurrent episode, mild (H)     Crohn's disease of both small and large intestine without complication (H)     IUD (intrauterine device) in place     Chronic urticaria     ADHD (attention deficit hyperactivity disorder), inattentive type     Gastroesophageal reflux disease without esophagitis     Morbid obesity (H)       PAST MEDICAL HISTORY:   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      Crohn's colitis (H)      YUE (generalized anxiety disorder)      GERD (gastroesophageal reflux disease)      Low back pain      Major depressive disorder, recurrent episode, mild (H)         PAST SURGICAL HISTORY:   Past Surgical History:   Procedure  Laterality Date     CHOLECYSTECTOMY  Feb 2007     DILATION AND CURETTAGE  May 2003    Non-OB     HC TOOTH EXTRACTION W/FORCEP  Nov 2006     LAP ADJUSTABLE GASTRIC BAND  May 2007        MEDICATIONS:   Current Outpatient Prescriptions:      acetaminophen (TYLENOL) 500 MG tablet, Take 1,000 mg by mouth, Disp: , Rfl:      B Complex-Folic Acid (B COMPLEX FORMULA 1) TABS, Take 1 tablet by mouth, Disp: , Rfl:      buPROPion (WELLBUTRIN XL) 300 MG 24 hr tablet, Take 1 tablet (300 mg) by mouth every morning, Disp: 90 tablet, Rfl: 3     cetirizine (ZYRTEC) 10 MG tablet, Take 1 tablet (10 mg) by mouth daily, Disp: 30 tablet, Rfl: 0     esomeprazole (NEXIUM) 40 MG CR capsule, Take 1 capsule (40 mg) by mouth every morning (before breakfast) Take 30-60 minutes before eating., Disp: 90 capsule, Rfl: 1     HUMIRA PEN 40 MG/0.8ML pen kit, , Disp: , Rfl: 2     ibuprofen (ADVIL,MOTRIN) 200 MG tablet, Take 400 mg by mouth, Disp: , Rfl:      levonorgestrel (MIRENA) 20 MCG/24HR IUD, 1 each (20 mcg) by Intrauterine route continuous, Disp: , Rfl:      naltrexone-bupropion (CONTRAVE) 8-90 MG per 12 hr tablet, Take 2 tablets by mouth 2 times daily, Disp: 120 tablet, Rfl: 1     NP THYROID 15 MG TABS tablet, TAKE ONE TABLET BY MOUTH EVERY DAY, Disp: 90 tablet, Rfl: 1     omalizumab (XOLAIR) 150 MG injection, Inject 2.4 mLs (300 mg) Subcutaneous every 28 days, Disp: 2 each, Rfl:      phentermine 15 MG capsule, Take 1 capsule (15 mg) by mouth every morning, Disp: 30 capsule, Rfl: 0     EPINEPHrine (EPIPEN/ADRENACLICK/OR ANY BX GENERIC EQUIV) 0.3 MG/0.3ML injection 2-pack, Inject 0.3 mLs (0.3 mg) into the muscle as needed for anaphylaxis, Disp: 0.6 mL, Rfl: 1     valACYclovir (VALTREX) 1000 mg tablet, Take 0.5 tablets (500 mg) by mouth 2 times daily for 6 days, Disp: 6 tablet, Rfl: 11     ALLERGIES:    Allergies   Allergen Reactions     Azathioprine Other (See Comments)     pancreatitis        SOCIAL HISTORY:   Social History     Social History  "    Marital status:      Spouse name: N/A     Number of children: N/A     Years of education: N/A     Occupational History     Not on file.     Social History Main Topics     Smoking status: Former Smoker     Quit date: 5/1/2016     Smokeless tobacco: Never Used     Alcohol use 0.0 oz/week     0 Standard drinks or equivalent per week      Comment: occasional      Drug use: No     Sexual activity: Yes     Partners: Male     Birth control/ protection: IUD     Other Topics Concern     Parent/Sibling W/ Cabg, Mi Or Angioplasty Before 65f 55m? No     Social History Narrative        FAMILY HISTORY:   Family History   Problem Relation Age of Onset     Arthritis Mother      HEART DISEASE Father      CAD, CHF     Alcoholism Father      Asthma Father      Hypertension Father      Hyperlipidemia Father      Pancreatic Cancer Father      Liver Cancer Father      Diabetes Maternal Grandmother      Substance Abuse Maternal Grandmother      Hyperlipidemia Maternal Grandmother      Hypertension Maternal Grandmother      Substance Abuse Maternal Grandfather      Asthma Paternal Grandmother      HEART DISEASE Paternal Grandmother      Diabetes Paternal Grandmother      Hypertension Paternal Grandmother      Substance Abuse Paternal Grandfather      Mental Illness Brother      Substance Abuse Brother         EXAM:Vitals: /72 (BP Location: Right arm, Cuff Size: Adult Regular)  Ht 5' 3.39\" (1.61 m)  Wt 197 lb (89.4 kg)  BMI 34.47 kg/m2  BMI= Body mass index is 34.47 kg/(m^2).    General appearance: Patient is alert and fully cooperative with history & exam.  No sign of distress is noted during the visit.     Psychiatric: Affect is pleasant & appropriate.  Patient appears motivated to improve health.     Respiratory: Breathing is regular & unlabored while sitting.     HEENT: Hearing is intact to spoken word.  Speech is clear.  No gross evidence of visual impairment that would impact ambulation.     Vascular: DP & PT " pulses are intact & regular bilaterally.  No significant edema or varicosities noted.  CFT and skin temperature is normal to both lower extremities.     Neurologic: Lower extremity sensation is intact to light touch.  No evidence of weakness or contracture in the lower extremities.  No evidence of neuropathy.    Dermatologic: Skin is intact to both lower extremities with adequate texture, turgor and tone about the integument.  No paronychia or evidence of soft tissue infection is noted.     Musculoskeletal: Patient is ambulatory without assistive device or brace.  No further discomfort noted at the dorsal neck of the second metatarsal right foot.  Edema is resolved.  No erythema.  No pain or symptoms at the second metatarsal phalangeal joint first or third metatarsals.  All symptoms with very firm palpation of the second metatarsal shaft at the distal neck metaphysis diaphysis junction.    Radiographs 3 views right foot demonstrate acute cortical reaction and thickening along the medial aspect of the neck distal diaphysis of the right second metatarsal.     ASSESSMENT:       ICD-10-CM    1. Stress fracture of right foot, initial encounter M84.374A    2. Pes valgus of right foot Q66.6         PLAN:  Reviewed [facility] in [facility].      7/31/2018   Obtained interpreted radiographs  Stress fracture second metatarsal right foot  Patient is on immunosuppressive medication  Fracture boot right foot  Follow-up in 4 weeks and likely begin sleep without the fracture boot and more weightbearing activity.  Weightbearing now to tolerance in the fracture boot likely 1 or 2 hours per day.  Repeat imaging in 1 month.  Ordered future today.    8/28/2018  Pain and edema is now resolved  Obtained interpreted radiographs and there appears to be cortical thickening along the medial shaft distal second metatarsal.  No reaction laterally however.  Questionable stress reaction to this area.  Therefore we will begin coming out of the  fracture boot for light duty short periods of time.  Order for custom molded orthotics to provide support and protection during bone metabolism.  Follow-up in 3-4 weeks.  Expect to progress into regular shoe gear and return to the gym at that time  Dispensed a pedometer and how to utilize it    9/25/2018  Her symptoms have resolved and she is utilizing the orthotic full-time  Commended appropriate shoe gear, written instructions dispensed  Discussed signs and symptoms of worsening problems  Recommended follow-up as needed remain activities as tolerated without restrictions in regular shoes and custom orthotics.  All questions answered  Follow-up as needed    Torito Caldwell DPM

## 2018-09-25 NOTE — MR AVS SNAPSHOT
After Visit Summary   9/25/2018    Ms. Ashley Mcdaniels    MRN: [id number]           Patient Information     Date Of Birth          1980        Visit Information        Provider Department      9/25/2018 8:10 AM ER [facility]        Today's Diagnoses     Chronic idiopathic urticaria           Follow-ups after your visit        Your next 10 appointments already scheduled     Sep 25, 2018  9:15 AM CDT   [facility] Visit with Torito Caldwell DPM   [facility]    [address] [id number] [phone]            Oct 16, 2018  8:00 AM CDT   Office Visit with Melva You PA-C   [facility]    [address]  [place] [facility] [id number] [phone]           Bring a current list of meds and any records pertaining to this visit. For Physicals, please bring immunization records and any forms needing to be filled out. Please arrive 10 minutes early to complete paperwork.              Who to contact     If you have questions or need follow up information about today's clinic visit or your schedule please contact [facility] directly at [phone].  Normal or non-critical lab and imaging results will be communicated to you by Knomehart, letter or phone within 4 business days after the clinic has received the results. If you do not hear from us within 7 days, please contact the clinic through Stylefincht or phone. If you have a critical or abnormal lab result, we will notify you by phone as soon as possible.  Submit refill requests through [employer] or call your pharmacy and they will forward the refill request to us. Please allow 3 business days for your refill to be completed.          Additional Information About Your Visit        KnomeharDoctolib Information     [employer] gives you secure access to your electronic health record. If you see a primary care  provider, you can also send messages to your care team and make appointments. If you have questions, please call your primary care clinic.  If you do not have a primary care provider, please call [phone] and they will assist you.        Care EveryWhere ID     This is your Care EveryWhere ID. This could be used by other organizations to access your [place] medical records  [id number]         Blood Pressure from Last 3 Encounters:   08/28/18 116/72   08/24/18 108/68   07/31/18 104/66    Weight from Last 3 Encounters:   08/28/18 92.2 kg (203 lb 3.2 oz)   08/24/18 92.2 kg (203 lb 3.2 oz)   07/31/18 89.8 kg (198 lb)              We Performed the Following     OMALIZUMAB INJECTION []     THER/PROPH/DIAG INJ, SC/IM [[id number]]        Primary Care Provider Office Phone # Fax #    Melva ANDIE You PA-C [phone]       [address] [place] [address] [address]  [facility] [address]        Equal Access to Services     [facility]: Hadii aad ku hadasho Soomaali, waaxda luqadaha, qaybta kaalmada adechrisyada, brenda emmanuel . So [facility] [phone].    ATENCIÓN: Si habla español, tiene a keita disposición servicios gratuitos de asistencia lingüística. Alexi al [phone].    We comply with applicable federal civil rights laws and [place] laws. We do not discriminate on the basis of race, color, national origin, age, disability, sex, sexual orientation, or gender identity.            Thank you!     Thank you for choosing [facility]  for your care. Our goal is always to provide you with excellent care. Hearing back from our patients is one way we can continue to improve our services. Please take a few minutes to complete the written survey that you may receive in the mail after your visit with us. Thank you!             Your Updated Medication List - Protect others around you: Learn how to safely use, store and throw away your medicines at www.disposemymeds.org.           This list is accurate as of 9/25/18  9:12 AM.  Always use your most recent med list.                   Brand Name Dispense Instructions for use Diagnosis    acetaminophen 500 MG tablet    TYLENOL     Take 1,000 mg by mouth        B COMPLEX FORMULA 1 Tabs      Take 1 tablet by mouth        buPROPion 300 MG 24 hr tablet    WELLBUTRIN XL    90 tablet    Take 1 tablet (300 mg) by mouth every morning    YUE (generalized anxiety disorder), Major depressive disorder, recurrent episode, mild (H)       cetirizine 10 MG tablet    zyrTEC    30 tablet    Take 1 tablet (10 mg) by mouth daily    Chronic urticaria       EPINEPHrine 0.3 MG/0.3ML injection 2-pack    EPIPEN/ADRENACLICK/or ANY BX GENERIC EQUIV    0.6 mL    Inject 0.3 mLs (0.3 mg) into the muscle as needed for anaphylaxis    Chronic urticaria       esomeprazole 40 MG CR capsule    nexIUM    90 capsule    Take 1 capsule (40 mg) by mouth every morning (before breakfast) Take 30-60 minutes before eating.    Gastroesophageal reflux disease without esophagitis       HUMIRA PEN 40 MG/0.8ML pen kit   Generic drug:  adalimumab           ibuprofen 200 MG tablet    ADVIL/MOTRIN     Take 400 mg by mouth        levonorgestrel 20 MCG/24HR IUD    MIRENA     1 each (20 mcg) by Intrauterine route continuous    Encounter for IUD removal, Encounter for IUD insertion, IUD (intrauterine device) in place       naltrexone-bupropion 8-90 MG per 12 hr tablet    CONTRAVE    120 tablet    Take 2 tablets by mouth 2 times daily    YUE (generalized anxiety disorder), Major depressive disorder, recurrent episode, mild (H), Morbid obesity (H)       NP THYROID 15 MG Tabs tablet   Generic drug:  thyroid     90 tablet    TAKE ONE TABLET BY MOUTH EVERY DAY    Acquired hypothyroidism       phentermine 15 MG capsule     30 capsule    Take 1 capsule (15 mg) by mouth every morning    Morbid obesity (H)       valACYclovir 1000 mg tablet    VALTREX    6 tablet    Take 0.5 tablets (500 mg) by mouth 2 times  daily for 6 days    HSV (herpes simplex virus) infection       XOLAIR 150 MG injection   Generic drug:  omalizumab     2 each    Inject 2.4 mLs (300 mg) Subcutaneous every 28 days    Chronic idiopathic urticaria

## 2018-09-25 NOTE — MR AVS SNAPSHOT
After Visit Summary   9/25/2018    Ms. Ashley Mcdaniels    MRN: [id number]           Patient Information     Date Of Birth          1980        Visit Information        Provider Department      9/25/2018 9:15 AM Torito Caldwell DPM [facility]        Today's Diagnoses     Stress fracture of right foot, initial encounter    -  1    Pes valgus of right foot          Care Instructions    Follow-up as needed          Follow-ups after your visit        Your next 10 appointments already scheduled     Oct 16, 2018  8:00 AM CDT   [facility] with Melva You PA-C   [facility]    [address]  [facility] [id number] [phone]           Bring a current list of meds and any records pertaining to this visit. For Physicals, please bring immunization records and any forms needing to be filled out. Please arrive 10 minutes early to complete paperwork.              Who to contact     If you have questions or need follow up information about today's clinic visit or your schedule please contact [facility] directly at [phone].  Normal or non-critical lab and imaging results will be communicated to you by Dotstudiozhart, letter or phone within 4 business days after the clinic has received the results. If you do not hear from us within 7 days, please contact the clinic through Abazabt or phone. If you have a critical or abnormal lab result, we will notify you by phone as soon as possible.  Submit refill requests through [employer] or call your pharmacy and they will forward the refill request to us. Please allow 3 business days for your refill to be completed.          Additional Information About Your Visit        MyCharCareinSync Information     [employer] gives you secure access to your electronic health record. If you see a primary care provider, you can also send messages to your care team and make appointments. If you  "have questions, please call your primary care clinic.  If you do not have a primary care provider, please call [phone] and they will assist you.        Care EveryWhere ID     This is your Care EveryWhere ID. This could be used by other organizations to access your [place] medical records  [id number]        Your Vitals Were     Height BMI (Body Mass Index)                5' 3.39\" (1.61 m) 34.47 kg/m2           Blood Pressure from Last 3 Encounters:   09/25/18 110/72   08/28/18 116/72   08/24/18 108/68    Weight from Last 3 Encounters:   09/25/18 197 lb (89.4 kg)   08/28/18 203 lb 3.2 oz (92.2 kg)   08/24/18 203 lb 3.2 oz (92.2 kg)              Today, you had the following     No orders found for display       Primary Care Provider Office Phone # Fax #    Melva CHAVES PENNIE You [phone]       [address] [place] [address]        Equal Access to Services     [facility]: Hadii aad ku hadasho Soomaali, waaxda luqadaha, qaybta kaalmada adeegyada, brenda emmanuel . So [facility] [phone].    ATENCIÓN: Si habla español, tiene a keita disposición servicios gratuitos de asistencia lingüística. Llame al [phone].    We comply with applicable federal civil rights laws and [place] laws. We do not discriminate on the basis of race, color, national origin, age, disability, sex, sexual orientation, or gender identity.            Thank you!     Thank you for choosing [facility]  for your care. Our goal is always to provide you with excellent care. Hearing back from our patients is one way we can continue to improve our services. Please take a few minutes to complete the written survey that you may receive in the mail after your visit with us. Thank you!             Your Updated Medication List - Protect others around you: Learn how to safely use, store and throw away your medicines at www.disposemymeds.org.          This list is accurate " as of 9/25/18 11:13 AM.  Always use your most recent med list.                   Brand Name Dispense Instructions for use Diagnosis    acetaminophen 500 MG tablet    TYLENOL     Take 1,000 mg by mouth        B COMPLEX FORMULA 1 Tabs      Take 1 tablet by mouth        buPROPion 300 MG 24 hr tablet    WELLBUTRIN XL    90 tablet    Take 1 tablet (300 mg) by mouth every morning    YUE (generalized anxiety disorder), Major depressive disorder, recurrent episode, mild (H)       cetirizine 10 MG tablet    zyrTEC    30 tablet    Take 1 tablet (10 mg) by mouth daily    Chronic urticaria       EPINEPHrine 0.3 MG/0.3ML injection 2-pack    EPIPEN/ADRENACLICK/or ANY BX GENERIC EQUIV    0.6 mL    Inject 0.3 mLs (0.3 mg) into the muscle as needed for anaphylaxis    Chronic urticaria       esomeprazole 40 MG CR capsule    nexIUM    90 capsule    Take 1 capsule (40 mg) by mouth every morning (before breakfast) Take 30-60 minutes before eating.    Gastroesophageal reflux disease without esophagitis       HUMIRA PEN 40 MG/0.8ML pen kit   Generic drug:  adalimumab           ibuprofen 200 MG tablet    ADVIL/MOTRIN     Take 400 mg by mouth        levonorgestrel 20 MCG/24HR IUD    MIRENA     1 each (20 mcg) by Intrauterine route continuous    Encounter for IUD removal, Encounter for IUD insertion, IUD (intrauterine device) in place       naltrexone-bupropion 8-90 MG per 12 hr tablet    CONTRAVE    120 tablet    Take 2 tablets by mouth 2 times daily    YUE (generalized anxiety disorder), Major depressive disorder, recurrent episode, mild (H), Morbid obesity (H)       NP THYROID 15 MG Tabs tablet   Generic drug:  thyroid     90 tablet    TAKE ONE TABLET BY MOUTH EVERY DAY    Acquired hypothyroidism       phentermine 15 MG capsule     30 capsule    Take 1 capsule (15 mg) by mouth every morning    Morbid obesity (H)       valACYclovir 1000 mg tablet    VALTREX    6 tablet    Take 0.5 tablets (500 mg) by mouth 2 times daily for 6 days    HSV  (herpes simplex virus) infection       XOLAIR 150 MG injection   Generic drug:  omalizumab     2 each    Inject 2.4 mLs (300 mg) Subcutaneous every 28 days    Chronic idiopathic urticaria

## 2018-10-11 NOTE — PROGRESS NOTES
SUBJECTIVE:   Ashley Mcdaniels is a 38 year old female who presents to clinic today for the following health issues:    HPI  Depression and Anxiety Follow-Up    Status since last visit: Improved    Other associated symptoms:None    Complicating factors:     Significant life event: No     Current substance abuse: None    PHQ 4/10/2018 6/22/2018 10/16/2018   PHQ-9 Total Score 16 7 6   Q9: Suicide Ideation Not at all Not at all Not at all     YUE-7 SCORE 4/10/2018 6/22/2018 10/16/2018   Total Score 19 (severe anxiety) 4 (minimal anxiety) 7 (mild anxiety)   Total Score 19 4 7     In the past two weeks have you had thoughts of suicide or self-harm?  No.    Do you have concerns about your personal safety or the safety of others?   No        Medication Followup of Phentermine     Taking Medication as prescribed: yes    Side Effects:  Anxiety increased initially, now improved    Medication Helping Symptoms:  Yes improved energy, no weight loss         Plan from visit 8/24/18  - Patient with stable anxiety and depression off Abilify and Wellbutrin   - Would like to try something to help lose weight and possibly mood control   - As above, great healthy diet with portion control and usually exercises (until recent foot fracture) is s/p gastric band   - Will do trial of contrave   - Reviewed use, side effects, and taper plan (see patient instructions)   - Gave hand out on [employer]     - No longer takes Abilify, stopped 2 months ago  -Doing well on Wellbutrin alone    Problem list and histories reviewed & adjusted, as indicated.  Additional history: as documented      ROS:  Constitutional, HEENT, cardiovascular, pulmonary, gi and gu systems are negative, except as otherwise noted.    OBJECTIVE:   /70  Pulse 62  Temp 98.3  F (36.8  C) (Temporal)  Resp 16  Wt 201 lb 9.6 oz (91.4 kg)  LMP  (Exact Date)  SpO2 98%  Breastfeeding? No  BMI 35.27 kg/m2  Body mass index is 35.27 kg/(m^2).  GENERAL APPEARANCE: healthy,  alert and no distress  EYES: Eyes grossly normal to inspection, PERRLA, conjunctivae and sclerae without injection or discharge, EOM intact   RESP: Lungs clear to auscultation - no rales, rhonchi or wheezes    CV: Regular rates and rhythm, normal S1 S2, no S3 or S4, no murmur, click or rub, no peripheral edema and peripheral pulses strong and symmetric bilaterally   MS: No musculoskeletal defects are noted and gait is age appropriate without ataxia   SKIN: No suspicious lesions or rashes, hydration status appears adeuqate with normal skin turgor   PSYCH: Alert and oriented x3; speech- coherent , normal rate and volume; able to articulate logical thoughts, able to abstract reason, no tangential thoughts, no hallucinations or delusions, mentation appears normal, Mood is euthymic. Affect is appropriate for this mood state and bright. Thought content is free of suicidal ideation, hallucinations, and delusions. Dress is adequate and upkept. Eye contact is good during conversation.       Diagnostic Test Results:  none     ASSESSMENT/PLAN:       ICD-10-CM    1. YUE (generalized anxiety disorder) F41.1 buPROPion (WELLBUTRIN XL) 300 MG 24 hr tablet   2. Major depressive disorder, recurrent episode, mild (H) F33.0 buPROPion (WELLBUTRIN XL) 300 MG 24 hr tablet   3. Morbid obesity (H) E66.01 phentermine 30 MG capsule   4. HSV (herpes simplex virus) infection B00.9 valACYclovir (VALTREX) 1000 mg tablet       1 & 2. Mood  -Tia's depression is managed well with Wellbutrin 300 mg once daily  -No medication changes were made today    3. Obesity  -Patient has not lost weight since beginning Phentermine, but does note improved energy  -I reassured her that increased anxiety can be normal during the first few couple weeks  -Dosing was increased from 15 mg to 30 mg today, new prescription was sent to her pharmacy  -Medication use and side effects reviewed  -She will call to return to the 15 mg dose if side effects are intolerable  -We  will see her back in 1 month for a follow-up evaluation     4. HSV  -She also requested a refill of Valacyclovir, and she is out and feels a cold sore coming on  -Prescription was sent to her pharmacy  -She understands that she is to contact me if using the medication more than 3 times per week  -We will see her back as needed for this      The patient indicates understanding of these issues and agrees with the plan.    Follow up: 1 month     The patient was seen with student SAVITA Murillo.         Melva You PA-C  [facility]

## 2018-10-16 ENCOUNTER — OFFICE VISIT (OUTPATIENT)
Dept: FAMILY MEDICINE | Facility: OTHER | Age: 38
End: 2018-10-16
Payer: COMMERCIAL

## 2018-10-16 VITALS
OXYGEN SATURATION: 98 % | BODY MASS INDEX: 35.27 KG/M2 | WEIGHT: 201.6 LBS | RESPIRATION RATE: 16 BRPM | SYSTOLIC BLOOD PRESSURE: 110 MMHG | DIASTOLIC BLOOD PRESSURE: 70 MMHG | HEART RATE: 62 BPM | TEMPERATURE: 98.3 F

## 2018-10-16 DIAGNOSIS — E66.01 MORBID OBESITY (H): ICD-10-CM

## 2018-10-16 DIAGNOSIS — B00.9 HSV (HERPES SIMPLEX VIRUS) INFECTION: ICD-10-CM

## 2018-10-16 DIAGNOSIS — F33.0 MAJOR DEPRESSIVE DISORDER, RECURRENT EPISODE, MILD (H): ICD-10-CM

## 2018-10-16 DIAGNOSIS — F41.1 GAD (GENERALIZED ANXIETY DISORDER): Primary | ICD-10-CM

## 2018-10-16 PROCEDURE — 99214 OFFICE O/P EST MOD 30 MIN: CPT | Performed by: PHYSICIAN ASSISTANT

## 2018-10-16 RX ORDER — VALACYCLOVIR HYDROCHLORIDE 1 G/1
500 TABLET, FILM COATED ORAL 2 TIMES DAILY
Qty: 20 TABLET | Refills: 11 | Status: SHIPPED | OUTPATIENT
Start: 2018-10-16 | End: 2020-01-15

## 2018-10-16 RX ORDER — PHENTERMINE HYDROCHLORIDE 30 MG/1
30 CAPSULE ORAL EVERY MORNING
Qty: 30 CAPSULE | Refills: 0 | Status: SHIPPED | OUTPATIENT
Start: 2018-10-16 | End: 2018-12-14

## 2018-10-16 RX ORDER — BUPROPION HYDROCHLORIDE 300 MG/1
300 TABLET ORAL EVERY MORNING
Qty: 90 TABLET | Refills: 3 | Status: SHIPPED | OUTPATIENT
Start: 2018-10-16 | End: 2019-11-08

## 2018-10-16 RX ORDER — METHOTREXATE 25 MG/ML
25 INJECTION, SOLUTION INTRA-ARTERIAL; INTRAMUSCULAR; INTRAVENOUS
COMMUNITY
Start: 2018-09-20 | End: 2019-07-26

## 2018-10-16 RX ORDER — FOLIC ACID 1 MG/1
TABLET ORAL
Refills: 0 | COMMUNITY
Start: 2018-09-20 | End: 2019-07-26

## 2018-10-16 ASSESSMENT — ANXIETY QUESTIONNAIRES
GAD7 TOTAL SCORE: 7
6. BECOMING EASILY ANNOYED OR IRRITABLE: MORE THAN HALF THE DAYS
3. WORRYING TOO MUCH ABOUT DIFFERENT THINGS: SEVERAL DAYS
2. NOT BEING ABLE TO STOP OR CONTROL WORRYING: SEVERAL DAYS
7. FEELING AFRAID AS IF SOMETHING AWFUL MIGHT HAPPEN: NOT AT ALL
7. FEELING AFRAID AS IF SOMETHING AWFUL MIGHT HAPPEN: NOT AT ALL
GAD7 TOTAL SCORE: 7
4. TROUBLE RELAXING: SEVERAL DAYS
1. FEELING NERVOUS, ANXIOUS, OR ON EDGE: MORE THAN HALF THE DAYS
5. BEING SO RESTLESS THAT IT IS HARD TO SIT STILL: NOT AT ALL
GAD7 TOTAL SCORE: 7

## 2018-10-16 ASSESSMENT — PAIN SCALES - GENERAL: PAINLEVEL: NO PAIN (0)

## 2018-10-16 ASSESSMENT — PATIENT HEALTH QUESTIONNAIRE - PHQ9
SUM OF ALL RESPONSES TO PHQ QUESTIONS 1-9: 6
SUM OF ALL RESPONSES TO PHQ QUESTIONS 1-9: 6

## 2018-10-16 NOTE — LETTER
My Depression Action Plan  Name: Ashley Mcdaniels   Date of Birth 1980  Date: 10/11/2018    My doctor: Melva You   My clinic: [address] [address] [phone]          GREEN    ZONE   Good Control    What it looks like:     Things are going generally well. You have normal up s and down s. You may even feel depressed from time to time, but bad moods usually last less than a day.   What you need to do:  1. Continue to care for yourself (see self care plan)  2. Check your depression survival kit and update it as needed  3. Follow your physician s recommendations including any medication.  4. Do not stop taking medication unless you consult with your physician first.           YELLOW         ZONE Getting Worse    What it looks like:     Depression is starting to interfere with your life.     It may be hard to get out of bed; you may be starting to isolate yourself from others.    Symptoms of depression are starting to last most all day and this has happened for several days.     You may have suicidal thoughts but they are not constant.   What you need to do:     1. Call your care team, your response to treatment will improve if you keep your care team informed of your progress. Yellow periods are signs an adjustment may need to be made.     2. Continue your self-care, even if you have to fake it!    3. Talk to someone in your support network    4. Open up your depression survival kit           RED    ZONE Medical Alert - Get Help    What it looks like:     Depression is seriously interfering with your life.     You may experience these or other symptoms: You can t get out of bed most days, can t work or engage in other necessary activities, you have trouble taking care of basic hygiene, or basic responsibilities, thoughts of suicide or death that will not go away, self-injurious behavior.     What you need to do:  1. Call your  care team and request a same-day appointment. If they are not available (weekends or after hours) call your local crisis line, emergency room or 911.            Depression Self Care Plan / Survival Kit    Self-Care for Depression  Here s the deal. Your body and mind are really not as separate as most people think.  What you do and think affects how you feel and how you feel influences what you do and think. This means if you do things that people who feel good do, it will help you feel better.  Sometimes this is all it takes.  There is also a place for medication and therapy depending on how severe your depression is, so be sure to consult with your medical provider and/ or Behavioral Health Consultant if your symptoms are worsening or not improving.     In order to better manage my stress, I will:    Exercise  Get some form of exercise, every day. This will help reduce pain and release endorphins, the  feel good  chemicals in your brain. This is almost as good as taking antidepressants!  This is not the same as joining a gym and then never going! (they count on that by the way ) It can be as simple as just going for a walk or doing some gardening, anything that will get you moving.      Hygiene   Maintain good hygiene (Get out of bed in the morning, Make your bed, Brush your teeth, Take a shower, and Get dressed like you were going to work, even if you are unemployed).  If your clothes don't fit try to get ones that do.    Diet  I will strive to eat foods that are good for me, drink plenty of water, and avoid excessive sugar, caffeine, alcohol, and other mood-altering substances.  Some foods that are helpful in depression are: complex carbohydrates, B vitamins, flaxseed, fish or fish oil, fresh fruits and vegetables.    Psychotherapy  I agree to participate in Individual Therapy (if recommended).    Medication  If prescribed medications, I agree to take them.  Missing doses can result in serious side effects.  I  understand that drinking alcohol, or other illicit drug use, may cause potential side effects.  I will not stop my medication abruptly without first discussing it with my provider.    Staying Connected With Others  I will stay in touch with my friends, family members, and my primary care provider/team.    Use your imagination  Be creative.  We all have a creative side; it doesn t matter if it s oil painting, sand castles, or mud pies! This will also kick up the endorphins.    Witness Beauty  (AKA stop and smell the roses) Take a look outside, even in mid-winter. Notice colors, textures. Watch the squirrels and birds.     Service to others  Be of service to others.  There is always someone else in need.  By helping others we can  get out of ourselves  and remember the really important things.  This also provides opportunities for practicing all the other parts of the program.    Humor  Laugh and be silly!  Adjust your TV habits for less news and crime-drama and more comedy.    Control your stress  Try breathing deep, massage therapy, biofeedback, and meditation. Find time to relax each day.     My support system    Clinic Contact:  Phone number:    Contact 1:  Phone number:    Contact 2:  Phone number:    Islam/:  Phone number:    Therapist:  Phone number:    Local crisis center:    Phone number:    Other community support:  Phone number:

## 2018-10-16 NOTE — MR AVS SNAPSHOT
After Visit Summary   10/16/2018    Ms. Ashley Mcdaniels    MRN: [id number]           Patient Information     Date Of Birth          1980        Visit Information        Provider Department      10/16/2018 8:00 AM Melva You PA-C [facility]        Today's Diagnoses     YUE (generalized anxiety disorder)    -  1    Major depressive disorder, recurrent episode, mild (H)        Morbid obesity (H)        Need for prophylactic vaccination and inoculation against influenza        HSV (herpes simplex virus) infection          Care Instructions      - Increase Phentermine to 30 mg     - Recheck 1 month           Follow-ups after your visit        Follow-up notes from your care team     Return in about 1 month (around 11/16/2018).      Who to contact     If you have questions or need follow up information about today's clinic visit or your schedule please contact [facility] directly at [phone].  Normal or non-critical lab and imaging results will be communicated to you by [employer], letter or phone within 4 business days after the clinic has received the results. If you do not hear from us within 7 days, please contact the clinic through [employer] or phone. If you have a critical or abnormal lab result, we will notify you by phone as soon as possible.  Submit refill requests through [employer] or call your pharmacy and they will forward the refill request to us. Please allow 3 business days for your refill to be completed.          Additional Information About Your Visit        MyChart Information     [employer] gives you secure access to your electronic health record. If you see a primary care provider, you can also send messages to your care team and make appointments. If you have questions, please call your primary care clinic.  If you do not have a primary care provider, please call [phone] and they will assist you.        Care EveryWhere ID     This is  your Care EveryWhere ID. This could be used by other organizations to access your [place] medical records  [id number]        Your Vitals Were     Pulse Temperature Respirations Last Period Pulse Oximetry Breastfeeding?    62 98.3  F (36.8  C) (Temporal) 16 (Exact Date) 98% No    BMI (Body Mass Index)                   35.27 kg/m2            Blood Pressure from Last 3 Encounters:   10/16/18 110/70   09/25/18 110/72   08/28/18 116/72    Weight from Last 3 Encounters:   10/16/18 201 lb 9.6 oz (91.4 kg)   09/25/18 197 lb (89.4 kg)   08/28/18 203 lb 3.2 oz (92.2 kg)              We Performed the Following     DEPRESSION ACTION PLAN (DAP)          Today's Medication Changes          These changes are accurate as of 10/16/18  8:32 AM.  If you have any questions, ask your nurse or doctor.               These medicines have changed or have updated prescriptions.        Dose/Directions    phentermine 30 MG capsule   This may have changed:    - medication strength  - how much to take   Used for:  Morbid obesity (H)   Changed by:  Melva You PA-C        Dose:  30 mg   Take 1 capsule (30 mg) by mouth every morning   Quantity:  30 capsule   Refills:  0         Stop taking these medicines if you haven't already. Please contact your care team if you have questions.     naltrexone-bupropion 8-90 MG per 12 hr tablet   Commonly known as:  CONTRAVE   Stopped by:  Melva You PA-C                Where to get your medicines      These medications were sent to [facility] - [address]  [address] [address]     Phone:  [phone]     buPROPion 300 MG 24 hr tablet    valACYclovir 1000 mg tablet         Some of these will need a paper prescription and others can be bought over the counter.  Ask your nurse if you have questions.     Bring a paper prescription for each of these medications     phentermine 30 MG capsule                 Primary Care Provider Office Phone # Fax #    Melva CHAVES PENNIE oYu [phone]       [address] [place] [address]  [facility] [address]        Equal Access to Services     ERICKA WEINSTEIN : Hadii aad ku hadluiso Sosumanth, waaxda luqadaha, qaybta kaalmada adeegyada, brenda jooin hayaawendie mathew homerooleg coronado. So [facility] [phone].    ATENCIÓN: Si habla español, tiene a keita disposición servicios gratuitos de asistencia lingüística. Llame al [phone].    We comply with applicable federal civil rights laws and [place] laws. We do not discriminate on the basis of race, color, national origin, age, disability, sex, sexual orientation, or gender identity.            Thank you!     Thank you for choosing [facility]  for your care. Our goal is always to provide you with excellent care. Hearing back from our patients is one way we can continue to improve our services. Please take a few minutes to complete the written survey that you may receive in the mail after your visit with us. Thank you!             Your Updated Medication List - Protect others around you: Learn how to safely use, store and throw away your medicines at www.disposemymeds.org.          This list is accurate as of 10/16/18  8:32 AM.  Always use your most recent med list.                   Brand Name Dispense Instructions for use Diagnosis    acetaminophen 500 MG tablet    TYLENOL     Take 1,000 mg by mouth        B COMPLEX FORMULA 1 Tabs      Take 1 tablet by mouth        buPROPion 300 MG 24 hr tablet    WELLBUTRIN XL    90 tablet    Take 1 tablet (300 mg) by mouth every morning    YUE (generalized anxiety disorder), Major depressive disorder, recurrent episode, mild (H)       cetirizine 10 MG tablet    zyrTEC    30 tablet    Take 1 tablet (10 mg) by mouth daily    Chronic urticaria       EPINEPHrine 0.3 MG/0.3ML injection 2-pack    EPIPEN/ADRENACLICK/or ANY BX GENERIC EQUIV    0.6 mL    Inject 0.3 mLs (0.3 mg) into the  muscle as needed for anaphylaxis    Chronic urticaria       esomeprazole 40 MG CR capsule    nexIUM    90 capsule    Take 1 capsule (40 mg) by mouth every morning (before breakfast) Take 30-60 minutes before eating.    Gastroesophageal reflux disease without esophagitis       folic acid 1 MG tablet    FOLVITE          HUMIRA PEN 40 MG/0.8ML pen kit   Generic drug:  adalimumab           ibuprofen 200 MG tablet    ADVIL/MOTRIN     Take 400 mg by mouth        levonorgestrel 20 MCG/24HR IUD    MIRENA     1 each (20 mcg) by Intrauterine route continuous    Encounter for IUD removal, Encounter for IUD insertion, IUD (intrauterine device) in place       methotrexate 50 MG/2ML injection CHEMO      Inject 25 mg Subcutaneous        NP THYROID 15 MG Tabs tablet   Generic drug:  thyroid     90 tablet    TAKE ONE TABLET BY MOUTH EVERY DAY    Acquired hypothyroidism       phentermine 30 MG capsule     30 capsule    Take 1 capsule (30 mg) by mouth every morning    Morbid obesity (H)       valACYclovir 1000 mg tablet    VALTREX    20 tablet    Take 0.5 tablets (500 mg) by mouth 2 times daily    HSV (herpes simplex virus) infection       XOLAIR 150 MG injection   Generic drug:  omalizumab     2 each    Inject 2.4 mLs (300 mg) Subcutaneous every 28 days    Chronic idiopathic urticaria

## 2018-10-17 ASSESSMENT — PATIENT HEALTH QUESTIONNAIRE - PHQ9: SUM OF ALL RESPONSES TO PHQ QUESTIONS 1-9: 6

## 2018-10-17 ASSESSMENT — ANXIETY QUESTIONNAIRES: GAD7 TOTAL SCORE: 7

## 2018-10-23 ENCOUNTER — APPOINTMENT (OUTPATIENT)
Dept: FAMILY MEDICINE | Facility: OTHER | Age: 38
End: 2018-10-23
Payer: COMMERCIAL

## 2018-11-20 ENCOUNTER — ALLIED HEALTH/NURSE VISIT (OUTPATIENT)
Dept: ALLERGY | Facility: OTHER | Age: 38
End: 2018-11-20
Payer: COMMERCIAL

## 2018-11-20 DIAGNOSIS — L50.1 CHRONIC IDIOPATHIC URTICARIA: ICD-10-CM

## 2018-11-20 PROCEDURE — 96372 THER/PROPH/DIAG INJ SC/IM: CPT

## 2018-11-20 PROCEDURE — 99207 ZZC DROP WITH A PROCEDURE: CPT

## 2018-11-20 NOTE — MR AVS SNAPSHOT
After Visit Summary   11/20/2018    Ms. Ashley Mcdaniels    MRN: [id number]           Patient Information     Date Of Birth          1980        Visit Information        Provider Department      11/20/2018 8:40 AM ER [facility]        Today's Diagnoses     Chronic idiopathic urticaria           Follow-ups after your visit        Your next 10 appointments already scheduled     Dec 18, 2018  8:20 AM CST   Nurse Only with [facility]   [facility]    [address]  [facility] [id number] [phone]              Who to contact     If you have questions or need follow up information about today's clinic visit or your schedule please contact [facility] directly at [phone].  Normal or non-critical lab and imaging results will be communicated to you by MyChart, letter or phone within 4 business days after the clinic has received the results. If you do not hear from us within 7 days, please contact the clinic through MyChart or phone. If you have a critical or abnormal lab result, we will notify you by phone as soon as possible.  Submit refill requests through [employer] or call your pharmacy and they will forward the refill request to us. Please allow 3 business days for your refill to be completed.          Additional Information About Your Visit        MyChart Information     [employer] gives you secure access to your electronic health record. If you see a primary care provider, you can also send messages to your care team and make appointments. If you have questions, please call your primary care clinic.  If you do not have a primary care provider, please call [phone] and they will assist you.        Care EveryWhere ID     This is your Care EveryWhere ID. This could be used by other organizations to access your [place] medical records  [id number]         Blood Pressure from Last 3  Encounters:   10/16/18 110/70   09/25/18 110/72   08/28/18 116/72    Weight from Last 3 Encounters:   10/16/18 91.4 kg (201 lb 9.6 oz)   09/25/18 89.4 kg (197 lb)   08/28/18 92.2 kg (203 lb 3.2 oz)              We Performed the Following     OMALIZUMAB INJECTION []     THER/PROPH/DIAG INJ, SC/IM [[id number]]        Primary Care Provider Office Phone # Fax #    Melva ANDIE You PA-C [phone]       [address]  [facility] [address]        Equal Access to Services     ERICKA WEINSTEIN : Hadii rupinder Purdy, wajerelda luqadaha, qaybta kaalmada adeegyada, brenda coronado. So [facility] [phone].    ATENCIÓN: Si habla español, tiene a keita disposición servicios gratuitos de asistencia lingüística. Llame al [phone].    We comply with applicable federal civil rights laws and [place] laws. We do not discriminate on the basis of race, color, national origin, age, disability, sex, sexual orientation, or gender identity.            Thank you!     Thank you for choosing [facility]  for your care. Our goal is always to provide you with excellent care. Hearing back from our patients is one way we can continue to improve our services. Please take a few minutes to complete the written survey that you may receive in the mail after your visit with us. Thank you!             Your Updated Medication List - Protect others around you: Learn how to safely use, store and throw away your medicines at www.disposemymeds.org.          This list is accurate as of 11/20/18  9:05 AM.  Always use your most recent med list.                   Brand Name Dispense Instructions for use Diagnosis    acetaminophen 500 MG tablet    TYLENOL     Take 1,000 mg by mouth        B COMPLEX FORMULA 1 Tabs      Take 1 tablet by mouth        buPROPion 300 MG 24 hr tablet    WELLBUTRIN XL    90 tablet    Take 1 tablet (300 mg) by mouth every morning    YUE (generalized anxiety  disorder), Major depressive disorder, recurrent episode, mild (H)       cetirizine 10 MG tablet    zyrTEC    30 tablet    Take 1 tablet (10 mg) by mouth daily    Chronic urticaria       EPINEPHrine 0.3 MG/0.3ML injection 2-pack    EPIPEN/ADRENACLICK/or ANY BX GENERIC EQUIV    0.6 mL    Inject 0.3 mLs (0.3 mg) into the muscle as needed for anaphylaxis    Chronic urticaria       esomeprazole 40 MG CR capsule    nexIUM    90 capsule    Take 1 capsule (40 mg) by mouth every morning (before breakfast) Take 30-60 minutes before eating.    Gastroesophageal reflux disease without esophagitis       folic acid 1 MG tablet    FOLVITE          HUMIRA PEN 40 MG/0.8ML pen kit   Generic drug:  adalimumab           ibuprofen 200 MG tablet    ADVIL/MOTRIN     Take 400 mg by mouth        levonorgestrel 20 MCG/24HR IUD    MIRENA     1 each (20 mcg) by Intrauterine route continuous    Encounter for IUD removal, Encounter for IUD insertion, IUD (intrauterine device) in place       methotrexate 50 MG/2ML injection CHEMO      Inject 25 mg Subcutaneous        NP THYROID 15 MG Tabs tablet   Generic drug:  thyroid     90 tablet    TAKE ONE TABLET BY MOUTH EVERY DAY    Acquired hypothyroidism       phentermine 30 MG capsule    ADIPEX-P    30 capsule    Take 1 capsule (30 mg) by mouth every morning    Morbid obesity (H)       valACYclovir 1000 mg tablet    VALTREX    20 tablet    Take 0.5 tablets (500 mg) by mouth 2 times daily    HSV (herpes simplex virus) infection       XOLAIR 150 MG injection   Generic drug:  omalizumab     2 each    Inject 2.4 mLs (300 mg) Subcutaneous every 28 days    Chronic idiopathic urticaria

## 2018-11-20 NOTE — PROGRESS NOTES
Patient presented after waiting 30 minutes with no reaction to Xolair injections. Discharged from clinic.    Marylu Correa RN ............   11/20/2018...9:05 AM

## 2018-12-13 ENCOUNTER — TELEPHONE (OUTPATIENT)
Dept: FAMILY MEDICINE | Facility: OTHER | Age: 38
End: 2018-12-13

## 2018-12-13 NOTE — TELEPHONE ENCOUNTER
Patient scheduled tonight 6:30 for back and chest pain with difficulty breathing. Please triage and will need to be rescheduled as is in a 15 min spot.    Rene You PA-C  [facility]

## 2018-12-13 NOTE — TELEPHONE ENCOUNTER
Left message for patient to call back, needs to reschedule orly appt with [facility], not enough time for provider

## 2018-12-13 NOTE — TELEPHONE ENCOUNTER
Spoke with patient.  Sometimes she has this sharp shooting pain in her ribs. But always has a lower back. Dull achy pain in the back.    No fever. No current SOB but when she feels a little SOB she feels like she's not getting enough air.  No current chest pain.     Informed patient she was scheduled for a short and there is not enough time for a complete and though visit.  She states that she also has an appointment with [facility] at 530 today for checking on a cyst.  She is wondering if [facility] would keep that time as the patient will already be in the building and done with [facility].     Informed her RN would route to provider for review and one of the medical staff would call her back regarding scheduling. (as I do see wiggled room on [facility] schedule as on right now)    Provider please advise.    Rob Dodson, RN, BSN

## 2018-12-13 NOTE — TELEPHONE ENCOUNTER
I can not see her at 630 for this large of an issue. Needs to reschedule.     Rene You PA-C  [facility]

## 2018-12-14 ENCOUNTER — OFFICE VISIT (OUTPATIENT)
Dept: FAMILY MEDICINE | Facility: OTHER | Age: 38
End: 2018-12-14
Payer: COMMERCIAL

## 2018-12-14 ENCOUNTER — ANCILLARY PROCEDURE (OUTPATIENT)
Dept: GENERAL RADIOLOGY | Facility: OTHER | Age: 38
End: 2018-12-14
Attending: PHYSICIAN ASSISTANT
Payer: COMMERCIAL

## 2018-12-14 VITALS
SYSTOLIC BLOOD PRESSURE: 124 MMHG | OXYGEN SATURATION: 98 % | TEMPERATURE: 98.7 F | DIASTOLIC BLOOD PRESSURE: 80 MMHG | WEIGHT: 200 LBS | BODY MASS INDEX: 34.99 KG/M2 | HEART RATE: 93 BPM | RESPIRATION RATE: 16 BRPM

## 2018-12-14 DIAGNOSIS — M79.605 BILATERAL LOWER EXTREMITY PAIN: ICD-10-CM

## 2018-12-14 DIAGNOSIS — N63.0 BREAST MASS: ICD-10-CM

## 2018-12-14 DIAGNOSIS — R07.9 CHEST PAIN, UNSPECIFIED TYPE: ICD-10-CM

## 2018-12-14 DIAGNOSIS — M54.6 ACUTE BILATERAL THORACIC BACK PAIN: ICD-10-CM

## 2018-12-14 DIAGNOSIS — R07.9 CHEST PAIN, UNSPECIFIED TYPE: Primary | ICD-10-CM

## 2018-12-14 DIAGNOSIS — M79.604 BILATERAL LOWER EXTREMITY PAIN: ICD-10-CM

## 2018-12-14 DIAGNOSIS — K21.9 GASTROESOPHAGEAL REFLUX DISEASE, ESOPHAGITIS PRESENCE NOT SPECIFIED: ICD-10-CM

## 2018-12-14 LAB
BASOPHILS # BLD AUTO: 0 10E9/L (ref 0–0.2)
BASOPHILS NFR BLD AUTO: 0.1 %
DIFFERENTIAL METHOD BLD: NORMAL
EOSINOPHIL # BLD AUTO: 0.1 10E9/L (ref 0–0.7)
EOSINOPHIL NFR BLD AUTO: 0.6 %
ERYTHROCYTE [DISTWIDTH] IN BLOOD BY AUTOMATED COUNT: 14.3 % (ref 10–15)
HCT VFR BLD AUTO: 40.4 % (ref 35–47)
HGB BLD-MCNC: 13.2 G/DL (ref 11.7–15.7)
LYMPHOCYTES # BLD AUTO: 3.1 10E9/L (ref 0.8–5.3)
LYMPHOCYTES NFR BLD AUTO: 32 %
MCH RBC QN AUTO: 29.1 PG (ref 26.5–33)
MCHC RBC AUTO-ENTMCNC: 32.7 G/DL (ref 31.5–36.5)
MCV RBC AUTO: 89 FL (ref 78–100)
MONOCYTES # BLD AUTO: 0.6 10E9/L (ref 0–1.3)
MONOCYTES NFR BLD AUTO: 6.4 %
NEUTROPHILS # BLD AUTO: 5.9 10E9/L (ref 1.6–8.3)
NEUTROPHILS NFR BLD AUTO: 60.9 %
PLATELET # BLD AUTO: 322 10E9/L (ref 150–450)
RBC # BLD AUTO: 4.53 10E12/L (ref 3.8–5.2)
TSH SERPL DL<=0.005 MIU/L-ACNC: 0.87 MU/L (ref 0.4–4)
WBC # BLD AUTO: 9.8 10E9/L (ref 4–11)

## 2018-12-14 PROCEDURE — 36415 COLL VENOUS BLD VENIPUNCTURE: CPT | Performed by: PHYSICIAN ASSISTANT

## 2018-12-14 PROCEDURE — 84443 ASSAY THYROID STIM HORMONE: CPT | Performed by: PHYSICIAN ASSISTANT

## 2018-12-14 PROCEDURE — 93000 ELECTROCARDIOGRAM COMPLETE: CPT | Performed by: PHYSICIAN ASSISTANT

## 2018-12-14 PROCEDURE — 99214 OFFICE O/P EST MOD 30 MIN: CPT | Performed by: PHYSICIAN ASSISTANT

## 2018-12-14 PROCEDURE — 85025 COMPLETE CBC W/AUTO DIFF WBC: CPT | Performed by: PHYSICIAN ASSISTANT

## 2018-12-14 PROCEDURE — 71046 X-RAY EXAM CHEST 2 VIEWS: CPT

## 2018-12-14 ASSESSMENT — ENCOUNTER SYMPTOMS
DIARRHEA: 0
PARESTHESIAS: 0
NERVOUS/ANXIOUS: 1
CHILLS: 0
FREQUENCY: 0
HEMATOCHEZIA: 0
WEAKNESS: 1
HEMATURIA: 0
BREAST MASS: 1
HEARTBURN: 1
FEVER: 0
PALPITATIONS: 0
COUGH: 1
JOINT SWELLING: 0
CONSTIPATION: 1
EYE PAIN: 0
ABDOMINAL PAIN: 1
DYSURIA: 0
HEADACHES: 0
SHORTNESS OF BREATH: 1
ARTHRALGIAS: 1
DIZZINESS: 0
MYALGIAS: 1
NAUSEA: 0
SORE THROAT: 0

## 2018-12-14 NOTE — PROGRESS NOTES
"  SUBJECTIVE:   Ashley Mcdaniels is a 38 year old female who presents to clinic today for the following health issues:  Please review/remove medications listed as not taking.      HPI  CHEST PAIN     Onset: all week    Description:   Location:  Started in upper back  Character: dull, achey  Radiation: from back to chest  Duration: constant in the back    Intensity: 5/10    Progression of Symptoms:  worsening    Accompanying Signs & Symptoms:  Shortness of breath: YES- \"sometimes I feel like I don't get enough air so I find myself taking bigger breaths often.\"   Sweating: no  Nausea/vomiting: no  Lightheadedness: no  Palpitations: YES- \"feel like I get shakey on the inside, but i'm kind of shakey on the inside\"  Fever/Chills: no  Cough: YES - \"weird non productive cough once in a while\"  Heartburn: YES- \"horrible heartburn\"    History:   Family history of heart disease YES- dad and paternal grandma  Tobacco use: no    Precipitating factors:   Worse with exertion: no  Worse with deep breaths :  no  Related to food: no    Alleviating factors:  no       Therapies Tried and outcome: none    Triage note: \"Sometimes she has this sharp shooting pain in her ribs. But always has a lower back. Dull achy pain in the back.    No fever. No current SOB but when she feels a little SOB she feels like she's not getting enough air.  No current chest pain. \"    Problem list and histories reviewed & adjusted, as indicated.  Additional history: {NONE - AS DOCUMENTED:874986::\"as documented\"}    {ACUTE Problem SUPERLIST - brief histories:550745}    {HIST REVIEW/ LINKS 2:259438}    {PROVIDER CHARTING PREFERENCE:639947}  "

## 2018-12-14 NOTE — PROGRESS NOTES
"   SUBJECTIVE:   CC: Ashley Mcdaniels is an 38 year old woman who presents for office visit concerned about Chest pain, back pain, breast mass    Physical   Annual:     Getting at least 3 servings of Calcium per day:  Yes    Bi-annual eye exam:  Yes    Dental care twice a year:  Yes    Sleep apnea or symptoms of sleep apnea:  Daytime drowsiness    Diet:  Gluten-free/reduced    Frequency of exercise:  2-3 days/week    Duration of exercise:  30-45 minutes    Taking medications regularly:  Yes    Medication side effects:  Muscle aches    Additional concerns today:  Yes    PHQ-2 Total Score: 0    Triage note: \"Sometimes she has this sharp shooting pain in her ribs. But always has a lower back. Dull achy pain in the back.    No fever. No current SOB but when she feels a little SOB she feels like she's not getting enough air.  No current chest pain.  \"    Patient originally scheduled for a physical but had several other concerns today, she is in general up to date on health maintenance therefore elected to switch to an office visit to address the following concerns.     CHEST PAIN     Onset: all week    Description:   Location:  Started in upper back  Character: dull, achey  Radiation: from back to chest  Duration: constant in the back    Intensity: 5/10    Progression of Symptoms:  worsening    Accompanying Signs & Symptoms:  Shortness of breath: YES- \"sometimes I feel like I don't get enough air so I find myself taking bigger breaths often.\"   Sweating: no  Nausea/vomiting: no  Lightheadedness: no  Palpitations: YES- \"feel like I get shakey on the inside, but i'm kind of shakey on the inside\"  Fever/Chills: no  Cough: YES - \"weird non productive cough once in a while\"  Heartburn: YES- \"horrible heartburn\"    History:   Family history of heart disease YES- dad and paternal grandma  Tobacco use: no  Travel: no  Antibiotics: no    Precipitating factors:   Worse with exertion: no  Worse with deep breaths :  no  Related to food: " no    Alleviating factors:  no       Therapies Tried and outcome: none    She says it started first in between her shoulder blades on both side.  It is a sharp pain that will extend around to the chest on both sides.    She says the pain is not currently present   She says sometimes she notices that it feels like she is short of breath such as if she held her breath and now needs to take a deep breath, this is occasional.   Nothing makes her symptoms worse - unchanged by activity or use of muscles.   Nothing makes her symptoms better.   She has chronic GERD and takes daily medication yet has break through heartburn symptoms at least once per week.   No recent diet changes.   No Changes in physical activity, job, increased lifting or exercise.   No recent travel or prolonged immobilization.   Mother had a deep venous thrombosis for unknown reason in the past but denies history of bleeding or clotting disorders.   Patient has never had any clots.   She notes she has pain in both legs but that this is a chronic problem and unchanged.  She has noticed a rash on both lower legs that has been there for quite a while and getting worse.  Doesn't itch or cause pain.   Denies recent illnesses.  No irregular or racing heart beats.  Denies cough or hemoptysis.   She notes breast mass in the right breast for the last month or so that is not overly painful, no family history of breast cancer.  No discharge from nipples.   She says she has joint pains but thinks it is related to medication.   She has some occasional pelvic discomfort and urgency but says that it is likely from a cyst she was found to have in the past, was going to follow-up with OB/GYN regarding this issue.     Today's PHQ-2 Score:   PHQ-2 ( 1999 [employer]) 12/14/2018   Q1: Little interest or pleasure in doing things 0   Q2: Feeling down, depressed or hopeless 0   PHQ-2 Score 0   Q1: Little interest or pleasure in doing things Not at all   Q2: Feeling down, depressed  or hopeless Not at all   PHQ-2 Score 0         Social History     Tobacco Use     Smoking status: Former Smoker     Last attempt to quit: 2016     Years since quittin.6     Smokeless tobacco: Never Used   Substance Use Topics     Alcohol use: Yes     Alcohol/week: 0.0 oz     Comment: occasional      Alcohol Use 2018   If you drink alcohol do you typically have greater than 3 drinks per day OR greater than 7 drinks per week? No   No flowsheet data found.    Reviewed orders with patient.  Reviewed health maintenance and updated orders accordingly - Yes  Labs reviewed in EPIC  BP Readings from Last 3 Encounters:   18 124/80   10/16/18 110/70   18 110/72    Wt Readings from Last 3 Encounters:   18 90.7 kg (200 lb)   10/16/18 91.4 kg (201 lb 9.6 oz)   18 89.4 kg (197 lb)                  Patient Active Problem List   Diagnosis     Acquired hypothyroidism     YUE (generalized anxiety disorder)     Major depressive disorder, recurrent episode, mild (H)     Crohn's disease of both small and large intestine without complication (H)     IUD (intrauterine device) in place     Chronic urticaria     ADHD (attention deficit hyperactivity disorder), inattentive type     Gastroesophageal reflux disease without esophagitis     Morbid obesity (H)     Past Surgical History:   Procedure Laterality Date     CHOLECYSTECTOMY  2007     DILATION AND CURETTAGE  May 2003    Non-OB     HC TOOTH EXTRACTION W/FORCEP  2006     LAP ADJUSTABLE GASTRIC BAND  May 2007       Social History     Tobacco Use     Smoking status: Former Smoker     Last attempt to quit: 2016     Years since quittin.6     Smokeless tobacco: Never Used   Substance Use Topics     Alcohol use: Yes     Alcohol/week: 0.0 oz     Comment: occasional      Family History   Problem Relation Age of Onset     Arthritis Mother      Deep Vein Thrombosis Mother      Heart Disease Father         CAD, CHF     Alcoholism Father      Asthma  Father      Hypertension Father      Hyperlipidemia Father      Pancreatic Cancer Father      Liver Cancer Father      Diabetes Maternal Grandmother      Substance Abuse Maternal Grandmother      Hyperlipidemia Maternal Grandmother      Hypertension Maternal Grandmother      Substance Abuse Maternal Grandfather      Asthma Paternal Grandmother      Heart Disease Paternal Grandmother      Diabetes Paternal Grandmother      Hypertension Paternal Grandmother      Substance Abuse Paternal Grandfather      Mental Illness Brother      Substance Abuse Brother          Current Outpatient Medications   Medication Sig Dispense Refill     acetaminophen (TYLENOL) 500 MG tablet Take 1,000 mg by mouth       B Complex-Folic Acid (B COMPLEX FORMULA 1) TABS Take 1 tablet by mouth       buPROPion (WELLBUTRIN XL) 300 MG 24 hr tablet Take 1 tablet (300 mg) by mouth every morning 90 tablet 3     cetirizine (ZYRTEC) 10 MG tablet Take 1 tablet (10 mg) by mouth daily 30 tablet 0     EPINEPHrine (EPIPEN/ADRENACLICK/OR ANY BX GENERIC EQUIV) 0.3 MG/0.3ML injection 2-pack Inject 0.3 mLs (0.3 mg) into the muscle as needed for anaphylaxis 0.6 mL 1     esomeprazole (NEXIUM) 40 MG CR capsule Take 1 capsule (40 mg) by mouth every morning (before breakfast) Take 30-60 minutes before eating. 90 capsule 1     folic acid (FOLVITE) 1 MG tablet   0     HUMIRA PEN 40 MG/0.8ML pen kit   2     ibuprofen (ADVIL,MOTRIN) 200 MG tablet Take 400 mg by mouth       levonorgestrel (MIRENA) 20 MCG/24HR IUD 1 each (20 mcg) by Intrauterine route continuous       NP THYROID 15 MG TABS tablet TAKE ONE TABLET BY MOUTH EVERY DAY 90 tablet 1     omalizumab (XOLAIR) 150 MG injection Inject 2.4 mLs (300 mg) Subcutaneous every 28 days 2 each      valACYclovir (VALTREX) 1000 mg tablet Take 0.5 tablets (500 mg) by mouth 2 times daily 20 tablet 11     methotrexate 50 MG/2ML injection CHEMO Inject 25 mg Subcutaneous       Allergies   Allergen Reactions     Azathioprine Other (See  Comments)     pancreatitis     No Clinical Screening - See Comments Hives     From stress       Mammogram not appropriate for this patient based on age.    Pertinent mammograms are reviewed under the imaging tab.  PAP / HPV Latest Ref Rng & Units 7/12/2016 4/8/2015   PAP - NIL -   HPV 16 DNA NEG Negative -   HPV 18 DNA NEG Negative -   OTHER HR HPV NEG Negative -   HPV-ABSTRA - - Negative     Reviewed and updated as needed this visit by clinical staff  Tobacco  Allergies  Meds  Med Hx  Surg Hx  Fam Hx  Soc Hx        Reviewed and updated as needed this visit by Provider          Review of Systems   Constitutional: Negative for chills and fever.   HENT: Negative for congestion, ear pain, hearing loss and sore throat.    Eyes: Negative for pain and visual disturbance.   Respiratory: Positive for cough and shortness of breath.    Cardiovascular: Positive for chest pain. Negative for palpitations and peripheral edema.   Gastrointestinal: Positive for abdominal pain, constipation and heartburn. Negative for diarrhea, hematochezia and nausea.   Breasts:  Positive for breast mass. Negative for tenderness and discharge.   Genitourinary: Positive for pelvic pain and urgency. Negative for dysuria, frequency, genital sores, hematuria, vaginal bleeding and vaginal discharge.   Musculoskeletal: Positive for arthralgias and myalgias. Negative for joint swelling.   Skin: Positive for rash.   Neurological: Positive for weakness. Negative for dizziness, headaches and paresthesias.   Psychiatric/Behavioral: Negative for mood changes. The patient is nervous/anxious.      OBJECTIVE:   /80   Pulse 93   Temp 98.7  F (37.1  C) (Temporal)   Resp 16   Wt 90.7 kg (200 lb)   SpO2 98%   BMI 34.99 kg/m     Physical Exam  GENERAL: healthy, alert and no distress  EYES: Eyes grossly normal to inspection, PERRL and conjunctivae and sclerae normal  HENT: ear canals and TM's normal, nose and mouth without ulcers or lesions  NECK: no  adenopathy, no asymmetry, masses, or scars and thyroid normal to palpation  RESP: lungs clear to auscultation - no rales, rhonchi or wheezes  BREAST: Right breast - 9 o'clock position there is a 1 cm oval shaped mass that is non-tender to palpation, no nipple discharge, no overlying skin changes.   CV: regular rate and rhythm, normal S1 S2, no S3 or S4, no murmur, click or rub, no peripheral edema and peripheral pulses strong, negative carotid bruits bilaterally.  ABDOMEN: soft, nontender, no hepatosplenomegaly, no masses and bowel sounds normal  MS: no gross musculoskeletal defects noted, no edema, bilateral lower extremities there are palpable cords without significant tenderness, no erythema or warmth in lower extremities.  Non-tender to palpation over the thoracic spinous processes and paraspinal muscles.  Full active ROM of the bilateral upper extremities without pain.   SKIN: There is brown macular rash bilateral shins.    NEURO: Normal strength and tone, mentation intact and speech normal  PSYCH: mentation appears normal, affect normal/bright    Diagnostic Test Results:  Results for orders placed or performed in visit on 12/14/18 (from the past 24 hour(s))   CBC with platelets differential   Result Value Ref Range    WBC 9.8 4.0 - 11.0 10e9/L    RBC Count 4.53 3.8 - 5.2 10e12/L    Hemoglobin 13.2 11.7 - 15.7 g/dL    Hematocrit 40.4 35.0 - 47.0 %    MCV 89 78 - 100 fl    MCH 29.1 26.5 - 33.0 pg    MCHC 32.7 31.5 - 36.5 g/dL    RDW 14.3 10.0 - 15.0 %    Platelet Count 322 150 - 450 10e9/L    % Neutrophils 60.9 %    % Lymphocytes 32.0 %    % Monocytes 6.4 %    % Eosinophils 0.6 %    % Basophils 0.1 %    Absolute Neutrophil 5.9 1.6 - 8.3 10e9/L    Absolute Lymphocytes 3.1 0.8 - 5.3 10e9/L    Absolute Monocytes 0.6 0.0 - 1.3 10e9/L    Absolute Eosinophils 0.1 0.0 - 0.7 10e9/L    Absolute Basophils 0.0 0.0 - 0.2 10e9/L    Diff Method Automated Method    TSH with free T4 reflex   Result Value Ref Range    TSH 0.87  0.40 - 4.00 mU/L     XR CHEST 2 VW 12/14/2018 4:54 PM     HISTORY: Pain.     COMPARISON: None.                                                                      IMPRESSION: The lungs are clear. No focal pulmonary opacities. Heart  and mediastinum are unremarkable. No acute cardiopulmonary  abnormalities.     PINKY CABALLERO MD    EKG:   Sinus rhythm  94 BPM  421 ms QTc interval.    ASSESSMENT/PLAN:       ICD-10-CM    1. Chest pain, unspecified type R07.9 EKG 12-lead complete w/read - Clinics     CBC with platelets differential     TSH with free T4 reflex     XR Chest 2 Views   2. Acute bilateral thoracic back pain M54.6 CBC with platelets differential     TSH with free T4 reflex     XR Chest 2 Views   3. Breast mass N63.0 MA Diagnostic Digital Right     US Breast Right Complete 4 Quadrants   4. Gastroesophageal reflux disease, esophagitis presence not specified K21.9 GASTROENTEROLOGY ADULT REF PROCEDURE ONLY   5. Bilateral lower extremity pain M79.604 US Lower Extremity Venous Duplex Bilateral    M79.605 US Venous Competency Bilateral       - Uncertain the cause of her back pain and chest pain, possibly musculoskeletal, possibly related to her chronic GERD that may be causing more problems especially as she treats this daily and still has break through symptoms.  Did EKG which shows no concerns, chest x-ray no concerns.  Labs show no concerns as well at this time.  Over the weekend recommended ice/heat, stretching, massage and chiropractic treatment to see if this helps resolve her symptoms. No concerns on exam for deep venous thrombosis and low suspicion for pulmonary embolism.  Consider additional imaging in the future or stress test to further evaluate her chest symptoms, less concerned for cardiac as symptoms start in the back and wrap into both sides of the chest.    - For the breast mass possibly just a plugged duct but will check mammogram and ultrasound to verify there is no mass.  No signs of infection on  "exam today.   - For her GERD recommended she continue therapy she is on and add on Zantac 150 mg twice per day.  Because of her long standing GERD and break through symptoms recommended endoscopy to further evaluate.  Will schedule.   - For her leg pain concerned for venous insufficiency, no signs of arterial cause, will obtain ultrasound and refer to general surgery.   - Recommend she see OB/GYN regarding her possibly recurring cyst and pelvic pressure and urgency that is intermittent.     Follow-up if symptoms are not improving, worse or new concerns.     BP Readings from Last 1 Encounters:   12/14/18 124/80     Estimated body mass index is 34.99 kg/m  as calculated from the following:    Height as of 9/25/18: 1.61 m (5' 3.39\").    Weight as of this encounter: 90.7 kg (200 lb).    Options for treatment and follow-up care were reviewed with the patient and/or guardian. Patient and/or guardian engaged in the decision making process and verbalized understanding of the options discussed and agreed with the final plan.     Gerhard Ugarte PA-C  [facility]  "

## 2018-12-14 NOTE — PATIENT INSTRUCTIONS
- 150 mg Zantac/Ranitidine twice per day, add this on. But continue your daily medication as well.   - massage and chiropractic is fine.   - They should call to set up Endoscopy, mammogram and ultrasound.   - We will call with labs and see how you're doing Monday.

## 2018-12-17 ENCOUNTER — TELEPHONE (OUTPATIENT)
Dept: FAMILY MEDICINE | Facility: OTHER | Age: 38
End: 2018-12-17

## 2018-12-17 ENCOUNTER — HOSPITAL ENCOUNTER (OUTPATIENT)
Facility: CLINIC | Age: 38
End: 2018-12-17
Attending: SURGERY | Admitting: SURGERY
Payer: COMMERCIAL

## 2018-12-17 NOTE — TELEPHONE ENCOUNTER
I would recommend she take Omeprazole 40 mg once daily and Zantac 150 mg twice per day.  Do not take Nexium. Or just take the higher dose Nexium with Zantac and no omeprazole.     Gerhard Ugarte PA-C

## 2018-12-17 NOTE — TELEPHONE ENCOUNTER
If her symptoms are stable then continue with plan and set up endoscopy for further evaluation with her chronic GERD.  If her symptoms are getting worse she should be seen in clinic.  Please verify what medications she is taking for GERD right now and the doses.       Gerhard Ugarte PA-C

## 2018-12-17 NOTE — TELEPHONE ENCOUNTER
Reason for Call:  Other returning call    Detailed comments: Patient called clinic back. Relayed message below to patient stated she is still having chest and back pain.     Phone Number Patient can be reached at: Home number on file [phone] (home)    Best Time: any    Can we leave a detailed message on this number? YES    Call taken on 12/17/2018 at 11:06 AM by Ramses Ocampo

## 2018-12-17 NOTE — TELEPHONE ENCOUNTER
Patient states that she is about the same, so she will stay with the plan. She stated that she is taking Omeprazole 20 mg daily, Nexium 20 mg PRN and Zantac 150 mg daily

## 2018-12-18 ENCOUNTER — ALLIED HEALTH/NURSE VISIT (OUTPATIENT)
Dept: ALLERGY | Facility: OTHER | Age: 38
End: 2018-12-18
Payer: COMMERCIAL

## 2018-12-18 ENCOUNTER — TELEPHONE (OUTPATIENT)
Dept: FAMILY MEDICINE | Facility: OTHER | Age: 38
End: 2018-12-18

## 2018-12-18 DIAGNOSIS — L50.1 CHRONIC IDIOPATHIC URTICARIA: ICD-10-CM

## 2018-12-18 PROCEDURE — 99207 ZZC DROP WITH A PROCEDURE: CPT

## 2018-12-18 PROCEDURE — 96372 THER/PROPH/DIAG INJ SC/IM: CPT

## 2018-12-18 NOTE — TELEPHONE ENCOUNTER
Date of colonoscopy/EGD: 1/2/19  Surgeon: Dr. Son  Prep:EGD  Packet:Colonoscopy/EGD instructions mailed to patient's home address.   Date: 12/18/2018      Surgery Scheduler

## 2018-12-18 NOTE — LETTER
Upper Endoscopy    Date of procedure:__1/2/19_________      Location:[facility]_______________  Please arrive at:__7:30am___________    Procedure time:_8:30am_______________    Review the preparation schedule below for the five days preceding your procedure.     If you have any questions, please call [phone].          5 Days Prior  *CHECK WITH YOUR INSURANCE REGARDING COVERAGE PRIOR TO PROCEDURE.  If you take Coumadin (Warfarin), Plavix, Ticlid, Aggrenox:, insulin, diabetic pills and/or iron products contact your doctor for specific instructions.  Have INR checked the day before the procedure.  Please remember to arrange a responsible adult to accompany you home.   must be     present upon discharge.    1 Days Prior  Eat normally up until midnight.  You may drink small amounts of clear liquids up until 4 hours prior to your arrival.  **Please see Clear Liquid Diet Sheet for suggested liquids.    Procedure Day    From midnight until 3 hours prior to your procedure, you may drink small amounts of clear liquids.  Do not take your morning medications until after you have completed your procedure.  Bring a list of your medications with you on the day of your procedure.     *Reminder:  Have transportation arranged to take you home.   must accompany you to the procedure.  Do not plan to drive or operate vehicles or machinery the day of your exam.      Clear Liquid Diet  Beverages  Coffee, Decaffeinated coffee, Tea (without milk or non-dairy creamer)  Carbonated beverages (pop)  Water  Sports Drinks    Desserts  Jello (except red or purple)  Fruit ice  Popsicle    Fruit and Fruit Juices  Citrus juices, strained  Fruit nectars, strained  Apple juice  Fruit drinks    Soups  Broth or Bouillon  Consomme  Meat stock, strained  Vegetable broth, strained    Sweets  Hard candy, plain  Sugar    Miscellaneous  Flavorings  Salt    No red or purple colored juices or Jello  No dairy products  No foods containing seeds  2 days prior to procedure  No alcoholic beverages               Directions to [facility]    From the North:   Take the [address] exit off of [address] (on the south side of [place]).  Turn left on [address]  Turn left at the first stoplight (at [place]).  The hospital and clinic is the third building on the left.     From the South:  Follow [address] north to the first [place] exit.  Exit on [address] following it to the right.  Turn left at the first stoplight.  The hospital and clinic is the third building on the left.    From the East:     Following [address] west, turn left at the stoplight onto [address] Follow [address] through [place].  Take a right at the light on [address] (at [facility]).  The hospital and clinic is the third building on the left.    From the West:    Following [address] going east, turn south on [address].  Take the [address] exit off of [address] (on the south side of [place]).  Follow [address] through [place] to the stoplight on [address] (at [facility]). Take a left.  The hospital and clinic is the third building on the left.        UPPER ENDOSCOPY INFORMATION  Upper endoscopy (also known as an upper GI endoscopy, esophagogastro-duodenoscopy [EGD], or panendoscopy) is a procedure that enables your physician to examine the lining of the upper part of your gastrointestinal tract, ie. The esophagus (swallowing tube), stomach, and duodenum (first portion of the small intestine) using a thin flexible tube with its own lens and light source.    Upper endoscopy is usually performed to evaluate symptoms of persistent upper abdominal pain, nausea, vomiting or difficulty swallowing.  It is also the best test for finding the cause of bleeding from the upper gastrointestinal tract.    Upper endoscopy is more accurate than x-ray films for detecting inflammation, ulcers or tumors of the esophagus, stomach and duodenum.  Upper  endoscopy can detect early cancer and can distinguish between benign (non-cancerous) and malignant (cancerous) conditions when biopsies (small tissue samples) of suspicious areas are obtained.  Biopsies are taken for many reasons and do not necessarily mean that cancer is suspected.  A cytology test (introduction of a small brush to collect cells) may also be performed.    Upper endoscopy is also used to treat conditions present in the upper gastrointestinal tract.  A variety of instruments can be passed through the endoscope that allow many abnormalities to be treated directly with little or no discomfort.  This may include stretching narrowed areas, removing polyps (usually benign growths) or swallowed objects, or treating upper gastrointestinal bleeding.  Safe and effective endoscopic control of bleeding has reduced the need for transfusions and surgery in many patients.    For the best and safest examination, the stomach must be completely empty.  You should have nothing to eat or drink, including water, for approximately 4 hours prior to your examination.    WHAT CAN BE EXPECTED DURING THE UPPER ENDOSCOPY?  Your doctor will review with you why upper endoscopy is being performed, whether any alternative tests are available, and possible complications from the procedure.  Your throat will be sprayed with a local anesthetic before the procedure begins and you may be given medication through a vein to help you relax during the procedure.  While you are in a comfortable position on your side, the endoscope is passed through the mouth and then is passed in turn through the esophagus, stomach, and duodenum.  The endoscope does not interfere with your breathing during the test.  Most patients consider the procedure to be only slightly uncomfortable and many patients fall asleep during the procedure.    WHAT HAPPENS AFTER THE UPPER ENDOSCOPY?  After the procedure, you will be monitored in the endoscopy area until most  of the effects of the medication have work off.  Your throat may be a little sore for a while, and you may feel bloated right after the procedure because of the air introduced into your stomach during the test.  You will be able to resume your diet after you leave unless you are instructed otherwise.    If you receive sedation, you will need to arrange to have a responsible adult drive and accompany you home from the examination because the sedative may affect your judgment and reflexes for the rest of the day.  You will not be allowed to take a taxi or bus as transportation home.  If you receive sedation, you will not be allowed to drive after the procedure even though you may not feel tired.    WHAT ARE THE POSSIBLE COMPLICATIONS OF UPPER ENDOSCOPY?  Endoscopy is generally safe.  Complications can occur but are rare when the test is performed by physicians with specialized training and experience in this procedure.  Bleeding may occur from a biopsy site or where a polyp was removed.  It is usually minimal and rarely requires blood transfusions or surgery.  Localized irritation of the vein where the medication was injected may rarely cause a tender lump lasting for several weeks, but this will go away.  Applying heat packs or hot moist towels may help relieve discomfort.  Other potential risks include a reaction to the sedatives used and complications from underlying medical conditions.  Major complication, eg, perforation (a tear that might require surgery for repair) are very uncommon.      It is important for you to recognize early signs of any possible complication.  If you begin to run a fever after the test, begin to have trouble swallowing, or have increasing throat, chest or abdominal pain, let your doctor know about it promptly.

## 2018-12-18 NOTE — LETTER
[facility]  [address]  [place] [address]  [phone]        December 18, 2018    Ashley Mcdaniels  [id number] [facility] [address]

## 2018-12-18 NOTE — PROGRESS NOTES
Patient presented after waiting 30 minutes with no reaction to Xolair injections. Discharged from clinic.    Chelsea Camara RN

## 2018-12-28 ENCOUNTER — ANCILLARY PROCEDURE (OUTPATIENT)
Dept: MAMMOGRAPHY | Facility: CLINIC | Age: 38
End: 2018-12-28
Attending: PHYSICIAN ASSISTANT
Payer: COMMERCIAL

## 2018-12-28 ENCOUNTER — ANCILLARY PROCEDURE (OUTPATIENT)
Dept: ULTRASOUND IMAGING | Facility: CLINIC | Age: 38
End: 2018-12-28
Attending: PHYSICIAN ASSISTANT
Payer: COMMERCIAL

## 2018-12-28 DIAGNOSIS — N63.0 BREAST MASS: ICD-10-CM

## 2018-12-28 PROCEDURE — 77066 DX MAMMO INCL CAD BI: CPT

## 2018-12-28 PROCEDURE — 76642 ULTRASOUND BREAST LIMITED: CPT | Mod: 50

## 2019-01-01 RX ORDER — ONDANSETRON 2 MG/ML
4 INJECTION INTRAMUSCULAR; INTRAVENOUS
Status: CANCELLED | OUTPATIENT
Start: 2019-01-01

## 2019-01-01 RX ORDER — LIDOCAINE 40 MG/G
CREAM TOPICAL
Status: CANCELLED | OUTPATIENT
Start: 2019-01-01

## 2019-01-02 ENCOUNTER — ANCILLARY PROCEDURE (OUTPATIENT)
Dept: ULTRASOUND IMAGING | Facility: OTHER | Age: 39
End: 2019-01-02
Attending: PHYSICIAN ASSISTANT
Payer: COMMERCIAL

## 2019-01-02 ENCOUNTER — TELEPHONE (OUTPATIENT)
Dept: FAMILY MEDICINE | Facility: OTHER | Age: 39
End: 2019-01-02

## 2019-01-02 DIAGNOSIS — M79.605 BILATERAL LOWER EXTREMITY PAIN: ICD-10-CM

## 2019-01-02 DIAGNOSIS — M79.604 BILATERAL LOWER EXTREMITY PAIN: ICD-10-CM

## 2019-01-02 PROCEDURE — 93970 EXTREMITY STUDY: CPT

## 2019-01-02 NOTE — TELEPHONE ENCOUNTER
----- Message from Gerhard Ugarte PA-C sent at 1/2/2019  4:23 PM CST -----  Please call patient.  Ultrasound showed no concerns for deep venous thrombosis.  There is insufficiency in the right iliac, femoral and great saphenous veins which could be contributing to her pain.  Left side no signs of insufficiency.  If she would like we could have her see vascular for consult on treatment of the insufficiency.     Gerhard Ugarte PA-C

## 2019-01-02 NOTE — TELEPHONE ENCOUNTER
Spoke with patient - as I was reading the result note, she hung up or lost service. Tried to call back and got busy signal.  Christie Man, CMA

## 2019-01-03 ENCOUNTER — NURSE TRIAGE (OUTPATIENT)
Dept: NURSING | Facility: CLINIC | Age: 39
End: 2019-01-03

## 2019-01-03 DIAGNOSIS — I87.2 VENOUS (PERIPHERAL) INSUFFICIENCY: Primary | ICD-10-CM

## 2019-01-04 NOTE — TELEPHONE ENCOUNTER
37 y/o female calls back about Ultrasound Results, RN able to read the results and the recommendation to her. She would like the consult to A vascular Specialist. I will route that message to the PA for review.    Miladis Mcclain RN - [place] Nurse Advisor  01/03/2019  7:15PM

## 2019-01-08 ENCOUNTER — TELEPHONE (OUTPATIENT)
Dept: OTHER | Facility: CLINIC | Age: 39
End: 2019-01-08

## 2019-01-08 NOTE — PROGRESS NOTES
"  SUBJECTIVE:   Ashley Mcdaniels is a 38 year old female who presents to clinic today for the following health issues:      HPI     Patient in clinic today to follow up on back/chest pain. She was last seen on 12/14 with Gerhard Ugarte PA-C. She has noticed pain in the left upper back between her shoulder blades radiating into the bilateral chest for the past month. She states it came on one day and has not gone away since. She denies any fall or injury but states it has become more constant lately. She states it's \"like an ache, and with some stabbing.\" She denies any extremity numbness, tingling or weakness. She has also noticed increased shortness of breath since the symptoms began, worse with activity but it can occur at rest as well. No increased chest pain upon exertion. She has an occasional, non-productive cough as well for the past month. No leg swelling, orthopnea, or PND. She had labs, chest x-ray, EKG and bilateral lower extremity US ordered/completed at her last visit with no abnormal findings besides venous insufficiency of the right lower extremity for which she is following up with a vascular specialist in a few weeks. Given her continued symptoms, she would like further evaluation on this - requesting a CT or MRI. Her GERD has been better controlled recently as she had her gastric band adjusted/filled.       Per ES note from 12/14/18: \"- Uncertain the cause of her back pain and chest pain, possibly musculoskeletal, possibly related to her chronic GERD that may be causing more problems especially as she treats this daily and still has break through symptoms.  Did EKG which shows no concerns, chest x-ray no concerns.  Labs show no concerns as well at this time.  Over the weekend recommended ice/heat, stretching, massage and chiropractic treatment to see if this helps resolve her symptoms. No concerns on exam for deep venous thrombosis and low suspicion for pulmonary embolism.  Consider additional imaging " "in the future or stress test to further evaluate her chest symptoms, less concerned for cardiac as symptoms start in the back and wrap into both sides of the chest.    - For the breast mass possibly just a plugged duct but will check mammogram and ultrasound to verify there is no mass.  No signs of infection on exam today.   - For her GERD recommended she continue therapy she is on and add on Zantac 150 mg twice per day.  Because of her long standing GERD and break through symptoms recommended endoscopy to further evaluate.  Will schedule.   - For her leg pain concerned for venous insufficiency, no signs of arterial cause, will obtain ultrasound and refer to general surgery.   - Recommend she see OB/GYN regarding her possibly recurring cyst and pelvic pressure and urgency that is intermittent. \"    Problem list and histories reviewed & adjusted, as indicated.  Additional history: none    ROS:  GENERAL: Denies fever, fatigue, weakness, weight gain, or weight loss.  HEENT: Eyes-Denies pain, redness, loss of vision, double or blurred vision.     Ears/Nose-Denies tinnitus, loss of hearing, epistaxis, decreased sense of smell, nasal congestion. Denies loss of sense of taste, dry mouth, or sore throat.   CARDIOVASCULAR: +Bilateral chest pain. Denies shortness of breath, irregular heartbeats, palpitations, or edema.  RESPIRATORY: Denies cough, hemoptysis, and shortness of breath.  GASTROINTESTINAL: +Improved heartburn. Denies nausea, vomiting, change in appetite, abdominal pain, diarrhea, or constipation.  MUSCULOSKELETAL: +Left upper back pain, bilateral chest pain, bilateral leg aching.   NEUROLOGIC: Denies headache, fainting, dizziness, memory loss, numbness, tingling, or seizures.    OBJECTIVE:     /64   Pulse 79   Temp 97.3  F (36.3  C) (Temporal)   Resp 16   Ht 1.6 m (5' 3\")   Wt 92.1 kg (203 lb)   SpO2 99%   BMI 35.96 kg/m    Body mass index is 35.96 kg/m .  GENERAL: healthy, alert and no " distress  RESP: lungs clear to auscultation - no rales, rhonchi or wheezes  CV: regular rate and rhythm, normal S1 S2, no S3 or S4, no murmur, click or rub, no peripheral edema   ABDOMEN: soft, nontender, no hepatosplenomegaly, no masses and bowel sounds normal  MS: no gross musculoskeletal defects noted. No chest wall or sternal tenderness. No tenderness over the cervical or thoracic spinous processes or over the left scapular area. There is tenderness over the upper lumbar spinous processes.   NEURO: Normal strength and tone, mentation intact and speech normal. Cranial nerves II-XII are grossly intact. DTRs are 2+/4 throughout and symmetric. Gait is stable.     ASSESSMENT/PLAN:       ICD-10-CM    1. Chest pain, unspecified type R07.9 D dimer, quantitative     CT Chest w Contrast   2. Upper back pain on left side M54.9 D dimer, quantitative     CT Chest w Contrast   3. Shortness of breath R06.02 D dimer, quantitative     CT Chest w Contrast   4. History of cigarette smoking Z87.891 CT Chest w Contrast         Negative work up last month regarding her chest pain and shortness of breath although BLE venous US positive for right venous insufficiency without evidence of DVT. She has follow up scheduled with vascular specialist. I would recommend a metabolic panel include LFTs and pancreatic enzymes to rule out pancreatitis. I will also order a D dimer to rule out a PE although she is not tachycardic with no history of DVT or PE so this is less likely but still possible. Given her continued symptoms, I also recommend a contrasted chest CT, especially with her associated shortness of breath and negative work up thus far. If this is normal, will order a thoracic MRI as symptoms could be caused by thoracic radiculopathy. A cardiac cause is still in the differential but less likely given her normal EKG last month with no increased chest pain upon exertion, but will consider stress testing if all other diagnostic testing  come back normal. She is agreeable with this plan and I will notify her of her results.       Axel Roche PA-C  [facility]

## 2019-01-08 NOTE — TELEPHONE ENCOUNTER
Patient is scheduled for a consult appointment with Dr. Maldonado on 1/28/19, patient requested that date.

## 2019-01-08 NOTE — TELEPHONE ENCOUNTER
Pt referred to [facility] by Gerhard Ugarte PA-C  for venous insufficiency.    Patient has venous competency US in  [facility] on 1/2/19.     Pt needs to be scheduled for consult with vascular medicine.  Will route to scheduling to coordinate an appointment at next available.    Radha Mcneill, [place], RN

## 2019-01-10 ENCOUNTER — OFFICE VISIT (OUTPATIENT)
Dept: FAMILY MEDICINE | Facility: OTHER | Age: 39
End: 2019-01-10
Payer: COMMERCIAL

## 2019-01-10 VITALS
HEIGHT: 63 IN | RESPIRATION RATE: 16 BRPM | DIASTOLIC BLOOD PRESSURE: 64 MMHG | WEIGHT: 203 LBS | SYSTOLIC BLOOD PRESSURE: 118 MMHG | OXYGEN SATURATION: 99 % | TEMPERATURE: 97.3 F | BODY MASS INDEX: 35.97 KG/M2 | HEART RATE: 79 BPM

## 2019-01-10 DIAGNOSIS — M54.9 UPPER BACK PAIN ON LEFT SIDE: ICD-10-CM

## 2019-01-10 DIAGNOSIS — R07.9 CHEST PAIN, UNSPECIFIED TYPE: Primary | ICD-10-CM

## 2019-01-10 DIAGNOSIS — R06.02 SHORTNESS OF BREATH: ICD-10-CM

## 2019-01-10 DIAGNOSIS — Z87.891 HISTORY OF CIGARETTE SMOKING: ICD-10-CM

## 2019-01-10 LAB
ALBUMIN SERPL-MCNC: 3.7 G/DL (ref 3.4–5)
ALP SERPL-CCNC: 71 U/L (ref 40–150)
ALT SERPL W P-5'-P-CCNC: 22 U/L (ref 0–50)
AMYLASE SERPL-CCNC: 82 U/L (ref 30–110)
ANION GAP SERPL CALCULATED.3IONS-SCNC: 4 MMOL/L (ref 3–14)
AST SERPL W P-5'-P-CCNC: 15 U/L (ref 0–45)
BILIRUB SERPL-MCNC: 0.2 MG/DL (ref 0.2–1.3)
BUN SERPL-MCNC: 10 MG/DL (ref 7–30)
CALCIUM SERPL-MCNC: 8.7 MG/DL (ref 8.5–10.1)
CHLORIDE SERPL-SCNC: 103 MMOL/L (ref 94–109)
CO2 SERPL-SCNC: 30 MMOL/L (ref 20–32)
CREAT SERPL-MCNC: 0.86 MG/DL (ref 0.52–1.04)
D DIMER PPP FEU-MCNC: 0.3 UG/ML FEU (ref 0–0.5)
GFR SERPL CREATININE-BSD FRML MDRD: 86 ML/MIN/{1.73_M2}
GLUCOSE SERPL-MCNC: 87 MG/DL (ref 70–99)
LIPASE SERPL-CCNC: 204 U/L (ref 73–393)
POTASSIUM SERPL-SCNC: 4.1 MMOL/L (ref 3.4–5.3)
PROT SERPL-MCNC: 7.6 G/DL (ref 6.8–8.8)
SODIUM SERPL-SCNC: 137 MMOL/L (ref 133–144)

## 2019-01-10 PROCEDURE — 83690 ASSAY OF LIPASE: CPT | Performed by: PHYSICIAN ASSISTANT

## 2019-01-10 PROCEDURE — 80053 COMPREHEN METABOLIC PANEL: CPT | Performed by: PHYSICIAN ASSISTANT

## 2019-01-10 PROCEDURE — 82150 ASSAY OF AMYLASE: CPT | Performed by: PHYSICIAN ASSISTANT

## 2019-01-10 PROCEDURE — 99214 OFFICE O/P EST MOD 30 MIN: CPT | Performed by: PHYSICIAN ASSISTANT

## 2019-01-10 PROCEDURE — 36415 COLL VENOUS BLD VENIPUNCTURE: CPT | Performed by: PHYSICIAN ASSISTANT

## 2019-01-10 PROCEDURE — 85379 FIBRIN DEGRADATION QUANT: CPT | Performed by: PHYSICIAN ASSISTANT

## 2019-01-10 ASSESSMENT — MIFFLIN-ST. JEOR: SCORE: 1569.93

## 2019-01-10 NOTE — PATIENT INSTRUCTIONS
Will order a D dimer to rule out a blood clot and will also order a chest CT to further evaluate your pain and shortness of breath.    If this is normal, will order an MRI of your thoracic spine to rule out a bulging disc.    If you have further questions, let me know.

## 2019-01-14 ENCOUNTER — HOSPITAL ENCOUNTER (OUTPATIENT)
Dept: CT IMAGING | Facility: CLINIC | Age: 39
Discharge: HOME | End: 2019-01-14
Attending: PHYSICIAN ASSISTANT | Admitting: PHYSICIAN ASSISTANT
Payer: COMMERCIAL

## 2019-01-14 ENCOUNTER — TELEPHONE (OUTPATIENT)
Dept: FAMILY MEDICINE | Facility: OTHER | Age: 39
End: 2019-01-14

## 2019-01-14 DIAGNOSIS — M54.6 PAIN IN THORACIC SPINE: Primary | ICD-10-CM

## 2019-01-14 DIAGNOSIS — R06.02 SHORTNESS OF BREATH: ICD-10-CM

## 2019-01-14 DIAGNOSIS — R07.9 CHEST PAIN, UNSPECIFIED TYPE: ICD-10-CM

## 2019-01-14 DIAGNOSIS — Z87.891 HISTORY OF CIGARETTE SMOKING: ICD-10-CM

## 2019-01-14 DIAGNOSIS — M54.9 UPPER BACK PAIN ON LEFT SIDE: ICD-10-CM

## 2019-01-14 PROCEDURE — 25000125 ZZHC RX 250: Performed by: PHYSICIAN ASSISTANT

## 2019-01-14 PROCEDURE — 25000128 H RX IP 250 OP 636: Performed by: PHYSICIAN ASSISTANT

## 2019-01-14 PROCEDURE — 71260 CT THORAX DX C+: CPT

## 2019-01-14 RX ORDER — IOPAMIDOL 755 MG/ML
500 INJECTION, SOLUTION INTRAVASCULAR ONCE
Status: DISCONTINUED | OUTPATIENT
Start: 2019-01-14 | End: 2019-01-14

## 2019-01-14 RX ORDER — IOPAMIDOL 755 MG/ML
200 INJECTION, SOLUTION INTRAVASCULAR ONCE
Status: COMPLETED | OUTPATIENT
Start: 2019-01-14 | End: 2019-01-14

## 2019-01-14 RX ADMIN — IOPAMIDOL 75 ML: 755 INJECTION, SOLUTION INTRAVENOUS at 08:27

## 2019-01-14 RX ADMIN — SODIUM CHLORIDE 75 ML: 9 INJECTION, SOLUTION INTRAVENOUS at 08:27

## 2019-01-15 NOTE — TELEPHONE ENCOUNTER
Please call and notify patient that her chest CT was completely normal so as we discussed, I would recommend a thoracic MRI for further evaluation of her pain. Order has been placed.    Axel Roche PA-C

## 2019-01-22 ENCOUNTER — ALLIED HEALTH/NURSE VISIT (OUTPATIENT)
Dept: ALLERGY | Facility: OTHER | Age: 39
End: 2019-01-22
Payer: COMMERCIAL

## 2019-01-22 DIAGNOSIS — L50.1 CHRONIC IDIOPATHIC URTICARIA: ICD-10-CM

## 2019-01-22 PROCEDURE — 96372 THER/PROPH/DIAG INJ SC/IM: CPT

## 2019-01-22 PROCEDURE — 99207 ZZC DROP WITH A PROCEDURE: CPT

## 2019-01-22 NOTE — PROGRESS NOTES
Patient presented after waiting 30 minutes with no reaction to xolair injections. Discharged from clinic.    Miguel Luna RN....1/22/2019 9:06 AM

## 2019-01-28 ENCOUNTER — OFFICE VISIT (OUTPATIENT)
Dept: OTHER | Facility: CLINIC | Age: 39
End: 2019-01-28
Attending: INTERNAL MEDICINE
Payer: COMMERCIAL

## 2019-01-28 VITALS
HEIGHT: 63 IN | WEIGHT: 203.8 LBS | RESPIRATION RATE: 18 BRPM | BODY MASS INDEX: 36.11 KG/M2 | SYSTOLIC BLOOD PRESSURE: 129 MMHG | HEART RATE: 60 BPM | OXYGEN SATURATION: 98 % | DIASTOLIC BLOOD PRESSURE: 79 MMHG

## 2019-01-28 DIAGNOSIS — I87.2 VENOUS (PERIPHERAL) INSUFFICIENCY: Primary | ICD-10-CM

## 2019-01-28 PROCEDURE — 99204 OFFICE O/P NEW MOD 45 MIN: CPT | Mod: ZP | Performed by: INTERNAL MEDICINE

## 2019-01-28 PROCEDURE — G0463 HOSPITAL OUTPT CLINIC VISIT: HCPCS

## 2019-01-28 ASSESSMENT — MIFFLIN-ST. JEOR: SCORE: 1573.56

## 2019-01-28 NOTE — PROGRESS NOTES
"Ashley Mcdaniels is a 38 year old female who presents for:  Chief Complaint   Patient presents with     RECHECK        Vitals:    Vitals:    01/28/19 1048 01/28/19 1049   BP: 129/80 129/79   BP Location: Right arm Left arm   Patient Position: Chair Chair   Cuff Size: Adult Regular Adult Regular   Pulse: 60    Resp: 18    SpO2: 98%    Weight: 203 lb 12.8 oz (92.4 kg)    Height: 5' 3\" (1.6 m)        BMI:  Estimated body mass index is 36.1 kg/m  as calculated from the following:    Height as of this encounter: 5' 3\" (1.6 m).    Weight as of this encounter: 203 lb 12.8 oz (92.4 kg).    Pain Score:  Data Unavailable        Little Massey    "

## 2019-01-28 NOTE — PROGRESS NOTES
Vascular Medicine Consultation     Chief Complaint   Bilateral leg swelling    Date of Admission:  (Not on file)    Ashley Mcdaniels is a 38 year old female who is here for bilateral leg swelling.    Code Status    Full code    Reason for Consult   Reason for consult: I was asked by PCP to evaluate this patient for bilateral leg swelling.    Primary Care Physician   Melva You      History is obtained from the patient    History of Present Illness   Ashley Mcdaniels is a 38 year old female who presents with bilateral leg swelling, patient had Doppler venous insufficiency which showed evidence of bilateral deep system reflux and superficial right GS V reflux, patient does not have the typical signs and symptoms of reflux apart from leg achiness which is worse at the end of the day with some leg swelling occasionally, patient denies any history of spontaneous ulceration, spontaneous bleeding, restless leg symptoms, itching yet the patient has skin changes at the anteromedial aspect of both legs more prominent on the right side, looks like a rash, none painful, not typical of skin manifestation for Crohn's disease can be an early manifestation of stasis dermatitis  Patient denies any history of claudication, rest pain or nocturnal pain  Patient denies any history of DVT, any history of varicose veins, no family history of varicose veins or DVT      Past Medical History   I have reviewed this patient's medical history and updated it with pertinent information if needed.   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  Past Surgical History:   Procedure Laterality Date     CHOLECYSTECTOMY  Feb 2007     DILATION AND CURETTAGE  May 2003    Non-OB     HC TOOTH EXTRACTION W/FORCEP  Nov 2006     LAP ADJUSTABLE GASTRIC BAND  May 2007       Prior to Admission Medications   Cannot display prior to admission medications because the patient has not been admitted in this contact.     Allergies   Allergies   Allergen Reactions     Azathioprine Other (See Comments)     pancreatitis     No Clinical Screening - See Comments Hives     From stress       Social History   I have reviewed this patient's social history and updated it with pertinent information if needed. Ashley Mcdaniels  reports that  has never smoked. she has never used smokeless tobacco. She reports that she drinks alcohol. She reports that she does not use drugs.    Family History   I have reviewed this patient's family history and updated it with pertinent information if needed.   Family History   Problem Relation Age of Onset     Arthritis Mother      Deep Vein Thrombosis Mother      Heart Disease Father         CAD, CHF     Alcoholism Father      Asthma Father      Hypertension Father      Hyperlipidemia Father      Pancreatic Cancer Father      Liver Cancer Father      Diabetes Maternal Grandmother      Substance Abuse Maternal Grandmother      Hyperlipidemia Maternal Grandmother      Hypertension Maternal Grandmother      Substance Abuse Maternal Grandfather      Asthma Paternal Grandmother      Heart Disease Paternal Grandmother      Diabetes Paternal Grandmother      Hypertension Paternal Grandmother      Substance Abuse Paternal Grandfather      Mental Illness Brother      Substance Abuse Brother        Review of Systems   The 10 point Review of Systems is negative other than noted in the HPI or here.     Physical Exam       BP: 129/79 Pulse: 60   Resp: 18 SpO2: 98 %      Vital Signs with Ranges  Pulse:  [60] 60  Resp:  [18] 18  BP:  (129)/(79-80) 129/79  SpO2:  [98 %] 98 %  203 lbs 12.8 oz    Constitutional: awake, alert, cooperative, no apparent distress, and appears stated age  Eyes: Lids and lashes normal, pupils equal, round and reactive to light, extra ocular muscles intact, sclera clear, conjunctiva normal  ENT: normocepalic, without obvious abnormality, oropharynx pink and moist  Hematologic / Lymphatic: no lymphadenopathy  Respiratory: No increased work of breathing, good air exchange, clear to auscultation bilaterally, no crackles or wheezing  Cardiovascular: regular rate and rhythm, normal S1 and S2 and no murmur noted  GI: Normal bowel sounds, soft, non-distended, non-tender  Skin: no redness, warmth, or swelling, no rashes and no lesions  Musculoskeletal: There is no redness, warmth, or swelling of the joints.  Full range of motion noted.  Motor strength is 5 out of 5 all extremities bilaterally.  Tone is normal.  Neurologic: Awake, alert, oriented to name, place and time.  Cranial nerves II-XII are grossly intact.  Motor is 5 out of 5 bilaterally.    Neuropsychiatric:  Normal affect, memory, insight.  Pulses: Positive pulses bilateral. No carotid bruits appreciated.     Data   Most Recent 3 CBC's:  Recent Labs   Lab Test 12/14/18  1644 05/09/18  0834 10/31/17  1318   WBC 9.8 14.3* 12.4*   HGB 13.2 14.1 13.9   MCV 89 86 87    341 377     Most Recent 3 BMP's:  Recent Labs   Lab Test 01/10/19  0938 05/09/18  0834 04/10/18  0952 10/31/17  1318 05/26/17  1222     --   --  137 141   POTASSIUM 4.1  --   --  3.9 3.9   CHLORIDE 103  --   --  103 106   CO2 30  --   --  25 30   BUN 10  --   --  7 8   CR 0.86 0.93  --  0.84 0.78   ANIONGAP 4  --   --  9 5   JOVANNI 8.7  --  8.3* 8.6 8.9   GLC 87  --   --  106* 75     Most Recent 2 LFT's:  Recent Labs   Lab Test 01/10/19  0938 05/09/18  0834   AST 15 15   ALT 22 21   ALKPHOS 71 75   BILITOTAL 0.2 0.2     Most Recent 3 INR's:No lab results found.  Anticoagulation Dose History     There  is no flowsheet data to display.        Most Recent 3 Troponin's:No lab results found.  Most Recent 3 BNP's:No lab results found.  Most Recent D-dimer:  Recent Labs   Lab Test 01/10/19  0934   DD 0.3     Most Recent Cholesterol Panel:  Recent Labs   Lab Test 04/26/16  0908   CHOL 155   LDL 57   HDL 78   TRIG 100     Most Recent 6 Bacteria Isolates From Any Culture (See EPIC Reports for Culture Details):No lab results found.    IMPRESSION:  1. No deep vein thrombosis in either lower extremity.  2. Deep venous insufficiency in the right external iliac and common  femoral veins.  3. Superficial venous insufficiency in the right great saphenous vein  at the level of proximal thigh.  4. No deep or superficial venous insufficiency in the left.  Assessment & Plan   No diagnosis found.    Summary: Bilateral lower extremity venous insufficiency, no evidence of DVT bilaterally, she would benefit from compression treatment, patient was given a prescription for compression stockings, 20-30 mmHg, bilateral, thigh-high,  Care instructions were given to the patient  Follow-up with the patient in 3 months from now    Leland Maldonado MD

## 2019-01-29 DIAGNOSIS — K50.80 CROHN'S DISEASE OF BOTH SMALL AND LARGE INTESTINE WITHOUT COMPLICATION (H): Primary | ICD-10-CM

## 2019-03-12 ENCOUNTER — ALLIED HEALTH/NURSE VISIT (OUTPATIENT)
Dept: ALLERGY | Facility: OTHER | Age: 39
End: 2019-03-12
Payer: COMMERCIAL

## 2019-03-12 DIAGNOSIS — K50.80 CROHN'S DISEASE OF BOTH SMALL AND LARGE INTESTINE WITHOUT COMPLICATION (H): ICD-10-CM

## 2019-03-12 DIAGNOSIS — L50.1 CHRONIC IDIOPATHIC URTICARIA: ICD-10-CM

## 2019-03-12 LAB
ALBUMIN SERPL-MCNC: 3.7 G/DL (ref 3.4–5)
ALP SERPL-CCNC: 68 U/L (ref 40–150)
ALT SERPL W P-5'-P-CCNC: 24 U/L (ref 0–50)
AST SERPL W P-5'-P-CCNC: 20 U/L (ref 0–45)
BASOPHILS # BLD AUTO: 0 10E9/L (ref 0–0.2)
BASOPHILS NFR BLD AUTO: 0.1 %
BILIRUB DIRECT SERPL-MCNC: <0.1 MG/DL (ref 0–0.2)
BILIRUB SERPL-MCNC: 0.4 MG/DL (ref 0.2–1.3)
CREAT SERPL-MCNC: 0.76 MG/DL (ref 0.52–1.04)
DIFFERENTIAL METHOD BLD: NORMAL
EOSINOPHIL # BLD AUTO: 0.2 10E9/L (ref 0–0.7)
EOSINOPHIL NFR BLD AUTO: 1.8 %
ERYTHROCYTE [DISTWIDTH] IN BLOOD BY AUTOMATED COUNT: 13.6 % (ref 10–15)
GFR SERPL CREATININE-BSD FRML MDRD: >90 ML/MIN/{1.73_M2}
HCT VFR BLD AUTO: 42.5 % (ref 35–47)
HGB BLD-MCNC: 14 G/DL (ref 11.7–15.7)
LYMPHOCYTES # BLD AUTO: 3 10E9/L (ref 0.8–5.3)
LYMPHOCYTES NFR BLD AUTO: 34 %
MCH RBC QN AUTO: 29.4 PG (ref 26.5–33)
MCHC RBC AUTO-ENTMCNC: 32.9 G/DL (ref 31.5–36.5)
MCV RBC AUTO: 89 FL (ref 78–100)
MONOCYTES # BLD AUTO: 0.8 10E9/L (ref 0–1.3)
MONOCYTES NFR BLD AUTO: 8.5 %
NEUTROPHILS # BLD AUTO: 4.9 10E9/L (ref 1.6–8.3)
NEUTROPHILS NFR BLD AUTO: 55.6 %
PLATELET # BLD AUTO: 299 10E9/L (ref 150–450)
PROT SERPL-MCNC: 7.7 G/DL (ref 6.8–8.8)
RBC # BLD AUTO: 4.76 10E12/L (ref 3.8–5.2)
WBC # BLD AUTO: 8.9 10E9/L (ref 4–11)

## 2019-03-12 PROCEDURE — 85025 COMPLETE CBC W/AUTO DIFF WBC: CPT | Performed by: PHYSICIAN ASSISTANT

## 2019-03-12 PROCEDURE — 36415 COLL VENOUS BLD VENIPUNCTURE: CPT | Performed by: PHYSICIAN ASSISTANT

## 2019-03-12 PROCEDURE — 96372 THER/PROPH/DIAG INJ SC/IM: CPT

## 2019-03-12 PROCEDURE — 82565 ASSAY OF CREATININE: CPT | Performed by: PHYSICIAN ASSISTANT

## 2019-03-12 PROCEDURE — 80076 HEPATIC FUNCTION PANEL: CPT | Performed by: PHYSICIAN ASSISTANT

## 2019-03-12 PROCEDURE — 99207 ZZC DROP WITH A PROCEDURE: CPT

## 2019-03-12 NOTE — PROGRESS NOTES
Patient presented after waiting 30 minutes with no reaction to Xolair injections. Discharged from clinic.    Marylu Correa RN ............   3/12/2019...8:35 AM

## 2019-04-01 NOTE — PROGRESS NOTES
"  SUBJECTIVE:   Ashley Mcdaniels is a 38 year old female who presents to clinic today for the following health issues:    History of Present Illness     Depression & Anxiety Follow-up:     Depression/Anxiety:  Depression & Anxiety    Status since last visit::  Worsened    Other associated symptoms of depression and anxiety::  YES    Significant life event::  No    Current substance use::  Alcohol       Today's PHQ-9         PHQ-9 Total Score:     (P) 13   PHQ-9 Q9 Thoughts of better off dead/self-harm past 2 weeks :   (P) Several days   Thoughts of suicide or self harm:  (P) No   Self-harm Plan:        Self-harm Action:          Safety concerns for self or others: (P) No     YUE-7 Total Score: (P) 15    Diet:  Gluten-free/reduced  Frequency of exercise:  2-3 days/week  Duration of exercise:  30-45 minutes  Taking medications regularly:  Yes  Medication side effects:  None  Additional concerns today:  Yes    Abnormal Mood Symptoms  Onset: \"years\"    Description:   Depression: YES  Anxiety: YES    Accompanying Signs & Symptoms:  Still participating in activities that you used to enjoy: YES- \"have to push myself very hard\"  Fatigue: YES, tired often  Irritability: YES- \"easy to be grumpy\"  Difficulty concentrating: YES  Changes in appetite: YES- off and on  Problems with sleep: YES- have hard time falling asleep but once she is asleep she is okay.   Heart racing/beating fast : YES- sometimes  Thoughts of hurting yourself or others: none    History:   Recent stress: YES- work relationship, Feels mood is more up and down EMDR therapy and focusing on past trauma, therapy is working well, but sucks sometimes.   Prior depression hospitalization: None  Family history of depression: YES  Family history of anxiety: YES    Precipitating factors:   Alcohol/drug use: YES- alcohol occasionally.   Medication: Wellbutrin is helping but not to where she wants it     Alleviating factors: Exercise, EMDR/coping strategies    Therapies Tried " and outcome: Wellbutrin (Bupropion)    Zoloft, effexor, prozac - all work relatively well for a while; possibly side effect on zoloft  Wellbutrin - has worked the best.    Feels EMDR is going well but it does bring up more and more traumas for past.   Her worsening depression has also made her home life worse.  Work is also stressful.   Her therapist who used to be a pharmacist recommended she try Topiramate.     Denies history of seizures, migraines or kidney stones.    weight loss: have been trying quite sometime.  Has tried to lose weight but more recently just gaining weight.  Per patient gained 6 lbs over the weekend.   She says in general she doesn't eat too poorly.  She has a history of gastric procedure which has resulted in smaller portion sizes.  Admits to eating more red meat than she probably should.   Sometimes eats 3 meals per day, has to have foods that are finger foods and quick to eat during day at work including yogurt, vegetables.   She has been able to walk more and ride horse for physical activity.   She is on Wellbutrin for mood and tolerating.   Goal weight is 130-140 lbs. Last time was this weight 4 years ago.     Problem list and histories reviewed & adjusted, as indicated.  Additional history: as documented    Current Outpatient Medications   Medication Sig Dispense Refill     acetaminophen (TYLENOL) 500 MG tablet Take 1,000 mg by mouth       B Complex-Folic Acid (B COMPLEX FORMULA 1) TABS Take 1 tablet by mouth       buPROPion (WELLBUTRIN XL) 300 MG 24 hr tablet Take 1 tablet (300 mg) by mouth every morning 90 tablet 3     cetirizine (ZYRTEC) 10 MG tablet Take 1 tablet (10 mg) by mouth daily 30 tablet 0     EPINEPHrine (EPIPEN/ADRENACLICK/OR ANY BX GENERIC EQUIV) 0.3 MG/0.3ML injection 2-pack Inject 0.3 mLs (0.3 mg) into the muscle as needed for anaphylaxis 0.6 mL 1     HUMIRA PEN 40 MG/0.8ML pen kit   2     hydrOXYzine (ATARAX) 25 MG tablet Take 25-50 mg by mouth as needed       ibuprofen  (ADVIL,MOTRIN) 200 MG tablet Take 400 mg by mouth       levonorgestrel (MIRENA) 20 MCG/24HR IUD 1 each (20 mcg) by Intrauterine route continuous       NP THYROID 15 MG TABS tablet TAKE ONE TABLET BY MOUTH EVERY DAY 90 tablet 1     omalizumab (XOLAIR) 150 MG injection Inject 2.4 mLs (300 mg) Subcutaneous every 28 days 2 each      topiramate (TOPAMAX) 25 MG tablet Take 1 tablet (25 mg) by mouth 2 times daily 60 tablet 1     valACYclovir (VALTREX) 1000 mg tablet Take 0.5 tablets (500 mg) by mouth 2 times daily 20 tablet 11     folic acid (FOLVITE) 1 MG tablet   0     methotrexate 50 MG/2ML injection CHEMO Inject 25 mg Subcutaneous         ROS:  Constitutional, HEENT, cardiovascular, pulmonary, GI, , musculoskeletal, neuro, skin, endocrine and psych systems are negative, except as otherwise noted.    OBJECTIVE:     /76   Pulse 90   Temp 98  F (36.7  C) (Temporal)   Resp 16   Wt 95.3 kg (210 lb 3.2 oz)   SpO2 95%   BMI 37.24 kg/m    Body mass index is 37.24 kg/m .  GENERAL: healthy, alert and no distress  NECK: no adenopathy, no asymmetry, masses, or scars and thyroid normal to palpation  RESP: lungs clear to auscultation - no rales, rhonchi or wheezes  CV: regular rate and rhythm, normal S1 S2, no S3 or S4, no murmur, click or rub, no peripheral edema and peripheral pulses strong  MS: no gross musculoskeletal defects noted, no edema  SKIN: no suspicious lesions or rashes  PSYCH: mentation appears normal, affect normal/bright    Diagnostic Test Results:  No results found for this or any previous visit (from the past 24 hour(s)).    ASSESSMENT/PLAN:       ICD-10-CM    1. YUE (generalized anxiety disorder) F41.1 topiramate (TOPAMAX) 25 MG tablet   2. Morbid obesity (H) E66.01 topiramate (TOPAMAX) 25 MG tablet     TSH with free T4 reflex   3. Major depressive disorder, recurrent episode, mild (H) F33.0 topiramate (TOPAMAX) 25 MG tablet       We discussed how important it is for her mood to be more stable to  help facilitate her weight loss.   In regards to her mood she will continue Wellbutrin 300 mg daily and will add on Topiramate 25 mg once per day x 7 days and then can increase to twice per day or 50 mg once per day and recommend at bedtime.  Monitor for side effects which were reviewed with patient.  Consider SSRI/SNRI therapy such as Lexapro or Cymbalta in future for her mood.     In regards to weight encouraged small changes.  Topiramate and Wellbutrin may help, may need to titrate doses accordingly. Encouraged her to track food closely with portion sizes so we can look at next visit.  For now continue with her current activities, exercise will increase as mood is improved.     Follow-up in 2 weeks, sooner if needed.     Options for treatment and follow-up care were reviewed with the patient and/or guardian. Patient and/or guardian engaged in the decision making process and verbalized understanding of the options discussed and agreed with the final plan.     Gerhard Ugarte PA-C  [facility]      Answers for HPI/ROS submitted by the patient on 4/2/2019   Chronic problems general questions HPI Form  If you checked off any problems, how difficult have these problems made it for you to do your work, take care of things at home, or get along with other people?: Very difficult  PHQ9 TOTAL SCORE: 13  YUE 7 TOTAL SCORE: 15

## 2019-04-02 ENCOUNTER — MYC MEDICAL ADVICE (OUTPATIENT)
Dept: FAMILY MEDICINE | Facility: OTHER | Age: 39
End: 2019-04-02

## 2019-04-02 ENCOUNTER — OFFICE VISIT (OUTPATIENT)
Dept: FAMILY MEDICINE | Facility: OTHER | Age: 39
End: 2019-04-02
Payer: COMMERCIAL

## 2019-04-02 VITALS
OXYGEN SATURATION: 95 % | SYSTOLIC BLOOD PRESSURE: 110 MMHG | HEART RATE: 90 BPM | WEIGHT: 210.2 LBS | RESPIRATION RATE: 16 BRPM | DIASTOLIC BLOOD PRESSURE: 76 MMHG | TEMPERATURE: 98 F | BODY MASS INDEX: 37.24 KG/M2

## 2019-04-02 DIAGNOSIS — F33.0 MAJOR DEPRESSIVE DISORDER, RECURRENT EPISODE, MILD (H): ICD-10-CM

## 2019-04-02 DIAGNOSIS — E66.01 MORBID OBESITY (H): ICD-10-CM

## 2019-04-02 DIAGNOSIS — F41.1 GAD (GENERALIZED ANXIETY DISORDER): Primary | ICD-10-CM

## 2019-04-02 PROCEDURE — 99214 OFFICE O/P EST MOD 30 MIN: CPT | Performed by: PHYSICIAN ASSISTANT

## 2019-04-02 RX ORDER — HYDROXYZINE HYDROCHLORIDE 25 MG/1
25-50 TABLET, FILM COATED ORAL PRN
COMMUNITY
Start: 2017-02-16 | End: 2021-08-04

## 2019-04-02 RX ORDER — TOPIRAMATE 25 MG/1
25 TABLET, FILM COATED ORAL 2 TIMES DAILY
Qty: 60 TABLET | Refills: 1 | Status: SHIPPED | OUTPATIENT
Start: 2019-04-02 | End: 2019-04-23

## 2019-04-02 ASSESSMENT — ANXIETY QUESTIONNAIRES
2. NOT BEING ABLE TO STOP OR CONTROL WORRYING: MORE THAN HALF THE DAYS
GAD7 TOTAL SCORE: 15
GAD7 TOTAL SCORE: 15
7. FEELING AFRAID AS IF SOMETHING AWFUL MIGHT HAPPEN: MORE THAN HALF THE DAYS
GAD7 TOTAL SCORE: 15
5. BEING SO RESTLESS THAT IT IS HARD TO SIT STILL: MORE THAN HALF THE DAYS
1. FEELING NERVOUS, ANXIOUS, OR ON EDGE: NEARLY EVERY DAY
3. WORRYING TOO MUCH ABOUT DIFFERENT THINGS: MORE THAN HALF THE DAYS
7. FEELING AFRAID AS IF SOMETHING AWFUL MIGHT HAPPEN: MORE THAN HALF THE DAYS
6. BECOMING EASILY ANNOYED OR IRRITABLE: MORE THAN HALF THE DAYS
4. TROUBLE RELAXING: MORE THAN HALF THE DAYS

## 2019-04-02 ASSESSMENT — PATIENT HEALTH QUESTIONNAIRE - PHQ9
10. IF YOU CHECKED OFF ANY PROBLEMS, HOW DIFFICULT HAVE THESE PROBLEMS MADE IT FOR YOU TO DO YOUR WORK, TAKE CARE OF THINGS AT HOME, OR GET ALONG WITH OTHER PEOPLE: VERY DIFFICULT
SUM OF ALL RESPONSES TO PHQ QUESTIONS 1-9: 13
SUM OF ALL RESPONSES TO PHQ QUESTIONS 1-9: 13

## 2019-04-02 NOTE — PATIENT INSTRUCTIONS
Topriamate:  Take 1 tablet daily at bedtime after a week or so if doing well increase to either 1 tablet twice per day or 2 tablets at bedtime.     Tracking food intake and portion amounts for next visit 1 week worth.   Water 64 ozs  Exercise - continue with walking

## 2019-04-03 ASSESSMENT — PATIENT HEALTH QUESTIONNAIRE - PHQ9: SUM OF ALL RESPONSES TO PHQ QUESTIONS 1-9: 13

## 2019-04-03 ASSESSMENT — ANXIETY QUESTIONNAIRES: GAD7 TOTAL SCORE: 15

## 2019-04-09 ENCOUNTER — ALLIED HEALTH/NURSE VISIT (OUTPATIENT)
Dept: ALLERGY | Facility: OTHER | Age: 39
End: 2019-04-09
Payer: COMMERCIAL

## 2019-04-09 DIAGNOSIS — L50.1 CHRONIC IDIOPATHIC URTICARIA: ICD-10-CM

## 2019-04-09 PROCEDURE — 96372 THER/PROPH/DIAG INJ SC/IM: CPT

## 2019-04-09 PROCEDURE — 99207 ZZC DROP WITH A PROCEDURE: CPT

## 2019-04-10 DIAGNOSIS — E66.01 MORBID OBESITY (H): ICD-10-CM

## 2019-04-10 LAB — TSH SERPL DL<=0.005 MIU/L-ACNC: 1.75 MU/L (ref 0.4–4)

## 2019-04-10 PROCEDURE — 84443 ASSAY THYROID STIM HORMONE: CPT | Performed by: PHYSICIAN ASSISTANT

## 2019-04-10 PROCEDURE — 36415 COLL VENOUS BLD VENIPUNCTURE: CPT | Performed by: PHYSICIAN ASSISTANT

## 2019-04-16 ENCOUNTER — TRANSFERRED RECORDS (OUTPATIENT)
Dept: HEALTH INFORMATION MANAGEMENT | Facility: CLINIC | Age: 39
End: 2019-04-16

## 2019-04-18 NOTE — PROGRESS NOTES
SUBJECTIVE:   Ashley Mcdaniels is a 38 year old female who presents to clinic today for the following health issues:    History of Present Illness     Diet:  Gluten-free/reduced and Breakfast skipped  Frequency of exercise:  2-3 days/week  Duration of exercise:  30-45 minutes  Taking medications regularly:  Yes  Medication side effects:  None  Additional concerns today:  No      Plan from last OV 04/02/2019:  We discussed how important it is for her mood to be more stable to help facilitate her weight loss.   In regards to her mood she will continue Wellbutrin 300 mg daily and will add on Topiramate 25 mg once per day x 7 days and then can increase to twice per day or 50 mg once per day and recommend at bedtime.  Monitor for side effects which were reviewed with patient.  Consider SSRI/SNRI therapy such as Lexapro or Cymbalta in future for her mood.      In regards to weight encouraged small changes.  Topiramate and Wellbutrin may help, may need to titrate doses accordingly. Encouraged her to track food closely with portion sizes so we can look at next visit.  For now continue with her current activities, exercise will increase as mood is improved.     Wt Readings from Last 2 Encounters:   04/23/19 96.2 kg (212 lb)   04/02/19 95.3 kg (210 lb 3.2 oz)       - Her mood is doing better since starting the Topiramate, continuing on Wellbutrin and continuing with EMDR.   - She denies any side effects on the Topiramate.  She is taking 25 mg twice per day.   - She thinks it is maybe helping more with portion control, she isn't emotional eating as much or doesn't feel like she has to.   - She has been trying to go to the Gym 2-3 days per week, she says she loves to get on her horse to ride which is a workout and that is up to 4 times per week.   - Her diet has been relatively consistent.  Chicken and fish for protein.  Potatoes, sweet potatoes, vegetables as sides.  Remembering to eat 3 meals per day.   - She is very  frustrated, tracking her weight daily, has noticed major fluctuations in weight up to 6 lbs up or down.     - Saw GI recently, follow-up in 1 year.  Continue on Humira.  Due for regular labs.     Additional history: as documented    Reviewed and updated as needed this visit by clinical staff         Reviewed and updated as needed this visit by Provider         Current Outpatient Medications   Medication Sig Dispense Refill     acetaminophen (TYLENOL) 500 MG tablet Take 1,000 mg by mouth       B Complex-Folic Acid (B COMPLEX FORMULA 1) TABS Take 1 tablet by mouth       buPROPion (WELLBUTRIN XL) 300 MG 24 hr tablet Take 1 tablet (300 mg) by mouth every morning 90 tablet 3     cetirizine (ZYRTEC) 10 MG tablet Take 1 tablet (10 mg) by mouth daily 30 tablet 0     EPINEPHrine (EPIPEN/ADRENACLICK/OR ANY BX GENERIC EQUIV) 0.3 MG/0.3ML injection 2-pack Inject 0.3 mLs (0.3 mg) into the muscle as needed for anaphylaxis 0.6 mL 1     folic acid (FOLVITE) 1 MG tablet   0     HUMIRA *CF* PEN 40 MG/0.4ML pen kit   1     hydrOXYzine (ATARAX) 25 MG tablet Take 25-50 mg by mouth as needed       ibuprofen (ADVIL,MOTRIN) 200 MG tablet Take 400 mg by mouth       levonorgestrel (MIRENA) 20 MCG/24HR IUD 1 each (20 mcg) by Intrauterine route continuous       NP THYROID 15 MG TABS tablet TAKE ONE TABLET BY MOUTH EVERY DAY 90 tablet 1     omalizumab (XOLAIR) 150 MG injection Inject 2.4 mLs (300 mg) Subcutaneous every 28 days 2 each      topiramate (TOPAMAX) 50 MG tablet Take 1 tablet (50 mg) by mouth 2 times daily 60 tablet 2     valACYclovir (VALTREX) 1000 mg tablet Take 0.5 tablets (500 mg) by mouth 2 times daily 20 tablet 11     HUMIRA PEN 40 MG/0.8ML pen kit   2     methotrexate 50 MG/2ML injection CHEMO Inject 25 mg Subcutaneous         ROS:  Constitutional, HEENT, cardiovascular, pulmonary, GI, , musculoskeletal, neuro, skin, endocrine and psych systems are negative, except as otherwise noted.    OBJECTIVE:     /78   Pulse 80    Temp 97.9  F (36.6  C) (Temporal)   Resp 16   Wt 96.2 kg (212 lb)   SpO2 98%   BMI 37.55 kg/m    Body mass index is 37.55 kg/m .  GENERAL: healthy, alert and no distress  MS: no gross musculoskeletal defects noted, no edema  SKIN: no suspicious lesions or rashes  NEURO: Normal strength and tone, mentation intact and speech normal  PSYCH: mentation appears normal, affect normal/bright    Diagnostic Test Results:  none     ASSESSMENT/PLAN:       ICD-10-CM    1. Morbid obesity (H) E66.01 topiramate (TOPAMAX) 50 MG tablet   2. YUE (generalized anxiety disorder) F41.1 topiramate (TOPAMAX) 50 MG tablet   3. Major depressive disorder, recurrent episode, mild (H) F33.0 topiramate (TOPAMAX) 50 MG tablet   4. Crohn's disease of both small and large intestine without complication (H) K50.80      - Encouraged her to keep up good job with her diet and work to eat consistently and keep consistent in her calorie intake.   - We increased topiramate today to 50 mg twice per day.  Continue on Wellbutrin. Her mood is improving which will help with her weight loss.   - Discouraged daily weights, she appears more frustrated by this and this is likely causing her to lose her motivation to make these changes.   - Encouraged more high intensity work outs and interval training to try and help her lose more weight.   - Discussed that the difficulties to her weight loss are multimodal.  We need to continue to work to improve those other concerns such as her mental health to try and help her make these more sustainable changes.   - [facility] and gave [facility]er regarding the program.     Follow-up in 1 month, sooner if needed.     Greater than 30 minutes were spent today with more than 50% dedicated to counseling and coordination of care of the above mentioned problems.     Options for treatment and follow-up care were reviewed with the patient and/or guardian. Patient and/or guardian engaged in the decision  making process and verbalized understanding of the options discussed and agreed with the final plan.     Gerhard Ugarte PA-C  [facility]    Answers for HPI/ROS submitted by the patient on 4/20/2019   Chronic problems general questions HPI Form  If you checked off any problems, how difficult have these problems made it for you to do your work, take care of things at home, or get along with other people?: Somewhat difficult  PHQ9 TOTAL SCORE: 11  YUE 7 TOTAL SCORE: 5

## 2019-04-20 ASSESSMENT — ANXIETY QUESTIONNAIRES
3. WORRYING TOO MUCH ABOUT DIFFERENT THINGS: SEVERAL DAYS
6. BECOMING EASILY ANNOYED OR IRRITABLE: SEVERAL DAYS
5. BEING SO RESTLESS THAT IT IS HARD TO SIT STILL: NOT AT ALL
1. FEELING NERVOUS, ANXIOUS, OR ON EDGE: SEVERAL DAYS
GAD7 TOTAL SCORE: 5
7. FEELING AFRAID AS IF SOMETHING AWFUL MIGHT HAPPEN: NOT AT ALL
GAD7 TOTAL SCORE: 5
4. TROUBLE RELAXING: SEVERAL DAYS
GAD7 TOTAL SCORE: 5
7. FEELING AFRAID AS IF SOMETHING AWFUL MIGHT HAPPEN: NOT AT ALL
2. NOT BEING ABLE TO STOP OR CONTROL WORRYING: SEVERAL DAYS

## 2019-04-20 ASSESSMENT — PATIENT HEALTH QUESTIONNAIRE - PHQ9
10. IF YOU CHECKED OFF ANY PROBLEMS, HOW DIFFICULT HAVE THESE PROBLEMS MADE IT FOR YOU TO DO YOUR WORK, TAKE CARE OF THINGS AT HOME, OR GET ALONG WITH OTHER PEOPLE: SOMEWHAT DIFFICULT
SUM OF ALL RESPONSES TO PHQ QUESTIONS 1-9: 11
SUM OF ALL RESPONSES TO PHQ QUESTIONS 1-9: 11

## 2019-04-21 ASSESSMENT — PATIENT HEALTH QUESTIONNAIRE - PHQ9: SUM OF ALL RESPONSES TO PHQ QUESTIONS 1-9: 11

## 2019-04-21 ASSESSMENT — ANXIETY QUESTIONNAIRES: GAD7 TOTAL SCORE: 5

## 2019-04-22 DIAGNOSIS — K50.80 CROHN'S DISEASE OF BOTH SMALL AND LARGE INTESTINE WITHOUT COMPLICATION (H): Primary | ICD-10-CM

## 2019-04-23 ENCOUNTER — OFFICE VISIT (OUTPATIENT)
Dept: FAMILY MEDICINE | Facility: OTHER | Age: 39
End: 2019-04-23
Payer: COMMERCIAL

## 2019-04-23 VITALS
TEMPERATURE: 97.9 F | OXYGEN SATURATION: 98 % | DIASTOLIC BLOOD PRESSURE: 78 MMHG | SYSTOLIC BLOOD PRESSURE: 116 MMHG | HEART RATE: 80 BPM | WEIGHT: 212 LBS | RESPIRATION RATE: 16 BRPM | BODY MASS INDEX: 37.55 KG/M2

## 2019-04-23 DIAGNOSIS — K50.80 CROHN'S DISEASE OF BOTH SMALL AND LARGE INTESTINE WITHOUT COMPLICATION (H): ICD-10-CM

## 2019-04-23 DIAGNOSIS — E66.01 MORBID OBESITY (H): Primary | ICD-10-CM

## 2019-04-23 DIAGNOSIS — F41.1 GAD (GENERALIZED ANXIETY DISORDER): ICD-10-CM

## 2019-04-23 DIAGNOSIS — F33.0 MAJOR DEPRESSIVE DISORDER, RECURRENT EPISODE, MILD (H): ICD-10-CM

## 2019-04-23 PROCEDURE — 99213 OFFICE O/P EST LOW 20 MIN: CPT | Performed by: PHYSICIAN ASSISTANT

## 2019-04-23 RX ORDER — ADALIMUMAB 40MG/0.4ML
KIT SUBCUTANEOUS
Refills: 1 | COMMUNITY
Start: 2019-04-16

## 2019-04-23 RX ORDER — TOPIRAMATE 50 MG/1
50 TABLET, FILM COATED ORAL 2 TIMES DAILY
Qty: 60 TABLET | Refills: 2 | Status: SHIPPED | OUTPATIENT
Start: 2019-04-23 | End: 2019-07-03

## 2019-04-23 ASSESSMENT — PAIN SCALES - GENERAL: PAINLEVEL: NO PAIN (0)

## 2019-05-07 ENCOUNTER — OFFICE VISIT (OUTPATIENT)
Dept: ALLERGY | Facility: OTHER | Age: 39
End: 2019-05-07
Payer: COMMERCIAL

## 2019-05-07 ENCOUNTER — ALLIED HEALTH/NURSE VISIT (OUTPATIENT)
Dept: ALLERGY | Facility: OTHER | Age: 39
End: 2019-05-07
Payer: COMMERCIAL

## 2019-05-07 VITALS
DIASTOLIC BLOOD PRESSURE: 72 MMHG | HEIGHT: 64 IN | WEIGHT: 212 LBS | SYSTOLIC BLOOD PRESSURE: 112 MMHG | BODY MASS INDEX: 36.19 KG/M2 | HEART RATE: 80 BPM | OXYGEN SATURATION: 98 % | TEMPERATURE: 97.9 F

## 2019-05-07 DIAGNOSIS — L50.8 CHRONIC URTICARIA: ICD-10-CM

## 2019-05-07 DIAGNOSIS — L50.1 CHRONIC IDIOPATHIC URTICARIA: ICD-10-CM

## 2019-05-07 PROCEDURE — 99213 OFFICE O/P EST LOW 20 MIN: CPT | Mod: 25 | Performed by: ALLERGY & IMMUNOLOGY

## 2019-05-07 PROCEDURE — 96372 THER/PROPH/DIAG INJ SC/IM: CPT

## 2019-05-07 ASSESSMENT — MIFFLIN-ST. JEOR: SCORE: 1618.69

## 2019-05-07 NOTE — LETTER
5/7/2019         RE: Ashley Mcdaniels  [id number] [facility] [address]        Dear Colleague,    Thank you for referring your patient, Ashley Mcdaniels, to the [facility]. Please see a copy of my visit note below.    Ashley Mcdaniels is a 38 year old White female with previous medical history significant for Crohn's disease, and chronic urticaria who returns for a follow up visit.    Patient has a history of chronic urticaria.  She is on Xolair.  Approximately 3 to 4 months ago she  missed her monthly dose of Xolair and went 6 weeks in between doses.  She developed urticarial lesions.  She reports that the hives were on her face, arms.  She denied angioedema.  She had to resume using Zyrtec.  She reports that hives have improved since resuming Xolair but she still requires Zyrtec 10 mg by mouth twice daily.  If she uses 10 mg once daily she will develop hives.  She is tolerating Xolair.    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      Crohn's colitis (H)      YUE (generalized anxiety disorder)      GERD (gastroesophageal reflux disease)      Low back pain      Major depressive disorder, recurrent episode, mild (H)      Family History   Problem Relation Age of Onset     Arthritis Mother      Deep Vein Thrombosis Mother      Heart Disease Father         CAD, CHF     Alcoholism Father      Asthma Father      Hypertension Father      Hyperlipidemia Father      Pancreatic Cancer Father      Liver Cancer Father      Diabetes Maternal Grandmother      Substance Abuse Maternal Grandmother      Hyperlipidemia Maternal Grandmother      Hypertension Maternal Grandmother      Substance Abuse Maternal Grandfather      Asthma Paternal Grandmother      Heart Disease Paternal Grandmother      Diabetes Paternal Grandmother      Hypertension Paternal Grandmother      Substance Abuse Paternal Grandfather      Mental  Illness Brother      Substance Abuse Brother      Past Surgical History:   Procedure Laterality Date     CHOLECYSTECTOMY  Feb 2007     DILATION AND CURETTAGE  May 2003    Non-OB     HC TOOTH EXTRACTION W/FORCEP  Nov 2006     LAP ADJUSTABLE GASTRIC BAND  May 2007       REVIEW OF SYSTEMS:  General: negative for weight gain. negative for weight loss. negative for changes in sleep.   Ears: negative for fullness. negative for hearing loss. negative for dizziness.   Nose: negative for snoring.negative for changes in smell. negative for drainage.   Eyes: negative for eye watering. negative for eye itching. negative for vision changes. negative for eye redness.  Throat: negative for hoarseness. negative for sore throat. negative for trouble swallowing.   Lungs: negative for shortness of breath.negative for wheezing. negative for sputum production.   Cardiovascular: negative for chest pain. negative for swelling of ankles. negative for fast or irregular heartbeat.   Gastrointestinal: negative for nausea. negative for heartburn. negative for acid reflux.   Musculoskeletal: negative for joint pain. negative for joint stiffness. negative for joint swelling.   Neurologic: negative for seizures. negative for fainting. negative for weakness.   Psychiatric: negative for changes in mood. negative for anxiety.   Endocrine: negative for cold intolerance. negative for heat intolerance. negative for tremors.   Lymphatic: negative for lower extremity swelling. negative for lymph node swelling.   Hematologic: negative for easy bruising. negative for easy bleeding.  Integumentary: negative for rash. negative for scaling. negative for nail changes.       Current Outpatient Medications:      acetaminophen (TYLENOL) 500 MG tablet, Take 1,000 mg by mouth, Disp: , Rfl:      B Complex-Folic Acid (B COMPLEX FORMULA 1) TABS, Take 1 tablet by mouth, Disp: , Rfl:      buPROPion (WELLBUTRIN XL) 300 MG 24 hr tablet, Take 1 tablet (300 mg) by mouth  every morning, Disp: 90 tablet, Rfl: 3     cetirizine (ZYRTEC) 10 MG tablet, Take 1 tablet (10 mg) by mouth daily, Disp: 30 tablet, Rfl: 0     EPINEPHrine (EPIPEN/ADRENACLICK/OR ANY BX GENERIC EQUIV) 0.3 MG/0.3ML injection 2-pack, Inject 0.3 mLs (0.3 mg) into the muscle as needed for anaphylaxis, Disp: 0.6 mL, Rfl: 1     folic acid (FOLVITE) 1 MG tablet, , Disp: , Rfl: 0     HUMIRA *CF* PEN 40 MG/0.4ML pen kit, , Disp: , Rfl: 1     HUMIRA PEN 40 MG/0.8ML pen kit, , Disp: , Rfl: 2     hydrOXYzine (ATARAX) 25 MG tablet, Take 25-50 mg by mouth as needed, Disp: , Rfl:      ibuprofen (ADVIL,MOTRIN) 200 MG tablet, Take 400 mg by mouth, Disp: , Rfl:      levonorgestrel (MIRENA) 20 MCG/24HR IUD, 1 each (20 mcg) by Intrauterine route continuous, Disp: , Rfl:      NP THYROID 15 MG TABS tablet, TAKE ONE TABLET BY MOUTH EVERY DAY, Disp: 90 tablet, Rfl: 1     omalizumab (XOLAIR) 150 MG injection, Inject 2.4 mLs (300 mg) Subcutaneous every 28 days, Disp: 2 each, Rfl:      topiramate (TOPAMAX) 50 MG tablet, Take 1 tablet (50 mg) by mouth 2 times daily, Disp: 60 tablet, Rfl: 2     valACYclovir (VALTREX) 1000 mg tablet, Take 0.5 tablets (500 mg) by mouth 2 times daily, Disp: 20 tablet, Rfl: 11     methotrexate 50 MG/2ML injection CHEMO, Inject 25 mg Subcutaneous, Disp: , Rfl:   Immunization History   Administered Date(s) Administered     FLU 6-35 months 10/16/2002, 11/28/2003     HepA-Adult 12/05/2017     Historical Hepb 10/12/2015, 12/05/2017     Influenza (IIV3) PF 11/02/2005, 10/17/2006, 11/07/2007, 10/21/2008     Influenza Vaccine IM 3yrs+ 4 Valent IIV4 10/12/2015     Mantoux Tuberculin Skin Test 10/26/2015     Pneumococcal 23 valent 02/09/2018     TDAP Vaccine (Adacel) 09/07/2012     Allergies   Allergen Reactions     Azathioprine Other (See Comments)     pancreatitis     No Clinical Screening - See Comments Hives     From stress         EXAM:   Constitutional:  Appears well-developed and well-nourished. No distress.   HEENT:    Head: Normocephalic.   Mouth/Throat:   No cobblestoning of posterior oropharynx.   Nasal tissue pink and normal appearing.  No rhinorrhea noted.    Eyes: Conjunctivae are non-erythematous   Cardiovascular: Normal rate, regular rhythm and normal heart sounds. Exam reveals no gallop and no friction rub.   No murmur heard.  Respiratory: Effort normal and breath sounds normal. No respiratory distress. No wheezes. No rales.   Musculoskeletal: Normal range of motion.   Neuro: Oriented to person, place, and time.  Skin: Skin is warm and dry. No rash noted.   Psychiatric: Normal mood and affect.     Nursing note and vitals reviewed.    ASSESSMENT/PLAN:  Problem List Items Addressed This Visit        Musculoskeletal and Integumentary    Chronic urticaria     Chronic idiopathic urticaria. On Xolair and helpful.  No angioedema. Tried to space out Xolair to 6 weeks and increased hives. On Zyrtec 10mg PO bid.      - Cetirizine 10-40 mg by mouth daily as needed for hives.   - Continue Xolair 300mg monthly.     - Return to clinic in 6 months.                Chart documentation with Dragon Voice recognition Software. Although reviewed after completion, some words and grammatical errors may remain.    Ian Cash DO FAAAAI  Allergy/Immunology  [facility] [place], [place]      Again, thank you for allowing me to participate in the care of your patient.        Sincerely,        Ian Cash DO

## 2019-05-07 NOTE — PATIENT INSTRUCTIONS
Allergy Staff Appt Hours Shot Hours Locations    Physician     Ian Cash DO       Support Staff     BARBIE Lowe, RYAN  Tuesday:        [place] 7-4:20     Wednesday:        [place]: 7-5     Thursday:                    [place] 7-6:40     Friday:        [place] 7-2:40   [place]        Thursday: 1-5:50        Friday: 7-10:50     [place]        Tuesday: 7- 3:20        Wednesday: 7-4:20     [place] Monday: 7-4:20        Tuesday: 1-6:20         [facility]  [address] [place] [address]  Appt Line: [phone]  Allergy RN:  [phone]    [facility]  [address] [place] [address]  Appt Line: [phone]  Allergy RN:  [phone]       Important Scheduling Information  Aspirin Desensitization: Appt will last 2 clinic days. Please call the Allergy RN line for your clinic to schedule. Discontinue antihistamines 7 days prior to the appointment.     Food Challenges: Appt will last 3-4 hours. Please call the Allergy RN line for your clinic to schedule. Discontinue antihistamines 7 days prior to the appointment.     Penicillin Testing: Appt will last 2-3 hours. Please call the Allergy RN line for your clinic to schedule. Discontinue antihistamines 7 days prior to the appointment.     Skin Testing: Appt will about 40 minutes. Call the appointment line for your clinic to schedule. Discontinue antihistamines 7 days prior to the appointment.     Venom Testing: Appt will last 2-3 hours. Please call the Allergy RN line for your clinic to schedule. Discontinue antihistamines 7 days prior to the appointment.     Thank you for trusting us with your Allergy, Asthma, and Immunology care. Please feel free to contact us with any questions or concerns you may have.      - Continue Xolair monthly.   - Zyrtec 10-40mg by mouth daily as needed for hives.   - Return in 6 months.

## 2019-05-07 NOTE — PROGRESS NOTES
Ashley Mcdaniels is a 38 year old White female with previous medical history significant for Crohn's disease, and chronic urticaria who returns for a follow up visit.    Patient has a history of chronic urticaria.  She is on Xolair.  Approximately 3 to 4 months ago she  missed her monthly dose of Xolair and went 6 weeks in between doses.  She developed urticarial lesions.  She reports that the hives were on her face, arms.  She denied angioedema.  She had to resume using Zyrtec.  She reports that hives have improved since resuming Xolair but she still requires Zyrtec 10 mg by mouth twice daily.  If she uses 10 mg once daily she will develop hives.  She is tolerating Xolair.    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      Crohn's colitis (H)      YUE (generalized anxiety disorder)      GERD (gastroesophageal reflux disease)      Low back pain      Major depressive disorder, recurrent episode, mild (H)      Family History   Problem Relation Age of Onset     Arthritis Mother      Deep Vein Thrombosis Mother      Heart Disease Father         CAD, CHF     Alcoholism Father      Asthma Father      Hypertension Father      Hyperlipidemia Father      Pancreatic Cancer Father      Liver Cancer Father      Diabetes Maternal Grandmother      Substance Abuse Maternal Grandmother      Hyperlipidemia Maternal Grandmother      Hypertension Maternal Grandmother      Substance Abuse Maternal Grandfather      Asthma Paternal Grandmother      Heart Disease Paternal Grandmother      Diabetes Paternal Grandmother      Hypertension Paternal Grandmother      Substance Abuse Paternal Grandfather      Mental Illness Brother      Substance Abuse Brother      Past Surgical History:   Procedure Laterality Date     CHOLECYSTECTOMY  Feb 2007     DILATION AND CURETTAGE  May 2003    Non-OB     HC TOOTH EXTRACTION W/FORCEP  Nov 2006     LAP  ADJUSTABLE GASTRIC BAND  May 2007       REVIEW OF SYSTEMS:  General: negative for weight gain. negative for weight loss. negative for changes in sleep.   Ears: negative for fullness. negative for hearing loss. negative for dizziness.   Nose: negative for snoring.negative for changes in smell. negative for drainage.   Eyes: negative for eye watering. negative for eye itching. negative for vision changes. negative for eye redness.  Throat: negative for hoarseness. negative for sore throat. negative for trouble swallowing.   Lungs: negative for shortness of breath.negative for wheezing. negative for sputum production.   Cardiovascular: negative for chest pain. negative for swelling of ankles. negative for fast or irregular heartbeat.   Gastrointestinal: negative for nausea. negative for heartburn. negative for acid reflux.   Musculoskeletal: negative for joint pain. negative for joint stiffness. negative for joint swelling.   Neurologic: negative for seizures. negative for fainting. negative for weakness.   Psychiatric: negative for changes in mood. negative for anxiety.   Endocrine: negative for cold intolerance. negative for heat intolerance. negative for tremors.   Lymphatic: negative for lower extremity swelling. negative for lymph node swelling.   Hematologic: negative for easy bruising. negative for easy bleeding.  Integumentary: negative for rash. negative for scaling. negative for nail changes.       Current Outpatient Medications:      acetaminophen (TYLENOL) 500 MG tablet, Take 1,000 mg by mouth, Disp: , Rfl:      B Complex-Folic Acid (B COMPLEX FORMULA 1) TABS, Take 1 tablet by mouth, Disp: , Rfl:      buPROPion (WELLBUTRIN XL) 300 MG 24 hr tablet, Take 1 tablet (300 mg) by mouth every morning, Disp: 90 tablet, Rfl: 3     cetirizine (ZYRTEC) 10 MG tablet, Take 1 tablet (10 mg) by mouth daily, Disp: 30 tablet, Rfl: 0     EPINEPHrine (EPIPEN/ADRENACLICK/OR ANY BX GENERIC EQUIV) 0.3 MG/0.3ML injection 2-pack,  Inject 0.3 mLs (0.3 mg) into the muscle as needed for anaphylaxis, Disp: 0.6 mL, Rfl: 1     folic acid (FOLVITE) 1 MG tablet, , Disp: , Rfl: 0     HUMIRA *CF* PEN 40 MG/0.4ML pen kit, , Disp: , Rfl: 1     HUMIRA PEN 40 MG/0.8ML pen kit, , Disp: , Rfl: 2     hydrOXYzine (ATARAX) 25 MG tablet, Take 25-50 mg by mouth as needed, Disp: , Rfl:      ibuprofen (ADVIL,MOTRIN) 200 MG tablet, Take 400 mg by mouth, Disp: , Rfl:      levonorgestrel (MIRENA) 20 MCG/24HR IUD, 1 each (20 mcg) by Intrauterine route continuous, Disp: , Rfl:      NP THYROID 15 MG TABS tablet, TAKE ONE TABLET BY MOUTH EVERY DAY, Disp: 90 tablet, Rfl: 1     omalizumab (XOLAIR) 150 MG injection, Inject 2.4 mLs (300 mg) Subcutaneous every 28 days, Disp: 2 each, Rfl:      topiramate (TOPAMAX) 50 MG tablet, Take 1 tablet (50 mg) by mouth 2 times daily, Disp: 60 tablet, Rfl: 2     valACYclovir (VALTREX) 1000 mg tablet, Take 0.5 tablets (500 mg) by mouth 2 times daily, Disp: 20 tablet, Rfl: 11     methotrexate 50 MG/2ML injection CHEMO, Inject 25 mg Subcutaneous, Disp: , Rfl:   Immunization History   Administered Date(s) Administered     FLU 6-35 months 10/16/2002, 11/28/2003     HepA-Adult 12/05/2017     Historical Hepb 10/12/2015, 12/05/2017     Influenza (IIV3) PF 11/02/2005, 10/17/2006, 11/07/2007, 10/21/2008     Influenza Vaccine IM 3yrs+ 4 Valent IIV4 10/12/2015     Mantoux Tuberculin Skin Test 10/26/2015     Pneumococcal 23 valent 02/09/2018     TDAP Vaccine (Adacel) 09/07/2012     Allergies   Allergen Reactions     Azathioprine Other (See Comments)     pancreatitis     No Clinical Screening - See Comments Hives     From stress         EXAM:   Constitutional:  Appears well-developed and well-nourished. No distress.   HEENT:   Head: Normocephalic.   Mouth/Throat:   No cobblestoning of posterior oropharynx.   Nasal tissue pink and normal appearing.  No rhinorrhea noted.    Eyes: Conjunctivae are non-erythematous   Cardiovascular: Normal rate, regular  rhythm and normal heart sounds. Exam reveals no gallop and no friction rub.   No murmur heard.  Respiratory: Effort normal and breath sounds normal. No respiratory distress. No wheezes. No rales.   Musculoskeletal: Normal range of motion.   Neuro: Oriented to person, place, and time.  Skin: Skin is warm and dry. No rash noted.   Psychiatric: Normal mood and affect.     Nursing note and vitals reviewed.    ASSESSMENT/PLAN:  Problem List Items Addressed This Visit        Musculoskeletal and Integumentary    Chronic urticaria     Chronic idiopathic urticaria. On Xolair and helpful.  No angioedema. Tried to space out Xolair to 6 weeks and increased hives. On Zyrtec 10mg PO bid.      - Cetirizine 10-40 mg by mouth daily as needed for hives.   - Continue Xolair 300mg monthly.     - Return to clinic in 6 months.                Chart documentation with Dragon Voice recognition Software. Although reviewed after completion, some words and grammatical errors may remain.    Ian Cash DO FAAAAI  Allergy/Immunology  [facility], [place]

## 2019-05-07 NOTE — PROGRESS NOTES
Patient presented after waiting 30 minutes with no reaction to xolair injections. Discharged from clinic.    Sandra Everett RN ............   5/7/2019...8:42 AM

## 2019-05-07 NOTE — ASSESSMENT & PLAN NOTE
Chronic idiopathic urticaria. On Xolair and helpful.  No angioedema. Tried to space out Xolair to 6 weeks and increased hives. On Zyrtec 10mg PO bid.      - Cetirizine 10-40 mg by mouth daily as needed for hives.   - Continue Xolair 300mg monthly.     - Return to clinic in 6 months.

## 2019-05-28 ENCOUNTER — MYC MEDICAL ADVICE (OUTPATIENT)
Dept: FAMILY MEDICINE | Facility: OTHER | Age: 39
End: 2019-05-28

## 2019-05-28 NOTE — TELEPHONE ENCOUNTER
Recommend we discuss at her follow-up in clinic.  Generally recheck EKG to be sure that is normal before starting medication.      Gerhard Ugarte PA-C

## 2019-05-28 NOTE — TELEPHONE ENCOUNTER
Looks like per LOV needed to follow up in one month, she does have an appointment scheduled on 06/04, did you want to see her sooner?  Teri Massey MA

## 2019-05-29 NOTE — PROGRESS NOTES
Subjective     Ashley Mcdaniels is a 38 year old female who presents to clinic today for the following health issues:    History of Present Illness     Mental Health Follow-up:  Patient presents to follow-up on Depression & Anxiety.                 Social History  Tobacco Use    Smoking status: Never Smoker    Smokeless tobacco: Never Used  Alcohol use: Yes    Alcohol/week: 0.0 oz    Comment: occasional   Drug use: No      Today's PHQ-9         PHQ-9 Total Score:     (P) 8   PHQ-9 Q9 Thoughts of better off dead/self-harm past 2 weeks :   (P) Several days   Thoughts of suicide or self harm:  (P) No   Self-harm Plan:        Self-harm Action:          Safety concerns for self or others: (P) No         She eats 2-3 servings of fruits and vegetables daily.She consumes 0 sweetened beverage(s) daily.  She is taking medications regularly.     Subjective:  Ashley Mcdaniels is a 38 year old female here for follow-up on Weight Management.  Patient is currently on topiramate medication at 50mg dose.  Patient is not experiencing side effects from the medication.      Diet doing well, eats lean meats, vegetables, avoids processed foods and carbohydrates.   Exercise started walking more.     Very frustrated she hasn't been able to lose weight.  Admits she hasn't done as much with exercise.  But she has been eating well.     Weight:  Wt Readings from Last 5 Encounters:   06/04/19 96.6 kg (213 lb)   05/07/19 96.2 kg (212 lb)   04/23/19 96.2 kg (212 lb)   04/02/19 95.3 kg (210 lb 3.2 oz)   01/28/19 92.4 kg (203 lb 12.8 oz)       ROS:  General: Denies fevers, chills  Skin: normal color, denies rashes, skin lesions, changes in color  HEENT: Denies sore throat, dry mouth   Respiratory: Denies cough, SOB, wheezing  Cardiovascular: Denies chest pain, arrhythmias  Gastrointestinal: Denies nausea, vomiting, diarrhea  Genitoruinary: Denies frequency, dysuria, hematuria  Neurologic: Denies lightheadedness, headaches    Past Medical History:  "  Diagnosis Date     Acquired hypothyroidism      ADHD (attention deficit hyperactivity disorder)     Inattentive, dx 2/2012      Crohn's colitis (H)      YUE (generalized anxiety disorder)      GERD (gastroesophageal reflux disease)      Low back pain      Major depressive disorder, recurrent episode, mild (H)      Past Surgical History:   Procedure Laterality Date     CHOLECYSTECTOMY  Feb 2007     DILATION AND CURETTAGE  May 2003    Non-OB     HC TOOTH EXTRACTION W/FORCEP  Nov 2006     LAP ADJUSTABLE GASTRIC BAND  May 2007     History   Smoking Status     Never Smoker   Smokeless Tobacco     Never Used       Current Outpatient Medications   Medication     acetaminophen (TYLENOL) 500 MG tablet     B Complex-Folic Acid (B COMPLEX FORMULA 1) TABS     buPROPion (WELLBUTRIN XL) 300 MG 24 hr tablet     cetirizine (ZYRTEC) 10 MG tablet     EPINEPHrine (EPIPEN/ADRENACLICK/OR ANY BX GENERIC EQUIV) 0.3 MG/0.3ML injection 2-pack     folic acid (FOLVITE) 1 MG tablet     HUMIRA *CF* PEN 40 MG/0.4ML pen kit     HUMIRA PEN 40 MG/0.8ML pen kit     hydrOXYzine (ATARAX) 25 MG tablet     ibuprofen (ADVIL,MOTRIN) 200 MG tablet     levonorgestrel (MIRENA) 20 MCG/24HR IUD     NP THYROID 15 MG TABS tablet     omalizumab (XOLAIR) 150 MG injection     Phentermine HCl 15 MG TBDP     topiramate (TOPAMAX) 50 MG tablet     valACYclovir (VALTREX) 1000 mg tablet     methotrexate 50 MG/2ML injection CHEMO     topiramate (TOPAMAX) 25 MG tablet     No current facility-administered medications for this visit.    =    Reviewed and updated as needed this visit by Provider  Tobacco  Allergies  Meds  Problems  Med Hx  Surg Hx  Fam Hx         Review of Systems   ROS COMP: Constitutional, HEENT, cardiovascular, pulmonary, gi and gu systems are negative, except as otherwise noted.      Objective    /70 (BP Location: Right arm, Cuff Size: Adult Regular)   Pulse 76   Temp 99.1  F (37.3  C)   Resp 14   Ht 1.6 m (5' 3\")   Wt 96.6 kg (213 lb) " "  BMI 37.73 kg/m    Body mass index is 37.73 kg/m .  Physical Exam   GENERAL: healthy, alert and no distress  RESP: lungs clear to auscultation - no rales, rhonchi or wheezes  CV: regular rate and rhythm, normal S1 S2, no S3 or S4, no murmur, click or rub, no peripheral edema and peripheral pulses strong  MS: no gross musculoskeletal defects noted, no edema  SKIN: no suspicious lesions or rashes  PSYCH: mentation appears normal, affect normal/bright    Diagnostic Test Results:  EKG - negative        Assessment & Plan       ICD-10-CM    1. Morbid obesity (H) E66.01 Phentermine HCl 15 MG TBDP     BARIATRIC ADULT REFERRAL     EKG 12-lead complete w/read - Clinics        BMI:   Estimated body mass index is 37.73 kg/m  as calculated from the following:    Height as of this encounter: 1.6 m (5' 3\").    Weight as of this encounter: 96.6 kg (213 lb).   Weight management plan: Discussed healthy diet and exercise guidelines Specific weight management program called [facility] 24 week weight loss program discussed    - She is very frustrated by lack of weight loss.  She has been doing well with diet (unable to see actual logs of food).  She understands what she has to eat.   - She still has not increased much for physical activity, discussed that walking is great but generally not going to see as much weight loss with this type of activity.   - She is interested in restarting Phentermine (in past made her anxious).  We will start at lower dose of 15 mg, continue on Topiramate as patient doesn't think it is affordable to try Qsymia.   - We also discussed Contrave and it's components, she is already on Wellbutrin without additional help.   - She is interested in considering the 24 week program.  Referral made.     Return in about 1 month (around 7/2/2019) for Medication Re-check.     Options for treatment and follow-up care were reviewed with the patient and/or guardian. Patient and/or guardian engaged in the decision making " process and verbalized understanding of the options discussed and agreed with the final plan.     Gerhard Ugarte PA-C  [facility]    Answers for HPI/ROS submitted by the patient on 6/4/2019   Chronic problems general questions HPI Form  If you checked off any problems, how difficult have these problems made it for you to do your work, take care of things at home, or get along with other people?: Somewhat difficult  PHQ9 TOTAL SCORE: 8  YUE 7 TOTAL SCORE: 8

## 2019-06-04 ENCOUNTER — OFFICE VISIT (OUTPATIENT)
Dept: FAMILY MEDICINE | Facility: OTHER | Age: 39
End: 2019-06-04
Payer: COMMERCIAL

## 2019-06-04 ENCOUNTER — ALLIED HEALTH/NURSE VISIT (OUTPATIENT)
Dept: ALLERGY | Facility: OTHER | Age: 39
End: 2019-06-04
Payer: COMMERCIAL

## 2019-06-04 ENCOUNTER — TELEPHONE (OUTPATIENT)
Dept: ALLERGY | Facility: OTHER | Age: 39
End: 2019-06-04

## 2019-06-04 VITALS
BODY MASS INDEX: 37.74 KG/M2 | HEART RATE: 76 BPM | DIASTOLIC BLOOD PRESSURE: 70 MMHG | WEIGHT: 213 LBS | SYSTOLIC BLOOD PRESSURE: 110 MMHG | RESPIRATION RATE: 14 BRPM | TEMPERATURE: 99.1 F | HEIGHT: 63 IN

## 2019-06-04 DIAGNOSIS — E03.9 ACQUIRED HYPOTHYROIDISM: ICD-10-CM

## 2019-06-04 DIAGNOSIS — E66.01 MORBID OBESITY (H): Primary | ICD-10-CM

## 2019-06-04 DIAGNOSIS — L50.1 CHRONIC IDIOPATHIC URTICARIA: ICD-10-CM

## 2019-06-04 DIAGNOSIS — L50.1 CHRONIC IDIOPATHIC URTICARIA: Primary | ICD-10-CM

## 2019-06-04 PROCEDURE — 96372 THER/PROPH/DIAG INJ SC/IM: CPT

## 2019-06-04 PROCEDURE — 99207 ZZC DROP WITH A PROCEDURE: CPT

## 2019-06-04 PROCEDURE — 99214 OFFICE O/P EST MOD 30 MIN: CPT | Performed by: PHYSICIAN ASSISTANT

## 2019-06-04 PROCEDURE — 93000 ELECTROCARDIOGRAM COMPLETE: CPT | Performed by: PHYSICIAN ASSISTANT

## 2019-06-04 RX ORDER — TOPIRAMATE 25 MG/1
TABLET, FILM COATED ORAL
Refills: 0 | COMMUNITY
Start: 2019-04-02 | End: 2019-07-26

## 2019-06-04 ASSESSMENT — ANXIETY QUESTIONNAIRES
GAD7 TOTAL SCORE: 8
GAD7 TOTAL SCORE: 8
4. TROUBLE RELAXING: SEVERAL DAYS
1. FEELING NERVOUS, ANXIOUS, OR ON EDGE: MORE THAN HALF THE DAYS
7. FEELING AFRAID AS IF SOMETHING AWFUL MIGHT HAPPEN: SEVERAL DAYS
GAD7 TOTAL SCORE: 8
3. WORRYING TOO MUCH ABOUT DIFFERENT THINGS: SEVERAL DAYS
7. FEELING AFRAID AS IF SOMETHING AWFUL MIGHT HAPPEN: SEVERAL DAYS
2. NOT BEING ABLE TO STOP OR CONTROL WORRYING: SEVERAL DAYS
6. BECOMING EASILY ANNOYED OR IRRITABLE: SEVERAL DAYS
5. BEING SO RESTLESS THAT IT IS HARD TO SIT STILL: SEVERAL DAYS

## 2019-06-04 ASSESSMENT — PAIN SCALES - GENERAL: PAINLEVEL: NO PAIN (0)

## 2019-06-04 ASSESSMENT — PATIENT HEALTH QUESTIONNAIRE - PHQ9
SUM OF ALL RESPONSES TO PHQ QUESTIONS 1-9: 8
10. IF YOU CHECKED OFF ANY PROBLEMS, HOW DIFFICULT HAVE THESE PROBLEMS MADE IT FOR YOU TO DO YOUR WORK, TAKE CARE OF THINGS AT HOME, OR GET ALONG WITH OTHER PEOPLE: SOMEWHAT DIFFICULT
SUM OF ALL RESPONSES TO PHQ QUESTIONS 1-9: 8

## 2019-06-04 ASSESSMENT — MIFFLIN-ST. JEOR: SCORE: 1615.29

## 2019-06-04 NOTE — PROGRESS NOTES
Patient presented after waiting 30 minutes with no reaction to xolair injections. Discharged from clinic.    Sandra Everett RN ............   6/4/2019...9:00 AM

## 2019-06-04 NOTE — TELEPHONE ENCOUNTER
Patient reports to clinic and states that her injection has not been covered the past two visit. Per [employer] response forms on 5/14/18, no PA required. Patient receives buy and bill xolair 300mg every 28 days. Prescriber Service Form completed and faxed to [employer]. Forms sent to scanning. Sandra Everett RN

## 2019-06-05 ENCOUNTER — TELEPHONE (OUTPATIENT)
Dept: FAMILY MEDICINE | Facility: OTHER | Age: 39
End: 2019-06-05

## 2019-06-05 RX ORDER — THYROID 15 MG/1
15 TABLET ORAL DAILY
Qty: 90 TABLET | Refills: 1 | Status: SHIPPED | OUTPATIENT
Start: 2019-06-05 | End: 2020-11-24

## 2019-06-05 ASSESSMENT — PATIENT HEALTH QUESTIONNAIRE - PHQ9: SUM OF ALL RESPONSES TO PHQ QUESTIONS 1-9: 8

## 2019-06-05 ASSESSMENT — ANXIETY QUESTIONNAIRES: GAD7 TOTAL SCORE: 8

## 2019-06-05 NOTE — TELEPHONE ENCOUNTER
[facility] calling asking about phentermine script, one that was written is not available anymore, clarified with ES okay to give capsules as long as they are 15mg  Teri Massey MA

## 2019-06-05 NOTE — TELEPHONE ENCOUNTER
Contacted [employer], PA is needed for Xolair. They are going to fax over forms/information to [facility]. Will fill out forms and fax once received.      Michela Garcia CMA

## 2019-06-05 NOTE — TELEPHONE ENCOUNTER
Pending Prescriptions:                       Disp   Refills    thyroid (NP THYROID) 15 MG tablet         90 tab*1            Sig: Take 1 tablet (15 mg) by mouth daily    Routing refill request to provider for review/approval because:  A break in medication    Nicole Bhatia RN, BSN

## 2019-06-11 DIAGNOSIS — L50.1 CHRONIC IDIOPATHIC URTICARIA: Primary | ICD-10-CM

## 2019-06-11 NOTE — TELEPHONE ENCOUNTER
Patient was approved for Xolair Prefilled syringes through [facility]. Case number is [id number]. Approval is valid from 03/06/2019 through 11/21/2019. A prior authorization was required for this medication. Medication is being used to treat Chronic Idiopathic Urticaria    The patient is aware that deductible and OOP max must be reached before insurance will cover Xolair at 100%. Specialist copay not listed on fax.    Medication will be obtained by [employer], and an ABN IS required before each injection.. This patient will be receiving Xolair 300 mg every 28 days. Standing orders have been placed. All paperwork has been sent to scanning.    Patient was notified of the approval. Appointments have not been scheduled, left message on patient's confidential voicemail notifying of approval and ability to call and schedule future appointments. Patient is not due for injection at this time. Patient information was added to the IT Patient log. Xolair vials available in clinic for administration.     Sandra Everett RN

## 2019-07-02 ENCOUNTER — ALLIED HEALTH/NURSE VISIT (OUTPATIENT)
Dept: ALLERGY | Facility: OTHER | Age: 39
End: 2019-07-02
Payer: COMMERCIAL

## 2019-07-02 DIAGNOSIS — L50.1 CHRONIC IDIOPATHIC URTICARIA: ICD-10-CM

## 2019-07-02 PROCEDURE — 99207 ZZC DROP WITH A PROCEDURE: CPT

## 2019-07-02 PROCEDURE — 96372 THER/PROPH/DIAG INJ SC/IM: CPT

## 2019-07-02 NOTE — PROGRESS NOTES
Patient presented after waiting 30 minutes with no reaction to xolair injections. Discharged from clinic.    Sandra Everett RN ............   7/2/2019...11:35 AM

## 2019-07-03 ENCOUNTER — MYC REFILL (OUTPATIENT)
Dept: FAMILY MEDICINE | Facility: OTHER | Age: 39
End: 2019-07-03

## 2019-07-03 DIAGNOSIS — E66.01 MORBID OBESITY (H): ICD-10-CM

## 2019-07-03 DIAGNOSIS — F33.0 MAJOR DEPRESSIVE DISORDER, RECURRENT EPISODE, MILD (H): ICD-10-CM

## 2019-07-03 DIAGNOSIS — F41.1 GAD (GENERALIZED ANXIETY DISORDER): ICD-10-CM

## 2019-07-08 RX ORDER — TOPIRAMATE 50 MG/1
50 TABLET, FILM COATED ORAL 2 TIMES DAILY
Qty: 60 TABLET | Refills: 0 | Status: SHIPPED | OUTPATIENT
Start: 2019-07-08 | End: 2019-07-26

## 2019-07-08 RX ORDER — PHENTERMINE HYDROCHLORIDE 15 MG/1
15 CAPSULE ORAL EVERY MORNING
Qty: 15 CAPSULE | Refills: 0 | Status: SHIPPED | OUTPATIENT
Start: 2019-07-08 | End: 2019-07-26

## 2019-07-08 NOTE — TELEPHONE ENCOUNTER
Called and spoke with patient regarding message below.  Patient verbalized understanding.  Chelsea West CMA ([facility])

## 2019-07-08 NOTE — TELEPHONE ENCOUNTER
2 week Rx placed in bin but per ES last note, she was supposed to f/u in 1 month so is due for f/u prior to further refills.    Axel Roche PA-C

## 2019-07-22 NOTE — PROGRESS NOTES
Subjective     Ashley Mcdaniels is a 38 year old female who presents to clinic today for the following health issues:    History of Present Illness        Mental Health Follow-up:  Patient presents to follow-up on Depression & Anxiety.Patient's depression since last visit has been:  Better  The patient is not having other symptoms associated with depression.  Patient's anxiety since last visit has been:  Better  The patient is not having other symptoms associated with anxiety.  Any significant life events: No  Patient is feeling anxious or having panic attacks.  Patient has no concerns about alcohol or drug use.     Social History  Tobacco Use    Smoking status: Never Smoker    Smokeless tobacco: Never Used  Alcohol use: Yes    Alcohol/week: 0.0 oz    Comment: occasional   Drug use: No      Today's PHQ-9         PHQ-9 Total Score:     (P) 7   PHQ-9 Q9 Thoughts of better off dead/self-harm past 2 weeks :   (P) Not at all   Thoughts of suicide or self harm:      Self-harm Plan:        Self-harm Action:          Safety concerns for self or others:           She eats 4 or more servings of fruits and vegetables daily.She consumes 1 sweetened beverage(s) daily.  She is taking medications regularly.     - She started seeing nutritionist and provider for weight management.   - She still plans to have medication managed by myself for now.   - Constipated for the last week.   - Tolerating phentermine, wellbutrin and topiramate without known side effects.   - She has been losing weight but not significant.   - She has been eating regularly, nutrition worried she wasn't eating enough calories and too little protein.   - She is riding horse 3-4 times per week which she notes is core exercise.  She is also walking in the evening.     Wt Readings from Last 2 Encounters:   07/26/19 95 kg (209 lb 6.4 oz)   06/04/19 96.6 kg (213 lb)         Current Outpatient Medications   Medication Sig Dispense Refill     B Complex-Folic Acid (B  "COMPLEX FORMULA 1) TABS Take 1 tablet by mouth       buPROPion (WELLBUTRIN XL) 300 MG 24 hr tablet Take 1 tablet (300 mg) by mouth every morning 90 tablet 3     cetirizine (ZYRTEC) 10 MG tablet Take 1 tablet (10 mg) by mouth daily 30 tablet 0     EPINEPHrine (EPIPEN/ADRENACLICK/OR ANY BX GENERIC EQUIV) 0.3 MG/0.3ML injection 2-pack Inject 0.3 mLs (0.3 mg) into the muscle as needed for anaphylaxis 0.6 mL 1     HUMIRA *CF* PEN 40 MG/0.4ML pen kit   1     hydrOXYzine (ATARAX) 25 MG tablet Take 25-50 mg by mouth as needed       ibuprofen (ADVIL,MOTRIN) 200 MG tablet Take 400 mg by mouth       insulin pen needle (32G X 4 MM) 32G X 4 MM miscellaneous Use 1 pen needles daily or as directed. 30 each 1     levonorgestrel (MIRENA) 20 MCG/24HR IUD 1 each (20 mcg) by Intrauterine route continuous       liraglutide (VICTOZA) 18 MG/3ML solution Inject 0.6 mg Subcutaneous daily 3 mL 1     phentermine (ADIPEX-P) 15 MG capsule Take 1 capsule (15 mg) by mouth every morning 30 capsule 0     thyroid (NP THYROID) 15 MG tablet Take 1 tablet (15 mg) by mouth daily 90 tablet 1     topiramate (TOPAMAX) 50 MG tablet Take 1 tablet (50 mg) by mouth 2 times daily 60 tablet 3     valACYclovir (VALTREX) 1000 mg tablet Take 0.5 tablets (500 mg) by mouth 2 times daily 20 tablet 11     Allergies   Allergen Reactions     Azathioprine Other (See Comments)     pancreatitis     No Clinical Screening - See Comments Hives     From stress       Reviewed and updated as needed this visit by Provider  Tobacco  Allergies  Meds  Problems  Med Hx  Surg Hx  Fam Hx         Review of Systems   ROS COMP: Constitutional, HEENT, cardiovascular, pulmonary, GI, , musculoskeletal, neuro, skin, endocrine and psych systems are negative, except as otherwise noted.      Objective    /72   Pulse 80   Temp 98.6  F (37  C) (Temporal)   Resp 16   Ht 1.6 m (5' 2.99\")   Wt 95 kg (209 lb 6.4 oz)   SpO2 96%   BMI 37.10 kg/m    Body mass index is 37.1 " "kg/m .  Physical Exam   GENERAL: healthy, alert and no distress  RESP: lungs clear to auscultation - no rales, rhonchi or wheezes  CV: regular rate and rhythm, normal S1 S2, no S3 or S4, no murmur, click or rub, no peripheral edema and peripheral pulses strong  MS: no gross musculoskeletal defects noted, no edema  SKIN: no suspicious lesions or rashes  NEURO: Normal strength and tone, mentation intact and speech normal  PSYCH: mentation appears normal, affect normal/bright    Diagnostic Test Results:  Labs reviewed in Epic        Assessment & Plan       ICD-10-CM    1. Morbid obesity (H) E66.01 liraglutide (VICTOZA) 18 MG/3ML solution     insulin pen needle (32G X 4 MM) 32G X 4 MM miscellaneous     topiramate (TOPAMAX) 50 MG tablet     phentermine (ADIPEX-P) 15 MG capsule   2. Crohn's disease of both small and large intestine without complication (H) K50.80    3. Acquired hypothyroidism E03.9    4. Gastroesophageal reflux disease, esophagitis presence not specified K21.9 liraglutide (VICTOZA) 18 MG/3ML solution   5. YUE (generalized anxiety disorder) F41.1 topiramate (TOPAMAX) 50 MG tablet   6. Major depressive disorder, recurrent episode, mild (H) F33.0 topiramate (TOPAMAX) 50 MG tablet        BMI:   Estimated body mass index is 37.1 kg/m  as calculated from the following:    Height as of this encounter: 1.6 m (5' 2.99\").    Weight as of this encounter: 95 kg (209 lb 6.4 oz).   Weight management plan: See visit note        We discussed additional options for medication management.   Would not advise increasing Phentermine as she has no problems now with portion control.   Agree with dietician that she should be in taking more calories which is difficult due to her hx of lap band procedure.   She will continue to follow with Dietician monthly.  Still encourage increasing physical activity.   She is interested in trying Victoza/Sexenda/Liraglutide depending on cost and if insurance covers, we will send it to see if " it is covered or reasonable price.  If not consider starting Naltrexone.   Discussed potential side effects of medication.   Continue on Phentermine and Topiramate.   With her seeing Dietician regularly and if she is starting to lose more weight ok to extend follow-up to 2 months (would refill Phentermine next month for 30 additional days).      Return in about 2 months (around 9/26/2019) for Medication Re-check, sooner if not doing well, or other concerns.     Options for treatment and follow-up care were reviewed with the patient and/or guardian. Patient and/or guardian engaged in the decision making process and verbalized understanding of the options discussed and agreed with the final plan.     Gerhard Ugarte PA-C  [facility]    Answers for HPI/ROS submitted by the patient on 7/26/2019   Chronic problems general questions HPI Form  If you checked off any problems, how difficult have these problems made it for you to do your work, take care of things at home, or get along with other people?: Somewhat difficult  PHQ9 TOTAL SCORE: 7  YUE 7 TOTAL SCORE: 6

## 2019-07-26 ENCOUNTER — TELEPHONE (OUTPATIENT)
Dept: FAMILY MEDICINE | Facility: OTHER | Age: 39
End: 2019-07-26

## 2019-07-26 ENCOUNTER — OFFICE VISIT (OUTPATIENT)
Dept: FAMILY MEDICINE | Facility: OTHER | Age: 39
End: 2019-07-26
Payer: COMMERCIAL

## 2019-07-26 VITALS
TEMPERATURE: 98.6 F | RESPIRATION RATE: 16 BRPM | WEIGHT: 209.4 LBS | SYSTOLIC BLOOD PRESSURE: 116 MMHG | HEART RATE: 80 BPM | OXYGEN SATURATION: 96 % | HEIGHT: 63 IN | DIASTOLIC BLOOD PRESSURE: 72 MMHG | BODY MASS INDEX: 37.1 KG/M2

## 2019-07-26 DIAGNOSIS — F41.1 GAD (GENERALIZED ANXIETY DISORDER): ICD-10-CM

## 2019-07-26 DIAGNOSIS — K50.80 CROHN'S DISEASE OF BOTH SMALL AND LARGE INTESTINE WITHOUT COMPLICATION (H): ICD-10-CM

## 2019-07-26 DIAGNOSIS — F33.0 MAJOR DEPRESSIVE DISORDER, RECURRENT EPISODE, MILD (H): ICD-10-CM

## 2019-07-26 DIAGNOSIS — E66.01 MORBID OBESITY (H): Primary | ICD-10-CM

## 2019-07-26 DIAGNOSIS — E03.9 ACQUIRED HYPOTHYROIDISM: ICD-10-CM

## 2019-07-26 DIAGNOSIS — K21.9 GASTROESOPHAGEAL REFLUX DISEASE, ESOPHAGITIS PRESENCE NOT SPECIFIED: ICD-10-CM

## 2019-07-26 PROCEDURE — 99214 OFFICE O/P EST MOD 30 MIN: CPT | Performed by: PHYSICIAN ASSISTANT

## 2019-07-26 RX ORDER — PHENTERMINE HYDROCHLORIDE 15 MG/1
15 CAPSULE ORAL EVERY MORNING
Qty: 30 CAPSULE | Refills: 0 | Status: SHIPPED | OUTPATIENT
Start: 2019-07-26 | End: 2019-08-26

## 2019-07-26 RX ORDER — TOPIRAMATE 50 MG/1
50 TABLET, FILM COATED ORAL 2 TIMES DAILY
Qty: 60 TABLET | Refills: 3 | Status: SHIPPED | OUTPATIENT
Start: 2019-07-26 | End: 2020-05-11

## 2019-07-26 RX ORDER — LIRAGLUTIDE 6 MG/ML
0.6 INJECTION SUBCUTANEOUS DAILY
Qty: 3 ML | Refills: 1 | Status: SHIPPED | OUTPATIENT
Start: 2019-07-26 | End: 2020-02-07

## 2019-07-26 ASSESSMENT — ANXIETY QUESTIONNAIRES
GAD7 TOTAL SCORE: 6
5. BEING SO RESTLESS THAT IT IS HARD TO SIT STILL: NOT AT ALL
6. BECOMING EASILY ANNOYED OR IRRITABLE: MORE THAN HALF THE DAYS
GAD7 TOTAL SCORE: 6
7. FEELING AFRAID AS IF SOMETHING AWFUL MIGHT HAPPEN: NOT AT ALL
4. TROUBLE RELAXING: SEVERAL DAYS
GAD7 TOTAL SCORE: 6
2. NOT BEING ABLE TO STOP OR CONTROL WORRYING: SEVERAL DAYS
7. FEELING AFRAID AS IF SOMETHING AWFUL MIGHT HAPPEN: NOT AT ALL
1. FEELING NERVOUS, ANXIOUS, OR ON EDGE: SEVERAL DAYS
3. WORRYING TOO MUCH ABOUT DIFFERENT THINGS: SEVERAL DAYS

## 2019-07-26 ASSESSMENT — PATIENT HEALTH QUESTIONNAIRE - PHQ9
SUM OF ALL RESPONSES TO PHQ QUESTIONS 1-9: 7
SUM OF ALL RESPONSES TO PHQ QUESTIONS 1-9: 7
10. IF YOU CHECKED OFF ANY PROBLEMS, HOW DIFFICULT HAVE THESE PROBLEMS MADE IT FOR YOU TO DO YOUR WORK, TAKE CARE OF THINGS AT HOME, OR GET ALONG WITH OTHER PEOPLE: SOMEWHAT DIFFICULT

## 2019-07-26 ASSESSMENT — MIFFLIN-ST. JEOR: SCORE: 1598.83

## 2019-07-26 NOTE — TELEPHONE ENCOUNTER
Prior Authorization Retail Medication Request    Medication/Dose: victoza  ICD code (if different than what is on RX):    Previously Tried and Failed:  unknown  Rationale:  Step edit    Insurance Name:  [employer]  Insurance ID:  [id number]      Pharmacy Information (if different than what is on RX)  Name:    Phone:

## 2019-07-27 ASSESSMENT — ANXIETY QUESTIONNAIRES: GAD7 TOTAL SCORE: 6

## 2019-07-27 ASSESSMENT — PATIENT HEALTH QUESTIONNAIRE - PHQ9: SUM OF ALL RESPONSES TO PHQ QUESTIONS 1-9: 7

## 2019-08-06 ENCOUNTER — ALLIED HEALTH/NURSE VISIT (OUTPATIENT)
Dept: ALLERGY | Facility: OTHER | Age: 39
End: 2019-08-06
Payer: COMMERCIAL

## 2019-08-06 DIAGNOSIS — L50.1 CHRONIC IDIOPATHIC URTICARIA: ICD-10-CM

## 2019-08-06 PROCEDURE — 99207 ZZC DROP WITH A PROCEDURE: CPT

## 2019-08-06 PROCEDURE — 96372 THER/PROPH/DIAG INJ SC/IM: CPT

## 2019-08-06 NOTE — TELEPHONE ENCOUNTER
Central Prior Authorization Team  Phone: [phone]    PA Initiation    Medication: victoza  Insurance Company: [employer] - Phone [phone] Fax [phone]  Pharmacy Filling the Rx: [facility] - [id number]  Filling Pharmacy Phone: [phone]  Filling Pharmacy Fax:    Start Date: 8/6/2019

## 2019-08-06 NOTE — PROGRESS NOTES
Patient presented after waiting 30 minutes with no reaction to Xolair injections. Discharged from clinic.    Sandra Everett RN ............   8/6/2019...8:41 AM

## 2019-08-07 NOTE — TELEPHONE ENCOUNTER
PRIOR AUTHORIZATION DENIED    Medication: victoza- DENIED     Denial Date: 8/7/2019    Denial Rational: Patient must have a history of trial & failure to the formulary alternative(s) or have a contraindication or intolerance to the formulary alternatives:  Insurance required trial of one drug listed below within the past 90 days.           Appeal Information: If provider would like to appeal please provide a letter of medical necessity.

## 2019-08-14 ENCOUNTER — TRANSFERRED RECORDS (OUTPATIENT)
Dept: HEALTH INFORMATION MANAGEMENT | Facility: CLINIC | Age: 39
End: 2019-08-14

## 2019-08-26 ENCOUNTER — MYC REFILL (OUTPATIENT)
Dept: FAMILY MEDICINE | Facility: OTHER | Age: 39
End: 2019-08-26

## 2019-08-26 DIAGNOSIS — E66.01 MORBID OBESITY (H): ICD-10-CM

## 2019-08-26 DIAGNOSIS — K21.9 GASTROESOPHAGEAL REFLUX DISEASE, ESOPHAGITIS PRESENCE NOT SPECIFIED: ICD-10-CM

## 2019-08-27 RX ORDER — LIRAGLUTIDE 6 MG/ML
0.6 INJECTION SUBCUTANEOUS DAILY
Qty: 3 ML | Refills: 1 | OUTPATIENT
Start: 2019-08-27

## 2019-08-27 RX ORDER — PHENTERMINE HYDROCHLORIDE 15 MG/1
15 CAPSULE ORAL EVERY MORNING
Qty: 30 CAPSULE | Refills: 0 | Status: SHIPPED | OUTPATIENT
Start: 2019-08-27 | End: 2020-02-07

## 2019-08-27 NOTE — TELEPHONE ENCOUNTER
Rx faxed, left message for patient to return call regarding below. Also sent [employer] message.  Teri Massey MA

## 2019-08-27 NOTE — TELEPHONE ENCOUNTER
I generally do not use Metformin for weight loss, insurance likely stating metformin needs to be utilized for diabetes purposes.     Gerhard Ugarte PA-C

## 2019-09-20 ENCOUNTER — TELEPHONE (OUTPATIENT)
Dept: FAMILY MEDICINE | Facility: OTHER | Age: 39
End: 2019-09-20

## 2019-09-20 NOTE — TELEPHONE ENCOUNTER
Prior Authorization Retail Medication Request    Medication/Dose: NP THYROID 15mg  ICD code (if different than what is on RX):    Previously Tried and Failed:    Rationale:  Product not on formulary     Insurance Name:  INO ABBASI  Insurance ID:  [id number]      Pharmacy Information (if different than what is on RX)  Name:  [facility]   Phone:  [phone]

## 2019-09-23 ENCOUNTER — MEDICAL CORRESPONDENCE (OUTPATIENT)
Dept: HEALTH INFORMATION MANAGEMENT | Facility: CLINIC | Age: 39
End: 2019-09-23

## 2019-09-24 ENCOUNTER — ALLIED HEALTH/NURSE VISIT (OUTPATIENT)
Dept: ALLERGY | Facility: OTHER | Age: 39
End: 2019-09-24
Payer: COMMERCIAL

## 2019-09-24 DIAGNOSIS — L50.1 CHRONIC IDIOPATHIC URTICARIA: Primary | ICD-10-CM

## 2019-09-24 PROCEDURE — 99207 ZZC DROP WITH A PROCEDURE: CPT

## 2019-09-24 PROCEDURE — 96372 THER/PROPH/DIAG INJ SC/IM: CPT

## 2019-09-24 NOTE — TELEPHONE ENCOUNTER
Prior Authorization Approval    Authorization Effective Date: 9/1/2019  Authorization Expiration Date: 9/24/2020  Medication: NP THYROID 15mg  Approved Dose/Quantity:    Reference #:     Insurance Company: [employer] [place] - Phone [phone] Fax [phone]  Expected CoPay:       CoPay Card Available:      Foundation Assistance Needed:    Which Pharmacy is filling the prescription (Not needed for infusion/clinic administered): [facility] - [id number]  Pharmacy Notified: Yes  Patient Notified: Yes **Instructed pharmacy to notify patient when script is ready to /ship.**

## 2019-09-24 NOTE — PROGRESS NOTES
Patient presented after waiting 30 minutes with no reaction to Xolair injections. Discharged from clinic.    Yumiko Basilio RN

## 2019-09-24 NOTE — TELEPHONE ENCOUNTER
Central Prior Authorization Team   Phone: [phone]      PA Initiation    Medication: NP THYROID 15mg  Insurance Company: [employer] [place] - Phone [phone] Fax [phone]  Pharmacy Filling the Rx: [facility] - [id number]  Filling Pharmacy Phone: [phone]  Filling Pharmacy Fax:    Start Date: 9/24/2019

## 2019-10-03 DIAGNOSIS — K50.80 CROHN'S DISEASE OF BOTH SMALL AND LARGE INTESTINE WITHOUT COMPLICATION (H): ICD-10-CM

## 2019-10-03 DIAGNOSIS — Z79.899 ENCOUNTER FOR LONG-TERM (CURRENT) USE OF OTHER MEDICATIONS: Primary | ICD-10-CM

## 2019-10-09 ENCOUNTER — MYC REFILL (OUTPATIENT)
Dept: FAMILY MEDICINE | Facility: OTHER | Age: 39
End: 2019-10-09

## 2019-10-09 DIAGNOSIS — E66.01 MORBID OBESITY (H): ICD-10-CM

## 2019-10-09 RX ORDER — PHENTERMINE HYDROCHLORIDE 15 MG/1
15 CAPSULE ORAL EVERY MORNING
Qty: 30 CAPSULE | Refills: 0 | Status: CANCELLED | OUTPATIENT
Start: 2019-10-09

## 2019-10-22 ENCOUNTER — TELEPHONE (OUTPATIENT)
Dept: ALLERGY | Facility: OTHER | Age: 39
End: 2019-10-22

## 2019-10-22 NOTE — TELEPHONE ENCOUNTER
Contacted [employer] to initiate PA for xolair 300 mg monthly. They will fax forms to [facility] or call with questions within the next 24 hours. Sandra Everett RN

## 2019-10-23 NOTE — TELEPHONE ENCOUNTER
PA denied for reasoning symptoms need to have improved since starting Xolair, examples decrease number of hives, decrease sie of hives and improvement in itching. Provider note/additional information faxed to [phone] [facility] appeals.      Michela Garcia MA

## 2019-10-30 DIAGNOSIS — L50.8 CHRONIC URTICARIA: Primary | ICD-10-CM

## 2019-10-30 NOTE — TELEPHONE ENCOUNTER
Patient was approved for Xolair Pre-filled Syringes through [facility]. Approval number is [id number]. Approval is valid from 11/22/19 - 11/22/20.  A prior authorization was required for this medication. Medication is being used to treat Chronic Idiopathic Urticaria      Medication will be obtained by [employer], and an ABN IS required before each injection. This patient will be receiving Xolair 300 mg every 28 days. Standing orders have been placed. All paperwork has been sent to scanning.    Patient was not notified of the approval - shot staff please inform patient at next scheduled injection appointment.  Patient information was updated on the IT Patient log.     Chelsea Camara, RN, RN

## 2019-10-30 NOTE — PROGRESS NOTES
Replacing current standing orders for Xolair.  Prior authorization renewal.    Chelsea Camara RN

## 2019-11-04 ENCOUNTER — HEALTH MAINTENANCE LETTER (OUTPATIENT)
Age: 39
End: 2019-11-04

## 2019-11-05 ENCOUNTER — ALLIED HEALTH/NURSE VISIT (OUTPATIENT)
Dept: ALLERGY | Facility: OTHER | Age: 39
End: 2019-11-05
Payer: COMMERCIAL

## 2019-11-05 ENCOUNTER — TELEPHONE (OUTPATIENT)
Dept: FAMILY MEDICINE | Facility: OTHER | Age: 39
End: 2019-11-05

## 2019-11-05 DIAGNOSIS — L50.8 CHRONIC URTICARIA: ICD-10-CM

## 2019-11-05 PROCEDURE — 99207 ZZC DROP WITH A PROCEDURE: CPT

## 2019-11-05 PROCEDURE — 96372 THER/PROPH/DIAG INJ SC/IM: CPT

## 2019-11-05 NOTE — TELEPHONE ENCOUNTER
Hollie from [facility] calling to check if we have lab orders.  We have orders from Delores Cameron.  Per [facility] the patient is due to come in for these labs.  I left a message for patient that she is due for lab work.  Please help her schedule lab only ordered by Delores Cameron.  Thank you.

## 2019-11-05 NOTE — PROGRESS NOTES
Patient presented after waiting 30 minutes with no reaction to xolair injections. Discharged from clinic.    Sandra Everett RN, RN ............   11/5/2019...9:02 AM

## 2019-11-08 DIAGNOSIS — F41.1 GAD (GENERALIZED ANXIETY DISORDER): ICD-10-CM

## 2019-11-08 DIAGNOSIS — F33.0 MAJOR DEPRESSIVE DISORDER, RECURRENT EPISODE, MILD (H): ICD-10-CM

## 2019-11-08 RX ORDER — BUPROPION HYDROCHLORIDE 300 MG/1
300 TABLET ORAL EVERY MORNING
Qty: 90 TABLET | Refills: 3 | Status: SHIPPED | OUTPATIENT
Start: 2019-11-08 | End: 2019-11-21

## 2019-11-08 NOTE — TELEPHONE ENCOUNTER
Saw ES for this on 7/26/19.   Medication(s) reviewed and approved. Rx. was faxed to the designated pharmacy.      Please notify that this has been done.      Please close the encounter when finished with it.     Melva You PA-C

## 2019-11-08 NOTE — TELEPHONE ENCOUNTER
Pending Prescriptions:                       Disp   Refills    buPROPion (WELLBUTRIN XL) 300 MG 24 hr ta*90 tab*3            Sig: Take 1 tablet (300 mg) by mouth every morning    PHQ-9 SCORE 4/20/2019 6/4/2019 7/26/2019   PHQ-9 Total Score MyChart 11 (Moderate depression) 8 (Mild depression) 7 (Mild depression)   PHQ-9 Total Score 11 8 7     Routing refill request to provider for review/approval because:  Labs out of range:  PHQ9      Leda Siegel, RN, BSN

## 2019-11-21 ENCOUNTER — MYC MEDICAL ADVICE (OUTPATIENT)
Dept: FAMILY MEDICINE | Facility: OTHER | Age: 39
End: 2019-11-21

## 2019-11-21 DIAGNOSIS — F33.0 MAJOR DEPRESSIVE DISORDER, RECURRENT EPISODE, MILD (H): ICD-10-CM

## 2019-11-21 DIAGNOSIS — K50.80 CROHN'S DISEASE OF BOTH SMALL AND LARGE INTESTINE WITHOUT COMPLICATION (H): ICD-10-CM

## 2019-11-21 DIAGNOSIS — F41.1 GAD (GENERALIZED ANXIETY DISORDER): ICD-10-CM

## 2019-11-21 DIAGNOSIS — Z79.899 ENCOUNTER FOR LONG-TERM (CURRENT) USE OF OTHER MEDICATIONS: ICD-10-CM

## 2019-11-21 LAB
ALBUMIN SERPL-MCNC: 3.6 G/DL (ref 3.4–5)
ALP SERPL-CCNC: 64 U/L (ref 40–150)
ALT SERPL W P-5'-P-CCNC: 28 U/L (ref 0–50)
AST SERPL W P-5'-P-CCNC: 19 U/L (ref 0–45)
BASOPHILS # BLD AUTO: 0 10E9/L (ref 0–0.2)
BASOPHILS NFR BLD AUTO: 0.4 %
BILIRUB DIRECT SERPL-MCNC: 0.1 MG/DL (ref 0–0.2)
BILIRUB SERPL-MCNC: 0.4 MG/DL (ref 0.2–1.3)
CREAT SERPL-MCNC: 0.84 MG/DL (ref 0.52–1.04)
DEPRECATED CALCIDIOL+CALCIFEROL SERPL-MC: 43 UG/L (ref 20–75)
DIFFERENTIAL METHOD BLD: NORMAL
EOSINOPHIL # BLD AUTO: 0.2 10E9/L (ref 0–0.7)
EOSINOPHIL NFR BLD AUTO: 2.7 %
ERYTHROCYTE [DISTWIDTH] IN BLOOD BY AUTOMATED COUNT: 13.3 % (ref 10–15)
GFR SERPL CREATININE-BSD FRML MDRD: 87 ML/MIN/{1.73_M2}
HCT VFR BLD AUTO: 40.9 % (ref 35–47)
HGB BLD-MCNC: 13.6 G/DL (ref 11.7–15.7)
LYMPHOCYTES # BLD AUTO: 2.1 10E9/L (ref 0.8–5.3)
LYMPHOCYTES NFR BLD AUTO: 29.9 %
MCH RBC QN AUTO: 30 PG (ref 26.5–33)
MCHC RBC AUTO-ENTMCNC: 33.3 G/DL (ref 31.5–36.5)
MCV RBC AUTO: 90 FL (ref 78–100)
MONOCYTES # BLD AUTO: 0.5 10E9/L (ref 0–1.3)
MONOCYTES NFR BLD AUTO: 7.5 %
NEUTROPHILS # BLD AUTO: 4.2 10E9/L (ref 1.6–8.3)
NEUTROPHILS NFR BLD AUTO: 59.5 %
PLATELET # BLD AUTO: 300 10E9/L (ref 150–450)
PROT SERPL-MCNC: 7.6 G/DL (ref 6.8–8.8)
RBC # BLD AUTO: 4.53 10E12/L (ref 3.8–5.2)
VIT B12 SERPL-MCNC: 297 PG/ML (ref 193–986)
WBC # BLD AUTO: 7 10E9/L (ref 4–11)

## 2019-11-21 PROCEDURE — 80076 HEPATIC FUNCTION PANEL: CPT | Performed by: PHYSICIAN ASSISTANT

## 2019-11-21 PROCEDURE — 36415 COLL VENOUS BLD VENIPUNCTURE: CPT | Performed by: PHYSICIAN ASSISTANT

## 2019-11-21 PROCEDURE — 82565 ASSAY OF CREATININE: CPT | Performed by: PHYSICIAN ASSISTANT

## 2019-11-21 PROCEDURE — 86481 TB AG RESPONSE T-CELL SUSP: CPT | Performed by: PHYSICIAN ASSISTANT

## 2019-11-21 PROCEDURE — 82306 VITAMIN D 25 HYDROXY: CPT | Performed by: PHYSICIAN ASSISTANT

## 2019-11-21 PROCEDURE — 82607 VITAMIN B-12: CPT | Performed by: PHYSICIAN ASSISTANT

## 2019-11-21 PROCEDURE — 85025 COMPLETE CBC W/AUTO DIFF WBC: CPT | Performed by: PHYSICIAN ASSISTANT

## 2019-11-21 RX ORDER — BUPROPION HYDROCHLORIDE 300 MG/1
300 TABLET ORAL EVERY MORNING
Qty: 30 TABLET | Refills: 1 | Status: SHIPPED | OUTPATIENT
Start: 2019-11-21 | End: 2020-02-07

## 2019-11-21 NOTE — TELEPHONE ENCOUNTER
Uncertain why this was sent to JM as he has not seen patient since 1/2019, she has been [facility] (most recently 7/2019) and has CDL listed as PCP (last saw patient 10/16/18) who filled this for her 11/8/19. Verified with pharmacy that patient picked up 30 day supply on 11/11/19. I'm unsure who the appropriate provider is to review this so will route to provider travis Fitch, CMA

## 2019-11-24 LAB
GAMMA INTERFERON BACKGROUND BLD IA-ACNC: 0.04 IU/ML
M TB IFN-G BLD-IMP: NEGATIVE
M TB IFN-G CD4+ BCKGRND COR BLD-ACNC: >10 IU/ML
MITOGEN IGNF BCKGRD COR BLD-ACNC: 0.02 IU/ML
MITOGEN IGNF BCKGRD COR BLD-ACNC: 0.02 IU/ML

## 2019-11-26 ENCOUNTER — TELEPHONE (OUTPATIENT)
Dept: ALLERGY | Facility: OTHER | Age: 39
End: 2019-11-26

## 2019-11-26 NOTE — TELEPHONE ENCOUNTER
Called and left message for patient to return call to clinic. Patient is scheduled for xolair injection oon 11/26/19 but is too soon for her injection. Must schedule appointment on or after 12/3/19. Advised patient to return call to allergy shot room during office hours with questions or to call main line to reschedule. Sandra Everett RN

## 2019-12-11 ENCOUNTER — ALLIED HEALTH/NURSE VISIT (OUTPATIENT)
Dept: ALLERGY | Facility: OTHER | Age: 39
End: 2019-12-11
Payer: COMMERCIAL

## 2019-12-11 DIAGNOSIS — L50.8 CHRONIC URTICARIA: ICD-10-CM

## 2019-12-11 PROCEDURE — 96372 THER/PROPH/DIAG INJ SC/IM: CPT

## 2019-12-11 PROCEDURE — 99207 ZZC DROP WITH A PROCEDURE: CPT

## 2019-12-11 RX ORDER — EPINEPHRINE 0.3 MG/.3ML
0.3 INJECTION SUBCUTANEOUS PRN
Qty: 0.6 ML | Refills: 1 | Status: SHIPPED | OUTPATIENT
Start: 2019-12-11 | End: 2020-02-07

## 2019-12-11 NOTE — PROGRESS NOTES
Patient presented after waiting 30 minutes with no reaction to xolair injections. Discharged from clinic.    Miguel Luna RN....12/11/2019 10:25 AM

## 2019-12-11 NOTE — TELEPHONE ENCOUNTER
Patient did not have her epi pen with her today (she thought it was in her car but it wasn't)  so Dr. Cash gave verbal order to get a new one from pharmacy today.     Kandi SARAVIA, Lead RN, BSN. . .  12/11/2019, 9:47 AM

## 2019-12-17 DIAGNOSIS — E53.8 B12 DEFICIENCY: Primary | ICD-10-CM

## 2019-12-19 ENCOUNTER — TELEPHONE (OUTPATIENT)
Dept: FAMILY MEDICINE | Facility: OTHER | Age: 39
End: 2019-12-19

## 2019-12-19 ENCOUNTER — TELEPHONE (OUTPATIENT)
Dept: OTHER | Facility: CLINIC | Age: 39
End: 2019-12-19

## 2019-12-19 ENCOUNTER — ALLIED HEALTH/NURSE VISIT (OUTPATIENT)
Dept: FAMILY MEDICINE | Facility: OTHER | Age: 39
End: 2019-12-19
Payer: COMMERCIAL

## 2019-12-19 DIAGNOSIS — E53.8 VITAMIN B12 DEFICIENCY (NON ANEMIC): Primary | ICD-10-CM

## 2019-12-19 PROCEDURE — 99207 ZZC NO CHARGE NURSE ONLY: CPT

## 2019-12-19 PROCEDURE — 96372 THER/PROPH/DIAG INJ SC/IM: CPT

## 2019-12-19 RX ORDER — CYANOCOBALAMIN 1000 UG/ML
1000 INJECTION, SOLUTION INTRAMUSCULAR; SUBCUTANEOUS ONCE
Status: COMPLETED | OUTPATIENT
Start: 2019-12-19 | End: 2019-12-19

## 2019-12-19 RX ADMIN — CYANOCOBALAMIN 1000 MCG: 1000 INJECTION, SOLUTION INTRAMUSCULAR; SUBCUTANEOUS at 12:39

## 2019-12-19 NOTE — TELEPHONE ENCOUNTER
December 19, 2019    Patient is scheduled for Follow-Up appointment with Dr. Maldonado on 1/10/2020 at [facility].      [facility]  Appointment Scheduling   [facility]  Office: [phone]  Fax: [phone]  Direct: [phone]

## 2019-12-19 NOTE — PROGRESS NOTES
Clinic Administered Medication Documentation    MEDICATION LIST:   Injectable Medication Documentation    Patient was given Cyanocobalamin (B-12). Prior to medication administration, verified patients identity using patient s name and date of birth. Please see MAR and medication order for additional information. Patient instructed to remain in clinic for 15 minutes and report any adverse reaction to staff immediately .      Was entire vial of medication used? Yes  Vial/Syringe: Single dose vial  Expiration Date:  12/20    Patient hand carried medication in brown medication bag from [facility] directly to [facility]. Medication vial was in plastic medication bottle with label identifying patient/ medication. Medication vial was sealed with a white label and a green medication cap. Medication was administered. Huddled with RANI CRENSHAW lead RN and LESLY, patient care supervisor.  Christal Thrasher MA on 12/19/2019 at 2:04 PM

## 2019-12-19 NOTE — TELEPHONE ENCOUNTER
Patient was in today and received injection of B12 by ARMAAN Thrasher. Patient hand carried medication from [facility] pharmacy in sealed bag. Patient arrived to clinic in [place] and received her injection. I called to Tia to ensure that when she is due next, we have her scheduled at the appropriate location as the B12 needs to be given at the facility in which it was picked up from. Tia states understanding and says that she believes she scheduled the appointment incorrectly. She now knows that she needs to schedule an appointment with an RN to receive injection teaching as she does have the supplies. Advised patient that she should schedule at the clinic she picks up the medication from- which would be based on her preference. Patient states understanding and denies any further questions. She will call back to schedule with an RN next month at either [place] or [facility].   Lady Mahoney RN on 12/19/2019 at 2:04 PM

## 2019-12-19 NOTE — TELEPHONE ENCOUNTER
December 19, 2019    Patient submitted request for appointment via [facility] with Dr. Maldonado due to symptoms of leg pain.     Patient was last seen in January 2019 and was instructed to follow up 3 months afterwards.     Patient cancelled 3 month follow-up appointment.     Routing to RN Triage for review and correspondence.       Melinda Ocampo  Appointment Scheduling   [facility]  Office: [phone]  Fax: [phone]  Direct: [phone]

## 2020-01-10 ENCOUNTER — OFFICE VISIT (OUTPATIENT)
Dept: OTHER | Facility: CLINIC | Age: 40
End: 2020-01-10
Attending: INTERNAL MEDICINE
Payer: COMMERCIAL

## 2020-01-10 VITALS
WEIGHT: 214 LBS | RESPIRATION RATE: 14 BRPM | DIASTOLIC BLOOD PRESSURE: 87 MMHG | HEART RATE: 68 BPM | BODY MASS INDEX: 39.38 KG/M2 | OXYGEN SATURATION: 100 % | SYSTOLIC BLOOD PRESSURE: 123 MMHG | HEIGHT: 62 IN

## 2020-01-10 DIAGNOSIS — Z71.6 ENCOUNTER FOR SMOKING CESSATION COUNSELING: ICD-10-CM

## 2020-01-10 DIAGNOSIS — I73.9 PAD (PERIPHERAL ARTERY DISEASE) (H): Primary | ICD-10-CM

## 2020-01-10 DIAGNOSIS — I87.2 VENOUS (PERIPHERAL) INSUFFICIENCY: ICD-10-CM

## 2020-01-10 PROCEDURE — 99214 OFFICE O/P EST MOD 30 MIN: CPT | Mod: ZP | Performed by: INTERNAL MEDICINE

## 2020-01-10 PROCEDURE — G0463 HOSPITAL OUTPT CLINIC VISIT: HCPCS

## 2020-01-10 ASSESSMENT — MIFFLIN-ST. JEOR: SCORE: 1598.95

## 2020-01-10 NOTE — PROGRESS NOTES
Vascular Medicine Progress Note     Ashley Mcdaniesl is a 39 year old female who is here for follow-up on venous insufficiency    Interval History   Patient has venous insufficiency signs and symptoms bilateral lower extremities, patient wears her compression stockings yet her veins are almost gone but they are prominent when she is not wearing the compression stockings they are painful and they are symptomatic she denies any history of spontaneous bleeding or spontaneous ulceration    Patient has a new complaint which is pain when she walks, pain is better when she stops, patient use to be a smoker, patient has a longstanding history of Crohn's disease on treatment and she is morbidly obese    Physical Exam       BP: 123/87 Pulse: 68   Resp: 14 SpO2: 100 %      Vitals:    01/10/20 1021   Weight: 214 lb (97.1 kg)     Vital Signs with Ranges  Pulse:  [68] 68  Resp:  [14] 14  BP: (123)/(87) 123/87  SpO2:  [100 %] 100 %  [unfilled]    Constitutional: awake, alert, cooperative, no apparent distress, and appears stated age  Eyes: Lids and lashes normal, pupils equal, round and reactive to light, extra ocular muscles intact, sclera clear, conjunctiva normal  ENT: normocepalic, without obvious abnormality, oropharynx pink and moist  Hematologic / Lymphatic: no lymphadenopathy  Respiratory: No increased work of breathing, good air exchange, clear to auscultation bilaterally, no crackles or wheezing  Cardiovascular: regular rate and rhythm, normal S1 and S2 and no murmur noted  GI: Normal bowel sounds, soft, non-distended, non-tender  Skin: no redness, warmth, or swelling, no rashes and no lesions  Musculoskeletal: There is no redness, warmth, or swelling of the joints.  Full range of motion noted.  Motor strength is 5 out of 5 all extremities bilaterally.  Tone is normal.  Neurologic: Awake, alert, oriented to name, place and time.  Cranial nerves II-XII are grossly intact.  Motor is 5 out of 5 bilaterally.     Neuropsychiatric:  Normal affect, memory, insight.  Pulses: Palpable pulses bilaterally  . No carotid bruits appreciated.     Medications       omalizumab  150 mg Subcutaneous Q28 Days       Data   No results found for this or any previous visit (from the past 24 hour(s)).    Assessment & Plan   (I87.2) Venous (peripheral) insufficiency  Comment: Continue wearing compression stockings  Plan: Patient might need sclerotherapy for the symptomatic veins        Summary: Patient will be scheduled for AYALA pre-and post exercise to rule out symptomatic PAD    Leland Maldonado MD

## 2020-01-14 DIAGNOSIS — B00.9 HSV (HERPES SIMPLEX VIRUS) INFECTION: ICD-10-CM

## 2020-01-15 RX ORDER — VALACYCLOVIR HYDROCHLORIDE 1 G/1
500 TABLET, FILM COATED ORAL 2 TIMES DAILY
Qty: 20 TABLET | Refills: 11 | Status: SHIPPED | OUTPATIENT
Start: 2020-01-15 | End: 2020-11-27

## 2020-01-15 NOTE — TELEPHONE ENCOUNTER
Pending Prescriptions:                       Disp   Refills    valACYclovir (VALTREX) 1000 mg tablet     20 tab*11           Sig: Take 0.5 tablets (500 mg) by mouth 2 times daily    Prescription approved per [facility] Refill Protocol.    Nicole Bhatia, RN, BSN

## 2020-01-22 ENCOUNTER — ALLIED HEALTH/NURSE VISIT (OUTPATIENT)
Dept: ALLERGY | Facility: OTHER | Age: 40
End: 2020-01-22
Payer: COMMERCIAL

## 2020-01-22 ENCOUNTER — OFFICE VISIT (OUTPATIENT)
Dept: URGENT CARE | Facility: RETAIL CLINIC | Age: 40
End: 2020-01-22
Payer: COMMERCIAL

## 2020-01-22 VITALS
DIASTOLIC BLOOD PRESSURE: 81 MMHG | TEMPERATURE: 98 F | OXYGEN SATURATION: 98 % | HEART RATE: 75 BPM | SYSTOLIC BLOOD PRESSURE: 118 MMHG

## 2020-01-22 DIAGNOSIS — B34.9 VIRAL SYNDROME: Primary | ICD-10-CM

## 2020-01-22 DIAGNOSIS — L50.8 CHRONIC URTICARIA: Primary | ICD-10-CM

## 2020-01-22 PROCEDURE — 99207 ZZC DROP WITH A PROCEDURE: CPT

## 2020-01-22 PROCEDURE — 99213 OFFICE O/P EST LOW 20 MIN: CPT | Performed by: FAMILY MEDICINE

## 2020-01-22 NOTE — PROGRESS NOTES
SUBJECTIVE:  Patient presents with flu-like symptoms:  Fevers, chills, myalgias, congestion, sore throat for 4 days.  Denies dyspnea or wheezing.    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      Crohn's colitis (H)      YUE (generalized anxiety disorder)      GERD (gastroesophageal reflux disease)      Low back pain      Major depressive disorder, recurrent episode, mild (H)      Current Outpatient Medications   Medication Sig Dispense Refill     B Complex-Folic Acid (B COMPLEX FORMULA 1) TABS Take 1 tablet by mouth       cetirizine (ZYRTEC) 10 MG tablet Take 1 tablet (10 mg) by mouth daily 30 tablet 0     EPINEPHrine (EPIPEN/ADRENACLICK/OR ANY BX GENERIC EQUIV) 0.3 MG/0.3ML injection 2-pack Inject 0.3 mLs (0.3 mg) into the muscle as needed for anaphylaxis 0.6 mL 1     HUMIRA *CF* PEN 40 MG/0.4ML pen kit   1     ibuprofen (ADVIL,MOTRIN) 200 MG tablet Take 400 mg by mouth       levonorgestrel (MIRENA) 20 MCG/24HR IUD 1 each (20 mcg) by Intrauterine route continuous       thyroid (NP THYROID) 15 MG tablet Take 1 tablet (15 mg) by mouth daily 90 tablet 1     topiramate (TOPAMAX) 50 MG tablet Take 1 tablet (50 mg) by mouth 2 times daily 60 tablet 3     varenicline (CHANTIX SHAYLEE) 0.5 MG X 11 & 1 MG X 42 tablet Take 0.5 mg tab daily for 3 days, THEN 0.5 mg tab twice daily for 4 days, THEN 1 mg twice daily. 53 tablet 0     buPROPion (WELLBUTRIN XL) 300 MG 24 hr tablet Take 1 tablet (300 mg) by mouth every morning (Patient not taking: Reported on 1/10/2020) 30 tablet 1     hydrOXYzine (ATARAX) 25 MG tablet Take 25-50 mg by mouth as needed       insulin pen needle (32G X 4 MM) 32G X 4 MM miscellaneous Use 1 pen needles daily or as directed. (Patient not taking: Reported on 1/22/2020) 30 each 1     liraglutide (VICTOZA) 18 MG/3ML solution Inject 0.6 mg Subcutaneous daily (Patient not taking: Reported on 1/10/2020) 3 mL 1      omalizumab (XOLAIR) 150 MG/ML SOSY injection Inject 2 mLs (300 mg) Subcutaneous every 28 days 2 Syringe 11     phentermine (ADIPEX-P) 15 MG capsule Take 1 capsule (15 mg) by mouth every morning (Patient not taking: Reported on 1/10/2020) 30 capsule 0     valACYclovir (VALTREX) 1000 mg tablet Take 0.5 tablets (500 mg) by mouth 2 times daily (Patient not taking: Reported on 1/22/2020) 20 tablet 11     History   Smoking Status     Current Every Day Smoker     Packs/day: 0.50     Last attempt to quit: 5/1/2016   Smokeless Tobacco     Never Used         OBJECITVE;  /81   Pulse 75   Temp 98  F (36.7  C) (Temporal)   SpO2 98%   Appears moderately ill but not toxic.  EARS:  normal.  THROAT AND PHARYNX:  normal.  NECK: supple; no adenopathy in the neck.  SINUSES: non tender.  CHEST:  clear.    ASSESSMENT:  Viral syndrome    PLAN:  Symptomatic therapy suggested: rest, increase fluids and Call primary provider if symptoms worsen..    Follow up with primary care provider if no improvement.

## 2020-01-22 NOTE — NURSING NOTE
Patient presented after waiting 30 minutes with no reaction to xolair injections. Discharged from clinic.    Kandi Fish RN, RN ............   1/22/2020...12:10 PM

## 2020-01-22 NOTE — PATIENT INSTRUCTIONS
"  Patient Education     Viral Syndrome (Adult)  A viral illness may cause a number of symptoms such as fever. Other symptoms depend on the part of the body that the virus affects. If it settles in your nose, throat, and lungs, it may cause cough, sore throat, congestion, runny nose, headache, earache and other ear symptoms, or shortness of breath. If it settles in your stomach and intestinal tract, it may cause nausea, vomiting, cramping, and diarrhea. Sometimes it causes generalized symptoms like \"aching all over,\" feeling tired, loss of energy, or loss of appetite.  A viral illness usually lasts anywhere from several days to several weeks, but sometimes it lasts longer. In some cases, a more serious infection can look like a viral syndrome in the first few days of the illness. You may need another exam and additional tests to know the difference. Watch for the warning signs listed below for when to seek medical advice.  Home care  Follow these guidelines for taking care of yourself at home:    If symptoms are severe, rest at home for the first 2 to 3 days.    Stay away from cigarette smoke - both your smoke and the smoke from others.    You may use over-the-counter acetaminophen or ibuprofen for fever, muscle aching, and headache, unless another medicine was prescribed for this. If you have chronic liver or kidney disease or ever had a stomach ulcer or gastrointestinal bleeding, talk with your healthcare provider before using these medicines. No one who is younger than 18 and ill with a fever should take aspirin. It may cause severe disease or death.    Your appetite may be poor, so a light diet is fine. Avoid dehydration by drinking 8 to 12, 8-ounce glasses of fluids each day. This may include water; orange juice; lemonade; apple, grape, and cranberry juice; clear fruit drinks; electrolyte replacement and sports drinks; and decaffeinated teas and coffee. If you have been diagnosed with a kidney disease, ask your " healthcare provider how much and what types of fluids you should drink to prevent dehydration. If you have kidney disease, drinking too much fluid can cause it build up in the your body and be dangerous to your health.    Over-the-counter remedies won't shorten the length of the illness but may be helpful for symptoms such as cough, sore throat, nasal and sinus congestion, or diarrhea. Don't use decongestants if you have high blood pressure.  Follow-up care  Follow up with your healthcare provider if you do not improve over the next week.  Call 911  Call 911 if any of the following occur:    Convulsion    Feeling weak, dizzy, or like you are going to faint    Chest pain, or more than mild shortness of breath  When to seek medical advice  Call your healthcare provider right away if any of these occur:    Cough with lots of colored sputum (mucus) or blood in your sputum    Chest pain, shortness of breath, wheezing, or trouble breathing    Severe headache; face, neck, or ear pain    Severe, constant pain in the lower right side of your belly (abdominal)    Continued vomiting (can t keep liquids down)    Frequent diarrhea (more than 5 times a day); blood (red or black color) or mucus in diarrhea    Feeling weak, dizzy, or like you are going to faint    Extreme thirst    Fever of 100.4 F (38 C) or higher, or as directed by your healthcare provider  Date Last Reviewed: 4/1/2018 2000-2019 [employer]. [address] [place] [id number]. All rights reserved. This information is not intended as a substitute for professional medical care. Always follow your healthcare professional's instructions.

## 2020-01-27 ENCOUNTER — THERAPY VISIT (OUTPATIENT)
Dept: CHIROPRACTIC MEDICINE | Facility: CLINIC | Age: 40
End: 2020-01-27
Payer: COMMERCIAL

## 2020-01-27 DIAGNOSIS — M54.42 BILATERAL LOW BACK PAIN WITH LEFT-SIDED SCIATICA: ICD-10-CM

## 2020-01-27 DIAGNOSIS — M99.04 SEGMENTAL DYSFUNCTION OF SACRAL REGION: Primary | ICD-10-CM

## 2020-01-27 DIAGNOSIS — M99.02 SEGMENTAL DYSFUNCTION OF THORACIC REGION: ICD-10-CM

## 2020-01-27 DIAGNOSIS — M99.03 SEGMENTAL DYSFUNCTION OF LUMBAR REGION: ICD-10-CM

## 2020-01-27 PROCEDURE — 97110 THERAPEUTIC EXERCISES: CPT | Performed by: CHIROPRACTOR

## 2020-01-27 PROCEDURE — 98941 CHIROPRACT MANJ 3-4 REGIONS: CPT | Mod: AT | Performed by: CHIROPRACTOR

## 2020-01-27 PROCEDURE — 99203 OFFICE O/P NEW LOW 30 MIN: CPT | Mod: 25 | Performed by: CHIROPRACTOR

## 2020-01-27 PROCEDURE — 97035 APP MDLTY 1+ULTRASOUND EA 15: CPT | Performed by: CHIROPRACTOR

## 2020-01-27 NOTE — PROGRESS NOTES
"Initial Chiropractic Clinic Visit    PCP: Melva You    Ashley Mcdaniels is a 39 year old female who is seen  as a self referral presenting with lower back pain. She also has left leg pain and has had sciatica in the past. She has had this off and on for years but slipped on the ice on Jan. 2 and exacerbated it. Her pain is across her lower back, has a \"hot poker\" near her right SI joint. The leg pain is on the left leg in her gluts and down the posterior aspect of her left leg. She has been adjusted in the past, but it has been a long time. Her pain becomes dull and achy in her left. She is not sleeping well at night. She is taking ibuprofen for pain which helps. She has not tried ice or heat. Laying on the left side is the worst pain.  Moving makes the pain better. The pain is rated 6/10, worsens at times.       Injury: Horse accident when younger and tore back muscles    Location of Pain: right SI region, left buttock   Duration of Pain: \"years\"  Rating of Pain at worst: 10/10  Rating of Pain Currently: 6/10  Symptoms are better with: Moving  Symptoms are worse with: Laying  Additional Features: left radicular pain to knee     Health History  as reported by the patient:    How does the patient rate their own health:   Fair    Current or past medical history:   Depression - ongoing, managed, stopped taking Wellbutrin, History of fractures, Overweight and Thyroid problems - functional medicine diagnosis    Medical allergies:  Azathioprine - Chron's Disease medication    Past Traumas/Surgeries:  Gall bladder, lapband    Family History:  Heart disease, pancreatic cancer, DM2    Medications:  Thyroid, Zyrtec, Zolair    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             Tia was asked to complete the Oswestry Low Back Disability Index and Dany Start Back screening tool, today in the office.  " Disability score: 16%. Keel Start Total Score:3 Sub Score: 1     Review of Systems  Musculoskeletal: as above  Remainder of review of systems is negative including constitutional, CV, pulmonary, GI, Skin and Neurologic except as noted in HPI or medical history.    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      GENERAL APPEARANCE: healthy, alert and no distress   GAIT: NORMAL  SKIN: no suspicious lesions or rashes  NEURO: Normal strength and tone, mentation intact and speech normal  PSYCH:  mentation appears normal and affect normal/bright    Low back exam:    Inspection:       no visible deformity in the low back    ROM:       LR mildly restricted with pain    Tender:  Right SI region, left buttock      Strength:       ankle dorsiflexion 5/5 Normal       ankle plantarflexion 5/5 Normal       dorsiflexion of the great toe 5/5 Normal    Reflexes:       patellar (L3, L4) 2 bilaterally    Sensation:      grossly intact throughout lower extremities    Special tests:  Milgrams - negative, Valsalva - negative, Kemps - Right negative and Left negative, SLR - Right negative and Left negative, Gaenslen's - Right negative and Left negative, Fabere - Right negative and Left negative, Yeoman's - Right negative and Left negative, Dustin - Right negative and Left negative and Ely's - Right negative and Left negative    Segmental spinal dysfunction/restrictions found at:  T7  , T10, L4 , Sacrum  and PSIS Right       Muscle spasm found in:Gluteal, Lumbar erector spine and Piriformis      Radiology:  None warranted at this time, consider if no improvement with conservative management.     Assessment:    No diagnosis found.    RX ordered/plan of care; Left-sided sciatica with associated myospasm and intersegmental dysfunction.   Anticipated outcomes: Patient is expected to get relief with care.   Possible risks and side effects: Minimal soreness expected post-adjustment.     After discussing the risk and benefits of care, patient consented to treatment.    Prognosis: Good      Patient's condition:  Patient had restrictions pre-manipulation and Patient had decreased motion prior to manipulation    Treatment effectiveness:  Post manipulation there is better intersegmental movement and Patient claims to feel looser post manipulation      Plan:    Procedures:  Evaluation and Management:  36440 Moderate level exam 30 min    CMT:  62373 Chiropractic manipulative treatment 3-4 regions performed   Thoracic: Diversified, T7, T10, Prone  Lumbar: Drop Table, L4, Prone  Pelvis: Drop Table, Sacrum , PSIS Right , Prone    Modalities:  30755: US:  1.0 Soto/cm squared for 8 minutes at 1.2mhz  Continuous  pulsed, Location: left piriformis    Therapeutic procedures:  82933: Therapeutic Exercises  Gave patient Ice instructions post adjustment, and instructions for acute care    Response to Treatment:  Patient tolerated treatment well today.       Treatment plan and goals:  Goals:  PAIN: the patient specific goal of thier symptoms is to attain the pre-inury state of 0/10 on the VAS from 6/10.  BANG: To change BANG score from 16 to 8% functional impairment.   Be able to lay down without pain.     Frequency of care  Duration of care is estimated to be 6 weeks, from the initial treatment.  It is estimated that the patient will need a total of 8 visits to resolve this episode.  For the initial therapeutic trial of  care, the frequency is recommended at 1-2 times per week.  A reevaluation would be clinically appropriate in 8 visits, to determine progress and further course of care.    In-Office Treatment  Evaluation  Spinal Chiropractic Manipulative Therapy:  Trial of care - re-evaluate after 8 visits.        Recommendations:    Instructions:  ice 20 minutes every other hour as needed and stretch as instructed at visit    Follow-up:    Return to care in 1 week.       Discussed the assessment with the patient.      Disclaimer: This note consists of symbols derived from keyboarding, dictation and/or voice recognition software. As a result, there may be errors in the script that have gone undetected. Please consider this when interpreting information found in this chart.

## 2020-02-07 ENCOUNTER — OFFICE VISIT (OUTPATIENT)
Dept: URGENT CARE | Facility: RETAIL CLINIC | Age: 40
End: 2020-02-07
Payer: COMMERCIAL

## 2020-02-07 VITALS
DIASTOLIC BLOOD PRESSURE: 85 MMHG | HEART RATE: 81 BPM | SYSTOLIC BLOOD PRESSURE: 133 MMHG | TEMPERATURE: 98.5 F | OXYGEN SATURATION: 99 %

## 2020-02-07 DIAGNOSIS — R30.0 DYSURIA: Primary | ICD-10-CM

## 2020-02-07 LAB
BILIRUB UR QL: ABNORMAL
CLARITY: CLEAR
COLOR UR: YELLOW
GLUCOSE URINE: ABNORMAL MG/DL
HGB UR QL: ABNORMAL
KETONES UR QL: ABNORMAL MG/DL
NITRITE UR QL STRIP: ABNORMAL
PH UR STRIP: 6 PH (ref 5–7)
PROT UR QL: ABNORMAL MG/DL
SP GR UR STRIP: 1.02 (ref 1–1.03)
SPECIMEN VOL UR: ABNORMAL ML
UROBILINOGEN UR QL STRIP: 0.2 EU/DL (ref 0.2–1)
WBC #/AREA URNS HPF: ABNORMAL /[HPF]

## 2020-02-07 PROCEDURE — 99213 OFFICE O/P EST LOW 20 MIN: CPT | Performed by: NURSE PRACTITIONER

## 2020-02-07 PROCEDURE — 87086 URINE CULTURE/COLONY COUNT: CPT | Performed by: NURSE PRACTITIONER

## 2020-02-07 PROCEDURE — 81003 URINALYSIS AUTO W/O SCOPE: CPT | Performed by: NURSE PRACTITIONER

## 2020-02-07 ASSESSMENT — ENCOUNTER SYMPTOMS
NAUSEA: 0
ABDOMINAL PAIN: 0
BACK PAIN: 1
CHILLS: 0
DYSURIA: 1
VOMITING: 0
ACTIVITY CHANGE: 0
FREQUENCY: 1
APPETITE CHANGE: 0
FEVER: 0
LIGHT-HEADEDNESS: 0
FLANK PAIN: 1
COLOR CHANGE: 0
FATIGUE: 0
MYALGIAS: 0
DIFFICULTY URINATING: 1
DIAPHORESIS: 0
WEAKNESS: 0

## 2020-02-07 NOTE — PATIENT INSTRUCTIONS
Urinalysis not convincing for true UTI with negative leukocytes and nitrite, just trace blood.  Urine culture pending, we will call you only if culture shows growth and antibiotic is required. TaKes 48 hours.  May use over the counter AZO pain relief or Uristat (phenazopyridine) for urinary burning but do not use for 24 hours before future urine tests.  Drink plenty of fluids. Limit caffeine, alcohol, tea, chocolate as these are bladder irritants.  May take tylenol or ibuprofen as needed for discomfort.   If you develop any vomiting, high fevers or lower back pain, these can be signs of a kidney infection and you should be seen in urgent care or in the ER.  Prevention of future infections by drinking cranberry juice, urination after intercourse and wiping from front to back after using the toilet.  Please follow up with primary care provider if symptoms return, if you're not improving, worsening or new symptoms or for any adverse reactions to medications.  Discussed vaginitis, stones, hormonal changes, bladder irritants as possible contributing factors.

## 2020-02-07 NOTE — PROGRESS NOTES
Chief Complaint   Patient presents with     Rule out Urinary Tract Infection     started a week ago, pressure burning x 3 days      SUBJECTIVE:  Ashley Mcdaniels is a 39 year old female who  presents today for a possible UTI.   She has symptoms of dysuria, urgency, frequency, cloudy urine, burning, suprapubic pain and pressure and back pain that have been going on for 3 day(s).    Symptom timing described as sudden onset and moderate in severity.    This patient does have a history of urinary tract infections.  There is no history of gross hematuria, fever, chills, nausea or vomiting.   Patient denies vaginal discharge, vaginal odor, vaginal itching and dyspareunia (pain in labia/pelvis).  Last LMP was years ago with mirena.    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      Crohn's colitis (H)      YUE (generalized anxiety disorder)      GERD (gastroesophageal reflux disease)      Low back pain      Major depressive disorder, recurrent episode, mild (H)      Current Outpatient Medications   Medication Sig Dispense Refill     cetirizine (ZYRTEC) 10 MG tablet Take 1 tablet (10 mg) by mouth daily (Patient not taking: Reported on 2/7/2020) 30 tablet 0     HUMIRA *CF* PEN 40 MG/0.4ML pen kit   1     hydrOXYzine (ATARAX) 25 MG tablet Take 25-50 mg by mouth as needed       levonorgestrel (MIRENA) 20 MCG/24HR IUD 1 each (20 mcg) by Intrauterine route continuous (Patient not taking: Reported on 2/7/2020)       omalizumab (XOLAIR) 150 MG/ML SOSY injection Inject 2 mLs (300 mg) Subcutaneous every 28 days 2 Syringe 11     thyroid (NP THYROID) 15 MG tablet Take 1 tablet (15 mg) by mouth daily (Patient not taking: Reported on 2/7/2020) 90 tablet 1     topiramate (TOPAMAX) 50 MG tablet Take 1 tablet (50 mg) by mouth 2 times daily (Patient not taking: Reported on 2/7/2020) 60 tablet 3     valACYclovir (VALTREX) 1000 mg tablet Take  0.5 tablets (500 mg) by mouth 2 times daily (Patient not taking: Reported on 1/22/2020) 20 tablet 11     Social History     Tobacco Use     Smoking status: Current Every Day Smoker     Packs/day: 0.50     Last attempt to quit: 5/1/2016     Years since quitting: 3.7     Smokeless tobacco: Never Used   Substance Use Topics     Alcohol use: Yes     Alcohol/week: 0.0 standard drinks     Comment: occasional      Allergies   Allergen Reactions     Azathioprine Other (See Comments)     pancreatitis     No Clinical Screening - See Comments Hives     From stress     Review of Systems   Constitutional: Negative for activity change, appetite change, chills, diaphoresis, fatigue and fever.   Gastrointestinal: Negative for abdominal pain, nausea and vomiting.   Genitourinary: Positive for difficulty urinating, dysuria, flank pain, frequency and urgency. Negative for vaginal bleeding, vaginal discharge and vaginal pain. Hematuria: not gross.   Musculoskeletal: Positive for back pain. Negative for myalgias.   Skin: Negative for color change and rash.   Neurological: Negative for syncope, weakness and light-headedness.     OBJECTIVE:  /85   Pulse 81   Temp 98.5  F (36.9  C) (Temporal)   SpO2 99%      Physical Exam  Vitals signs reviewed.   Constitutional:       General: She is not in acute distress.     Appearance: She is well-developed. She is not diaphoretic.   Abdominal:      General: Bowel sounds are normal.      Palpations: Abdomen is soft.      Tenderness: There is no abdominal tenderness. There is no right CVA tenderness or left CVA tenderness.   Musculoskeletal: Normal range of motion.   Skin:     General: Skin is warm and dry.      Capillary Refill: Capillary refill takes less than 2 seconds.      Coloration: Skin is not pale.   Neurological:      Mental Status: She is alert and oriented to person, place, and time.   Psychiatric:         Behavior: Behavior normal.       Results for orders placed or performed in  visit on 02/07/20   UA without Microscopic     Status: Abnormal   Result Value Ref Range    Glucose Urine Neg neg mg/dL    Bilirubin Urine Neg neg    Ketones Urine Neg neg mg/dL    Specific Gravity Urine 1.025 1.003 - 1.035    pH Urine 6.0 5.0 - 7.0 pH    Protein Urine neg neg - neg mg/dL    Urobilinogen Urine 0.2 0.2 - 1.0 EU/dL    Nitrite Urine Neg NEG    Blood Urine Trace neg    Leukocytes neg     Color Urine Yellow     Clarity clear     Volume       ASSESSMENT:    ICD-10-CM    1. Dysuria R30.0 UA without Microscopic     Urine Culture Aerobic Bacterial     PLAN:   Patient Instructions   Urinalysis not convincing for true UTI with negative leukocytes and nitrite, just trace blood.  Urine culture pending, we will call you only if culture shows growth and antibiotic is required. TaKes 48 hours.  May use over the counter AZO pain relief or Uristat (phenazopyridine) for urinary burning but do not use for 24 hours before future urine tests.  Drink plenty of fluids. Limit caffeine, alcohol, tea, chocolate as these are bladder irritants.  May take tylenol or ibuprofen as needed for discomfort.   If you develop any vomiting, high fevers or lower back pain, these can be signs of a kidney infection and you should be seen in urgent care or in the ER.  Prevention of future infections by drinking cranberry juice, urination after intercourse and wiping from front to back after using the toilet.  Please follow up with primary care provider if symptoms return, if you're not improving, worsening or new symptoms or for any adverse reactions to medications.  Discussed vaginitis, stones, hormonal changes, bladder irritants as possible contributing factors.    Follow up with primary care provider with any problems, questions or concerns or if symptoms worsen or fail to improve. Patient agreed to plan and verbalized understanding.    Shanika Elliott, VIKTOR  [facility]

## 2020-02-09 LAB
BACTERIA SPEC CULT: NORMAL
Lab: NORMAL
SPECIMEN SOURCE: NORMAL

## 2020-02-17 ENCOUNTER — ALLIED HEALTH/NURSE VISIT (OUTPATIENT)
Dept: FAMILY MEDICINE | Facility: CLINIC | Age: 40
End: 2020-02-17
Payer: COMMERCIAL

## 2020-02-17 ENCOUNTER — TELEPHONE (OUTPATIENT)
Dept: FAMILY MEDICINE | Facility: OTHER | Age: 40
End: 2020-02-17

## 2020-02-17 ENCOUNTER — THERAPY VISIT (OUTPATIENT)
Dept: CHIROPRACTIC MEDICINE | Facility: CLINIC | Age: 40
End: 2020-02-17
Payer: COMMERCIAL

## 2020-02-17 DIAGNOSIS — M99.03 SEGMENTAL DYSFUNCTION OF LUMBAR REGION: ICD-10-CM

## 2020-02-17 DIAGNOSIS — M54.42 BILATERAL LOW BACK PAIN WITH LEFT-SIDED SCIATICA: ICD-10-CM

## 2020-02-17 DIAGNOSIS — M99.02 SEGMENTAL DYSFUNCTION OF THORACIC REGION: ICD-10-CM

## 2020-02-17 DIAGNOSIS — Z23 NEED FOR PROPHYLACTIC VACCINATION AND INOCULATION AGAINST INFLUENZA: Primary | ICD-10-CM

## 2020-02-17 DIAGNOSIS — M99.04 SEGMENTAL DYSFUNCTION OF SACRAL REGION: Primary | ICD-10-CM

## 2020-02-17 PROCEDURE — 90686 IIV4 VACC NO PRSV 0.5 ML IM: CPT

## 2020-02-17 PROCEDURE — 99207 ZZC NO CHARGE NURSE ONLY: CPT

## 2020-02-17 PROCEDURE — 90471 IMMUNIZATION ADMIN: CPT

## 2020-02-17 PROCEDURE — 98941 CHIROPRACT MANJ 3-4 REGIONS: CPT | Mod: AT | Performed by: CHIROPRACTOR

## 2020-02-17 NOTE — PROGRESS NOTES
Visit #:  2 of 8 based on treatment plan 1/27/2020    Subjective:  Ashley Mcdaniels is a 39 year old female who is seen in f/u up for:        Segmental dysfunction of sacral region  Segmental dysfunction of lumbar region  Segmental dysfunction of thoracic region  Bilateral low back pain with left-sided sciatica.     Since last visit on 1/27/2020,  Ashley Mcdaniels reports the following changes: Patient presents and states that she is doing okay. She did get some relief with care, but she has been feeling pain again. She rates her current pain 5/10 across her lower back. If she stands or walks on concrete, it burns and makes her left leg really sore. She has a rib out on her right side today from her work.        Objective:  The following was observed:    P: palpatory tenderness right ribs and left lower back    A: static palpation demonstrates intersegmental asymmetry     R: motion palpation notes restricted motion    T: localized muscle spasm at: Gluteal, Lumbar erector spine and T-spine paraspinal Bilaterally      Assessment:    Segmental spinal dysfunction/restrictions found at:  T1   T5  T10  L4  PSIS Left    Diagnoses:      1. Segmental dysfunction of sacral region    2. Segmental dysfunction of lumbar region    3. Segmental dysfunction of thoracic region    4. Bilateral low back pain with left-sided sciatica        Patient's condition:  Patient had restrictions pre-manipulation and Patient had decreased motion prior to manipulation    Treatment effectiveness:  Post manipulation there is better intersegmental movement and Patient claims to feel looser post manipulation      Procedures:  CMT:  33648 Chiropractic manipulative treatment 3-4 regions performed   Thoracic: Diversified, T1, T5, T10, Prone  Lumbar: Drop Table, L4, Prone  Pelvis: Drop Table, PSIS Left , Prone    Modalities:  33668: MSTM:  To Gluteal, Lumbar erector spine, Piriformis and Traps  for 5 min HYPERVOLT    Therapeutic procedures:  Gave patient Ice  instructions post adjustment, and instructions for acute care      Prognosis: Good    Progress towards Goals: Patient is making progress towards the goal of:  PAIN: the patient specific goal of thier symptoms is to attain the pre-inury state of 0/10 on the VAS from 6/10.  BANG: To change BANG score from 16 to 8% functional impairment.   Be able to lay down without pain.        Response to Treatment:   Reduction of symptoms with first treatment.       Recommendations:    Instructions:ice 20 minutes every other hour as needed and stretch as instructed at visit    Follow-up:  Return to care in 1 week.

## 2020-02-17 NOTE — TELEPHONE ENCOUNTER
Patient will come in sometime after 4 pm.    Please place orders for patient to have pneumonia injections which she gets because she is on Humera.

## 2020-02-17 NOTE — TELEPHONE ENCOUNTER
Tried to reach patient, left message for patient to call clinic back in regards to appointment on 2/17/2020. Wondering if patient can come in before 5 tonight? We will not have a float after 5 due to staffing. If she is not able to we will have to make it work.     Other concern is we are unable to give her pneumonia injection without orders from her physician due to her age and criteria. Does she want the flu shot tonight? Anything else for her visit?    Mahesh Hickman, CMA

## 2020-02-17 NOTE — TELEPHONE ENCOUNTER
MyChart sent to patient stating her provider will need to place order for pneumonia injection or fax order.    Mahesh Hickman, CMA

## 2020-02-24 ENCOUNTER — THERAPY VISIT (OUTPATIENT)
Dept: CHIROPRACTIC MEDICINE | Facility: CLINIC | Age: 40
End: 2020-02-24
Payer: COMMERCIAL

## 2020-02-24 DIAGNOSIS — M54.42 BILATERAL LOW BACK PAIN WITH LEFT-SIDED SCIATICA: ICD-10-CM

## 2020-02-24 DIAGNOSIS — M99.03 SEGMENTAL DYSFUNCTION OF LUMBAR REGION: ICD-10-CM

## 2020-02-24 DIAGNOSIS — M99.04 SEGMENTAL DYSFUNCTION OF SACRAL REGION: Primary | ICD-10-CM

## 2020-02-24 DIAGNOSIS — M99.02 SEGMENTAL DYSFUNCTION OF THORACIC REGION: ICD-10-CM

## 2020-02-24 PROCEDURE — 98941 CHIROPRACT MANJ 3-4 REGIONS: CPT | Mod: AT | Performed by: CHIROPRACTOR

## 2020-02-24 NOTE — PROGRESS NOTES
Visit #:  3 of 8 based on treatment plan 1/27/2020    Subjective:  Ashley Mcdaniels is a 39 year old female who is seen in f/u up for:        Segmental dysfunction of sacral region  Segmental dysfunction of lumbar region  Segmental dysfunction of thoracic region  Bilateral low back pain with left-sided sciatica.     Since last visit on 2/17/2020,  Ashley Mcdaniels reports the following changes: Patient presents and states that she is really hurting today. Her lower back feels really tight and it hurts to lift up her legs. She rates her current pain 10/10. She has been using ice and Biofreeze all morning. Ibuprofen did nothing this morning. She denies radiation of symptoms into her LE.        Objective:  The following was observed:    P: palpatory tenderness bilateral lower back     A: static palpation demonstrates intersegmental asymmetry     R: motion palpation notes restricted motion    T: localized muscle spasm at: Gluteal, Lumbar erector spine and T-spine paraspinal Bilaterally      Assessment:  T1 LR, RRR  T5 E, FR  T10 E, FR  L4 RR, LRR  Left SI posterior  L1-L5 Flexion Distraction    Diagnoses:      1. Segmental dysfunction of sacral region    2. Segmental dysfunction of lumbar region    3. Segmental dysfunction of thoracic region    4. Bilateral low back pain with left-sided sciatica        Patient's condition:  Patient had restrictions pre-manipulation and Patient had decreased motion prior to manipulation    Treatment effectiveness:  Post manipulation there is better intersegmental movement and Patient claims to feel looser post manipulation      Procedures:  CMT:  54620 Chiropractic manipulative treatment 3-4 regions performed   Thoracic: Diversified, T1, T5, T10, Prone  Lumbar: Drop Table, L4, Prone  Pelvis: Drop Table, PSIS Left , Prone   Flexion Distraction L1-L5    Modalities:  34762: MSTM:  To Gluteal, Lumbar erector spine, Piriformis and Traps  for 5 min HYPERVOLT    Therapeutic procedures:  Gave patient  Ice instructions post adjustment, and instructions for acute care      Prognosis: Good    Progress towards Goals: Patient is making progress towards the goal of:  PAIN: the patient specific goal of thier symptoms is to attain the pre-inury state of 0/10 on the VAS from 6/10.  BANG: To change BANG score from 16 to 8% functional impairment.   Be able to lay down without pain.        Response to Treatment:   Reduction of symptoms with care, but exacerbated this weekend.       Recommendations:    Instructions:ice 20 minutes every other hour as needed and stretch as instructed at visit    Follow-up:  Return to care Wednesday.

## 2020-02-26 ENCOUNTER — THERAPY VISIT (OUTPATIENT)
Dept: CHIROPRACTIC MEDICINE | Facility: CLINIC | Age: 40
End: 2020-02-26
Payer: COMMERCIAL

## 2020-02-26 ENCOUNTER — ALLIED HEALTH/NURSE VISIT (OUTPATIENT)
Dept: ALLERGY | Facility: OTHER | Age: 40
End: 2020-02-26
Payer: COMMERCIAL

## 2020-02-26 DIAGNOSIS — M99.02 SEGMENTAL DYSFUNCTION OF THORACIC REGION: ICD-10-CM

## 2020-02-26 DIAGNOSIS — M54.42 BILATERAL LOW BACK PAIN WITH LEFT-SIDED SCIATICA: ICD-10-CM

## 2020-02-26 DIAGNOSIS — M99.03 SEGMENTAL DYSFUNCTION OF LUMBAR REGION: ICD-10-CM

## 2020-02-26 DIAGNOSIS — L50.8 CHRONIC URTICARIA: ICD-10-CM

## 2020-02-26 DIAGNOSIS — M99.04 SEGMENTAL DYSFUNCTION OF SACRAL REGION: Primary | ICD-10-CM

## 2020-02-26 PROCEDURE — 96372 THER/PROPH/DIAG INJ SC/IM: CPT

## 2020-02-26 PROCEDURE — 98941 CHIROPRACT MANJ 3-4 REGIONS: CPT | Mod: AT | Performed by: CHIROPRACTOR

## 2020-02-26 PROCEDURE — 99207 ZZC DROP WITH A PROCEDURE: CPT

## 2020-02-26 NOTE — PROGRESS NOTES
Visit #:  4 of 8 based on treatment plan 1/27/2020    Subjective:  Ashley Mcdaniels is a 39 year old female who is seen in f/u up for:        Segmental dysfunction of sacral region  Segmental dysfunction of lumbar region  Segmental dysfunction of thoracic region  Bilateral low back pain with left-sided sciatica.     Since last visit on 2/24/2020,  Ashley Mcdaniels reports the following changes: Patient presents and states that she is better but still is very sore. She felt better after her adjustment, but popped back out pretty quickly. She rates her current pain 8/10. She has shooting pains when she moves her legs.        Objective:  The following was observed:    P: palpatory tenderness bilateral lower back     A: static palpation demonstrates intersegmental asymmetry     R: motion palpation notes restricted motion    T: localized muscle spasm at: Gluteal, Lumbar erector spine and T-spine paraspinal Bilaterally      Assessment:  T1 RR, LRR  T5 E, FR  T10 E, FR  L4 RR, LRR  Left SI posterior  L1-L5 Flexion Distraction    Diagnoses:      1. Segmental dysfunction of sacral region    2. Segmental dysfunction of lumbar region    3. Segmental dysfunction of thoracic region    4. Bilateral low back pain with left-sided sciatica        Patient's condition:  Patient had restrictions pre-manipulation and Patient had decreased motion prior to manipulation    Treatment effectiveness:  Post manipulation there is better intersegmental movement and Patient claims to feel looser post manipulation      Procedures:  CMT:  38560 Chiropractic manipulative treatment 3-4 regions performed   Thoracic: Diversified, T1, T5, T10, Prone  Lumbar: Drop Table, L4, Prone  Pelvis: Drop Table, PSIS Left , Prone   Flexion Distraction L1-L5    Modalities:  83063: MSTM:  To Gluteal, Lumbar erector spine, Piriformis and Traps  for 5 min HYPERVOLT    Therapeutic procedures:  Gave patient Ice instructions post adjustment, and instructions for acute  care      Prognosis: Good    Progress towards Goals: Patient is making progress towards the goal of:  PAIN: the patient specific goal of thier symptoms is to attain the pre-inury state of 0/10 on the VAS from 6/10.  BANG: To change BANG score from 16 to 8% functional impairment.   Be able to lay down without pain.        Response to Treatment:   Reduction of symptoms with care, but exacerbated this weekend.       Recommendations:    Instructions:ice 20 minutes every other hour as needed and stretch as instructed at visit    Follow-up:  Return to care next week.

## 2020-02-26 NOTE — NURSING NOTE
Patient presented after waiting 30 minutes with no reaction to xolair injections. Discharged from clinic.    Kandi Fish RN, RN ............   2/26/2020...12:05 PM

## 2020-03-12 ENCOUNTER — MYC MEDICAL ADVICE (OUTPATIENT)
Dept: FAMILY MEDICINE | Facility: OTHER | Age: 40
End: 2020-03-12

## 2020-03-12 ENCOUNTER — NURSE TRIAGE (OUTPATIENT)
Dept: FAMILY MEDICINE | Facility: OTHER | Age: 40
End: 2020-03-12

## 2020-03-12 ENCOUNTER — VIRTUAL VISIT (OUTPATIENT)
Dept: FAMILY MEDICINE | Facility: OTHER | Age: 40
End: 2020-03-12

## 2020-03-12 NOTE — TELEPHONE ENCOUNTER
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  To do this follow these instructions:    1. Go to the website [email or web address]  2. Create an account (you will need your insurance information)  3. Start a new visit  4. Choose your diagnosis (e.g. COVID19)  5. Fill out the information about your symptoms  6. A provider will reach out to you by text, phone call or video visit based on your request    While you are at home please follow these instructions to care for yourself:    Isolate Yourself:    Isolate yourself at home.     Do Not allow any visitors    Do Not go to work or school    Do Not go to [facility],  centers, shopping, or other public places.    Do Not shake hands.    Avoid close contact with others (hugging, kissing).    Protect Others:    Cover Your Mouth and Nose with a mask, disposable tissue or wash cloth to avoid spreading germs to others.    Wash your hands and face frequently with soap and water.    Fever Medicines:    For fever relief, take acetaminophen or ibuprofen.    Treat fevers above 101  F (38.3  C) to lower fevers  and make you more comfortable.     Acetaminophen (e.g., Tylenol): Take 650 mg (two 325 mg pills) by mouth every 4-6 hours as needed of regular strength Tyleno or 1,000 mg (two 500 mg pills) every 8 hours as needed of Extra Strength Tylenol.     Ibuprofen (e.g., Motrin, Advil): Take 400 mg (two 200 mg pills) by mouth every 6 hours as needed.     Acetaminophen is thought to be safer than ibuprofen or naproxen for people over 65 years old. Acetaminophen is in many OTC and prescription medicines. It might be in more than one medicine that you are taking. You need to be careful and not take an overdose. Before taking any medicine, read all the instructions on the package.    Caution -NSAIDs (e.g., ibuprofen, naproxen): Do not take nonsteroidal anti-inflammatory drugs (NSAIDs) if you have stomach problems, kidney disease, heart failure, or other contraindications to using this type of medicine. Do not take NSAID medicines for over 7 days without consulting your PCP. Do not take NSAID medicines if you are pregnant. Do not take NSAID medicines if you are also taking blood thinners.     Call Back If: Breathing difficulty develops or you become worse.    Thank you for limiting contact with others, wearing a simple mask to cover your cough, practice good hand hygiene habits and accessing our virtual services where possible to limit the spread of this virus.    For more information about COVID19 and options for caring for yourself at home, please visit the CDC website at https://www.cdc.gov/coronavirus/2019-ncov/about/steps-when-sick.html  For more options for care at [facility], please visit our website at [email or web address]            Reason for Disposition    [1] Fever or feeling feverish AND [2] symptoms of lower respiratory illness (e.g., cough, difficulty breathing) AND [3] TRAVEL FROM [place] within last 14 days    Additional Information    Negative: Shock suspected (e.g.,  cold/pale/clammy skin, too weak to stand, low BP, rapid pulse)    Negative: Difficult to awaken or acting confused (e.g., disoriented, slurred speech)    Negative: [1] Difficulty breathing AND [2] bluish lips, tongue or face    Negative: New onset rash with multiple purple (or blood-colored) spots or dots    Negative: Sounds like a life-threatening emergency to the triager    Other symptom is present, see that guideline  (e.g., symptoms of cough, runny nose, sore throat, earache, abdominal pain, diarrhea, vomiting)    Protocols used: CORONAVIRUS (2019-NCOV) EXPOSURE-A-, FEVER-A-    Leda Siegel, BSN, RN, PHN

## 2020-03-12 NOTE — TELEPHONE ENCOUNTER
"Patient scheduled an appt for tomorrow Friday 03-13-20 with Gerhard Ugarte at 1:00.  Please advise on symptoms below.   Thank you    \"Sinus infection upper respiratory issues  Mild fever 99.5 \"  "

## 2020-03-12 NOTE — PROGRESS NOTES
"Date: 2020 09:54:30  Clinician: Helene Camargo  Clinician NPI: [id number]  Patient: Ashley Mcdaniels  Patient : 1980  Patient Address: [address] [place] [address]  Patient Phone: [phone]  Visit Protocol: URI  Patient Summary:  Ashley is a 39 year old ( : 1980 ) female who initiated a Visit for COVID-19 (Coronavirus) evaluation and screening. When asked the question \"Please sign me up to receive news, health information and promotions. \", Ashley responded \"No\".   A synchronous visit is necessary because the patient reported the following abnormal symptoms:   Bloody mucus (requires clarification)   Ashley states her symptoms started suddenly 3-6 days ago.   Her symptoms consist of rhinitis, wheezing, malaise, a cough, nasal congestion, a headache, enlarged lymph nodes, chills, facial pain or pressure, and myalgia. She is experiencing mild difficulty breathing with activities but can speak normally in full sentences.   Symptom details     Nasal secretions: The color of her mucus is bloody.    Cough: Ashley coughs every 5-10 minutes and her cough is not more bothersome at night. Phlegm comes into her throat when she coughs. She believes her cough is caused by post-nasal drip. The color of the phlegm is white.     Wheezing: Ashley has not ever been diagnosed with asthma. The wheezing does not interfere with her normal daily activities.    Facial pain or pressure: The facial pain or pressure feels worse when bending over or leaning forward.     Headache: She states the headache is moderate (4-6 on a 10 point pain scale).      Ashley denies having ear pain, sore throat, fever, and teeth pain. She also denies double sickening (worsening symptoms after initial improvement), taking antibiotic medication for the symptoms, and having recent facial or sinus surgery in the past 60 days.   Precipitating events  She has not recently been exposed to someone with influenza. Ashley has not been in close contact with any " high risk individuals.   Pertinent COVID-19 (Coronavirus) information  Ashley has traveled internationally in the last 14 days before the start of her symptoms. Countries traveled as reported by the patient (free text): [place], [place], [place]   Ashley has not had close contact with a laboratory confirmed positive COVID-19 patient within 14 days of symptom onset.   Pertinent medical history  Ashley typically gets a yeast infection when she takes antibiotics. She has used fluconazole (Diflucan) to treat previous yeast infections. 2 doses of fluconazole (Diflucan) has typically been needed for symptoms to resolve in the past.  Ashley does not need a return to work/school note.   Weight: 214 lbs   Ashley smokes or uses smokeless tobacco.   She denies pregnancy and denies breastfeeding. She does not menstruate.   Additional information as reported by the patient (free text): upper back pain started yesterday   Weight: 214 lbs    MEDICATIONS: Xolair subcutaneous, Zyrtec oral, Humira Pen subcutaneous, ALLERGIES: NKDA  Clinician Response:  Dear Ashley,  Based on the information provided, you have acute bacterial sinusitis, also known as a sinus infection. Sinus infections are caused by bacteria or a virus and symptoms are almost always identical. The difference between the 2 types of infections is timing.  Sinus infections start as viral infections and symptoms improve on their own in about 7 days. If symptoms have not improved after 7 days or have even worsened, a bacterial infection may have developed.  Medication information  I am prescribing:     Doxycycline hyclate 100 mg oral tablet. Take 1 tablet by mouth 2 times per day for 7 days. There are no refills with this prescription.   Certain antibiotics such as Doxycycline, levofloxacin, and moxifloxacin can cause your skin to be more sensitive to the sun. Exposure to the sun when taking these antibiotics may cause skin rash, itching, redness, or a severe sunburn. Be sure to avoid  prolonged or direct exposure to the sun, especially between 10 am and 3 pm, if possible. Wear protective clothing and use sunscreen of SPF 30 or higher when you are outdoors.  Because you usually get a yeast infection when taking antibiotics, I am also prescribing:     Fluconazole (Diflucan) 150 mg oral tablet. Take 1 tablet by mouth in a single dose. Repeat dose in 3 days if symptoms are still present. There are no refills with this prescription.   If you become pregnant during this course of treatment, stop taking the medication and contact your primary care provider.  Self care  The following tips will keep you as comfortable as possible while you recover:     Rest    Drink plenty of water and other liquids    Take a hot shower to loosen congestion    Take a spoonful of honey to reduce your cough     Also, as your provider, I need you to know that becoming tobacco-free is the most important thing you can do to protect your current and future health.  When to seek care  Please be seen in a clinic or urgent care if any of the following occur:     Symptoms do not start to improve after 3 days of treatment    New symptoms develop, or symptoms become worse     It is possible to have an allergic reaction to an antibiotic even if you have not had one in the past. If you notice a new rash, significant swelling, or difficulty breathing, stop taking this medication immediately and go to a clinic or urgent care.   Diagnosis: Acute bacterial sinusitis  Diagnosis ICD: J01.90  Additional Clinician Notes: As symptoms are improving some, let's monitor things for now.  If your symptoms worsen over the next 2-3 days, then I suggest you start the antibiotic (have someone pick this up for you).  Stay quarantined until you are feeling better.  Work from home if possible.  May return to work on 3/16/20 if symptoms resolved.  Synchronous Triage: phone, status: completed, duration: 233 seconds  Prescription: doxycycline hyclate 100 mg  oral tablet 14 tablet, 7 days supply. Take 1 tablet by mouth 2 times per day for 7 days. Refills: 0, Refill as needed: no, Allow substitutions: yes  Prescription: fluconazole (Diflucan) 150 mg oral tablet 2 tablet, 4 days supply. Take 1 tablet by mouth in a single dose, repeat dose in 3 days if symptoms are still present. Refills: 0, Refill as needed: no, Allow substitutions: yes  Pharmacy: [facility] - [phone] - [address] [place] [address]

## 2020-03-13 NOTE — TELEPHONE ENCOUNTER
"Patient is currently scheduled on ES schedule next week for   \"Lump in right breast - due for annual preventative anyway\"    But triggered a BPA for the travel screening. She had already had previous encounters for positive symptoms, but still should be triaged again per BARBIE Nguyen. \"fever/cough and international travel\"    Teri Massey MA    "

## 2020-03-13 NOTE — TELEPHONE ENCOUNTER
Pt was triaged and did oncare. Plan to speak with her before appt Wednesday    Next 5 appointments (look out 90 days)    Mar 18, 2020 11:20 AM CDT  MyChart Physical Adult with Gerhard Ugarte PA-C  [facility] [address]   [facility] [address]  [phone]   Mar 27, 2020 10:40 AM CDT  Return Visit with Leland Maldonado MD  [facility] ([facility]) [address]  [facility] [address]  [phone]        Nicole Bhatia, MSN, RN

## 2020-03-17 ENCOUNTER — TELEPHONE (OUTPATIENT)
Dept: FAMILY MEDICINE | Facility: OTHER | Age: 40
End: 2020-03-17

## 2020-03-17 DIAGNOSIS — N63.0 BREAST MASS: Primary | ICD-10-CM

## 2020-03-17 NOTE — TELEPHONE ENCOUNTER
UPDATE:  As long as she is improving or stable she DOES NOT need testing and only if she is having severe symptoms should she be seen and in the ED if severe but otherwise just quarantine for 14 days from symptom onset.     Gerhard Ugarte PA-C

## 2020-03-17 NOTE — TELEPHONE ENCOUNTER
Spoke to patient, she stated that On[facility] told her as long as she was improving by this last Saturday she doesn't need to self quarantine after that and she has been work yesterday and today.     Breast Imaging:  She says that was her main concern for her visit tomorrow.  Lump in right and left. She thinks it was the right breast they were more concerned on but doesn't remember.     Teri Massey MA

## 2020-03-17 NOTE — TELEPHONE ENCOUNTER
Please call patient.     In regards to physical recommend pushing it off until July as we have been doing in the last couple of days with the COVID concerns.     I reviewed her concerns about COVID.  Guidelines have significantly changed.  She has had travel to high risk areas and had symptoms.  It appears her symptoms started on 3/9/2020.  I recommend that she self isolate/quarantine for 14 days because she is high risk for having COVID.  If her symptoms have been improving then she can continue with recommendations for isolation. However if she is getting worse because she is immunosuppressed with her medications she should undergo testing - should repeat [facility] process and note her medications and Crohns. Otherwise if improving DOES NOT require testing.     In regards to breast concern if she can tell me which breast we can just order diagnostic imaging.     Gerhard Ugarte PA-C

## 2020-03-17 NOTE — TELEPHONE ENCOUNTER
Again in regards to COVID she should be isolated for 14 days from symptom onset regardless of if her symptoms resolved - she had travel to high risk area with cough and fever she should be presumed positive for COVID.     Breast mass - diagnostic mammo and ultrasound ordered.     Gerhard Ugarte PA-C

## 2020-03-17 NOTE — TELEPHONE ENCOUNTER
If she is still having any symptoms (despite improvement) I would recommend being at home right now because she would be presumed to have coronavirus and could still be spreading it.  There have been changes daily about these recommendations and that is what I'm recommending right now.     Reviewed her breast imaging.  They found no concerns on the right, the left side they found something on mammogram but then ultrasound revealed a cyst.  Does she really have symptoms on both sides or just concerned with one side? And is it the same spot as previously?    Gerhard Ugarte PA-C

## 2020-03-17 NOTE — TELEPHONE ENCOUNTER
Left message for patient to return call regarding ES message below. I think she did say she was symptom free, but cannot remember for sure as I didn't document that. Please give her the recommendations below    Teri Massey MA

## 2020-03-17 NOTE — TELEPHONE ENCOUNTER
Patient returned call. She confirmed she is not having symptoms and feels fine. In regards to her breast, she notes a lump on both sides. The left side might be in the same spot as was found on US, she says she is positive it is the same spot.     Teri Massey MA

## 2020-03-27 ENCOUNTER — ANCILLARY PROCEDURE (OUTPATIENT)
Dept: MAMMOGRAPHY | Facility: CLINIC | Age: 40
End: 2020-03-27
Attending: PHYSICIAN ASSISTANT
Payer: COMMERCIAL

## 2020-03-27 ENCOUNTER — ANCILLARY PROCEDURE (OUTPATIENT)
Dept: ULTRASOUND IMAGING | Facility: CLINIC | Age: 40
End: 2020-03-27
Attending: PHYSICIAN ASSISTANT
Payer: COMMERCIAL

## 2020-03-27 DIAGNOSIS — N63.0 BREAST MASS: ICD-10-CM

## 2020-03-27 PROCEDURE — G0279 TOMOSYNTHESIS, MAMMO: HCPCS

## 2020-03-27 PROCEDURE — 76642 ULTRASOUND BREAST LIMITED: CPT | Mod: 50

## 2020-03-27 PROCEDURE — 77066 DX MAMMO INCL CAD BI: CPT

## 2020-04-02 ENCOUNTER — COMMUNICATION - HEALTHEAST (OUTPATIENT)
Dept: ALLERGY | Facility: CLINIC | Age: 40
End: 2020-04-02

## 2020-04-07 ENCOUNTER — COMMUNICATION - HEALTHEAST (OUTPATIENT)
Dept: TELEHEALTH | Facility: CLINIC | Age: 40
End: 2020-04-07

## 2020-04-07 ENCOUNTER — AMBULATORY - HEALTHEAST (OUTPATIENT)
Dept: ALLERGY | Facility: CLINIC | Age: 40
End: 2020-04-07

## 2020-04-07 DIAGNOSIS — L50.9 HIVES: ICD-10-CM

## 2020-05-26 ENCOUNTER — ALLIED HEALTH/NURSE VISIT (OUTPATIENT)
Dept: ALLERGY | Facility: CLINIC | Age: 40
End: 2020-05-26
Payer: COMMERCIAL

## 2020-05-26 DIAGNOSIS — E53.8 B12 DEFICIENCY: ICD-10-CM

## 2020-05-26 DIAGNOSIS — L50.1 CHRONIC IDIOPATHIC URTICARIA: Primary | ICD-10-CM

## 2020-05-26 DIAGNOSIS — Z79.899 ENCOUNTER FOR LONG-TERM (CURRENT) USE OF OTHER MEDICATIONS: ICD-10-CM

## 2020-05-26 DIAGNOSIS — K50.80 CROHN'S DISEASE OF BOTH SMALL AND LARGE INTESTINE WITHOUT COMPLICATION (H): ICD-10-CM

## 2020-05-26 LAB
ALBUMIN SERPL-MCNC: 3.6 G/DL (ref 3.4–5)
ALP SERPL-CCNC: 66 U/L (ref 40–150)
ALT SERPL W P-5'-P-CCNC: 19 U/L (ref 0–50)
AST SERPL W P-5'-P-CCNC: 18 U/L (ref 0–45)
BASOPHILS # BLD AUTO: 0 10E9/L (ref 0–0.2)
BASOPHILS NFR BLD AUTO: 0.2 %
BILIRUB DIRECT SERPL-MCNC: 0.1 MG/DL (ref 0–0.2)
BILIRUB SERPL-MCNC: 0.4 MG/DL (ref 0.2–1.3)
CREAT SERPL-MCNC: 0.76 MG/DL (ref 0.52–1.04)
DIFFERENTIAL METHOD BLD: NORMAL
EOSINOPHIL # BLD AUTO: 0.3 10E9/L (ref 0–0.7)
EOSINOPHIL NFR BLD AUTO: 3.3 %
ERYTHROCYTE [DISTWIDTH] IN BLOOD BY AUTOMATED COUNT: 13.9 % (ref 10–15)
GFR SERPL CREATININE-BSD FRML MDRD: >90 ML/MIN/{1.73_M2}
HCT VFR BLD AUTO: 41.3 % (ref 35–47)
HGB BLD-MCNC: 14.2 G/DL (ref 11.7–15.7)
LYMPHOCYTES # BLD AUTO: 2.6 10E9/L (ref 0.8–5.3)
LYMPHOCYTES NFR BLD AUTO: 28.3 %
MCH RBC QN AUTO: 31.3 PG (ref 26.5–33)
MCHC RBC AUTO-ENTMCNC: 34.4 G/DL (ref 31.5–36.5)
MCV RBC AUTO: 91 FL (ref 78–100)
MONOCYTES # BLD AUTO: 0.9 10E9/L (ref 0–1.3)
MONOCYTES NFR BLD AUTO: 9.5 %
NEUTROPHILS # BLD AUTO: 5.4 10E9/L (ref 1.6–8.3)
NEUTROPHILS NFR BLD AUTO: 58.7 %
PLATELET # BLD AUTO: 309 10E9/L (ref 150–450)
PROT SERPL-MCNC: 7.4 G/DL (ref 6.8–8.8)
RBC # BLD AUTO: 4.54 10E12/L (ref 3.8–5.2)
VIT B12 SERPL-MCNC: 307 PG/ML (ref 193–986)
WBC # BLD AUTO: 9.3 10E9/L (ref 4–11)

## 2020-05-26 PROCEDURE — 85025 COMPLETE CBC W/AUTO DIFF WBC: CPT | Performed by: PHYSICIAN ASSISTANT

## 2020-05-26 PROCEDURE — 80076 HEPATIC FUNCTION PANEL: CPT | Performed by: PHYSICIAN ASSISTANT

## 2020-05-26 PROCEDURE — 82607 VITAMIN B-12: CPT | Performed by: PHYSICIAN ASSISTANT

## 2020-05-26 PROCEDURE — 82565 ASSAY OF CREATININE: CPT | Performed by: PHYSICIAN ASSISTANT

## 2020-05-26 PROCEDURE — 36415 COLL VENOUS BLD VENIPUNCTURE: CPT | Performed by: PHYSICIAN ASSISTANT

## 2020-05-26 PROCEDURE — 99207 ZZC DROP WITH A PROCEDURE: CPT

## 2020-05-26 PROCEDURE — 96372 THER/PROPH/DIAG INJ SC/IM: CPT

## 2020-05-26 NOTE — PROGRESS NOTES
Patient presented after waiting 30 minutes with no reaction to Xolair injections. Discharged from clinic.    Ada Baron RN

## 2020-05-31 ENCOUNTER — E-VISIT (OUTPATIENT)
Dept: FAMILY MEDICINE | Facility: OTHER | Age: 40
End: 2020-05-31
Payer: COMMERCIAL

## 2020-05-31 DIAGNOSIS — F33.0 MAJOR DEPRESSIVE DISORDER, RECURRENT EPISODE, MILD (H): ICD-10-CM

## 2020-05-31 DIAGNOSIS — F41.1 GAD (GENERALIZED ANXIETY DISORDER): ICD-10-CM
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
## 2020-05-31 DIAGNOSIS — E66.01 MORBID OBESITY (H): ICD-10-CM

## 2020-05-31 DIAGNOSIS — T14.8XXA TICK BITE OF UNKNOWN LOCATION: Primary | ICD-10-CM

## 2020-05-31 PROCEDURE — 99422 OL DIG E/M SVC 11-20 MIN: CPT | Performed by: PHYSICIAN ASSISTANT

## 2020-06-01 ENCOUNTER — MYC MEDICAL ADVICE (OUTPATIENT)
Dept: FAMILY MEDICINE | Facility: OTHER | Age: 40
End: 2020-06-01

## 2020-06-01 DIAGNOSIS — T14.8XXA TICK BITE OF UNKNOWN LOCATION: ICD-10-CM
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
## 2020-06-02 RX ORDER — DOXYCYCLINE 100 MG/1
100 CAPSULE ORAL 2 TIMES DAILY
Qty: 14 CAPSULE | Refills: 0 | Status: SHIPPED | OUTPATIENT
Start: 2020-06-02 | End: 2020-06-08

## 2020-06-02 RX ORDER — TOPIRAMATE 50 MG/1
50 TABLET, FILM COATED ORAL 2 TIMES DAILY
Qty: 60 TABLET | Refills: 0 | Status: SHIPPED | OUTPATIENT
Start: 2020-06-02 | End: 2020-11-24

## 2020-06-05 ENCOUNTER — VIRTUAL VISIT (OUTPATIENT)
Dept: ENDOCRINOLOGY | Facility: CLINIC | Age: 40
End: 2020-06-05
Payer: COMMERCIAL

## 2020-06-05 ENCOUNTER — TELEPHONE (OUTPATIENT)
Dept: ENDOCRINOLOGY | Facility: CLINIC | Age: 40
End: 2020-06-05

## 2020-06-05 VITALS — HEIGHT: 63 IN | WEIGHT: 215 LBS | BODY MASS INDEX: 38.09 KG/M2

## 2020-06-05 DIAGNOSIS — Z71.6 ENCOUNTER FOR SMOKING CESSATION COUNSELING: ICD-10-CM

## 2020-06-05 DIAGNOSIS — E66.01 MORBID OBESITY (H): ICD-10-CM

## 2020-06-05 DIAGNOSIS — E03.9 ACQUIRED HYPOTHYROIDISM: Primary | ICD-10-CM

## 2020-06-05 RX ORDER — NALTREXONE HYDROCHLORIDE 50 MG/1
TABLET, FILM COATED ORAL
Qty: 30 TABLET | Refills: 3 | Status: SHIPPED | OUTPATIENT
Start: 2020-06-05 | End: 2020-07-15

## 2020-06-05 RX ORDER — VARENICLINE TARTRATE 0.5 MG/1
0.5 TABLET, FILM COATED ORAL DAILY
Qty: 30 TABLET | Refills: 11 | Status: SHIPPED | OUTPATIENT
Start: 2020-06-05 | End: 2021-07-05

## 2020-06-05 ASSESSMENT — MIFFLIN-ST. JEOR: SCORE: 1619.36
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
## 2020-06-05 NOTE — NURSING NOTE
"Chief Complaint   Patient presents with     New Patient     new weight management patient       Vitals:    06/05/20 1212   Weight: 215 lb   Height: 5' 3\"       Body mass index is 38.09 kg/m .      Preet Vazquez LPN                        "

## 2020-06-05 NOTE — PATIENT INSTRUCTIONS
Nice to talk with you today in virtual clinic.    Please go to any [place] lab, FASTING OVERNIGHT, WATER OK for lipids, thyroid tests. Follow standard safety precautions for COVID-19, practice social isolation, hand hygiene, do not touch your face, nose, or mouth, wear mask if have to go out in public to be respectful of community.    Medication recommendations:  Wean: Topiramate to off over 1 month:  from 100 mg twice daily to 100 mg once daily x 1 week, then 100 mg every other day x 1 week, then 100 mg x 3rd week, then stop.  THEN, ok to start Naltrexone 12 days later:  Start Naltrexone 50 mg tabs daily, Take it one to two hours prior to worst cravings. Start with 1/2 tab and if tolerated, on 3rd day, increase to full 50 mg tab.    Ok to start Chantix 0.5 mg daily now.    Please see Kriss Bishop, Pharm D re: medication mgmt.    1,200 calorie meal plan to lose 1 lbs weekly without exercise (based on REE calc of 1,701)  Use meal replacements such as Cindy's meals, Lean Cuisines, Healthy Choice, Smart Ones, Weight Watchers Meals, and Slim Fast and Glucerna shakes and supplement with fresh fruits and vegetables  Please drink a lot of water daily. Most people typically need about 2 liters of water daily and more if they are exercising, have a large weight, or have a fever or illness. Add Crystal Light for flavoring if desired. But no pop with calories in it.  Please keep a food journal of what you eat, calories in what you eat  Consider using applications for smart phones such as [employer], Pixlee, [employer] Services[employer]ipes, LoseIt, Tap&Track, and RelaxM.  Focus on wet volumetrics, meaning, eat more foods that are high in water and fiber such as fruits and vegetables in order to get that full feeling. These are also good for your overall health as well.  Check out Dr. Allie Norton from [facility] - she has cookbooks with low calorie volumetric recipes  You can try Let's Dish to help you prepare meals for you and your family.  Often times, the caloric and nutrition data and serving sizes are available for this food. This can be a time saving maneuver. The website can give you more information http://www.[employer]/  Check out Hello Fresh at https://www.Share PracticeloJustFab.com/food-boxes/classic-box/  Try Cooking Light recipes for low calorie meal preparation and planning  Other food plan options you can search for on the internet and check out include: Michael ALEMAN, [facility]    CVD screening: please call [phone] for appointment at [facility] for Cardiovascular Disease Prevention   Then, after above screening, Progressively increase physical activity to 60 minutes, including combination of cardio & resistance training x 5 days weekly. And consider the [place] [facility] at multiple locations, call [phone] for more information. You can also consider the [place] [facility] to get an exercise assessment and recommendation. You can either plan to complete the entire program there or take your new skills to the gym of your choice. For scheduling the [place] [facility] program, please call our clinic nurse at [phone]. Consider hiring a  to develop a structured progressive workout plan that includes both cardio and resistance training. Obesity is a disease according to the IRS, so you may be able to use some pre-tax dollars from your medical flexible spending account if you get a letter from your doctor and/or fill out a plan-specific form.    Please consider health psychologist to discuss how mood, depression and/or anxiety impact your eating.    Psychological Providers    Check with your insurance to see if the psychologist is covered, if not, ask your insurance company for a referral.    [facility]   Kg Gomez, PhD, LP   [phone]  Delores Lucio, PhD, LP  [phone]  Camille Romero, PhD                 [phone]    Harriett Iniguez,LP              [phone]    [place]  Monique Estrada, PhD, LP  [phone]      [facility]:           [phone]  We send a referral and patient is notified.    [place]  [facility]:  [phone]  Cindy Doran PsyD, [facility]  BEE SanchezyD, LP         [phone]    [facility]   To schedule, please call member services on the back of your card.    DENVER Cruz PsyD,LP,ABPP [phone]    [place]  Ryan Nj MA, [phone]    [place]  Bee LundbergyD, [phone]    [place]  Bee BrumfieldyD, [phone]  (fibromyalgia issues)    PETRA Mcneill  [phone]    [place]  Doretha Hernandez PsyD,LP  [phone]  Uvaldo Ram, PhD, LP  [phone]  Uvaldo Davey, PhD, LP             [phone]    [place]  Michael Garcia , [facility], LMFT [phone]    [place]  Abril Hussein, BeeyD, [phone]    PETRA Mao PsyD, LP   [phone]     [place]      Outreach [phone]   Raine Eden MA, LP  - ext. [phone]  Uvaldo Dean, PhD, LP - ext [phone]  Bee AlvaradoyD, LP - ext [phone]    [place]  Ovi Garrison, PhD, -[phone]  Tonio Louie PsShauna, [phone]          [place]  Aria Hammond, PhD, [phone]    [place] PsyD, LP  [phone]            Call updates to BARBIE Kelley    [phone]  Last updated: 2019      RTC quarterly    Please use [employer] to schedule your appointment(s) or call [phone] to schedule in [facility]    Best Wishes,  Dr. Ashely Krishnan MD, MPH  Endocrinologist

## 2020-06-08 RX ORDER — DOXYCYCLINE 100 MG/1
100 CAPSULE ORAL 2 TIMES DAILY
Qty: 28 CAPSULE | Refills: 0 | Status: SHIPPED | OUTPATIENT
Start: 2020-06-08 | End: 2020-06-22

## 2020-06-10 ENCOUNTER — TRANSFERRED RECORDS (OUTPATIENT)
Dept: HEALTH INFORMATION MANAGEMENT | Facility: CLINIC | Age: 40
End: 2020-06-10

## 2020-06-10 ENCOUNTER — ALLIED HEALTH/NURSE VISIT (OUTPATIENT)
Dept: PHARMACY | Facility: CLINIC | Age: 40
End: 2020-06-10
Payer: COMMERCIAL

## 2020-06-10 ENCOUNTER — TELEPHONE (OUTPATIENT)
Dept: ALLERGY | Facility: OTHER | Age: 40
End: 2020-06-10

## 2020-06-10 DIAGNOSIS — E66.01 MORBID OBESITY (H): Primary | ICD-10-CM

## 2020-06-10 PROCEDURE — 99207 ZZC NO CHARGE LOS: CPT | Performed by: PHARMACIST

## 2020-06-10 NOTE — TELEPHONE ENCOUNTER
Left message inquiring transfer [facility] to [facility].     Madhuri Camp MA, CMA ......6/10/2020...2:01 PM

## 2020-06-10 NOTE — PROGRESS NOTES
Clinical Pharmacy Consult:                                                    Ashley Mcdaniels is a 39 year old female called for a clinical pharmacist consult.  She was referred to me from Dr. Krishnan.     Reason for Consult: Naltrexone     Discussion: Discussed naltrexone mechanism of action, safety and efficacy. Answered patient questions.     Plan:  1. Start Naltrexone as prescribed by Dr. Krishnan  2. Follow up with [facility] Pharmacist in 1 month.     Lauren Bloch, PharmD  Medication Therapy Management Pharmacist   [facility]   Phone: [phone]        
no

## 2020-06-23 DIAGNOSIS — Z79.899 HIGH RISK MEDICATION USE: Primary | ICD-10-CM

## 2020-06-25 ENCOUNTER — ALLIED HEALTH/NURSE VISIT (OUTPATIENT)
Dept: ALLERGY | Facility: CLINIC | Age: 40
End: 2020-06-25
Payer: COMMERCIAL

## 2020-06-25 DIAGNOSIS — L50.8 CHRONIC URTICARIA: ICD-10-CM

## 2020-06-25 PROCEDURE — 96372 THER/PROPH/DIAG INJ SC/IM: CPT

## 2020-06-25 PROCEDURE — 99207 ZZC DROP WITH A PROCEDURE: CPT

## 2020-06-25 NOTE — PROGRESS NOTES
"Patient states she received a letter from her insurance company stating that she has reached her \"maxium\" number for Xolair.  Patient's PA was approved from 11- to 11-.    Ada Baron RN    "

## 2020-06-25 NOTE — PROGRESS NOTES
Clinic Administered Medication Documentation      Injectable Medication Documentation    Patient was given Xolair. Prior to medication administration, verified patients identity using patient s name and date of birth. Please see MAR and medication order for additional information. Patient instructed to report any adverse reaction to staff immediately  and remain in clinic for 30 minutes.      Was entire vial of medication used? Yes  Vial/Syringe: Single dose vial  Expiration Date:  03/31/2021  Was this medication supplied by the patient? No     Ada Baron RN

## 2020-07-06 DIAGNOSIS — Z79.899 HIGH RISK MEDICATION USE: ICD-10-CM

## 2020-07-06 DIAGNOSIS — E03.9 ACQUIRED HYPOTHYROIDISM: ICD-10-CM

## 2020-07-06 DIAGNOSIS — Z71.6 ENCOUNTER FOR SMOKING CESSATION COUNSELING: ICD-10-CM

## 2020-07-06 DIAGNOSIS — E66.01 MORBID OBESITY (H): ICD-10-CM

## 2020-07-06 LAB
T4 FREE SERPL-MCNC: 0.99 NG/DL (ref 0.76–1.46)
TSH SERPL DL<=0.005 MIU/L-ACNC: 1.2 MU/L (ref 0.4–4)
VIT B12 SERPL-MCNC: 294 PG/ML (ref 193–986)

## 2020-07-06 PROCEDURE — 84439 ASSAY OF FREE THYROXINE: CPT | Performed by: INTERNAL MEDICINE

## 2020-07-06 PROCEDURE — 82607 VITAMIN B-12: CPT | Performed by: INTERNAL MEDICINE

## 2020-07-06 PROCEDURE — 84443 ASSAY THYROID STIM HORMONE: CPT | Performed by: INTERNAL MEDICINE

## 2020-07-06 PROCEDURE — 80061 LIPID PANEL: CPT | Performed by: INTERNAL MEDICINE

## 2020-07-06 PROCEDURE — 86800 THYROGLOBULIN ANTIBODY: CPT | Performed by: INTERNAL MEDICINE

## 2020-07-06 PROCEDURE — 36415 COLL VENOUS BLD VENIPUNCTURE: CPT | Performed by: INTERNAL MEDICINE

## 2020-07-06 PROCEDURE — 86376 MICROSOMAL ANTIBODY EACH: CPT | Performed by: INTERNAL MEDICINE

## 2020-07-07 LAB
CHOLEST SERPL-MCNC: 187 MG/DL
HDLC SERPL-MCNC: 60 MG/DL
LDLC SERPL CALC-MCNC: 81 MG/DL
NONHDLC SERPL-MCNC: 127 MG/DL
THYROGLOB AB SERPL IA-ACNC: <20 IU/ML (ref 0–40)
THYROPEROXIDASE AB SERPL-ACNC: <10 IU/ML
TRIGL SERPL-MCNC: 228 MG/DL

## 2020-07-13 ENCOUNTER — THERAPY VISIT (OUTPATIENT)
Dept: CHIROPRACTIC MEDICINE | Facility: CLINIC | Age: 40
End: 2020-07-13
Payer: COMMERCIAL

## 2020-07-13 DIAGNOSIS — M99.04 SEGMENTAL DYSFUNCTION OF SACRAL REGION: Primary | ICD-10-CM

## 2020-07-13 DIAGNOSIS — M99.03 SEGMENTAL DYSFUNCTION OF LUMBAR REGION: ICD-10-CM

## 2020-07-13 DIAGNOSIS — M99.02 SEGMENTAL DYSFUNCTION OF THORACIC REGION: ICD-10-CM

## 2020-07-13 DIAGNOSIS — M54.42 BILATERAL LOW BACK PAIN WITH LEFT-SIDED SCIATICA: ICD-10-CM

## 2020-07-13 PROCEDURE — 99212 OFFICE O/P EST SF 10 MIN: CPT | Mod: 25 | Performed by: CHIROPRACTOR

## 2020-07-13 PROCEDURE — 98941 CHIROPRACT MANJ 3-4 REGIONS: CPT | Mod: AT | Performed by: CHIROPRACTOR

## 2020-07-13 NOTE — PROGRESS NOTES
Visit #:  1 of 8 based on treatment plan 1/27/2020    Subjective:  Ashley Mcdaniels is a 39 year old female who is seen in f/u up for:     Data Unavailable.     Since last visit on 2/26/2020,  Ashley Mcdaniels reports the following changes: Patient presents and states that she tweaked her lower back on the left side when she was putting bug spray on ehr horse. Then they went to the cabin 2 weeks ago and she slept on a crappy bed. She points to pain in her left buttock. She rates her current pain 5/10 with some radiating pain into her left buttock.          Objective:  The following was observed:    LAROM: LR mildly reduced with pain    P: palpatory tenderness bilateral lower back     A: static palpation demonstrates intersegmental asymmetry     R: motion palpation notes restricted motion    T: localized muscle spasm at: Gluteal, Lumbar erector spine and T-spine paraspinal Bilaterally      Assessment:  T1 RR, LRR  T5 E, FR  T10 E, FR  L4 RR, LRR  Left SI posterior  L1-L5 Flexion Distraction    Diagnoses:      No diagnosis found.    Patient's condition:  Patient had restrictions pre-manipulation and Patient had decreased motion prior to manipulation    Treatment effectiveness:  Post manipulation there is better intersegmental movement and Patient claims to feel looser post manipulation      Procedures:  Level 2 re-exam, new exacerbation  CMT:  92038 Chiropractic manipulative treatment 3-4 regions performed   Thoracic: Diversified, T1, T5, T10, Prone  Lumbar: Drop Table, L4, Prone  Pelvis: Drop Table, PSIS Left , Prone   Flexion Distraction L1-L5    Modalities:  98641: MSTM:  To Gluteal, Lumbar erector spine, Piriformis and Traps  for 5 min HYPERVOLT    Therapeutic procedures:  Gave patient Ice instructions post adjustment, and instructions for acute care      Prognosis: Good    Progress towards Goals: Patient is making progress towards the goal of:  PAIN: the patient specific goal of thier symptoms is to attain the pre-inury  state of 0/10 on the VAS from 6/10.  BANG: To change BANG score from 16 to 8% functional impairment.   Be able to lay down without pain.        Response to Treatment:   Exacerbation of left-sided SI pain.       Recommendations:    Instructions:ice 20 minutes every other hour as needed and stretch as instructed at visit    Follow-up:  Return to care next week.

## 2020-07-15 ENCOUNTER — ALLIED HEALTH/NURSE VISIT (OUTPATIENT)
Dept: PHARMACY | Facility: CLINIC | Age: 40
End: 2020-07-15
Payer: COMMERCIAL

## 2020-07-15 DIAGNOSIS — E66.01 MORBID OBESITY (H): ICD-10-CM

## 2020-07-15 PROCEDURE — 99207 ZZC NO CHARGE LOS: CPT | Performed by: PHARMACIST

## 2020-07-15 RX ORDER — NALTREXONE HYDROCHLORIDE 50 MG/1
TABLET, FILM COATED ORAL
Qty: 30 TABLET | Refills: 2 | Status: SHIPPED | OUTPATIENT
Start: 2020-07-15 | End: 2021-08-04

## 2020-07-15 NOTE — PROGRESS NOTES
Clinical Pharmacy Consult:                                                    Ashley Mcdaniels is a 39 year old female called for a clinical pharmacist consult.  She was referred to me from Dr. Krishnan.     Reason for Consult: Naltrexone questions     Obesity:   Naltrexone 50 mg daily - did not start  Topiramate 50 mg once daily      Followed by Dr. Ashely Krishnan, last seen 6/5/2020 for [facility], was recommended to taper off topiramate and start naltrexone. Today, patient reports that [facility] did not received naltrexone prescription. Patient is still interested in starting naltrexone. She is frustrated by her lack of weight loss over the last month given her increasing her physical activity and being more conscientious of her nutrition. She has been tapering off topiramate and plans on stopping today.   Diet/Eating Habits: Patient reports tracking foods in myfitness pal, she is noticing that when eating her macros are the following: ~45% carbohydrates, 30% fat, and 25% protein. She admits that on many days she has a difficult time getting in protein, some days averaging <20%. Would like to discuss different protein sources other than meat. She is interested in knowing what would be appropriate macros to try for weight loss. Notices that when tracking food she does select healthier options comparative to not tracking.   Exercise/Activity: Patient reports she recently threw her back out so has been walking less.  She reports prior to that, that she was walking 4 miles 4 times per week, did find that by the end her back and hips did hurt at times towards end of walks.  Weekends are more difficult to find the time.     Weight on scale today: 215 lb  Initial Consult Weight 6/5/2020: 215 lb    Basal metabolic rate: ~1678 calories   Wt Readings from Last 4 Encounters:   06/05/20 215 lb (97.5 kg)   01/10/20 214 lb (97.1 kg)   07/26/19 209 lb 6.4 oz (95 kg)   06/04/19 213 lb (96.6 kg)  "    Estimated body mass index is 38.09 kg/m  as calculated from the following:    Height as of 6/5/20: 5' 3\" (1.6 m).    Weight as of 6/5/20: 215 lb (97.5 kg).    Discussion: Discussed adjustment of macros to ~30% carbohydrates 30% fat and 40% protein to see if lower carbohydrate, greater protein approach is more effective, keeping to around 1,200-1,300 calories per day if possible. Discussed non-meat related foods could consider implementing, including protein powder, greek yogurt, eggs, particular vegetables, etc. Patient could consider walking 3 miles 4-5 times weekly if finding difficulty with pain. Would also benefit from trying naltrexone, discussed how to utilize. Finally, discussed preemptively putting in what meals and snacks would be to determine if would be on track with macros and adjust accordingly.     Plan:  1. Start naltrexone 25 mg daily for 1 week, then can increase to either 50 mg daily or 25 mg BID with food.   2. Try macro settings 30% carbohydrate, 30% fat and 40% protein and keep within 1,200-1,300 calories per day.   3. Try implementing meals/snacks ahead of time in myfitness pal to determine if on track to hit macros for the day, or if need to adjust accordingly.   4. Try reducing walking to 3 miles 4-5 times per day to reduce pain when back pain resolves.     Lauren Bloch, PharmD  Medication Therapy Management Pharmacist   [facility]   Phone: [phone]          "

## 2020-07-15 NOTE — Clinical Note
Hi Dr. Krishnan, she has not started naltrexone yet as [facility] didn't receive the prescription but the patient didn't let us know unti ltoday. I called them and sorted it out, should start on it soon. Discussed macros largely today and diet/exercise goals. She will follow up with me in 1 month and you in 2 months. Kriss NEGRON

## 2020-07-16 DIAGNOSIS — Z20.822 SUSPECTED COVID-19 VIRUS INFECTION: Primary | ICD-10-CM

## 2020-07-16 PROCEDURE — U0003 INFECTIOUS AGENT DETECTION BY NUCLEIC ACID (DNA OR RNA); SEVERE ACUTE RESPIRATORY SYNDROME CORONAVIRUS 2 (SARS-COV-2) (CORONAVIRUS DISEASE [COVID-19]), AMPLIFIED PROBE TECHNIQUE, MAKING USE OF HIGH THROUGHPUT TECHNOLOGIES AS DESCRIBED BY CMS-2020-01-R: HCPCS | Performed by: FAMILY MEDICINE

## 2020-07-16 NOTE — PROGRESS NOTES
"Date: 2020 08:06:55  Clinician: Suhas Sun  Clinician NPI: [id number]  Patient: Ashley Mcdaniels  Patient : 1980  Patient Address: [address] [place] [address]  Patient Phone: [phone]  Visit Protocol: URI  Patient Summary:  Ashley is a 39 year old ( : 1980 ) female who initiated a Visit for COVID-19 (Coronavirus) evaluation and screening. When asked the question \"Please sign me up to receive news, health information and promotions. \", Ashley responded \"No\".    Ashley states her symptoms started 1-2 days ago.   Her symptoms consist of diarrhea, rhinitis, malaise, a headache, chills, a sore throat, myalgia, and a cough. Ashley also feels feverish.   Symptom details     Nasal secretions: The color of her mucus is clear.    Cough: Ashley coughs a few times an hour and her cough is not more bothersome at night. Phlegm does not come into her throat when she coughs. She believes her cough is caused by post-nasal drip.     Sore throat: Ashley reports having mild throat pain (1-3 on a 10 point pain scale), does not have exudate on her tonsils, and can swallow liquids. She is not sure if the lymph nodes in her neck are enlarged. A rash has not appeared on the skin since the sore throat started.     Temperature: Her current temperature is 98.4 degrees Fahrenheit.     Headache: She states the headache is mild (1-3 on a 10 point pain scale).      Ashley denies having wheezing, nausea, teeth pain, ageusia, vomiting, ear pain, anosmia, facial pain or pressure, and nasal congestion. She also denies having recent facial or sinus surgery in the past 60 days, taking antibiotic medication in the past month, and having a sinus infection within the past year. She is not experiencing dyspnea.   Precipitating events  Within the past week, Ashley has not been exposed to someone with strep throat. She has not recently been exposed to someone with influenza. Ashley has not been in close contact with any high risk individuals.   " Pertinent COVID-19 (Coronavirus) information  In the past 14 days, Ashley has not worked in a congregate living setting.   She does not work or volunteer as healthcare worker or a  and does not work or volunteer in a healthcare facility.   Ashley also has not lived in a congregate living setting in the past 14 days. She does not live with a healthcare worker.   Ashley has had a close contact with a laboratory-confirmed COVID-19 patient within 14 days of symptom onset. Additional information about contact with COVID-19 (Coronavirus) patient as reported by the patient (free text):  Saturday evening served drinks was distanced but she tested positive   Pertinent medical history  Ashley typically gets a yeast infection when she takes antibiotics. She has used fluconazole (Diflucan) to treat previous yeast infections. 2 doses of fluconazole (Diflucan) has typically been needed for symptoms to resolve in the past.  Ashley does not need a return to work/school note.   Weight: 204 lbs   Ashley smokes or uses smokeless tobacco.   She denies pregnancy and denies breastfeeding. She does not menstruate.   Additional information as reported by the patient (free text): Pain in upper back and chest at times   Weight: 204 lbs    MEDICATIONS: Chantix oral, Xolair subcutaneous, Zyrtec oral, Humira Pen subcutaneous, ALLERGIES: NKDA  Clinician Response:  Dear Ashley,   Your symptoms show that you may have coronavirus (COVID-19). This illness can cause fever, cough and trouble breathing. Many people get a mild case and get better on their own. Some people can get very sick.  What should I do?  We would like to test you for this virus.   1. Please call [phone] to schedule your visit. Explain that you were referred by [facility] to have a COVID-19 test. Be ready to share your [facility] visit ID number.  The following will serve as your written order for this COVID Test, ordered by me, for the indication of suspected COVID  "[[id number]]: The test will be ordered in [employer], our electronic health record, after you are scheduled. It will show as ordered and authorized by Joseph Butler MD.  Order: COVID-19 (Coronavirus) PCR for SYMPTOMATIC testing from [facility].      2. When it's time for your COVID test:  Stay at least 6 feet away from others. (If someone will drive you to your test, stay in the backseat, as far away from the  as you can.)   Cover your mouth and nose with a mask, tissue or washcloth.  Go straight to the testing site. Don't make any stops on the way there or back.      3.Starting now: Stay home and away from others (self-isolate) until:   You've had no fever---and no medicine that reduces fever---for 3 full days (72 hours). And...   Your other symptoms have gotten better. For example, your cough or breathing has improved. And...   At least 10 days have passed since your symptoms started.       During this time, don't leave the house except for testing or medical care.   Stay in your own room, even for meals. Use your own bathroom if you can.   Stay away from others in your home. No hugging, kissing or shaking hands. No visitors.  Don't go to work, school or anywhere else.    Clean \"high touch\" surfaces often (doorknobs, counters, handles, etc.). Use a household cleaning spray or wipes. You'll find a full list of  on the EPA website: www.epa.gov/pesticide-registration/list-n-disinfectants-use-against-sars-cov-2.   Cover your mouth and nose with a mask, tissue or washcloth to avoid spreading germs.  Wash your hands and face often. Use soap and water.  Caregivers in these groups are at risk for severe illness due to COVID-19:  o People 65 years and older  o People who live in a nursing home or long-term care facility  o People with chronic disease (lung, heart, cancer, diabetes, kidney, liver, immunologic)  o People who have a weakened immune system, including those who:   Are in cancer treatment  Take medicine that " weakens the immune system, such as corticosteroids  Had a bone marrow or organ transplant  Have an immune deficiency  Have poorly controlled HIV or AIDS  Are obese (body mass index of 40 or higher)  Smoke regularly   o Caregivers should wear gloves while washing dishes, handling laundry and cleaning bedrooms and bathrooms.  o Use caution when washing and drying laundry: Don't shake dirty laundry, and use the warmest water setting that you can.  o For more tips, go to www.cdc.gov/coronavirus/2019-ncov/downloads/10Things.pdf.    4.Sign up for [employer]. We know it's scary to hear that you might have COVID-19. We want to track your symptoms to make sure you're okay over the next 2 weeks. Please look for an email from [employer]---this is a free, online program that we'll use to keep in touch. To sign up, follow the link in the email. Learn more at [email or web address]  How can I take care of myself?   Get lots of rest. Drink extra fluids (unless a doctor has told you not to).   Take Tylenol (acetaminophen) for fever or pain. If you have liver or kidney problems, ask your family doctor if it's okay to take Tylenol.   Adults can take either:    650 mg (two 325 mg pills) every 4 to 6 hours, or...   1,000 mg (two 500 mg pills) every 8 hours as needed.    Note: Don't take more than 3,000 mg in one day. Acetaminophen is found in many medicines (both prescribed and over-the-counter medicines). Read all labels to be sure you don't take too much.   For children, check the Tylenol bottle for the right dose. The dose is based on the child's age or weight.    If you have other health problems (like cancer, heart failure, an organ transplant or severe kidney disease): Call your specialty clinic if you don't feel better in the next 2 days.       Know when to call 911. Emergency warning signs include:    Trouble breathing or shortness of breath Pain or pressure in the chest that doesn't go away Feeling confused like  you haven't felt before, or not being able to wake up Bluish-colored lips or face.  Where can I get more information?    "[employer] [place] -- About COVID-19: [email or web address]   CDC -- What to Do If You're Sick: www.cdc.gov/coronavirus/2019-ncov/about/steps-when-sick.html   CDC -- Ending Home Isolation: www.cdc.gov/coronavirus/2019-ncov/hcp/disposition-in-home-patients.html   [facility] -- Caring for Someone: www.cdc.gov/coronavirus/2019-ncov/if-you-are-sick/care-for-someone.html   [facility] -- Interim Guidance for Hospital Discharge to Home: www.health.[facility].[place]./diseases/coronavirus/hcp/hospdischarge.pdf   [facility] clinical trials (COVID-19 research studies): clinicalaffairs.[facility]-clinical-trials    Below are the COVID-19 hotlines at the [place] Department of Health ([facility]). Interpreters are available.    For health questions: Call 163-797-1039 or 1-471.367.3650 (7 a.m. to 7 p.m.) For questions about schools and childcare: Call 346-780-8174 or 1-469.369.9147 (7 a.m. to 7 p.m.)       [employer] Strep Test    I am sorry you are not feeling well. Based on the information provided, it is possible you could have strep throat. When left untreated, strep can spread to other areas of the body and cause a more serious infection.  A strep test is the only way to determine if a bacterial infection or a virus is causing your sore throat. This is done in a lab where a swab of the back of your throat is collected and tested for the bacteria that causes strep.  Since you chose not to use the lab order, please be seen:     In a clinic or urgent care    Within 24 hours     Call 161 or go to the emergency room if you suddenly develop a rash, are drooling or unable to swallow fluids, if you are having difficulty breathing, or feel that your throat is closing off.   Diagnosis: Pain in throat  Diagnosis ICD: R07.0  [employer] Results: [place] Strep Test - Declined  [facility]rayan[place] Secondary Results: null

## 2020-07-16 NOTE — LETTER
July 21, 2020        Ashley Mcdaniels  [id number] [facility] [address]    This letter provides a written record that you were tested for COVID-19 on 7/16/2020.       Your result was negative. This means that we didn t find the virus that causes COVID-19 in your sample. A test may show negative when you do actually have the virus. This can happen when the virus is in the early stages of infection, before you feel illness symptoms.    If you have symptoms   Stay home and away from others (self-isolate) until you meet ALL of the guidelines below:    You ve had no fever--and no medicine that reduces fever--for 3 full days (72 hours). And      Your other symptoms have gotten better. For example, your cough or breathing has improved. And     At least 10 days have passed since your symptoms started.    During this time:    Stay home. Don t go to work, school or anywhere else.     Stay in your own room, including for meals. Use your own bathroom if you can.    Stay away from others in your home. No hugging, kissing or shaking hands. No visitors.    Clean  high touch  surfaces often (doorknobs, counters, handles, etc.). Use a household cleaning spray or wipes. You can find a full list on the EPA website at www.epa.gov/pesticide-registration/list-n-disinfectants-use-against-sars-cov-2.    Cover your mouth and nose with a mask, tissue or washcloth to avoid spreading germs.    Wash your hands and face often with soap and water.    Going back to work  Check with your employer for any guidelines to follow for going back to work.    Employers: This document serves as formal notice that your employee tested negative for COVID-19, as of the testing date shown above.

## 2020-07-18 LAB
SARS-COV-2 RNA SPEC QL NAA+PROBE: NOT DETECTED
SPECIMEN SOURCE: NORMAL

## 2020-07-27 ENCOUNTER — MYC MEDICAL ADVICE (OUTPATIENT)
Dept: ALLERGY | Facility: OTHER | Age: 40
End: 2020-07-27

## 2020-08-06 ENCOUNTER — ALLIED HEALTH/NURSE VISIT (OUTPATIENT)
Dept: ALLERGY | Facility: CLINIC | Age: 40
End: 2020-08-06
Payer: COMMERCIAL

## 2020-08-06 DIAGNOSIS — L50.8 CHRONIC URTICARIA: Primary | ICD-10-CM

## 2020-08-06 PROCEDURE — 96372 THER/PROPH/DIAG INJ SC/IM: CPT

## 2020-08-06 PROCEDURE — 99207 ZZC DROP WITH A PROCEDURE: CPT

## 2020-08-06 NOTE — PROGRESS NOTES
Patient presented after waiting 30 minutes with no reaction to xolair injections. Discharged from clinic.    Sandra Everett RN, RN ............   8/6/2020...9:50 AM       Clinic Administered Medication Documentation      Injectable Medication Documentation    Patient was given xolair. Prior to medication administration, verified patients identity using patient s name and date of birth. Please see MAR and medication order for additional information. Patient instructed to report any adverse reaction to staff immediately  and remain in clinic for 30 minutes.      Was entire vial of medication used? Yes  Vial/Syringe: Syringe  Expiration Date:  04/30/2021  Was this medication supplied by the patient? No   Sandra Everett RN

## 2020-09-04 DIAGNOSIS — K50.80 CROHN'S DISEASE OF BOTH SMALL AND LARGE INTESTINE WITHOUT COMPLICATION (H): ICD-10-CM

## 2020-09-04 DIAGNOSIS — Z79.899 ENCOUNTER FOR LONG-TERM (CURRENT) USE OF OTHER MEDICATIONS: ICD-10-CM

## 2020-09-04 DIAGNOSIS — Z79.899 HIGH RISK MEDICATION USE: ICD-10-CM

## 2020-09-04 LAB
ALBUMIN SERPL-MCNC: 3.5 G/DL (ref 3.4–5)
ALP SERPL-CCNC: 66 U/L (ref 40–150)
ALT SERPL W P-5'-P-CCNC: 22 U/L (ref 0–50)
AST SERPL W P-5'-P-CCNC: 16 U/L (ref 0–45)
BASOPHILS # BLD AUTO: 0.1 10E9/L (ref 0–0.2)
BASOPHILS NFR BLD AUTO: 0.5 %
BILIRUB DIRECT SERPL-MCNC: <0.1 MG/DL (ref 0–0.2)
BILIRUB SERPL-MCNC: 0.3 MG/DL (ref 0.2–1.3)
CREAT SERPL-MCNC: 0.75 MG/DL (ref 0.52–1.04)
DIFFERENTIAL METHOD BLD: NORMAL
EOSINOPHIL NFR BLD AUTO: 3.4 %
ERYTHROCYTE [DISTWIDTH] IN BLOOD BY AUTOMATED COUNT: 13.1 % (ref 10–15)
GFR SERPL CREATININE-BSD FRML MDRD: >90 ML/MIN/{1.73_M2}
HCT VFR BLD AUTO: 41.2 % (ref 35–47)
HGB BLD-MCNC: 13.8 G/DL (ref 11.7–15.7)
IMM GRANULOCYTES # BLD: 0 10E9/L (ref 0–0.4)
IMM GRANULOCYTES NFR BLD: 0.4 %
LYMPHOCYTES # BLD AUTO: 3 10E9/L (ref 0.8–5.3)
LYMPHOCYTES NFR BLD AUTO: 30.7 %
MCH RBC QN AUTO: 30.3 PG (ref 26.5–33)
MCHC RBC AUTO-ENTMCNC: 33.5 G/DL (ref 31.5–36.5)
MCV RBC AUTO: 91 FL (ref 78–100)
MONOCYTES # BLD AUTO: 0.6 10E9/L (ref 0–1.3)
MONOCYTES NFR BLD AUTO: 5.7 %
NEUTROPHILS # BLD AUTO: 5.8 10E9/L (ref 1.6–8.3)
NEUTROPHILS NFR BLD AUTO: 59.3 %
NRBC # BLD AUTO: 0 10*3/UL
NRBC BLD AUTO-RTO: 0 /100
PLATELET # BLD AUTO: 289 10E9/L (ref 150–450)
PROT SERPL-MCNC: 7.4 G/DL (ref 6.8–8.8)
RBC # BLD AUTO: 4.55 10E12/L (ref 3.8–5.2)
WBC # BLD AUTO: 9.7 10E9/L (ref 4–11)

## 2020-09-04 PROCEDURE — 82306 VITAMIN D 25 HYDROXY: CPT | Performed by: PHYSICIAN ASSISTANT

## 2020-09-04 PROCEDURE — 85025 COMPLETE CBC W/AUTO DIFF WBC: CPT | Performed by: PHYSICIAN ASSISTANT

## 2020-09-04 PROCEDURE — 36415 COLL VENOUS BLD VENIPUNCTURE: CPT | Performed by: PHYSICIAN ASSISTANT

## 2020-09-04 PROCEDURE — 82565 ASSAY OF CREATININE: CPT | Performed by: PHYSICIAN ASSISTANT

## 2020-09-04 PROCEDURE — 86481 TB AG RESPONSE T-CELL SUSP: CPT | Performed by: PHYSICIAN ASSISTANT

## 2020-09-04 PROCEDURE — 80076 HEPATIC FUNCTION PANEL: CPT | Performed by: PHYSICIAN ASSISTANT

## 2020-09-08 LAB — DEPRECATED CALCIDIOL+CALCIFEROL SERPL-MC: 50 UG/L (ref 20–75)

## 2020-09-09 LAB
GAMMA INTERFERON BACKGROUND BLD IA-ACNC: 0.03 IU/ML
GAMMA INTERFERON BACKGROUND BLD IA-ACNC: NORMAL IU/ML
M TB IFN-G BLD-IMP: NORMAL
M TB IFN-G CD4+ BCKGRND COR BLD-ACNC: 9.97 IU/ML
M TB IFN-G CD4+ BCKGRND COR BLD-ACNC: NORMAL IU/ML
M TB TUBERC IFN-G BLD QL: NEGATIVE
MITOGEN IGNF BCKGRD COR BLD-ACNC: 0.07 IU/ML
MITOGEN IGNF BCKGRD COR BLD-ACNC: 0.21 IU/ML
MITOGEN IGNF BCKGRD COR BLD-ACNC: NORMAL IU/ML
MITOGEN IGNF BCKGRD COR BLD-ACNC: NORMAL IU/ML

## 2020-09-16 DIAGNOSIS — K50.80 CROHN'S DISEASE OF BOTH SMALL AND LARGE INTESTINE WITHOUT COMPLICATIONS (H): ICD-10-CM

## 2020-09-16 DIAGNOSIS — Z79.899 HIGH RISK MEDICATION USE: Primary | ICD-10-CM

## 2020-09-23 ENCOUNTER — TELEPHONE (OUTPATIENT)
Dept: FAMILY MEDICINE | Facility: OTHER | Age: 40
End: 2020-09-23

## 2020-09-23 ENCOUNTER — TELEPHONE (OUTPATIENT)
Dept: ALLERGY | Facility: CLINIC | Age: 40
End: 2020-09-23

## 2020-09-23 ENCOUNTER — VIRTUAL VISIT (OUTPATIENT)
Dept: FAMILY MEDICINE | Facility: OTHER | Age: 40
End: 2020-09-23
Payer: COMMERCIAL

## 2020-09-23 DIAGNOSIS — Z11.59 SCREENING FOR VIRAL DISEASE: ICD-10-CM

## 2020-09-23 DIAGNOSIS — K50.80 CROHN'S DISEASE OF BOTH SMALL AND LARGE INTESTINE WITHOUT COMPLICATION (H): ICD-10-CM

## 2020-09-23 DIAGNOSIS — Z23 NEED FOR VACCINATION: ICD-10-CM

## 2020-09-23 DIAGNOSIS — Z71.84 TRAVEL ADVICE ENCOUNTER: Primary | ICD-10-CM

## 2020-09-23 PROCEDURE — 99213 OFFICE O/P EST LOW 20 MIN: CPT | Mod: 95 | Performed by: FAMILY MEDICINE

## 2020-09-23 NOTE — TELEPHONE ENCOUNTER
Reason for call:  Other   Patient called regarding (reason for call): call back    Additional comments: per patient , she will like to get an allergy shot, called [facility] and they stated that a TE needs to be sent to allergy     Phone number to reach patient:  Home number on file [phone] (home)    Best Time:  Anytime     Can we leave a detailed message on this number?  NO    Travel screening: Not Applicable

## 2020-09-23 NOTE — TELEPHONE ENCOUNTER
Attempted to reach the patient with the following information.  Left message for patient to return call to clinic.     Chelsi Ocampo MA

## 2020-09-23 NOTE — TELEPHONE ENCOUNTER
Scheduled Xoliar injection at [facility].     Madhuri Camp MA, CMA ......9/23/2020...9:56 AM

## 2020-09-23 NOTE — PROGRESS NOTES
"Ashley Mcdaniels is a 40 year old female who is being evaluated via a billable telephone visit.      The patient has been notified of following:     \"This telephone visit will be conducted via a call between you and your physician/provider. We have found that certain health care needs can be provided without the need for a physical exam.  This service lets us provide the care you need with a short phone conversation.  If a prescription is necessary we can send it directly to your pharmacy.  If lab work is needed we can place an order for that and you can then stop by our lab to have the test done at a later time.    Telephone visits are billed at different rates depending on your insurance coverage. During this emergency period, for some insurers they may be billed the same as an in-person visit.  Please reach out to your insurance provider with any questions.    If during the course of the call the physician/provider feels a telephone visit is not appropriate, you will not be charged for this service.\"    Patient has given verbal consent for Telephone visit?  Yes    What phone number would you like to be contacted at? [phone]    How would you like to obtain your AVS? MyChart    Subjective     Ashley Mcdaniels is a 40 year old female who presents via phone visit today for the following health issues:    HPI    COVID test for traveling   Traveling to [place] on 10/10/2020    Will be heading to a wedding.      Denies current symptoms of COVID-19        Review of Systems   Constitutional, HEENT, cardiovascular, pulmonary, gi and gu systems are negative, except as otherwise noted.       Objective          Vitals:  No vitals were obtained today due to virtual visit.      PSYCH: Alert and oriented times 3; coherent speech, normal   rate and volume, able to articulate logical thoughts, able   to abstract reason, no tangential thoughts, no hallucinations   or delusions  Her affect is normal  RESP: No cough, no audible " wheezing, able to talk in full sentences  Remainder of exam unable to be completed due to telephone visits    Orders Only on 09/04/2020   Component Date Value Ref Range Status     Creatinine 09/04/2020 0.75  0.52 - 1.04 mg/dL Final     GFR Estimate 09/04/2020 >90  >60 mL/min/[1.73_m2] Final    Comment: Non  GFR Calc  Starting 12/18/2018, serum creatinine based estimated GFR (eGFR) will be   calculated using the Chronic Kidney Disease Epidemiology Collaboration   (CKD-EPI) equation.       GFR Estimate If Black 09/04/2020 >90  >60 mL/min/[1.73_m2] Final    Comment:  GFR Calc  Starting 12/18/2018, serum creatinine based estimated GFR (eGFR) will be   calculated using the Chronic Kidney Disease Epidemiology Collaboration   (CKD-EPI) equation.       Bilirubin Direct 09/04/2020 <0.1  0.0 - 0.2 mg/dL Final     Bilirubin Total 09/04/2020 0.3  0.2 - 1.3 mg/dL Final     Albumin 09/04/2020 3.5  3.4 - 5.0 g/dL Final     Protein Total 09/04/2020 7.4  6.8 - 8.8 g/dL Final     Alkaline Phosphatase 09/04/2020 66  40 - 150 U/L Final     ALT 09/04/2020 22  0 - 50 U/L Final     AST 09/04/2020 16  0 - 45 U/L Final     WBC 09/04/2020 9.7  4.0 - 11.0 10e9/L Final     RBC Count 09/04/2020 4.55  3.8 - 5.2 10e12/L Final     Hemoglobin 09/04/2020 13.8  11.7 - 15.7 g/dL Final     Hematocrit 09/04/2020 41.2  35.0 - 47.0 % Final     MCV 09/04/2020 91  78 - 100 fl Final     MCH 09/04/2020 30.3  26.5 - 33.0 pg Final     MCHC 09/04/2020 33.5  31.5 - 36.5 g/dL Final     RDW 09/04/2020 13.1  10.0 - 15.0 % Final     Platelet Count 09/04/2020 289  150 - 450 10e9/L Final     Diff Method 09/04/2020 Automated Method   Final     % Neutrophils 09/04/2020 59.3  % Final     % Lymphocytes 09/04/2020 30.7  % Final     % Monocytes 09/04/2020 5.7  % Final     % Eosinophils 09/04/2020 3.4  % Final     % Basophils 09/04/2020 0.5  % Final     % Immature Granulocytes 09/04/2020 0.4  % Final     Nucleated RBCs 09/04/2020 0  0 /100  Final     Absolute Neutrophil 09/04/2020 5.8  1.6 - 8.3 10e9/L Final     Absolute Lymphocytes 09/04/2020 3.0  0.8 - 5.3 10e9/L Final     Absolute Monocytes 09/04/2020 0.6  0.0 - 1.3 10e9/L Final     Absolute Basophils 09/04/2020 0.1  0.0 - 0.2 10e9/L Final     Abs Immature Granulocytes 09/04/2020 0.0  0 - 0.4 10e9/L Final     Absolute Nucleated RBC 09/04/2020 0.0   Final     Vitamin D Deficiency screening 09/04/2020 50  20 - 75 ug/L Final    Comment: Season, race, dietary intake, and treatment affect the concentration of   25-hydroxy-Vitamin D. Values may decrease during winter months and increase   during summer months. Values 20-29 ug/L may indicate Vitamin D insufficiency   and values <20 ug/L may indicate Vitamin D deficiency.  Vitamin D determination is routinely performed by an immunoassay specific for   25 hydroxyvitamin D3.  If an individual is on vitamin D2 (ergocalciferol)   supplementation, please specify 25 OH vitamin D2 and D3 level determination by   LCMSMS test [id number].       Quantiferon-TB Gold Plus Result 09/04/2020 Canceled, Test credited  NEG^Negative Final    Incorrectly ordered by U/Clinic     TB1 Ag minus Nil Value 09/04/2020 Canceled, Test credited  IU/mL Final    Incorrectly ordered by U/Clinic     TB2 Ag minus Nil Value 09/04/2020 Canceled, Test credited  IU/mL Final    Incorrectly ordered by U/Clinic     Mitogen minus Nil Result 09/04/2020 Canceled, Test credited  IU/mL Final    Incorrectly ordered by U/Clinic     Nil Result 09/04/2020 Canceled, Test credited  IU/mL Final    Incorrectly ordered by U/Clinic     MTB Quantiferon Result 09/04/2020 Negative  NEG^Negative Final    Comment: No interferon gamma response to M.tuberculosis antigens was detected.   Infection with M.tuberculosis is unlikely, however a single negative result   does not exclude infection. In patients at high risk for infection, a second   test should be considered       TB1 Ag minus Nil 09/04/2020 0.07  IU/mL  Final     TB2 Ag minus Nil 09/04/2020 0.21  IU/mL Final     Mitogen minus Nil 09/04/2020 9.97  IU/mL Final     NIL Result 09/04/2020 0.03  IU/mL Final           Assessment/Plan:    Assessment & Plan     Tia was seen today for covid 19 testing.    Diagnoses and all orders for this visit:    Travel advice encounter  -     Asymptomatic COVID-19 Virus (Coronavirus) by PCR; Future  -     HEPATITIS A VACCINE (ADULT); Future  -     HEPATITIS B VACCINE, ADULT, IM; Future  -     Cancel: PPSV23, IM/SUBQ (2+ YRS) - [id number]; Future  -     typhoid (VIVOTIF) CR capsule; One capsule on alternate days (day 1, 3, 5, and 7) for a total of 4 doses; all doses should be complete at least 1 week prior to travel    Need for vaccination  -     HEPATITIS A VACCINE (ADULT); Future  -     HEPATITIS B VACCINE, ADULT, IM; Future  -     Cancel: PPSV23, IM/SUBQ (2+ YRS) - [id number]; Future  -     typhoid (VIVOTIF) CR capsule; One capsule on alternate days (day 1, 3, 5, and 7) for a total of 4 doses; all doses should be complete at least 1 week prior to travel  -     PCV13, IM (6+ WK) - [id number]    Screening for viral disease  -     Asymptomatic COVID-19 Virus (Coronavirus) by PCR; Future    Crohn's disease of both small and large intestine without complication (H)  -     PCV13, IM (6+ WK) - [id number]    COVID-19 testing ordered for her travel requirements.  Discussed other precautions to take to avoid infection prior to or during travel.    Discussed recommended travel vaccinations based on CDC guidelines.  Patient requested vaccination for hepatitis A, B, and typhoid.  She is also due for a Pneumia vaccine given her Crohn's disease.  These were all ordered for her.     Tobacco Cessation:   reports that she has been smoking. She has been smoking about 0.50 packs per day. She has never used smokeless tobacco.  Not discussed  Today.      BMI:   Estimated body mass index is 38.09 kg/m  as calculated from the following:    Height as of 6/5/20:  "1.6 m (5' 3\").    Weight as of 6/5/20: 97.5 kg (215 lb).         Patient Instructions   [facility]    Get your COVID-19 testing done as discussed.  Let me know if any problems.  If there is a form to fill out, please give it to me as soon as possible.    I do recommend immunizations against hepatitis A, B, and typhoid prior to travel.  I have ordered these for you.  To be fully complete, hepatitis B does require 2 boosters at 2 and 6 months from the date of your first immunization.  The typhoid vaccine will last 5 years.    I did order your pneumonia vaccine, but in looking through your chart, it appears you had 1 in 2018.  If this is the case, you probably do not need another one until 2023.    Contact us or return if questions or concerns.     Have a nice day!    Dr. Thayer     Return in about 3 months (around 12/23/2020) for Physical Exam.      If you had imaging scheduled please refer to your radiology prep sheet.      If you need medication refills, please contact your pharmacy 3 days before your prescriptions runs out or download the [facility] tara for your smart phone.   If you are out of refills, your pharmacy will contact contact the clinic.    Contact us or return if questions or concerns.     -[facility] Care Team:    MD Char Alexander PA-C Joel De Haan, PA-C Anna Niesen, PA-C Elizabeth \"Kiki\" SHARIF Shafer CNP, APRN, SHARIF Ray, BRITANY Dodson, MARCON, RN, PHN                                         [facility] assistance [phone]    We would like to hear from you, how was your visit today?    Leena James       Patient Information Supervisor     [place], [place], and [place]                 Return in about 3 months (around 12/23/2020) for Physical Exam.    Richard Thayer MD, MD  [facility] [place]    Phone call duration:  8 minutes              "

## 2020-09-23 NOTE — TELEPHONE ENCOUNTER
Visit Disposition     Dispositions  Check-out Note    0 Return in about 3 months (around 12/23/2020) for Physical Exam.  Imm tarat, [place]

## 2020-09-23 NOTE — PATIENT INSTRUCTIONS
"Thank you for visiting [facility]    Get your COVID-19 testing done as discussed.  Let me know if any problems.  If there is a form to fill out, please give it to me as soon as possible.    I do recommend immunizations against hepatitis A, B, and typhoid prior to travel.  I have ordered these for you.  To be fully complete, hepatitis B does require 2 boosters at 2 and 6 months from the date of your first immunization.  The typhoid vaccine will last 5 years.    I did order your pneumonia vaccine.  This is the Prevnar vaccine.  You had a Pneumovax vaccine in 2018.  You should get a booster of the Pneumovax vaccine in 2023.  Let us know if you have questions or understand things differently.    Contact us or return if questions or concerns.     Have a nice day!    Dr. Thayer     Return in about 3 months (around 12/23/2020) for Physical Exam.      If you had imaging scheduled please refer to your radiology prep sheet.      If you need medication refills, please contact your pharmacy 3 days before your prescriptions runs out or download the [facility] tara for your smart phone.   If you are out of refills, your pharmacy will contact contact the clinic.    Contact us or return if questions or concerns.     -[facility] Care Team:    MD Char Alexander PA-C Joel De Haan, PA-C Anna Niesen, PA-C Elizabeth \"Kiki\" SHARIF Shafer CNP APRN, CNP  SHARIF Carlos, CNP  Rob Dodson, MARCON, RN, PHN                                         [facility] assistance [phone]    We would like to hear from you, how was your visit today?    Leena James       Patient Information Supervisor     [place], [place], and [facility]             "

## 2020-10-01 DIAGNOSIS — Z71.84 TRAVEL ADVICE ENCOUNTER: ICD-10-CM

## 2020-10-01 DIAGNOSIS — Z11.59 SCREENING FOR VIRAL DISEASE: ICD-10-CM

## 2020-10-01 PROCEDURE — U0003 INFECTIOUS AGENT DETECTION BY NUCLEIC ACID (DNA OR RNA); SEVERE ACUTE RESPIRATORY SYNDROME CORONAVIRUS 2 (SARS-COV-2) (CORONAVIRUS DISEASE [COVID-19]), AMPLIFIED PROBE TECHNIQUE, MAKING USE OF HIGH THROUGHPUT TECHNOLOGIES AS DESCRIBED BY CMS-2020-01-R: HCPCS | Performed by: FAMILY MEDICINE

## 2020-10-02 DIAGNOSIS — L50.8 CHRONIC URTICARIA: Primary | ICD-10-CM

## 2020-10-02 LAB
SARS-COV-2 RNA SPEC QL NAA+PROBE: NOT DETECTED
SPECIMEN SOURCE: NORMAL

## 2020-10-02 NOTE — PROGRESS NOTES
Received paperwork from insurance that PA for Xolair 300mg [id number] has been renewed from 9/21/20 to 9/20/21.  Paperwork sent to scanning.    Chelsea Camara RN

## 2020-10-04 ENCOUNTER — MYC MEDICAL ADVICE (OUTPATIENT)
Dept: FAMILY MEDICINE | Facility: OTHER | Age: 40
End: 2020-10-04

## 2020-10-09 ENCOUNTER — ALLIED HEALTH/NURSE VISIT (OUTPATIENT)
Dept: ALLERGY | Facility: CLINIC | Age: 40
End: 2020-10-09
Payer: COMMERCIAL

## 2020-10-09 DIAGNOSIS — L50.8 CHRONIC URTICARIA: Primary | ICD-10-CM

## 2020-10-09 PROCEDURE — 99207 PR DROP WITH A PROCEDURE: CPT

## 2020-10-09 PROCEDURE — 96372 THER/PROPH/DIAG INJ SC/IM: CPT

## 2020-11-06 ENCOUNTER — ALLIED HEALTH/NURSE VISIT (OUTPATIENT)
Dept: ALLERGY | Facility: CLINIC | Age: 40
End: 2020-11-06
Payer: COMMERCIAL

## 2020-11-06 DIAGNOSIS — L50.1 CHRONIC IDIOPATHIC URTICARIA: ICD-10-CM

## 2020-11-06 DIAGNOSIS — L50.8 CHRONIC URTICARIA: Primary | ICD-10-CM

## 2020-11-06 PROCEDURE — 99207 PR DROP WITH A PROCEDURE: CPT

## 2020-11-06 PROCEDURE — 96372 THER/PROPH/DIAG INJ SC/IM: CPT

## 2020-11-06 NOTE — PROGRESS NOTES
Patient presented after waiting 30 minutes with no reaction to xolair injections. Discharged from clinic.    Sandra Everett RN, RN ............   11/6/2020...10:54 AM

## 2020-11-06 NOTE — PROGRESS NOTES
Clinic Administered Medication Documentation      Injectable Medication Documentation    Patient was given xolair. Prior to medication administration, verified patients identity using patient s name and date of birth. Please see MAR and medication order for additional information. Patient instructed to report any adverse reaction to staff immediately  and remain in clinic for 30 minutes.      Was entire vial of medication used? Yes  Vial/Syringe: Syringe  Expiration Date:  6/30/2021  Was this medication supplied by the patient? No   Sandra Everett RN, BSN [facility]

## 2020-11-18 ENCOUNTER — VIRTUAL VISIT (OUTPATIENT)
Dept: FAMILY MEDICINE | Facility: OTHER | Age: 40
End: 2020-11-18

## 2020-11-18 PROCEDURE — U0003 INFECTIOUS AGENT DETECTION BY NUCLEIC ACID (DNA OR RNA); SEVERE ACUTE RESPIRATORY SYNDROME CORONAVIRUS 2 (SARS-COV-2) (CORONAVIRUS DISEASE [COVID-19]), AMPLIFIED PROBE TECHNIQUE, MAKING USE OF HIGH THROUGHPUT TECHNOLOGIES AS DESCRIBED BY CMS-2020-01-R: HCPCS | Performed by: FAMILY MEDICINE

## 2020-11-18 NOTE — PROGRESS NOTES
"Date: 2020 09:00:10  Clinician: Cuca Whiteside  Clinician NPI: [id number]  Patient: Ashley Mcdaniels  Patient : 1980  Patient Address: [address] [place] [address]  Patient Phone: [phone]  Visit Protocol: URI  Patient Summary:  Ashley is a 40 year old ( : 1980 ) female who initiated a [facility] Visit for cold, sinus infection, or influenza. When asked the question \"Please sign me up to receive news, health information and promotions. \", Ashley responded \"Yes\".    Ashley states her symptoms started 1-2 days ago.   Her symptoms consist of rhinitis, myalgia, chills, malaise, a sore throat, ear pain, a headache, enlarged lymph nodes, a cough, nasal congestion, and nausea.   Symptom details     Nasal secretions: The color of her mucus is yellow, blood-tinged, white, and clear.    Cough: Ashley coughs a few times an hour and her cough is not more bothersome at night. Phlegm does not come into her throat when she coughs. She believes her cough is caused by post-nasal drip.     Sore throat: Ashley reports having mild throat pain (1-3 on a 10 point pain scale), does not have exudate on her tonsils, and can swallow liquids. The lymph nodes in her neck are enlarged. A rash has not appeared on the skin since the sore throat started.     Headache: She states the headache is moderate (4-6 on a 10 point pain scale).      Ashley denies having vomiting, facial pain or pressure, teeth pain, ageusia, diarrhea, wheezing, fever, and anosmia. She also denies taking antibiotic medication in the past month, having recent facial or sinus surgery in the past 60 days, and having a sinus infection within the past year. She is not experiencing dyspnea.   Precipitating events  Within the past week, Ashley has not been exposed to someone with strep throat. She has not recently been exposed to someone with influenza. Ashley has not been in close contact with any high risk individuals.   Pertinent COVID-19 (Coronavirus) information  Ashley does not " work or volunteer as healthcare worker or a . In the past 14 days, Ashley has not worked or volunteered at a healthcare facility or group living setting.   In the past 14 days, she also has not lived in a congregate living setting.   Ashley has not had a close contact with a laboratory-confirmed COVID-19 patient within 14 days of symptom onset.    Since December 2019, Ashley has been tested for COVID-19 and has had upper respiratory infection (URI) or influenza-like illness.      Result of COVID-19 test: Negative     Date of her COVID-19 test: 10/01/2020     Date(s) of previous URI or influenza-like illness (free-text): 03/10/2020     Symptoms Ashley experienced during previous URI or influenza-like illness as reported by the patient (free-text): Cough, aches, congestion        Pertinent medical history  Ashley typically gets a yeast infection when she takes antibiotics. She has used fluconazole (Diflucan) to treat previous yeast infections. 2 doses of fluconazole (Diflucan) has typically been needed for symptoms to resolve in the past.  Ashley needs a return to work/school note.   Weight: 205 lbs   Ashley does not smoke or use smokeless tobacco.   She denies pregnancy and denies breastfeeding. She does not menstruate.   Weight: 205 lbs    MEDICATIONS: Chantix oral, Xolair subcutaneous, Humira Pen subcutaneous, Zyrtec oral, ALLERGIES: NKDA  Clinician Response:  Dear Ashley,         Your symptoms show that you may have coronavirus (COVID-19). This&nbsp;illness can cause fever, cough and trouble breathing. Many people get a mild case and get better on their own. Some people can get very sick.  What should I do?  We would like to test you for this virus.  1. Please call [phone] to schedule your visit. Explain that you were referred by [facility] to have a COVID-19 test. Be ready to share your [facility] visit ID number. Do not schedule your appointment until you have had at least 2 days of symptoms or you may receive a false  "negative result.  The following will serve as your written order for this COVID Test, ordered by me, for the indication of suspected COVID [Z20.828]: The test will be ordered in [facility], our electronic health record, after you are scheduled. It will show as ordered and authorized by Joseph Butler MD.  Order: COVID-19 (Coronavirus) [facility].    2. When it's time for your COVID test:  Stay at least 6 feet away from others. (If someone will drive you to your test, stay in the backseat, as far away from the  as you can.)  Cover your mouth and nose with a mask, tissue or washcloth.  Go straight to the testing site. Don't make any stops on the way there or back.    3.Starting now:&nbsp;Stay home and away from others (self-isolate) until:   You've had&nbsp;no&nbsp;fever---and no medicine that reduces fever---for one full day (24 hours).&nbsp;And...  Your other symptoms have gotten better. For example, your cough or breathing has improved.&nbsp;And...  At least&nbsp;10 days&nbsp;have passed since your symptoms started.    During this time, don't leave the house except for testing or medical care.   Stay in your own room, even for meals. Use your own bathroom if you can.  Stay away from others in your home. No hugging, kissing or shaking hands. No visitors.  Don't go to work, school or anywhere else.   Clean \"high touch\" surfaces often (doorknobs, counters, handles, etc.). Use a household cleaning spray or wipes. You'll find a full list of  on the EPA website:&nbsp;www.epa.gov/pesticide-registration/list-n-disinfectants-use-against-sars-cov-2.   Cover your mouth and nose with a mask, tissue or washcloth to avoid spreading germs.  Wash your hands and face often. Use soap and water.  Caregivers in these groups are at risk for severe illness due to COVID-19:  o People 65 years and older  o People who live in a nursing home or long-term care facility  o People with chronic disease (lung, " heart, cancer, diabetes, kidney, liver, immunologic)  o People who have a weakened immune system, including those who:   Are in cancer treatment  Take medicine that weakens the immune system, such as corticosteroids  Had a bone marrow or organ transplant  Have an immune deficiency  Have poorly controlled HIV or AIDS  Are obese (body mass index of 40 or higher)  Smoke regularly   o Caregivers should wear gloves while washing dishes, handling laundry and cleaning bedrooms and bathrooms.  o Use caution when washing and drying laundry: Don't shake dirty laundry, and use the warmest water setting that you can.  o For more tips, go to&nbsp;www.cdc.gov/coronavirus/2019-ncov/downloads/10Things.pdf.   How can I take care of myself?    Get lots of rest. Drink extra fluids&nbsp;(unless a doctor has told you not to).  Take Tylenol (acetaminophen) for fever or pain.&nbsp;If you have liver or kidney problems, ask your family doctor if it's okay to take Tylenol.   Adults can take either:   650 mg (two 325 mg pills) every 4 to 6 hours,&nbsp;or...  1,000 mg (two 500 mg pills) every 8 hours as needed.  Note:&nbsp;Don't take more than 3,000 mg in one day. Acetaminophen is found in many medicines (both prescribed and over-the-counter medicines). Read all labels to be sure you don't take too much.   For children, check the Tylenol bottle for the right dose. The dose is based on the child's age or weight.   If you have other health problems (like cancer, heart failure, an organ transplant or severe kidney disease):&nbsp;Call your specialty clinic if you don't feel better in the next 2 days.    Know when to call 911.&nbsp;Emergency warning signs include:   Trouble breathing or shortness of breath Pain or pressure in the chest that doesn't go away Feeling confused like you haven't felt before, or not being able to wake up Bluish-colored lips or face.  Where can I get more information?   [facility] -- About  COVID-19:&nbsp;[email or web address]  CDC -- What to Do If You're Sick:&nbsp;www.cdc.gov/coronavirus/2019-ncov/about/steps-when-sick.html  CDC -- Ending Home Isolation:&nbsp;www.cdc.gov/coronavirus/2019-ncov/hcp/disposition-in-home-patients.html  [facility] -- Caring for Someone:&nbsp;www.cdc.gov/coronavirus/2019-ncov/if-you-are-sick/care-for-someone.html  [facility] -- Interim Guidance for Hospital Discharge to Home:&nbsp;www.[facility].mn./diseases/coronavirus/hcp/hospdischarge.pdf  [facility] clinical trials (COVID-19 research studies):&nbsp;clinicalaffairs.[facility]-clinical-trials  Below are the COVID-19 hotlines at the [place] Department of Health ([facility]). Interpreters are available.   For health questions: Call 855-857-6292 or 1-288.230.1314 (7 a.m. to 7 p.m.) For questions about schools and childcare: Call 886-583-9704 or 1-241.898.4203 (7 a.m. to 7 p.m.)           Diagnosis: Contact with and (suspected) exposure to other viral communicable diseases  Diagnosis ICD: Z20.828

## 2020-11-19 LAB
SARS-COV-2 RNA SPEC QL NAA+PROBE: NOT DETECTED
SPECIMEN SOURCE: NORMAL

## 2020-11-22 ENCOUNTER — HEALTH MAINTENANCE LETTER (OUTPATIENT)
Age: 40
End: 2020-11-22

## 2020-11-23 NOTE — PROGRESS NOTES
"Subjective     Ashley Mcdaniels is a 40 year old female who presents to clinic today for the following health issues:    HPI         Vaginal Symptoms  Onset/Duration: 2 weeks  Description:  Vaginal Discharge: white clear   Itching (Pruritis): YES  Burning sensation:  YES  Odor: no  Accompanying Signs & Symptoms:  Urinary symptoms: no  Abdominal pain: YES  Fever: no  History:   Sexually active: YES  New Partner: no  Possibility of Pregnancy:  no  Recent antibiotic use: no  Previous vaginitis issues: no  Precipitating or alleviating factors: None  Therapies tried and outcome: Monistat      Answers for HPI/ROS submitted by the patient on 11/24/2020   If you checked off any problems, how difficult have these problems made it for you to do your work, take care of things at home, or get along with other people?: Somewhat difficult  PHQ9 TOTAL SCORE: 6  YUE 7 TOTAL SCORE: 7      Review of Systems   Constitutional, HEENT, cardiovascular, pulmonary, gi and gu systems are negative, except as otherwise noted.      Objective    /76   Pulse 68   Temp 97.2  F (36.2  C) (Temporal)   Resp 16   Ht 1.6 m (5' 3\")   Wt 95.7 kg (211 lb)   SpO2 96%   BMI 37.38 kg/m    Body mass index is 37.38 kg/m .  Physical Exam   GENERAL: healthy, alert and no distress  ABDOMEN: soft, nontender, no hepatosplenomegaly, no masses and bowel sounds normal  MS: no gross musculoskeletal defects noted, no edema  SKIN: no suspicious lesions or rashes  NEURO: Normal strength and tone, mentation intact and speech normal  PSYCH: mentation appears normal, affect normal/bright    Results for orders placed or performed in visit on 11/24/20 (from the past 24 hour(s))   Wet prep    Specimen: Vagina   Result Value Ref Range    Specimen Description Vagina     Wet Prep No Trichomonas seen     Wet Prep No yeast seen     Wet Prep Few  Clue cells seen   (A)     Wet Prep No WBC's seen            Assessment & Plan     BV (bacterial vaginosis)  Patient prefers " treatment with oral metronidazole. Advised to abstain from intercourse for at least one week and two weeks if she would like to be cautious. Also recommended pelvic ultrasound to evaluated nonspecific lower abdominal pain accompanied by hot flashes and sudden loss of sex drive.   - metroNIDAZOLE (FLAGYL) 500 MG tablet; Take 1 tablet (500 mg) by mouth 2 times daily for 7 days    Vaginal itching  - Wet prep    Pelvic pain in female  - US Pelvic Complete with Transvaginal; Future   No follow-ups on file.    SHARIF [facility]

## 2020-11-24 ENCOUNTER — OFFICE VISIT (OUTPATIENT)
Dept: FAMILY MEDICINE | Facility: OTHER | Age: 40
End: 2020-11-24
Payer: COMMERCIAL

## 2020-11-24 VITALS
OXYGEN SATURATION: 96 % | TEMPERATURE: 97.2 F | DIASTOLIC BLOOD PRESSURE: 76 MMHG | HEART RATE: 68 BPM | BODY MASS INDEX: 37.39 KG/M2 | SYSTOLIC BLOOD PRESSURE: 108 MMHG | RESPIRATION RATE: 16 BRPM | HEIGHT: 63 IN | WEIGHT: 211 LBS

## 2020-11-24 DIAGNOSIS — B00.9 HSV (HERPES SIMPLEX VIRUS) INFECTION: ICD-10-CM

## 2020-11-24 DIAGNOSIS — R10.2 PELVIC PAIN IN FEMALE: ICD-10-CM

## 2020-11-24 DIAGNOSIS — N89.8 VAGINAL ITCHING: ICD-10-CM

## 2020-11-24 DIAGNOSIS — B96.89 BV (BACTERIAL VAGINOSIS): Primary | ICD-10-CM

## 2020-11-24 DIAGNOSIS — N76.0 BV (BACTERIAL VAGINOSIS): Primary | ICD-10-CM

## 2020-11-24 LAB
SPECIMEN SOURCE: ABNORMAL
WET PREP SPEC: ABNORMAL

## 2020-11-24 PROCEDURE — 87210 SMEAR WET MOUNT SALINE/INK: CPT | Performed by: STUDENT IN AN ORGANIZED HEALTH CARE EDUCATION/TRAINING PROGRAM

## 2020-11-24 PROCEDURE — 99213 OFFICE O/P EST LOW 20 MIN: CPT | Performed by: STUDENT IN AN ORGANIZED HEALTH CARE EDUCATION/TRAINING PROGRAM

## 2020-11-24 RX ORDER — METRONIDAZOLE 500 MG/1
500 TABLET ORAL 2 TIMES DAILY
Qty: 14 TABLET | Refills: 0 | Status: SHIPPED | OUTPATIENT
Start: 2020-11-24 | End: 2020-12-01

## 2020-11-24 ASSESSMENT — ANXIETY QUESTIONNAIRES
1. FEELING NERVOUS, ANXIOUS, OR ON EDGE: SEVERAL DAYS
GAD7 TOTAL SCORE: 7
5. BEING SO RESTLESS THAT IT IS HARD TO SIT STILL: NOT AT ALL
GAD7 TOTAL SCORE: 7
7. FEELING AFRAID AS IF SOMETHING AWFUL MIGHT HAPPEN: SEVERAL DAYS
7. FEELING AFRAID AS IF SOMETHING AWFUL MIGHT HAPPEN: SEVERAL DAYS
6. BECOMING EASILY ANNOYED OR IRRITABLE: MORE THAN HALF THE DAYS
4. TROUBLE RELAXING: SEVERAL DAYS
2. NOT BEING ABLE TO STOP OR CONTROL WORRYING: SEVERAL DAYS
GAD7 TOTAL SCORE: 7
3. WORRYING TOO MUCH ABOUT DIFFERENT THINGS: SEVERAL DAYS

## 2020-11-24 ASSESSMENT — PATIENT HEALTH QUESTIONNAIRE - PHQ9
10. IF YOU CHECKED OFF ANY PROBLEMS, HOW DIFFICULT HAVE THESE PROBLEMS MADE IT FOR YOU TO DO YOUR WORK, TAKE CARE OF THINGS AT HOME, OR GET ALONG WITH OTHER PEOPLE: SOMEWHAT DIFFICULT
SUM OF ALL RESPONSES TO PHQ QUESTIONS 1-9: 6
SUM OF ALL RESPONSES TO PHQ QUESTIONS 1-9: 6

## 2020-11-24 ASSESSMENT — MIFFLIN-ST. JEOR: SCORE: 1596.22

## 2020-11-25 ASSESSMENT — ANXIETY QUESTIONNAIRES: GAD7 TOTAL SCORE: 7

## 2020-11-27 RX ORDER — VALACYCLOVIR HYDROCHLORIDE 1 G/1
500 TABLET, FILM COATED ORAL 2 TIMES DAILY
Qty: 20 TABLET | Refills: 11 | Status: SHIPPED | OUTPATIENT
Start: 2020-11-27 | End: 2021-12-15

## 2020-11-27 NOTE — TELEPHONE ENCOUNTER
Prescription approved per [facility] Refill Protocol.  Mirna Correa RN  November 27, 2020

## 2020-12-04 ENCOUNTER — ANCILLARY PROCEDURE (OUTPATIENT)
Dept: ULTRASOUND IMAGING | Facility: OTHER | Age: 40
End: 2020-12-04
Attending: STUDENT IN AN ORGANIZED HEALTH CARE EDUCATION/TRAINING PROGRAM
Payer: COMMERCIAL

## 2020-12-04 DIAGNOSIS — R10.2 PELVIC PAIN IN FEMALE: ICD-10-CM

## 2020-12-04 DIAGNOSIS — K50.80 CROHN'S DISEASE OF BOTH SMALL AND LARGE INTESTINE WITHOUT COMPLICATIONS (H): ICD-10-CM

## 2020-12-04 DIAGNOSIS — Z79.899 HIGH RISK MEDICATION USE: ICD-10-CM

## 2020-12-04 LAB
ALBUMIN SERPL-MCNC: 3.7 G/DL (ref 3.4–5)
ALP SERPL-CCNC: 59 U/L (ref 40–150)
ALT SERPL W P-5'-P-CCNC: 22 U/L (ref 0–50)
AST SERPL W P-5'-P-CCNC: 17 U/L (ref 0–45)
BASOPHILS # BLD AUTO: 0 10E9/L (ref 0–0.2)
BASOPHILS NFR BLD AUTO: 0.2 %
BILIRUB DIRECT SERPL-MCNC: <0.1 MG/DL (ref 0–0.2)
BILIRUB SERPL-MCNC: 0.2 MG/DL (ref 0.2–1.3)
CREAT SERPL-MCNC: 0.78 MG/DL (ref 0.52–1.04)
DIFFERENTIAL METHOD BLD: ABNORMAL
EOSINOPHIL # BLD AUTO: 0.3 10E9/L (ref 0–0.7)
EOSINOPHIL NFR BLD AUTO: 2.1 %
ERYTHROCYTE [DISTWIDTH] IN BLOOD BY AUTOMATED COUNT: 13.5 % (ref 10–15)
GFR SERPL CREATININE-BSD FRML MDRD: >90 ML/MIN/{1.73_M2}
HCT VFR BLD AUTO: 41.1 % (ref 35–47)
HGB BLD-MCNC: 13.7 G/DL (ref 11.7–15.7)
LYMPHOCYTES # BLD AUTO: 3.1 10E9/L (ref 0.8–5.3)
LYMPHOCYTES NFR BLD AUTO: 25.2 %
MCH RBC QN AUTO: 30.4 PG (ref 26.5–33)
MCHC RBC AUTO-ENTMCNC: 33.3 G/DL (ref 31.5–36.5)
MCV RBC AUTO: 91 FL (ref 78–100)
MONOCYTES # BLD AUTO: 1.2 10E9/L (ref 0–1.3)
MONOCYTES NFR BLD AUTO: 9.4 %
NEUTROPHILS # BLD AUTO: 7.7 10E9/L (ref 1.6–8.3)
NEUTROPHILS NFR BLD AUTO: 63.1 %
PLATELET # BLD AUTO: 321 10E9/L (ref 150–450)
PROT SERPL-MCNC: 7.9 G/DL (ref 6.8–8.8)
RBC # BLD AUTO: 4.5 10E12/L (ref 3.8–5.2)
WBC # BLD AUTO: 12.2 10E9/L (ref 4–11)

## 2020-12-04 PROCEDURE — 82306 VITAMIN D 25 HYDROXY: CPT | Performed by: PHYSICIAN ASSISTANT

## 2020-12-04 PROCEDURE — 82565 ASSAY OF CREATININE: CPT | Performed by: PHYSICIAN ASSISTANT

## 2020-12-04 PROCEDURE — 80076 HEPATIC FUNCTION PANEL: CPT | Performed by: PHYSICIAN ASSISTANT

## 2020-12-04 PROCEDURE — 85025 COMPLETE CBC W/AUTO DIFF WBC: CPT | Performed by: PHYSICIAN ASSISTANT

## 2020-12-04 PROCEDURE — 36415 COLL VENOUS BLD VENIPUNCTURE: CPT | Performed by: PHYSICIAN ASSISTANT

## 2020-12-04 PROCEDURE — 86481 TB AG RESPONSE T-CELL SUSP: CPT | Performed by: PHYSICIAN ASSISTANT

## 2020-12-07 LAB
GAMMA INTERFERON BACKGROUND BLD IA-ACNC: 0.11 IU/ML
M TB IFN-G CD4+ BCKGRND COR BLD-ACNC: 9.89 IU/ML
M TB TUBERC IFN-G BLD QL: NEGATIVE
MITOGEN IGNF BCKGRD COR BLD-ACNC: 0 IU/ML
MITOGEN IGNF BCKGRD COR BLD-ACNC: 0 IU/ML

## 2020-12-08 ENCOUNTER — ALLIED HEALTH/NURSE VISIT (OUTPATIENT)
Dept: ALLERGY | Facility: OTHER | Age: 40
End: 2020-12-08
Payer: COMMERCIAL

## 2020-12-08 DIAGNOSIS — L50.8 CHRONIC URTICARIA: Primary | ICD-10-CM

## 2020-12-08 PROCEDURE — 96372 THER/PROPH/DIAG INJ SC/IM: CPT

## 2020-12-08 PROCEDURE — 99207 PR DROP WITH A PROCEDURE: CPT

## 2020-12-08 NOTE — PROGRESS NOTES
Patient presented after waiting 30 minutes with normal reaction to allergy injections. Discharged from clinic.      Tracie Lira RN Specialty Triage 12/8/2020 2:05 PM

## 2020-12-09 ENCOUNTER — MEDICAL CORRESPONDENCE (OUTPATIENT)
Dept: HEALTH INFORMATION MANAGEMENT | Facility: CLINIC | Age: 40
End: 2020-12-09

## 2020-12-09 LAB
DEPRECATED CALCIDIOL+CALCIFEROL SERPL-MC: <59 UG/L (ref 20–75)
VITAMIN D2 SERPL-MCNC: <5 UG/L
VITAMIN D3 SERPL-MCNC: 54 UG/L

## 2021-01-13 ENCOUNTER — ALLIED HEALTH/NURSE VISIT (OUTPATIENT)
Dept: ALLERGY | Facility: OTHER | Age: 41
End: 2021-01-13
Payer: COMMERCIAL

## 2021-01-13 DIAGNOSIS — L50.1 CHRONIC IDIOPATHIC URTICARIA: Primary | ICD-10-CM

## 2021-01-13 PROCEDURE — 96372 THER/PROPH/DIAG INJ SC/IM: CPT

## 2021-01-13 PROCEDURE — 99207 PR DROP WITH A PROCEDURE: CPT

## 2021-01-13 NOTE — PROGRESS NOTES
Clinic Administered Medication Documentation      Injectable Medication Documentation    Patient was given Xolair. Prior to medication administration, verified patients identity using patient s name and date of birth. Please see MAR and medication order for additional information. Patient instructed to remain in clinic for 30 minutes after injection and report any adverse reactions to medication.      Was entire vial of medication used? Yes  Vial/Syringe: Syringe  Expiration Date:  07/31/21  Was this medication supplied by the patient? No     Patient presented after waiting 30 minutes with no reaction to  injections. Discharged from clinic.    Sandra DURAND RN, Allergy Clinic 01/13/21 1:40 PM

## 2021-02-17 ENCOUNTER — TELEPHONE (OUTPATIENT)
Dept: ALLERGY | Facility: OTHER | Age: 41
End: 2021-02-17

## 2021-02-17 ENCOUNTER — ALLIED HEALTH/NURSE VISIT (OUTPATIENT)
Dept: ALLERGY | Facility: OTHER | Age: 41
End: 2021-02-17
Payer: COMMERCIAL

## 2021-02-17 DIAGNOSIS — L50.8 CHRONIC URTICARIA: ICD-10-CM

## 2021-02-17 PROCEDURE — 99207 PR DROP WITH A PROCEDURE: CPT

## 2021-02-17 PROCEDURE — 96372 THER/PROPH/DIAG INJ SC/IM: CPT

## 2021-02-17 RX ORDER — EPINEPHRINE 0.3 MG/.3ML
0.3 INJECTION SUBCUTANEOUS PRN
Qty: 0.6 ML | Refills: 1 | Status: SHIPPED | OUTPATIENT
Start: 2021-02-17 | End: 2021-02-17

## 2021-02-17 RX ORDER — EPINEPHRINE 0.3 MG/.3ML
0.3 INJECTION SUBCUTANEOUS PRN
Qty: 0.6 ML | Refills: 1 | Status: SHIPPED | OUTPATIENT
Start: 2021-02-17 | End: 2022-05-10

## 2021-02-17 NOTE — PROGRESS NOTES
Clinic Administered Medication Documentation        Injectable Medication Documentation     Patient was given Xolair. Prior to medication administration, verified patients identity using patient s name and date of birth. Please see MAR and medication order for additional information. Patient instructed to remain in clinic for 30 minutes after injection and report any adverse reactions to medication.        Was entire vial of medication used? Yes  Vial/Syringe: Syringe  Expiration Date:  09/30/21  Was this medication supplied by the patient? No      Patient presented after waiting 30 minutes with no reaction to  injections. Discharged from clinic.    Chelsea Camara RN

## 2021-02-17 NOTE — TELEPHONE ENCOUNTER
Pt needs epipen for allergy shot today last filled here   Justine Hutchinson, Pharmacy [facility] [place] [phone]

## 2021-02-19 ENCOUNTER — TRANSFERRED RECORDS (OUTPATIENT)
Dept: HEALTH INFORMATION MANAGEMENT | Facility: CLINIC | Age: 41
End: 2021-02-19

## 2021-03-01 ENCOUNTER — TRANSFERRED RECORDS (OUTPATIENT)
Dept: HEALTH INFORMATION MANAGEMENT | Facility: CLINIC | Age: 41
End: 2021-03-01

## 2021-03-17 ENCOUNTER — ALLIED HEALTH/NURSE VISIT (OUTPATIENT)
Dept: ALLERGY | Facility: OTHER | Age: 41
End: 2021-03-17
Payer: COMMERCIAL

## 2021-03-17 DIAGNOSIS — L50.9 URTICARIA, UNSPECIFIED: Primary | ICD-10-CM

## 2021-03-17 PROCEDURE — 96372 THER/PROPH/DIAG INJ SC/IM: CPT

## 2021-03-17 PROCEDURE — 99207 PR DROP WITH A PROCEDURE: CPT

## 2021-03-17 NOTE — PROGRESS NOTES
Clinic Administered Medication Documentation      Injectable Medication Documentation    Patient was given Xolair. Prior to medication administration, verified patients identity using patient s name and date of birth. Please see MAR and medication order for additional information. Patient instructed to report any adverse reaction to staff immediately  and reemain in cinic for 30mins.      Was entire vial of medication used? Yes  Vial/Syringe: Syringe  Expiration Date:  09/30/21  Was this medication supplied by the patient? No     Tracie PEREZ RN Specialty Triage 3/17/2021 1:21 PM

## 2021-03-17 NOTE — PROGRESS NOTES
Patient presented after waiting 30 minutes with no reaction to allergy injections. Discharged from clinic.      Tracie PEREZ RN Specialty Triage 3/17/2021 1:50 PM

## 2021-03-26 ENCOUNTER — TRANSFERRED RECORDS (OUTPATIENT)
Dept: HEALTH INFORMATION MANAGEMENT | Facility: CLINIC | Age: 41
End: 2021-03-26

## 2021-03-26 ENCOUNTER — MEDICAL CORRESPONDENCE (OUTPATIENT)
Dept: HEALTH INFORMATION MANAGEMENT | Facility: CLINIC | Age: 41
End: 2021-03-26

## 2021-03-26 DIAGNOSIS — K50.80 REGIONAL ENTERITIS OF SMALL INTESTINE WITH LARGE INTESTINE (H): Primary | ICD-10-CM

## 2021-03-29 ENCOUNTER — TELEPHONE (OUTPATIENT)
Dept: FAMILY MEDICINE | Facility: OTHER | Age: 41
End: 2021-03-29

## 2021-03-29 NOTE — TELEPHONE ENCOUNTER
LMFP to call back. When pt calls please verify if she is aware of an order that was sent to us by [facility] for a prevnar vaccine. If she would like to schedule an appointment to receive it please assist with that thank you.      Lady Wang CMA

## 2021-04-02 ENCOUNTER — ANCILLARY PROCEDURE (OUTPATIENT)
Dept: MAMMOGRAPHY | Facility: CLINIC | Age: 41
End: 2021-04-02
Attending: PHYSICIAN ASSISTANT
Payer: COMMERCIAL

## 2021-04-02 DIAGNOSIS — Z12.31 VISIT FOR SCREENING MAMMOGRAM: ICD-10-CM

## 2021-04-02 PROCEDURE — 77067 SCR MAMMO BI INCL CAD: CPT | Mod: GC | Performed by: RADIOLOGY

## 2021-04-02 PROCEDURE — 77063 BREAST TOMOSYNTHESIS BI: CPT | Mod: GC | Performed by: RADIOLOGY

## 2021-04-05 ENCOUNTER — ALLIED HEALTH/NURSE VISIT (OUTPATIENT)
Dept: FAMILY MEDICINE | Facility: OTHER | Age: 41
End: 2021-04-05
Payer: COMMERCIAL

## 2021-04-05 DIAGNOSIS — K50.80 REGIONAL ENTERITIS OF SMALL INTESTINE WITH LARGE INTESTINE (H): ICD-10-CM

## 2021-04-05 DIAGNOSIS — K50.80 CROHN'S DISEASE OF BOTH SMALL AND LARGE INTESTINE WITHOUT COMPLICATION (H): ICD-10-CM

## 2021-04-05 DIAGNOSIS — Z23 NEED FOR VACCINATION: ICD-10-CM

## 2021-04-05 LAB — VIT B12 SERPL-MCNC: 539 PG/ML (ref 193–986)

## 2021-04-05 PROCEDURE — 90670 PCV13 VACCINE IM: CPT

## 2021-04-05 PROCEDURE — 90471 IMMUNIZATION ADMIN: CPT

## 2021-04-05 PROCEDURE — 82607 VITAMIN B-12: CPT | Performed by: PHYSICIAN ASSISTANT

## 2021-04-05 PROCEDURE — 99207 PR NO CHARGE NURSE ONLY: CPT

## 2021-04-05 PROCEDURE — 36415 COLL VENOUS BLD VENIPUNCTURE: CPT | Performed by: PHYSICIAN ASSISTANT

## 2021-04-05 NOTE — NURSING NOTE
Prior to immunization administration, verified patients identity using patient s name and date of birth. Please see Immunization Activity for additional information.     Screening Questionnaire for Adult Immunization    Are you sick today?   No   Do you have allergies to medications, food, a vaccine component or latex?   No   Have you ever had a serious reaction after receiving a vaccination?   No   Do you have a long-term health problem with heart, lung, kidney, or metabolic disease (e.g., diabetes), asthma, a blood disorder, no spleen, complement component deficiency, a cochlear implant, or a spinal fluid leak?  Are you on long-term aspirin therapy?   No   Do you have cancer, leukemia, HIV/AIDS, or any other immune system problem?   No   Do you have a parent, brother, or sister with an immune system problem?   No   In the past 3 months, have you taken medications that affect  your immune system, such as prednisone, other steroids, or anticancer drugs; drugs for the treatment of rheumatoid arthritis, Crohn s disease, or psoriasis; or have you had radiation treatments?   No   Have you had a seizure, or a brain or other nervous system problem?   No   During the past year, have you received a transfusion of blood or blood    products, or been given immune (gamma) globulin or antiviral drug?   No   For women: Are you pregnant or is there a chance you could become       pregnant during the next month?   No   Have you received any vaccinations in the past 4 weeks?   No     Immunization questionnaire answers were all negative.        Per orders of Dr. Thayer, injection of Prevnar given by Florinda Osborn MA. Patient instructed to remain in clinic for 15 minutes afterwards, and to report any adverse reaction to me immediately.       Screening performed by Florinda Osborn MA on 4/5/2021 at 2:43 PM.

## 2021-04-14 ENCOUNTER — OFFICE VISIT (OUTPATIENT)
Dept: ALLERGY | Facility: OTHER | Age: 41
End: 2021-04-14
Payer: COMMERCIAL

## 2021-04-14 DIAGNOSIS — L50.8 CHRONIC URTICARIA: Primary | ICD-10-CM

## 2021-04-14 PROCEDURE — 96372 THER/PROPH/DIAG INJ SC/IM: CPT

## 2021-04-14 PROCEDURE — 99207 PR DROP WITH A PROCEDURE: CPT

## 2021-04-14 NOTE — PROGRESS NOTES
Clinic Administered Medication Documentation        Injectable Medication Documentation     Patient was given Xolair. Prior to medication administration, verified patients identity using patient s name and date of birth. Please see MAR and medication order for additional information. Patient instructed to report any adverse reaction to staff immediately  and reemain in cinic for 30mins.        Was entire vial of medication used? Yes  Vial/Syringe: Syringe  Expiration Date:  09/30/21  Was this medication supplied by the patient? No     Tracie PEREZ RN Specialty Triage 4/14/2021 3:39 PM

## 2021-04-26 ENCOUNTER — OFFICE VISIT (OUTPATIENT)
Dept: OBGYN | Facility: OTHER | Age: 41
End: 2021-04-26
Payer: COMMERCIAL

## 2021-04-26 VITALS
WEIGHT: 208 LBS | SYSTOLIC BLOOD PRESSURE: 128 MMHG | BODY MASS INDEX: 36.86 KG/M2 | HEIGHT: 63 IN | DIASTOLIC BLOOD PRESSURE: 74 MMHG

## 2021-04-26 DIAGNOSIS — Z00.00 ANNUAL PHYSICAL EXAM: Primary | ICD-10-CM

## 2021-04-26 DIAGNOSIS — Z97.5 IUD (INTRAUTERINE DEVICE) IN PLACE: ICD-10-CM

## 2021-04-26 PROCEDURE — 99396 PREV VISIT EST AGE 40-64: CPT | Performed by: OBSTETRICS & GYNECOLOGY

## 2021-04-26 PROCEDURE — 87624 HPV HI-RISK TYP POOLED RSLT: CPT | Performed by: OBSTETRICS & GYNECOLOGY

## 2021-04-26 PROCEDURE — G0145 SCR C/V CYTO,THINLAYER,RESCR: HCPCS | Performed by: OBSTETRICS & GYNECOLOGY

## 2021-04-26 ASSESSMENT — MIFFLIN-ST. JEOR: SCORE: 1582.61

## 2021-04-26 NOTE — PATIENT INSTRUCTIONS
PREVENTIVE HEALTH RECOMMENDATIONS:   Get a physical every year.  A pap test is important to have done starting at age 21 and then every three years as long as your pap is normal.  When you receive the results of your pap test it will include when you need to have your next pap test.    You should be tested each year for chlamydia and gonorrhea if you are aged 16-25 and if you have had a new sexual partner since you were last tested.   Vaccines: Get a flu shot each year.   Eat at least 8-10 servings of fruits and vegetables daily.  Eat whole-grain bread and cereal, whole-wheat pasta and brown rice instead of white grains and white rice.   For bone health: Eat calcium-rich foods (dairy products) or take calcium pills (500 to 600 mg with vitamin D) twice a day with food.   Exercise for an average of 30 minutes a day, 5 days of the week. It can be as simple as taking a brisk walk.  This will help you control your weight and prevent many diseases.   Limit alcohol to one drink per day.   Don't smoke and limit your exposure to second hand smoke.  If you smoke consider making a plan to quit. Go to [employer] and clink on   [employer]  for help   Wear sunscreen with at least SPF15 to prevent skin damage and skin cancer.   Brush your teeth twice a day and floss once a day and see your dentist twice a year for an exam and cleaning.   Have a great year and I will look forward to seeing you next year.   Nhi Madrid, DO

## 2021-04-26 NOTE — PROGRESS NOTES
Chief Complaint   Patient presents with     Physical       Subjective  Tia NAFISA Mcdaniels is a 40 year old female who presents for her annual exam.  Patient states she is doing well.  She has the Mirena in place and does not have a menses.  She will only occasional vaginal spotting.  No issues with it.  It is due out in June.  No problems urinating.  Normal bowel movements.  Patient is sexually active.  No dyspareunia.  No vaginal spotting after.  2 NSVDs.      Most recent pap: 2016  History of abnormal Pap smear:  No  History of STI's: No  History of PID:  No    Family history of uterine cancer:  No  Family history of ovarian cancer:  No  Family history of colon cancer:  No  Family history of breast cancer:  No    ROS  ROS: 10 point ROS neg other than the symptoms noted above in the HPI.    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      Crohn's colitis (H)      YUE (generalized anxiety disorder)      GERD (gastroesophageal reflux disease)      Low back pain      Major depressive disorder, recurrent episode, mild (H)      Past Surgical History:   Procedure Laterality Date     CHOLECYSTECTOMY  Feb 2007     DILATION AND CURETTAGE  May 2003    Non-OB     HC TOOTH EXTRACTION W/FORCEP  Nov 2006     LAP ADJUSTABLE GASTRIC BAND  May 2007     Family History   Problem Relation Age of Onset     Arthritis Mother      Deep Vein Thrombosis Mother      Heart Disease Father         CAD, CHF     Alcoholism Father      Asthma Father      Hypertension Father      Hyperlipidemia Father      Pancreatic Cancer Father      Liver Cancer Father      Diabetes Maternal Grandmother      Substance Abuse Maternal Grandmother      Hyperlipidemia Maternal Grandmother      Hypertension Maternal Grandmother      Substance Abuse Maternal Grandfather      Asthma Paternal Grandmother      Heart Disease Paternal Grandmother      Diabetes Paternal  "Grandmother      Hypertension Paternal Grandmother      Substance Abuse Paternal Grandfather      Mental Illness Brother      Substance Abuse Brother      Social History     Tobacco Use     Smoking status: Current Every Day Smoker     Packs/day: 0.50     Last attempt to quit: 2016     Years since quittin.9     Smokeless tobacco: Never Used   Substance Use Topics     Alcohol use: Yes     Alcohol/week: 0.0 standard drinks     Comment: occasional        Tobacco abuse:  occasional  Do you get at least three servings of calcium containing foods daily (dairy, green leafy vegetables, etc.)? yes   Outside of work or daily activities, how many days per week do you exercise for 30 minutes or longer? 3-4x per week  The patient does not drink >3 drinks per day nor >7 drinks per week.   Have you had an eye exam in the past two years? yes   Do you see a dentist twice per year? yes   Today's PHQ-2 Score:   Abuse: Current or Past(Physical, Sexual or Emotional)- No   Do you feel safe in your environment - Yes  Objective  Vitals: /74 (BP Location: Right arm, Cuff Size: Adult Regular)   Ht 1.6 m (5' 3\")   Wt 94.3 kg (208 lb)   Breastfeeding No   BMI 36.85 kg/m    BMI= Body mass index is 36.85 kg/m .    General appearance=well developed, well-nourished female  Gait=normal  Psych=mood is stable, alert and oriented x3  HEENT=mucous membranes moist  Skin=no rashes or lesions seen,normal turgor   Breast:  Benign exam, no masses palpated.  No skin changes, no axillary lymphadenopathy, no nipple discharge.  Axilla feel completely normal, no lymph node enlargement and non-tender.  Neck=overall appearance is normal  Heart=RRR, no murmurs, no swelling noted  Thyroid=normal, no masses, no TTP, no enlargement  Lungs=non-labored breathing, no use of accessory muscles, clear to ausculation bilaterally  Abd=soft, Nontender/nondistended, +bowel sounds x4, no masses, no signs of hernias, no evidence of hepatosplenomegaly  PELVIC:  "   External genitalia: normal without lesions or masses  Urethral meatus: no lesions or prolapse noted, normal size  Urethra: no masses, non tender  Bladder: non tender, no fullness  Vagina: normal mucosa and rugae, no discharge.  Cervix: normal without lesion, no cervical motion tenderness, healthy, multiparous, pap smear obtained  Uterus: small, mobile, nontender.  Adnexa: non tender, without masses  Rectal: deferred  Ext=no clubbing or cyanosis, no swelling      Last lipid profile:  2020  Regular self breast exam:  Yes  Most recent mammogram:  Earlier this month  History of abnormal mammogram:  Yes, but benign dx  Fit testing: Crohns disease.  This Feb.  Repeat in 2-3 years  DEXA:  N/A        Assessment/Plan  1.)  Annual/well woman exam=pap smear  2.)  Crohn's=continue follow up with GI  3.)  IUD in place      The following topics were discussed or recommended   Discussed seat belt, helmet and sunscreen use  Vision screening   Calcium/Vitamin D supplement=Recommended 1000 mg of calcium daily and 800 IU of vitamin D.    30 minutes were spent on the date of the encounter doing chart review, history and exam, documentation, and further activities as noted above.        Nhi Madrid DO

## 2021-04-28 LAB
COPATH REPORT: NORMAL
PAP: NORMAL

## 2021-04-30 LAB
FINAL DIAGNOSIS: NORMAL
HPV HR 12 DNA CVX QL NAA+PROBE: NEGATIVE
HPV16 DNA SPEC QL NAA+PROBE: NEGATIVE
HPV18 DNA SPEC QL NAA+PROBE: NEGATIVE
SPECIMEN DESCRIPTION: NORMAL
SPECIMEN SOURCE CVX/VAG CYTO: NORMAL

## 2021-05-13 ENCOUNTER — TELEPHONE (OUTPATIENT)
Dept: ALLERGY | Facility: OTHER | Age: 41
End: 2021-05-13

## 2021-05-13 NOTE — TELEPHONE ENCOUNTER
RN left message for patient to return call to [phone] to determine which site she would like to receive her Xolair injections at.    Ada LONG RN

## 2021-05-13 NOTE — TELEPHONE ENCOUNTER
Patient has an appointment at [facility] to receive her Xolair injection.  Previously given at the [facility].    Please check prior authorization.    Ada LONG RN

## 2021-05-13 NOTE — TELEPHONE ENCOUNTER
With PAs, they are site specific.  It does not allow for patient to jump from site to site.  Please let me know which location you would like me to have the PA for and I can call the insurance to have it set up for that location.     Thank you,     Yesenia Mcmahon

## 2021-05-26 ENCOUNTER — ALLIED HEALTH/NURSE VISIT (OUTPATIENT)
Dept: ALLERGY | Facility: OTHER | Age: 41
End: 2021-05-26
Payer: COMMERCIAL

## 2021-05-26 DIAGNOSIS — J30.9 ALLERGIC RHINITIS: Primary | ICD-10-CM

## 2021-05-26 PROCEDURE — 99207 PR DROP WITH A PROCEDURE: CPT

## 2021-05-26 PROCEDURE — 96372 THER/PROPH/DIAG INJ SC/IM: CPT | Mod: 59

## 2021-05-26 NOTE — PROGRESS NOTES
Clinic Administered Medication Documentation      Injectable Medication Documentation    Patient was given Xolair. Prior to medication administration, verified patients identity using patient s name and date of birth. Please see MAR and medication order for additional information. Patient instructed to report any adverse reaction to staff immediately .      Was entire vial of medication used? Yes  Vial/Syringe: Syringe  Expiration Date:  02/28/2022  Was this medication supplied by the patient? No     Patient presented after waiting 30 minutes with normal reaction to  injections. Discharged from clinic.    Sandra DURAND RN, Allergy Clinic 05/26/21 8:02 AM

## 2021-06-07 NOTE — TELEPHONE ENCOUNTER
Called patient to schedule allergy shot.   Xolair q28 days  Last one was on 02/26, Patient is due now

## 2021-06-23 ENCOUNTER — ALLIED HEALTH/NURSE VISIT (OUTPATIENT)
Dept: ALLERGY | Facility: OTHER | Age: 41
End: 2021-06-23
Payer: COMMERCIAL

## 2021-06-23 DIAGNOSIS — L50.8 CHRONIC URTICARIA: Primary | ICD-10-CM

## 2021-06-23 PROCEDURE — 96372 THER/PROPH/DIAG INJ SC/IM: CPT | Performed by: ALLERGY & IMMUNOLOGY

## 2021-06-23 PROCEDURE — 99207 PR DROP WITH A PROCEDURE: CPT

## 2021-06-23 NOTE — PROGRESS NOTES
Clinic Administered Medication Documentation          Injectable Medication Documentation    Patient was given Xolair. Prior to medication administration, verified patients identity using patient s name and date of birth. Please see MAR and medication order for additional information. Patient instructed to report any adverse reaction to staff immediately .      Was entire vial of medication used? Yes  Vial/Syringe: Syringe  Expiration Date:  02/28/2022  Was this medication supplied by the patient? No    Patient presented after waiting 30 minutes with no reaction to  injections. Discharged from clinic.    Sandra DURAND RN, Allergy Clinic 06/23/21 8:08 AM

## 2021-06-30 DIAGNOSIS — E03.9 ACQUIRED HYPOTHYROIDISM: ICD-10-CM

## 2021-06-30 DIAGNOSIS — E66.01 MORBID OBESITY (H): ICD-10-CM

## 2021-06-30 DIAGNOSIS — Z71.6 ENCOUNTER FOR SMOKING CESSATION COUNSELING: ICD-10-CM

## 2021-07-05 RX ORDER — VARENICLINE TARTRATE 0.5 MG/1
0.5 TABLET, FILM COATED ORAL DAILY
Qty: 30 TABLET | Refills: 0 | Status: SHIPPED | OUTPATIENT
Start: 2021-07-05 | End: 2021-07-23

## 2021-07-05 NOTE — TELEPHONE ENCOUNTER
Refilled once but I have not seen patient in quite some time.  If she wants to remain on this she should reach out to Dr. Madrid who did see her most recently for a physical or have a med check.     Gerhard Ugarte PA-C

## 2021-07-05 NOTE — TELEPHONE ENCOUNTER
CHANTIX 0.5MG TABS  Last Written Prescription Date:  6/5/2020  Last Fill Quantity: 30,   # refills: 11  Last Office Visit : 6/5/2020  Future Office visit:  None    Routing refill request to provider for review/approval because:  Refer to Provider at [facility] for refills for this medication.   Looks like Pt see a Provider on a Regular basis at [facility].  Please send new order with updated Providers signature for Pt care.    Thank you       Yumiko Chambers RN  Central Triage Red Flags/Med Refills

## 2021-07-22 DIAGNOSIS — E03.9 ACQUIRED HYPOTHYROIDISM: ICD-10-CM

## 2021-07-22 DIAGNOSIS — Z71.6 ENCOUNTER FOR SMOKING CESSATION COUNSELING: ICD-10-CM

## 2021-07-22 DIAGNOSIS — E66.01 MORBID OBESITY (H): ICD-10-CM

## 2021-07-22 NOTE — TELEPHONE ENCOUNTER
Spoke with Zeb in pharmacy.  [facility] generic is pending as medication is not showing up in database. Please deny Chantix and approve [facility] generic as pended.    RN unable to sign alternative    MARCO WilkersonN, RN, PHN  [facility] ~ Registered Nurse  Clinic Triage ~ [facility] [place] [facility] [place]  July 22, 2021

## 2021-07-22 NOTE — TELEPHONE ENCOUNTER
Hello we are requesting refill for generic Chantix 0.5mg tablets to replace the recalled Brand name Chantix that patient filled with us recently. The generic (apo-varenicline) is from [facility] and is being approved for use by the FDA on an urgent basis due to this recall. Please send new prescription and be aware it might also require a Prior Authorization from insurance. Call pharmacy with questions.    Thank you,     Zeb Wisdom, PharmD  [facility]   [phone]

## 2021-07-23 ENCOUNTER — TELEPHONE (OUTPATIENT)
Dept: FAMILY MEDICINE | Facility: OTHER | Age: 41
End: 2021-07-23

## 2021-07-23 DIAGNOSIS — E66.01 MORBID OBESITY (H): ICD-10-CM

## 2021-07-23 DIAGNOSIS — E03.9 ACQUIRED HYPOTHYROIDISM: ICD-10-CM

## 2021-07-23 DIAGNOSIS — Z71.6 ENCOUNTER FOR SMOKING CESSATION COUNSELING: ICD-10-CM

## 2021-07-23 RX ORDER — VARENICLINE TARTRATE 0.5 MG/1
TABLET, FILM COATED ORAL
Qty: 30 TABLET | Refills: 1 | Status: SHIPPED | OUTPATIENT
Start: 2021-07-23 | End: 2021-12-01

## 2021-07-23 RX ORDER — VARENICLINE TARTRATE 0.5 MG/1
TABLET, FILM COATED ORAL
Qty: 30 TABLET | Refills: 0 | OUTPATIENT
Start: 2021-07-23

## 2021-07-23 NOTE — TELEPHONE ENCOUNTER
Prior Authorization Retail Medication Request    Medication/Dose: ap-varenicline 0.5mg  ICD code (if different than what is on RX):  n/a  Previously Tried and Failed:  n/a  Rationale:  Product not on formulary. This is the generic for chantix.    Insurance Name:  [facility]   Insurance ID:  [id number]      Pharmacy Information (if different than what is on RX)  Name:  n/a  Phone:  N/a    On behalf of [facility]    Thank You~  Tina Ledesma [facility]

## 2021-07-23 NOTE — TELEPHONE ENCOUNTER
Prescription approved per [facility] Refill Protocol.    Viktoriya Graves RN on 7/23/2021 at 1:28 PM

## 2021-07-23 NOTE — TELEPHONE ENCOUNTER
Central Prior Authorization Team   Phone: [phone]      PA Initiation    Medication: ap-varenicline (CHANTIX) 0.5mg - INITIATED  Insurance Company: [employer] P[employer] - Phone [phone] Fax [phone]  Pharmacy Filling the Rx: [facility] [place], [place] - [address] [place] [address]  Filling Pharmacy Phone: [phone]  Filling Pharmacy Fax: [phone]  Start Date: 7/23/2021

## 2021-07-26 NOTE — TELEPHONE ENCOUNTER
Central Prior Authorization Team   Phone: [phone]      Prior Authorization Not Available    07/23/2021  Medication: ap-varenicline (CHANTIX) 0.5mg - NOT AVAILABLE  Insurance Company: [employer] [place] [facility] - Phone [phone] Fax [phone]  Expected CoPay:      Pharm[place] the Rx[place] [facility] [place] [address] [place] [address]  Pharmacy Notified: No  Patient Notified: No    NCD not available due to being a [place] drug therefor a PA is not available. NDC does not pull up anything in their system per [facility].

## 2021-07-26 NOTE — TELEPHONE ENCOUNTER
Please call patient, see if she even still needs to be on Chantix, if she was able to  the prescription recently sent, otherwise the alternative from [facility] may not be covered.     Gerhard Ugarte PA-C

## 2021-07-28 ENCOUNTER — MYC MEDICAL ADVICE (OUTPATIENT)
Dept: FAMILY MEDICINE | Facility: OTHER | Age: 41
End: 2021-07-28

## 2021-07-28 ENCOUNTER — ALLIED HEALTH/NURSE VISIT (OUTPATIENT)
Dept: ALLERGY | Facility: OTHER | Age: 41
End: 2021-07-28
Payer: COMMERCIAL

## 2021-07-28 DIAGNOSIS — L50.8 CHRONIC URTICARIA: Primary | ICD-10-CM

## 2021-07-28 DIAGNOSIS — Z71.6 ENCOUNTER FOR TOBACCO USE CESSATION COUNSELING: Primary | ICD-10-CM

## 2021-07-28 PROCEDURE — 96372 THER/PROPH/DIAG INJ SC/IM: CPT | Mod: 59 | Performed by: ALLERGY & IMMUNOLOGY

## 2021-07-28 PROCEDURE — 99207 PR DROP WITH A PROCEDURE: CPT

## 2021-07-28 NOTE — PROGRESS NOTES
Clinic Administered Medication Documentation    Administrations This Visit     omalizumab (XOLAIR) injection 300 mg     Admin Date  07/28/2021 Action  Given Dose  300 mg Route  Subcutaneous Site   Administered By  [facility]    Ordering Provider: Ian Cash MD    NDC: 41284-869-34, 22487-353-58    Lot#: [id number], [id number]    : Plum, Plum    Patient Supplied?: No                  Injectable Medication Documentation    Patient was given xolair. Prior to medication administration, verified patients identity using patient s name and date of birth. Please see MAR and medication order for additional information. Patient instructed to report any adverse reaction to staff immediately .      Was entire vial of medication used? Yes  Vial/Syringe: Syringe  Expiration Date:  02/28/2022 and 04/30/2022  Was this medication supplied by the patient? No    Patient presented after waiting 30 minutes with normal reaction to  injections. Discharged from clinic.    Sandra DURAND RN, Allergy Clinic 07/28/21 9:53 AM

## 2021-07-28 NOTE — TELEPHONE ENCOUNTER
Patient returned the call stated she did picked up the Chantix but thinks she needs a higher dose if possible?

## 2021-07-29 ENCOUNTER — TELEPHONE (OUTPATIENT)
Dept: FAMILY MEDICINE | Facility: OTHER | Age: 41
End: 2021-07-29

## 2021-07-29 DIAGNOSIS — Z79.899 ENCOUNTER FOR LONG-TERM (CURRENT) USE OF MEDICATIONS: Primary | ICD-10-CM

## 2021-07-29 DIAGNOSIS — K50.80 CROHN'S DISEASE OF BOTH SMALL AND LARGE INTESTINE WITHOUT COMPLICATION (H): ICD-10-CM

## 2021-07-29 RX ORDER — VARENICLINE TARTRATE 1 MG/1
1 TABLET, FILM COATED ORAL 2 TIMES DAILY
Qty: 60 TABLET | Refills: 0 | Status: SHIPPED | OUTPATIENT
Start: 2021-07-29 | End: 2021-12-01

## 2021-07-29 NOTE — TELEPHONE ENCOUNTER
Central Prior Authorization Team   Phone: [phone]    PA Initiation    Medication: generic chantix temporaily can get due to brand on recall  Insurance Company: [employer]AP - Phone [phone] Fax [phone]  Pharmacy Filling the Rx: [facility] [place] [address] [place] [address]  Filling Pharmacy Phone: [phone]  Filling Pharmacy Fax:    Start Date: 7/29/2021

## 2021-07-29 NOTE — TELEPHONE ENCOUNTER
Pt requesting 1mg dose now for generic Chantix (Apo-Varenicline) 1mg dose also temporaily getting from [facility] due to brand name one Back order due to recall  PA needed for see above  Insurance: [employer] [place] [place]  Insur phone: [phone]  Patient ID: [id number]  Please let us know when PA is granted/denied. Thank you!      Trying to get pa for 0.5 mg dose also  Justine Hutchinson, Pharmacy [facility] [place] [phone]

## 2021-07-29 NOTE — TELEPHONE ENCOUNTER
I called patient to clarify, she has been taking the 0.5 daily (benita and on she stated) but was wanting the starter kit that is a higher dose.

## 2021-07-30 NOTE — TELEPHONE ENCOUNTER
PRIOR AUTHORIZATION DENIED    Medication: generic chantix temporaily can get due to brand on recall     Denial Date: 7/30/2021    Denial Rational: Generic chantix is excluded from benefit.   Generic is currently not FDA approved.   At this time brand Chantix is recalled.       Patient may get generic medication but will be responsible for cost.

## 2021-08-02 ENCOUNTER — LAB (OUTPATIENT)
Dept: LAB | Facility: CLINIC | Age: 41
End: 2021-08-02
Payer: COMMERCIAL

## 2021-08-02 DIAGNOSIS — Z79.899 ENCOUNTER FOR LONG-TERM (CURRENT) USE OF MEDICATIONS: ICD-10-CM

## 2021-08-02 DIAGNOSIS — K50.80 CROHN'S DISEASE OF BOTH SMALL AND LARGE INTESTINE WITHOUT COMPLICATION (H): ICD-10-CM

## 2021-08-02 LAB
ALBUMIN SERPL-MCNC: 3.5 G/DL (ref 3.4–5)
ALP SERPL-CCNC: 61 U/L (ref 40–150)
ALT SERPL W P-5'-P-CCNC: 29 U/L (ref 0–50)
AST SERPL W P-5'-P-CCNC: 19 U/L (ref 0–45)
BASOPHILS # BLD AUTO: 0 10E3/UL (ref 0–0.2)
BASOPHILS NFR BLD AUTO: 0 %
BILIRUB DIRECT SERPL-MCNC: 0.1 MG/DL (ref 0–0.2)
BILIRUB SERPL-MCNC: 0.4 MG/DL (ref 0.2–1.3)
CREAT SERPL-MCNC: 0.85 MG/DL (ref 0.52–1.04)
EOSINOPHIL # BLD AUTO: 0.4 10E3/UL (ref 0–0.7)
EOSINOPHIL NFR BLD AUTO: 4 %
ERYTHROCYTE [DISTWIDTH] IN BLOOD BY AUTOMATED COUNT: 13.2 % (ref 10–15)
GFR SERPL CREATININE-BSD FRML MDRD: 86 ML/MIN/1.73M2
HCT VFR BLD AUTO: 41.5 % (ref 35–47)
HGB BLD-MCNC: 14.2 G/DL (ref 11.7–15.7)
IMM GRANULOCYTES # BLD: 0 10E3/UL
IMM GRANULOCYTES NFR BLD: 0 %
LYMPHOCYTES # BLD AUTO: 2.9 10E3/UL (ref 0.8–5.3)
LYMPHOCYTES NFR BLD AUTO: 32 %
MCH RBC QN AUTO: 31.1 PG (ref 26.5–33)
MCHC RBC AUTO-ENTMCNC: 34.2 G/DL (ref 31.5–36.5)
MCV RBC AUTO: 91 FL (ref 78–100)
MONOCYTES # BLD AUTO: 0.7 10E3/UL (ref 0–1.3)
MONOCYTES NFR BLD AUTO: 7 %
NEUTROPHILS # BLD AUTO: 5.2 10E3/UL (ref 1.6–8.3)
NEUTROPHILS NFR BLD AUTO: 57 %
NRBC # BLD AUTO: 0 10E3/UL
NRBC BLD AUTO-RTO: 0 /100
PLATELET # BLD AUTO: 265 10E3/UL (ref 150–450)
PROT SERPL-MCNC: 7.4 G/DL (ref 6.8–8.8)
RBC # BLD AUTO: 4.56 10E6/UL (ref 3.8–5.2)
WBC # BLD AUTO: 9.2 10E3/UL (ref 4–11)

## 2021-08-02 PROCEDURE — 36415 COLL VENOUS BLD VENIPUNCTURE: CPT

## 2021-08-02 PROCEDURE — 80076 HEPATIC FUNCTION PANEL: CPT

## 2021-08-02 PROCEDURE — 82565 ASSAY OF CREATININE: CPT

## 2021-08-02 PROCEDURE — 85025 COMPLETE CBC W/AUTO DIFF WBC: CPT

## 2021-08-04 ENCOUNTER — OFFICE VISIT (OUTPATIENT)
Dept: FAMILY MEDICINE | Facility: OTHER | Age: 41
End: 2021-08-04
Payer: COMMERCIAL

## 2021-08-04 VITALS
HEART RATE: 64 BPM | SYSTOLIC BLOOD PRESSURE: 100 MMHG | DIASTOLIC BLOOD PRESSURE: 80 MMHG | WEIGHT: 217.8 LBS | OXYGEN SATURATION: 100 % | HEIGHT: 63 IN | TEMPERATURE: 97.5 F | BODY MASS INDEX: 38.59 KG/M2 | RESPIRATION RATE: 15 BRPM

## 2021-08-04 DIAGNOSIS — M25.561 ACUTE PAIN OF RIGHT KNEE: Primary | ICD-10-CM

## 2021-08-04 DIAGNOSIS — M79.89 PALPABLE MASS OF SOFT TISSUE OF KNEE: ICD-10-CM

## 2021-08-04 LAB — D DIMER PPP FEU-MCNC: 0.47 UG/ML FEU (ref 0–0.5)

## 2021-08-04 PROCEDURE — 99214 OFFICE O/P EST MOD 30 MIN: CPT | Performed by: PHYSICIAN ASSISTANT

## 2021-08-04 PROCEDURE — 85379 FIBRIN DEGRADATION QUANT: CPT | Performed by: PHYSICIAN ASSISTANT

## 2021-08-04 PROCEDURE — 36415 COLL VENOUS BLD VENIPUNCTURE: CPT | Performed by: PHYSICIAN ASSISTANT

## 2021-08-04 ASSESSMENT — MIFFLIN-ST. JEOR: SCORE: 1626.93

## 2021-08-04 NOTE — PROGRESS NOTES
Assessment & Plan       ICD-10-CM    1. Acute pain of right knee  M25.561 D dimer, quantitative     MR Knee Right w/o & w Contrast     D dimer, quantitative   2. Palpable mass of soft tissue of knee  M79.89 D dimer, quantitative     MR Knee Right w/o & w Contrast     D dimer, quantitative       1-2. Right knee pain over the past month, now with a soft tissue mass/prominence that is tender to palpation. Will check a D dimer to rule out a blood clot and if elevated, will check a venous ultrasound. Provocative knee testing is negative. Given the new mass and pain, I recommend a contrasted knee MRI for further evaluation. In the meantime, I recommend elevation, ice and ibuprofen. If symptoms change/worsen, she will contact the clinic.       PENNIE [facility]    Ulises Ramirez is a 40 year old who presents for the following health issues:    HPI     Concern - Lump right [place]  Onset: right knee pain and lump for about a month, worse over the past few days  Description: Inside of Right knee lump  Intensity: moderate, severe  Progression of Symptoms:  worsening  Accompanying Signs & Symptoms: Knee Pain  Previous history of similar problem: none  Precipitating factors:        Worsened by: none  Alleviating factors:        Improved by: Ice  Therapies tried and outcome:  ibuprofen    Over the past month, she has noticed pain that started over her lateral right knee followed by the medial right knee with a medial knee soft tissue lump/mass that seems to be more prominent and painful over the past few days. She denies any recent injury to this area. She denies redness or warmth. No history of blood clots. She has been using ice and ibuprofen with minimal relief. Pain is worse at the end of the day and does not seem to be worsened by walking or standing.     Review of Systems   Constitutional, cardiovascular, pulmonary, musculoskeletal and neuro systems are negative, except as  "otherwise noted.      Objective    /80 (BP Location: Right arm, Patient Position: Sitting, Cuff Size: Adult Large)   Pulse 64   Temp 97.5  F (36.4  C) (Temporal)   Resp 15   Ht 1.6 m (5' 2.99\")   Wt 98.8 kg (217 lb 12.8 oz)   SpO2 100%   BMI 38.59 kg/m    Body mass index is 38.59 kg/m .  Physical Exam   GENERAL: healthy, alert and no distress  RESP: lungs clear to auscultation - no rales, rhonchi or wheezes  CV: regular rate and rhythm, normal S1 S2, no S3 or S4, no murmur, click or rub, no peripheral edema   MS: Mild tenderness over the right knee lateral joint line. No knee effusion or swelling. Right medial knee tissue prominence/soft tissue mass, approximately tennis ball sized, that extends to the medial popliteal space. No erythema or warmth. Does have some tissue prominence of the left medial knee but much more extensive on the right. Mild right calf tenderness. Negative anterior/posterior drawer, varus/valgus stress and medial/lateral McMuray's.  NEURO: Normal strength and tone, mentation intact and speech normal. Cranial nerves II-XII are grossly intact. DTRs are 2+/4 throughout and symmetric. Gait is stable.   PSYCH: mentation appears normal, affect normal/bright          "

## 2021-08-04 NOTE — TELEPHONE ENCOUNTER
Forms received and patient notified that we are doing a PA for Xolair. Pt relayed that we will be in touch with her once we hear from insurance.      Michela Garcia CMA   Optomap Screening Retinal Photo Report    Note: The correlating image(s) are uploaded in EPIC for viewing    Right eye:     Quality of image: Good  Field of view:Full  Optic Nerve:  · Cup to disc ratio: 0.2  · Perfusion: Good, healthy color  · Rim:Intact  Retina: No holes/tears/detachments  Macula: + FLR  Fundus Autoflouress:  Normal  Progression: baseline  Plan: observe, repeat in 12 months    Left eye:     Quality of image: Good  Field of view:Full  Optic Nerve:  · Cup to disc ratio: 0.2  · Perfusion: Good, healthy color  · Rim:Intact  Retina: No holes/tears/detachments  Macula: + FLR  Fundus Autoflouress:  Normal  Progression: baseline  Plan: observe, repeat in 12 months    Mirna Cantrell OD

## 2021-08-04 NOTE — PATIENT INSTRUCTIONS
Will order some labs to rule out a blood clot.    Will also order an MRI to rule out a mass or other abnormal findings.  Continue with ice, elevation and ibuprofen.

## 2021-08-05 ENCOUNTER — MEDICAL CORRESPONDENCE (OUTPATIENT)
Dept: HEALTH INFORMATION MANAGEMENT | Facility: CLINIC | Age: 41
End: 2021-08-05
Payer: COMMERCIAL

## 2021-08-13 ENCOUNTER — HOSPITAL ENCOUNTER (OUTPATIENT)
Dept: MRI IMAGING | Facility: CLINIC | Age: 41
Discharge: HOME | End: 2021-08-13
Attending: PHYSICIAN ASSISTANT | Admitting: PHYSICIAN ASSISTANT
Payer: COMMERCIAL

## 2021-08-13 DIAGNOSIS — M25.561 ACUTE PAIN OF RIGHT KNEE: ICD-10-CM

## 2021-08-13 DIAGNOSIS — M79.89 PALPABLE MASS OF SOFT TISSUE OF KNEE: ICD-10-CM

## 2021-08-13 PROCEDURE — A9585 GADOBUTROL INJECTION: HCPCS | Performed by: PHYSICIAN ASSISTANT

## 2021-08-13 PROCEDURE — 255N000002 HC RX 255 OP 636: Performed by: PHYSICIAN ASSISTANT

## 2021-08-13 PROCEDURE — 73723 MRI JOINT LWR EXTR W/O&W/DYE: CPT | Mod: 26 | Performed by: RADIOLOGY

## 2021-08-13 PROCEDURE — 73723 MRI JOINT LWR EXTR W/O&W/DYE: CPT | Mod: RT

## 2021-08-13 RX ORDER — GADOBUTROL 604.72 MG/ML
10 INJECTION INTRAVENOUS ONCE
Status: COMPLETED | OUTPATIENT
Start: 2021-08-13 | End: 2021-08-13

## 2021-08-13 RX ADMIN — GADOBUTROL 10 ML: 604.72 INJECTION INTRAVENOUS at 08:40

## 2021-08-24 ENCOUNTER — E-VISIT (OUTPATIENT)
Dept: FAMILY MEDICINE | Facility: CLINIC | Age: 41
End: 2021-08-24
Payer: COMMERCIAL

## 2021-08-24 DIAGNOSIS — Z20.822 SUSPECTED COVID-19 VIRUS INFECTION: Primary | ICD-10-CM

## 2021-08-24 PROCEDURE — 99421 OL DIG E/M SVC 5-10 MIN: CPT | Performed by: PHYSICIAN ASSISTANT

## 2021-08-24 NOTE — PATIENT INSTRUCTIONS
Dear Ashley Mcdaniels,    Your symptoms show that you may have coronavirus (COVID-19). This illness can cause fever, cough and trouble breathing. Many people get a mild case and get better on their own. Some people can get very sick.    Will I be tested for COVID-19?  We would like to test you for Covid-19 virus. I have placed orders for this test.     To schedule: go to your [employer] home page and scroll down to the section that says  You have an appointment that needs to be scheduled  and click the large green button that says  Schedule Now  and follow the steps to find the next available openings.    If you are unable to complete these [employer] scheduling steps, please call [phone] to schedule your testing.     Return to work/school/ guidance:  Please let your workplace manager and staffing office know when your quarantine ends     We can t give you an exact date as it depends on the above. You can calculate this on your own or work with your manager/staffing office to calculate this. (For example if you were exposed on 10/4, you would have to quarantine for 14 full days. That would be through 10/18. You could return on 10/19.)      If you receive a positive COVID-19 test result, follow the guidance of the those who are giving you the results. Usually the return to work is 10 (or in some cases 20 days from symptom onset.) If you work at [facility] [employer], you must also be cleared by Employee Occupational Health and Safety to return to work.        If you receive a negative COVID-19 test result and did not have a high risk exposure to someone with a known positive COVID-19 test, you can return to work once you're free of fever for 24 hours without fever-reducing medication and your symptoms are improving or resolved.      If you receive a negative COVID-19 test and If you had a high risk exposure to someone who has tested positive for COVID-19 then you can return to work 14 days after your last contact with  the positive individual    Note: If you have ongoing exposure to the covid positive person, this quarantine period may be more than 14 days. (For example, if you are continued to be exposed to your child who tested positive and cannot isolate from them, then the quarantine of 7-14 days can't start until your child is no longer contagious. This is typically 10 days from onset of the child's symptoms. So the total duration may be 17-24 days in this case.)    Sign up for [employer].   We know it's scary to hear that you might have COVID-19. We want to track your symptoms to make sure you're okay over the next 2 weeks. Please look for an email from [employer]--this is a free, online program that we'll use to keep in touch. To sign up, follow the link in the email you will receive. Learn more at [email or web address]    How can I take care of myself?    Get lots of rest. Drink extra fluids (unless a doctor has told you not to)    Take Tylenol (acetaminophen) or ibuprofen for fever or pain. If you have liver or kidney problems, ask your family doctor if it's okay to take Tylenol o ibuprofen    If you have other health problems (like cancer, heart failure, an organ transplant or severe kidney disease): Call your specialty clinic if you don't feel better in the next 2 days.    Know when to call 911. Emergency warning signs include:  o Trouble breathing or shortness of breath  o Pain or pressure in the chest that doesn't go away  o Feeling confused like you haven't felt before, or not being able to wake up  o Bluish-colored lips or face    Where can I get more information?  [facility] [employer] [place] - About COVID-19:   [email or web address]    CDC - What to Do If You're Sick:   www.cdc.gov/coronavirus/2019-ncov/about/steps-when-sick.html    August 24, 2021  RE:  Ashley Mcdaniels                                                                                                                  [id number]  [place]  [facility] [address]      To whom it may concern:    I evaluated Ashley Mcdaniels on August 24, 2021. Ashley Mcdaniels should be excused from work/school.     They should let their workplace manager and staffing office know when their quarantine ends.    We can not give an exact date as it depends on the information below. They can calculate this on their own or work with their manager/staffing office to calculate this. (For example if they were exposed on 10/04, they would have to quarantine for 14 full days. That would be through 10/18. They could return on 10/19.)    Quarantine Guidelines:      If patient receives a positive COVID-19 test result, they should follow the guidance of those who are giving the results. Usually the return to work is 10 (or in some cases 20 days from symptom onset.) If they work at [employer], they must be cleared by Employee Occupational Health and Safety to return to work.        If patient receives a negative COVID-19 test result and did not have a high risk exposure to someone with a known positive COVID-19 test, they can return to work once they're free of fever for 24 hours without fever-reducing medication and their symptoms are improving or resolved.      If patient receives a negative COVID-19 test and if they had a high risk exposure to someone who has tested positive for COVID-19 then they can return to work 14 days after their last contact with the positive individual    Note: If there is ongoing exposure to the covid positive person, this quarantine period may be longer than 14 days. (For example, if they are continually exposed to their child, who tested positive and cannot isolate from them, then the quarantine of 7-14 days can't start until their child is no longer contagious. This is typically 10 days from onset to the child's symptoms. So the total duration may be 17-24 days in this case.)     Sincerely,  Petrona Tatum PA-C

## 2021-08-25 ENCOUNTER — LAB (OUTPATIENT)
Dept: URGENT CARE | Facility: URGENT CARE | Age: 41
End: 2021-08-25
Attending: PHYSICIAN ASSISTANT
Payer: COMMERCIAL

## 2021-08-25 DIAGNOSIS — Z20.822 SUSPECTED COVID-19 VIRUS INFECTION: ICD-10-CM

## 2021-08-25 LAB — SARS-COV-2 RNA RESP QL NAA+PROBE: NEGATIVE

## 2021-08-25 PROCEDURE — U0003 INFECTIOUS AGENT DETECTION BY NUCLEIC ACID (DNA OR RNA); SEVERE ACUTE RESPIRATORY SYNDROME CORONAVIRUS 2 (SARS-COV-2) (CORONAVIRUS DISEASE [COVID-19]), AMPLIFIED PROBE TECHNIQUE, MAKING USE OF HIGH THROUGHPUT TECHNOLOGIES AS DESCRIBED BY CMS-2020-01-R: HCPCS

## 2021-08-25 PROCEDURE — U0005 INFEC AGEN DETEC AMPLI PROBE: HCPCS

## 2021-08-26 ENCOUNTER — NURSE TRIAGE (OUTPATIENT)
Dept: FAMILY MEDICINE | Facility: OTHER | Age: 41
End: 2021-08-26

## 2021-08-26 NOTE — TELEPHONE ENCOUNTER
Patient on my schedule for noon for chest pressure and body aches. Please triage, may need to be seen in person at [facility]     COVID test negative yesterday.     Rene You PA-C  [facility]

## 2021-08-26 NOTE — TELEPHONE ENCOUNTER
She has had chest pressure and a pain under her left shoulder- on and off for 2 months.  It usually gets better in a couple of days and this time it hasn't.    Pain has had pain has lasted for more 5 minutes at a time.  Pain is a 3 out of 10 when she gets it.  Her breathing is more difficult to breath when she has the pain.  She has an occasional cough.    Chest pain is not present right now.  Per protocol patient should be seen in clinic.    Patient has an appointment today.  Viktoriya Graves RN on 8/26/2021 at 9:57 AM        Reason for Disposition    Chest pain(s) lasting a few seconds persists > 3 days    Additional Information    Negative: Passed out (i.e., fainted, collapsed and was not responding)    Negative: Severe difficulty breathing (e.g., struggling for each breath, speaks in single words)    Negative: Difficult to awaken or acting confused (e.g., disoriented, slurred speech)    Negative: Shock suspected (e.g., cold/pale/clammy skin, too weak to stand, low BP, rapid pulse)    Negative: Chest pain lasting longer than 5 minutes and ANY of the following:* Over 45 years old* Over 30 years old and at least one cardiac risk factor (e.g., diabetes, high blood pressure, high cholesterol, smoker, or strong family history of heart disease)* History of heart disease (i.e., angina, heart attack, heart failure, bypass surgery, takes nitroglycerin)* Pain is crushing, pressure-like, or heavy    Negative: Heart beating < 50 beats per minute OR > 140 beats per minute    Negative: Visible sweat on face or sweat dripping down face    Negative: Sounds like a life-threatening emergency to the triager    Negative: Followed an injury to chest    Negative: SEVERE chest pain    Negative: Pain also in shoulder(s) or arm(s) or jaw    Negative: Difficulty breathing    Negative: Cocaine use within last 3 days    Negative: Major surgery in the past month    Negative: Hip or leg fracture (broken bone) in past month (or had cast on leg  or ankle in past month)    Negative: Illness requiring prolonged bedrest in past month (e.g., immobilization, long hospital stay)    Negative: Long-distance travel in past month (e.g., car, bus, train, plane; with trip lasting 6 or more hours)    Negative: History of prior 'blood clot' in leg or lungs (i.e., deep vein thrombosis, pulmonary embolism)    Negative: History of inherited increased risk of blood clots (e.g., Factor 5 Leiden, Anti-thrombin 3, Protein C or Protein S deficiency, Prothrombin mutation)    Negative: Heart beating irregularly or very rapidly    Negative: Chest pain lasting longer than 5 minutes and occurred in last 3 days (72 hours) (Exception: feels exactly the same as previously diagnosed heartburn and has accompanying sour taste in mouth)    Negative: Chest pain or 'angina' comes and goes and is happening more often (increasing in frequency) or getting worse (increasing in severity) (Exception: chest pains that last only a few seconds)    Negative: Dizziness or lightheadedness    Negative: Coughing up blood    Negative: Patient sounds very sick or weak to the triager    Negative: Cancer treatment in the past two months (or has cancer now)    Negative: Patient says chest pain feels exactly the same as previously diagnosed 'heartburn'  and  describes burning in chest and accompanying sour taste in mouth    Negative: Fever > 100.4 F (38.0 C)    Protocols used: CHEST PAIN-A-[place]

## 2021-08-26 NOTE — TELEPHONE ENCOUNTER
Patient notified of providers advice and she will go to urgent care.  Viktoriya Graves RN on 8/26/2021 at 11:29 AM

## 2021-08-31 ENCOUNTER — ALLIED HEALTH/NURSE VISIT (OUTPATIENT)
Dept: ALLERGY | Facility: OTHER | Age: 41
End: 2021-08-31
Payer: COMMERCIAL

## 2021-08-31 DIAGNOSIS — L50.8 CHRONIC URTICARIA: Primary | ICD-10-CM

## 2021-08-31 PROCEDURE — 99207 PR DROP WITH A PROCEDURE: CPT

## 2021-08-31 PROCEDURE — 96372 THER/PROPH/DIAG INJ SC/IM: CPT | Performed by: ALLERGY & IMMUNOLOGY

## 2021-08-31 NOTE — PROGRESS NOTES
Patient presented after waiting 30 minutes with normal reaction to  injections. Discharged from clinic.    Sandra DURAND RN, Allergy Clinic 08/31/21 2:48 PM

## 2021-08-31 NOTE — PROGRESS NOTES
Clinic Administered Medication Documentation    Administrations This Visit     omalizumab (XOLAIR) injection 300 mg     Admin Date  08/31/2021 Action  Given Dose  300 mg Route  Subcutaneous Site   Administered By  Huey España RN    Ordering Provider: Ian Cash MD    Patient Supplied?: No                  Injectable Medication Documentation    Patient was given Xolair. Prior to medication administration, verified patients identity using patient s name and date of birth. Please see MAR and medication order for additional information. Patient instructed to report any adverse reaction to staff immediately .      Was entire vial of medication used? Yes  Vial/Syringe: Syringe  Expiration Date:  04/30/2022   Expiration Date:  06/30/2022  Was this medication supplied by the patient? No      Huey MALIK RN, Allergy Clinic

## 2021-09-10 ENCOUNTER — TRANSFERRED RECORDS (OUTPATIENT)
Dept: HEALTH INFORMATION MANAGEMENT | Facility: CLINIC | Age: 41
End: 2021-09-10

## 2021-09-18 ENCOUNTER — HEALTH MAINTENANCE LETTER (OUTPATIENT)
Age: 41
End: 2021-09-18

## 2021-09-22 ENCOUNTER — TELEPHONE (OUTPATIENT)
Dept: ALLERGY | Facility: OTHER | Age: 41
End: 2021-09-22

## 2021-09-22 DIAGNOSIS — L50.8 CHRONIC URTICARIA: ICD-10-CM

## 2021-09-22 NOTE — TELEPHONE ENCOUNTER
Patient is scheduled on 2021 for Xolair injection. After reviewing chart, the prior authorization has . It was good 2020 - 2021.  Xolair prescription ends on 2021.    Can a PA request be submitted to her insurance?    Thank you,    Huey MALIK RN, Allergy Clinic

## 2021-09-24 NOTE — TELEPHONE ENCOUNTER
I would prefer not to delay the process.  I am fine renewing the prescription.    Toñito Desir MD

## 2021-09-24 NOTE — TELEPHONE ENCOUNTER
Left message on answering machine for patient to call back. Need to know site that patient wants injections given at for PA.       Michela Garcia MA        I received your msg re: previous PA . Please have a new order entered  and advise on location for injections as well. It looks like she has been to a couple locations in the past.     Thanks,   Ashely Baron   CAM Team    [phone]

## 2021-09-24 NOTE — TELEPHONE ENCOUNTER
Patient has an appointment Tuesday, 9/28 for Xolair injections. PA is pending at this time. Can you update us as soon as a decision is made?    Thank you,    Huey MALIK RN

## 2021-09-24 NOTE — TELEPHONE ENCOUNTER
Patient is scheduled for a Xolair injection on 2021, PA has , Xolair order expires on 2021.     She has been receiving Xolair injections since 5/15/2018 for treatment of chronic urticaria.    To process a new PA, a new order needs to be placed.     Would you want to see her for a follow up to continue Xolair first or renew the prescription then see her?    Please advise.    Thank you,    Huey MALIK RN

## 2021-09-24 NOTE — TELEPHONE ENCOUNTER
Can we start the PA process for Xolair renewal, Site will be at [facility]. Xolair 300 mg every 28 days. Pt is scheduled this coming Tuesday 9/28, will have to cancel appointment if not approved/patient will be responsible for billing otherwise.       Michela Garcia MA     no

## 2021-09-27 ENCOUNTER — MYC MEDICAL ADVICE (OUTPATIENT)
Dept: NURSING | Facility: CLINIC | Age: 41
End: 2021-09-27

## 2021-09-27 NOTE — TELEPHONE ENCOUNTER
Left message for patient to return call. We are still waiting for decision on PA. Will need to cancel patient appointment on 9/28/2021. Can schedule once PA received.     Huey MALIK RN

## 2021-09-27 NOTE — TELEPHONE ENCOUNTER
Xolair Rx that was sent 9/24 was for AMB and not CAM. Is pt switching to self injections? If not, please discontinue Rx and re send as CAM.

## 2021-09-28 NOTE — TELEPHONE ENCOUNTER
Writer canceled appointment for today. Will contact patient once a decision is made from [employer].    Huey MALIK RN

## 2021-10-05 ENCOUNTER — MYC MEDICAL ADVICE (OUTPATIENT)
Dept: ALLERGY | Facility: OTHER | Age: 41
End: 2021-10-05

## 2021-10-05 NOTE — TELEPHONE ENCOUNTER
Approval letter received from [facility] 10/4/2021. Sent [employer] to inform patient and to schedule an appointment.    Huey MALIK RN

## 2021-10-27 ENCOUNTER — ALLIED HEALTH/NURSE VISIT (OUTPATIENT)
Dept: ALLERGY | Facility: OTHER | Age: 41
End: 2021-10-27
Payer: COMMERCIAL

## 2021-10-27 DIAGNOSIS — L50.8 CHRONIC URTICARIA: Primary | ICD-10-CM

## 2021-10-27 PROCEDURE — 96372 THER/PROPH/DIAG INJ SC/IM: CPT | Performed by: ALLERGY & IMMUNOLOGY

## 2021-10-27 PROCEDURE — 99207 PR NO CHARGE NURSE ONLY: CPT

## 2021-10-27 NOTE — PROGRESS NOTES
Patient presented after waiting 30 minutes with no reaction to Xolair injections. Discharged from clinic.    Huey España RN, RN ............   10/27/2021...2:16 PM

## 2021-10-27 NOTE — PROGRESS NOTES
Clinic Administered Medication Documentation    Administrations This Visit     omalizumab (XOLAIR) injection 300 mg     Admin Date  10/27/2021 Action  Given Dose  300 mg Route  Subcutaneous Site   Administered By  Huey España RN    Ordering Provider: Toñito Desir MD    Patient Supplied?: No                  Injectable Medication Documentation    Patient was given Xolair 300 mg. Prior to medication administration, verified patients identity using patient s name and date of birth. Please see MAR and medication order for additional information. Patient instructed to remain in clinic for 15 minutes and report any adverse reaction to staff immediately .      Was entire vial of medication used? Yes  Vial/Syringe: Syringe  Expiration Date:  09/30/2022  Was this medication supplied by the patient? No     Huey MALIK RN

## 2021-11-15 DIAGNOSIS — Z79.899 HIGH RISK MEDICATION USE: Primary | ICD-10-CM

## 2021-11-15 DIAGNOSIS — Z11.4 SCREENING FOR HIV (HUMAN IMMUNODEFICIENCY VIRUS): ICD-10-CM

## 2021-11-15 DIAGNOSIS — E03.9 ACQUIRED HYPOTHYROIDISM: ICD-10-CM

## 2021-11-15 DIAGNOSIS — K50.80 CROHN'S DISEASE OF BOTH SMALL AND LG INT W/O COMPLICATIONS (H): ICD-10-CM

## 2021-11-29 ENCOUNTER — LAB (OUTPATIENT)
Dept: LAB | Facility: OTHER | Age: 41
End: 2021-11-29
Payer: COMMERCIAL

## 2021-11-29 DIAGNOSIS — Z79.899 HIGH RISK MEDICATION USE: ICD-10-CM

## 2021-11-29 DIAGNOSIS — E03.9 ACQUIRED HYPOTHYROIDISM: ICD-10-CM

## 2021-11-29 DIAGNOSIS — K50.80 CROHN'S DISEASE OF BOTH SMALL AND LG INT W/O COMPLICATIONS (H): ICD-10-CM

## 2021-11-29 DIAGNOSIS — Z11.4 SCREENING FOR HIV (HUMAN IMMUNODEFICIENCY VIRUS): ICD-10-CM

## 2021-11-29 LAB
ALBUMIN SERPL-MCNC: 3.4 G/DL (ref 3.4–5)
ALP SERPL-CCNC: 74 U/L (ref 40–150)
ALT SERPL W P-5'-P-CCNC: 22 U/L (ref 0–50)
AST SERPL W P-5'-P-CCNC: 20 U/L (ref 0–45)
BASOPHILS # BLD AUTO: 0 10E3/UL (ref 0–0.2)
BASOPHILS NFR BLD AUTO: 0 %
BILIRUB DIRECT SERPL-MCNC: <0.1 MG/DL (ref 0–0.2)
BILIRUB SERPL-MCNC: 0.2 MG/DL (ref 0.2–1.3)
CREAT SERPL-MCNC: 0.78 MG/DL (ref 0.52–1.04)
EOSINOPHIL # BLD AUTO: 0.3 10E3/UL (ref 0–0.7)
EOSINOPHIL NFR BLD AUTO: 3 %
ERYTHROCYTE [DISTWIDTH] IN BLOOD BY AUTOMATED COUNT: 13.4 % (ref 10–15)
GFR SERPL CREATININE-BSD FRML MDRD: >90 ML/MIN/1.73M2
HCT VFR BLD AUTO: 39.3 % (ref 35–47)
HGB BLD-MCNC: 13.1 G/DL (ref 11.7–15.7)
LYMPHOCYTES # BLD AUTO: 3.5 10E3/UL (ref 0.8–5.3)
LYMPHOCYTES NFR BLD AUTO: 38 %
MCH RBC QN AUTO: 31.1 PG (ref 26.5–33)
MCHC RBC AUTO-ENTMCNC: 33.3 G/DL (ref 31.5–36.5)
MCV RBC AUTO: 93 FL (ref 78–100)
MONOCYTES # BLD AUTO: 0.7 10E3/UL (ref 0–1.3)
MONOCYTES NFR BLD AUTO: 8 %
NEUTROPHILS # BLD AUTO: 4.7 10E3/UL (ref 1.6–8.3)
NEUTROPHILS NFR BLD AUTO: 51 %
PLATELET # BLD AUTO: 297 10E3/UL (ref 150–450)
PROT SERPL-MCNC: 7.4 G/DL (ref 6.8–8.8)
RBC # BLD AUTO: 4.21 10E6/UL (ref 3.8–5.2)
TSH SERPL DL<=0.005 MIU/L-ACNC: 2.03 MU/L (ref 0.4–4)
WBC # BLD AUTO: 9.2 10E3/UL (ref 4–11)

## 2021-11-29 PROCEDURE — 82565 ASSAY OF CREATININE: CPT

## 2021-11-29 PROCEDURE — 84443 ASSAY THYROID STIM HORMONE: CPT

## 2021-11-29 PROCEDURE — 85025 COMPLETE CBC W/AUTO DIFF WBC: CPT

## 2021-11-29 PROCEDURE — 36415 COLL VENOUS BLD VENIPUNCTURE: CPT

## 2021-11-29 PROCEDURE — 86481 TB AG RESPONSE T-CELL SUSP: CPT

## 2021-11-29 PROCEDURE — 87389 HIV-1 AG W/HIV-1&-2 AB AG IA: CPT

## 2021-11-29 PROCEDURE — 80076 HEPATIC FUNCTION PANEL: CPT

## 2021-11-30 LAB — HIV 1+2 AB+HIV1 P24 AG SERPL QL IA: NONREACTIVE

## 2021-11-30 NOTE — PROGRESS NOTES
SUBJECTIVE:                                                                   Ashley Mcdaniels presents today to our Allergy Clinic at [facility] for a follow-up visit. She is a 41-year-old female with chronic urticaria. A new patient for me. Previous patient of Dr. Cash.   The patient has a past medical history of Crohn's disease, hypothyroidism on thyroid hormone replacement, depression, anxiety, and chronic urticaria.  The patient developed hives at the end of 2016.  Failed high-dose antihistamines.  Started omalizumab in 2018.  It has been beneficial.  She tried stopping it in 2019 and developed hives 6 weeks after stopping the medicine.    These days, she gets omalizumab every 4 weeks.  The patient thinks that omalizumab improved her hives by 80%.  She still has hives almost every day, but milder.  She takes cetirizine most of the time just 10 mg by mouth once daily.  Does not take famotidine.  In the past, it caused rebound GERD.    Patient Active Problem List   Diagnosis     Acquired hypothyroidism     YUE (generalized anxiety disorder)     Major depressive disorder, recurrent episode, mild (H)     Crohn's disease of both small and large intestine without complication (H)     IUD (intrauterine device) in place     Chronic urticaria     ADHD (attention deficit hyperactivity disorder), inattentive type     Gastroesophageal reflux disease without esophagitis     Morbid obesity (H)     Segmental dysfunction of sacral region     Segmental dysfunction of lumbar region     Segmental dysfunction of thoracic region     Bilateral low back pain with left-sided sciatica       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      Crohn's colitis (H)      YUE (generalized anxiety disorder)      GERD (gastroesophageal reflux disease)      Low back pain      Major depressive disorder, recurrent episode,  mild (H)       Problem (# of Occurrences) Relation (Name,Age of Onset)    Alcoholism (1) Father    Arthritis (1) Mother    Asthma (2) Father, Paternal Grandmother    Deep Vein Thrombosis (1) Mother    Diabetes (2) Maternal Grandmother, Paternal Grandmother    Heart Disease (2) Father: CAD, CHF, Paternal Grandmother    Hyperlipidemia (2) Father, Maternal Grandmother    Hypertension (3) Father, Maternal Grandmother, Paternal Grandmother    Liver Cancer (1) Father    Mental Illness (1) Brother    Pancreatic Cancer (1) Father    Substance Abuse (4) Maternal Grandmother, Maternal Grandfather, Paternal Grandfather, Brother        Past Surgical History:   Procedure Laterality Date     CHOLECYSTECTOMY  2007     DILATION AND CURETTAGE  May 2003    Non-OB     HC TOOTH EXTRACTION W/FORCEP  2006     LAP ADJUSTABLE GASTRIC BAND  May 2007     Social History     Socioeconomic History     Marital status:      Spouse name: None     Number of children: None     Years of education: None     Highest education level: None   Occupational History     Comment: consulting   Tobacco Use     Smoking status: Former Smoker     Packs/day: 0.50     Quit date: 2016     Years since quittin.5     Smokeless tobacco: Never Used   Vaping Use     Vaping Use: Never used   Substance and Sexual Activity     Alcohol use: Yes     Alcohol/week: 0.0 standard drinks     Comment: occasional      Drug use: No     Sexual activity: Yes     Partners: Male     Birth control/protection: I.U.D.   Other Topics Concern     Parent/sibling w/ CABG, MI or angioplasty before 65F 55M? No   Social History Narrative    ENVIRONMENTAL HISTORY: [place] The home is heated with a forced air. They do have central air conditioning. The patient's bedroom is furnished with carpeting in bedroom.  Pets inside the house include 6 cat(s), 1 dog(s), and 2 horses. There is no history of cockroach or mice infestation. There  is/are 0 smokers in the house.  The house does not have a damp basement.      Social Determinants of Health     Financial Resource Strain: Not on file   Food Insecurity: Not on file   Transportation Needs: Not on file   Physical Activity: Not on file   Stress: Not on file   Social Connections: Not on file   Intimate Partner Violence: Not on file   Housing Stability: Not on file           Review of Systems   Constitutional: Negative for activity change, chills and fever.   HENT: Negative for congestion, ear discharge, facial swelling, nosebleeds, postnasal drip, rhinorrhea, sinus pressure and sneezing.    Eyes: Negative for discharge, redness and itching.   Respiratory: Negative for cough, chest tightness, shortness of breath and wheezing.    Cardiovascular: Negative for chest pain.   Gastrointestinal: Negative for diarrhea, nausea and vomiting.   Musculoskeletal: Negative for arthralgias and myalgias.   Skin: Positive for rash.   Allergic/Immunologic: Negative for environmental allergies and food allergies.   Neurological: Negative for headaches.   Hematological: Negative for adenopathy.   Psychiatric/Behavioral: Negative for behavioral problems and self-injury.           Current Outpatient Medications:      cetirizine (ZYRTEC) 10 MG tablet, Take 1 tablet (10 mg) by mouth daily, Disp: 30 tablet, Rfl: 0     EPINEPHrine (ANY BX GENERIC EQUIV) 0.3 MG/0.3ML injection 2-pack, Inject 0.3 mLs (0.3 mg) into the muscle as needed for anaphylaxis, Disp: 0.6 mL, Rfl: 1     HUMIRA *CF* PEN 40 MG/0.4ML pen kit, , Disp: , Rfl: 1     levonorgestrel (MIRENA) 20 MCG/24HR IUD, 1 each (20 mcg) by Intrauterine route continuous, Disp: , Rfl:      valACYclovir (VALTREX) 1000 mg tablet, Take 0.5 tablets (500 mg) by mouth 2 times daily, Disp: 20 tablet, Rfl: 11    Current Facility-Administered Medications:      omalizumab (XOLAIR) injection 300 mg, 300 mg, Subcutaneous, Q28 Days, Toñito Desir MD, 300 mg at 12/01/21 1430  Immunization  "History   Administered Date(s) Administered     COVID-19,PF,Pfizer (12+ Yrs) 01/20/2021, 02/10/2021, 11/10/2021     FLU 6-35 months 10/16/2002, 11/28/2003     Flu, Unspecified 10/16/2002, 11/28/2003     HepA-Adult 12/05/2017     Historical Hepb 10/12/2015, 12/05/2017     Influenza (IIV3) PF 11/02/2005, 10/17/2006, 11/07/2007, 10/21/2008     Influenza Vaccine IM > 6 months Valent IIV4 (Alfuria,Fluzone) 10/12/2015, 02/17/2020     Mantoux Tuberculin Skin Test 10/26/2015     Pneumo Conj 13-V (2010&after) 04/05/2021     Pneumococcal 23 valent 02/09/2018     TDAP Vaccine (Adacel) 09/07/2012     Allergies   Allergen Reactions     Azathioprine Other (See Comments)     pancreatitis     No Clinical Screening - See Comments Hives     From stress     OBJECTIVE:                                                                 /76   Pulse 77   Ht 1.575 m (5' 2\")   Wt 97.5 kg (215 lb)   SpO2 97%   BMI 39.32 kg/m          Physical Exam  Vitals and nursing note reviewed.   Constitutional:       General: She is not in acute distress.     Appearance: She is not ill-appearing, toxic-appearing or diaphoretic.   HENT:      Head: Normocephalic and atraumatic.      Right Ear: Tympanic membrane, ear canal and external ear normal.      Left Ear: Tympanic membrane, ear canal and external ear normal.      Nose: No mucosal edema, congestion or rhinorrhea.      Right Turbinates: Not enlarged, swollen or pale.      Left Turbinates: Not enlarged, swollen or pale.      Mouth/Throat:      Lips: Pink.      Mouth: Mucous membranes are moist.      Pharynx: Oropharynx is clear. No pharyngeal swelling, oropharyngeal exudate, posterior oropharyngeal erythema or uvula swelling.   Eyes:      General:         Right eye: No discharge.         Left eye: No discharge.      Conjunctiva/sclera: Conjunctivae normal.   Cardiovascular:      Rate and Rhythm: Normal rate and regular rhythm.      Heart sounds: Normal heart sounds. No murmur " heard.      Pulmonary:      Effort: Pulmonary effort is normal. No respiratory distress.      Breath sounds: Normal breath sounds and air entry. No stridor, decreased air movement or transmitted upper airway sounds. No decreased breath sounds, wheezing, rhonchi or rales.   Musculoskeletal:         General: Normal range of motion.      Cervical back: Normal range of motion.   Skin:     General: Skin is warm.      Comments: Several, 2 to 5 mm, circular, raised, erythematous, skin lesions consistent with urticaria on the upper extremities.   Neurological:      Mental Status: She is alert and oriented to person, place, and time.   Psychiatric:         Mood and Affect: Mood normal.         Behavior: Behavior normal.         Examination chaperoned by Michela Garcia MA.    ASSESSMENT/PLAN:    Chronic urticaria  Symptoms improved by 80%.  Still has breakthrough hives with omalizumab 300 mg.  -Increase cetirizine to 20 mg by mouth twice daily.  Once she is better, she may experiment with the dose ranging from 10 mg by mouth twice daily up to 20 mg by mouth twice daily.  -Continue omalizumab 200 mg every 4 weeks.       Return in about 3 months (around 3/1/2022), or if symptoms worsen or fail to improve.    Thank you for allowing us to participate in the care of this patient. Please feel free to contact us if there are any questions or concerns about the patient.    Disclaimer: This note consists of symbols derived from keyboarding, dictation and/or voice recognition software. As a result, there may be errors in the script that have gone undetected. Please consider this when interpreting information found in this chart.    Toñito Desir MD, FAAAAI, FACAAI  Allergy, Asthma and Immunology     [facility]

## 2021-12-01 ENCOUNTER — OFFICE VISIT (OUTPATIENT)
Dept: ALLERGY | Facility: OTHER | Age: 41
End: 2021-12-01
Payer: COMMERCIAL

## 2021-12-01 ENCOUNTER — ALLIED HEALTH/NURSE VISIT (OUTPATIENT)
Dept: ALLERGY | Facility: OTHER | Age: 41
End: 2021-12-01
Payer: COMMERCIAL

## 2021-12-01 VITALS
WEIGHT: 215 LBS | BODY MASS INDEX: 39.56 KG/M2 | DIASTOLIC BLOOD PRESSURE: 76 MMHG | OXYGEN SATURATION: 97 % | HEART RATE: 77 BPM | HEIGHT: 62 IN | SYSTOLIC BLOOD PRESSURE: 102 MMHG

## 2021-12-01 DIAGNOSIS — L50.8 CHRONIC URTICARIA: Primary | ICD-10-CM

## 2021-12-01 LAB
GAMMA INTERFERON BACKGROUND BLD IA-ACNC: 0 IU/ML
M TB IFN-G BLD-IMP: NEGATIVE
M TB IFN-G CD4+ BCKGRND COR BLD-ACNC: 10 IU/ML
MITOGEN IGNF BCKGRD COR BLD-ACNC: 0.01 IU/ML
MITOGEN IGNF BCKGRD COR BLD-ACNC: 0.03 IU/ML
QUANTIFERON MITOGEN: 10 IU/ML
QUANTIFERON NIL TUBE: 0 IU/ML
QUANTIFERON TB1 TUBE: 0.01 IU/ML
QUANTIFERON TB2 TUBE: 0.03

## 2021-12-01 PROCEDURE — 96372 THER/PROPH/DIAG INJ SC/IM: CPT | Performed by: ALLERGY & IMMUNOLOGY

## 2021-12-01 PROCEDURE — 99214 OFFICE O/P EST MOD 30 MIN: CPT | Performed by: ALLERGY & IMMUNOLOGY

## 2021-12-01 PROCEDURE — 99207 PR NO CHARGE NURSE ONLY: CPT

## 2021-12-01 ASSESSMENT — ENCOUNTER SYMPTOMS
COUGH: 0
RHINORRHEA: 0
NAUSEA: 0
DIARRHEA: 0
SHORTNESS OF BREATH: 0
CHILLS: 0
ARTHRALGIAS: 0
FEVER: 0
EYE REDNESS: 0
EYE DISCHARGE: 0
ACTIVITY CHANGE: 0
FACIAL SWELLING: 0
EYE ITCHING: 0
CHEST TIGHTNESS: 0
HEADACHES: 0
VOMITING: 0
ADENOPATHY: 0
SINUS PRESSURE: 0
MYALGIAS: 0
WHEEZING: 0

## 2021-12-01 ASSESSMENT — MIFFLIN-ST. JEOR: SCORE: 1593.48

## 2021-12-01 ASSESSMENT — PAIN SCALES - GENERAL: PAINLEVEL: NO PAIN (0)

## 2021-12-01 NOTE — PROGRESS NOTES
Clinic Administered Medication Documentation    Administrations This Visit     omalizumab (XOLAIR) injection 300 mg     Admin Date  12/01/2021 Action  Given Dose  300 mg Route  Subcutaneous Site   Administered By  Huey España RN    Ordering Provider: Toñito Desir MD    Patient Supplied?: No                  Injectable Medication Documentation    Patient was given Xolair 300 mg. Prior to medication administration, verified patients identity using patient s name and date of birth. Please see MAR and medication order for additional information. Patient instructed to report any adverse reaction to staff immediately  and remain in clinic for 30 minutes.      Was entire vial of medication used? Yes  Vial/Syringe: Syringe  Expiration Date:  07/31/2022  Expiration Date:  09/30/2022  Was this medication supplied by the patient? No     Huey OLSON RN [facility]

## 2021-12-01 NOTE — PROGRESS NOTES
Patient presented after waiting 30 minutes with no reaction to Xolair injections. Discharged from clinic.    Huey OLSON RN [facility]

## 2021-12-01 NOTE — PROGRESS NOTES
Chaperone: I assisted the provider while performing a physical exam on patient.         Michela Garcia MA

## 2021-12-01 NOTE — PATIENT INSTRUCTIONS
Allergy Staff Appt Hours Shot Hours Locations    Physician     Toñito Desir MD       Support Staff     BARBIE De La Torre CMA  Tuesday:   [place] :  [place]: :         [place]:  [place]        Tuesday: : : : :  & Wed: :       Mon & Thurs:        Fri:          [place] [facility]  [address] [place] [address]  Appt Line: [phone]    [facility]  [address] [place] [address]  Appt Line: [phone]    Pulmonary Function Scheduling:  [place]: [phone]  [place]: [phone]  [phone]     Important Scheduling Information    Appointments for challenges (oral food challenge, penicillin testing, aspirin desensitization) will need to be scheduled directly through the allergy department.  Please contact them via [facility] or on  and  at [phone].  They will provide additional information and instructions for the appointment.  Discontinue oral antihistamines 7 days prior to the appointment.  Discontinue nasal and ocular antihistamines 4 days prior to the appointment.    Appointments for skin testing: Appointment will last approximately 45 minutes.  Please call the appointment line for your clinic to schedule.  Discontinue oral antihistamines 7 days prior to the appointment.  Discontinue nasal and ocular antihistamines 4 days prior to appointment.    Thank you for trusting us with your Allergy, Asthma, and Immunology care. Please feel free to contact us with any questions or concerns you may have.

## 2021-12-01 NOTE — LETTER
12/1/2021         RE: Ashley Mcdaniels  [address] [address] [facility] [address]        Dear Colleague,    Thank you for referring your patient, Ashley Mcdaniels, to the [facility]. Please see a copy of my visit note below.    SUBJECTIVE:                                                                   Ashley Mcdaniels presents today to our Allergy Clinic at [facility] for a follow-up visit. She is a 41-year-old female with chronic urticaria. A new patient for me. Previous patient of Dr. Cash.   The patient has a past medical history of Crohn's disease, hypothyroidism on thyroid hormone replacement, depression, anxiety, and chronic urticaria.  The patient developed hives at the end of 2016.  Failed high-dose antihistamines.  Started omalizumab in 2018.  It has been beneficial.  She tried stopping it in 2019 and developed hives 6 weeks after stopping the medicine.    These days, she gets omalizumab every 4 weeks.  The patient thinks that omalizumab improved her hives by 80%.  She still has hives almost every day, but milder.  She takes cetirizine most of the time just 10 mg by mouth once daily.  Does not take famotidine.  In the past, it caused rebound GERD.    Patient Active Problem List   Diagnosis     Acquired hypothyroidism     YUE (generalized anxiety disorder)     Major depressive disorder, recurrent episode, mild (H)     Crohn's disease of both small and large intestine without complication (H)     IUD (intrauterine device) in place     Chronic urticaria     ADHD (attention deficit hyperactivity disorder), inattentive type     Gastroesophageal reflux disease without esophagitis     Morbid obesity (H)     Segmental dysfunction of sacral region     Segmental dysfunction of lumbar region     Segmental dysfunction of thoracic region     Bilateral low back pain with left-sided sciatica       Past Medical History:   Diagnosis Date     Acquired hypothyroidism      ADHD (attention  deficit hyperactivity disorder)     Inattentive, dx 2012      Crohn's colitis (H)      YUE (generalized anxiety disorder)      GERD (gastroesophageal reflux disease)      Low back pain      Major depressive disorder, recurrent episode, mild (H)       Problem (# of Occurrences) Relation (Name,Age of Onset)    Alcoholism (1) Father    Arthritis (1) Mother    Asthma (2) Father, Paternal Grandmother    Deep Vein Thrombosis (1) Mother    Diabetes (2) Maternal Grandmother, Paternal Grandmother    Heart Disease (2) Father: CAD, CHF, Paternal Grandmother    Hyperlipidemia (2) Father, Maternal Grandmother    Hypertension (3) Father, Maternal Grandmother, Paternal Grandmother    Liver Cancer (1) Father    Mental Illness (1) Brother    Pancreatic Cancer (1) Father    Substance Abuse (4) Maternal Grandmother, Maternal Grandfather, Paternal Grandfather, Brother        Past Surgical History:   Procedure Laterality Date     CHOLECYSTECTOMY  2007     DILATION AND CURETTAGE  May 2003    Non-OB     HC TOOTH EXTRACTION W/FORCEP  2006     LAP ADJUSTABLE GASTRIC BAND  May 2007     Social History     Socioeconomic History     Marital status:      Spouse name: None     Number of children: None     Years of education: None     Highest education level: None   Occupational History     Comment: consulting   Tobacco Use     Smoking status: Former Smoker     Packs/day: 0.50     Quit date: 2016     Years since quittin.5     Smokeless tobacco: Never Used   Vaping Use     Vaping Use: Never used   Substance and Sexual Activity     Alcohol use: Yes     Alcohol/week: 0.0 standard drinks     Comment: occasional      Drug use: No     Sexual activity: Yes     Partners: Male     Birth control/protection: I.U.D.   Other Topics Concern     Parent/sibling w/ CABG, MI or angioplasty before 65F 55M? No   Social History Narrative    ENVIRONMENTAL HISTORY: [place] The home is heated with a  forced air. They do have central air conditioning. The patient's bedroom is furnished with carpeting in bedroom.  Pets inside the house include 6 cat(s), 1 dog(s), and 2 horses. There is no history of cockroach or mice infestation. There is/are 0 smokers in the house.  The house does not have a damp basement.      Social Determinants of Health     Financial Resource Strain: Not on file   Food Insecurity: Not on file   Transportation Needs: Not on file   Physical Activity: Not on file   Stress: Not on file   Social Connections: Not on file   Intimate Partner Violence: Not on file   Housing Stability: Not on file           Review of Systems   Constitutional: Negative for activity change, chills and fever.   HENT: Negative for congestion, ear discharge, facial swelling, nosebleeds, postnasal drip, rhinorrhea, sinus pressure and sneezing.    Eyes: Negative for discharge, redness and itching.   Respiratory: Negative for cough, chest tightness, shortness of breath and wheezing.    Cardiovascular: Negative for chest pain.   Gastrointestinal: Negative for diarrhea, nausea and vomiting.   Musculoskeletal: Negative for arthralgias and myalgias.   Skin: Positive for rash.   Allergic/Immunologic: Negative for environmental allergies and food allergies.   Neurological: Negative for headaches.   Hematological: Negative for adenopathy.   Psychiatric/Behavioral: Negative for behavioral problems and self-injury.           Current Outpatient Medications:      cetirizine (ZYRTEC) 10 MG tablet, Take 1 tablet (10 mg) by mouth daily, Disp: 30 tablet, Rfl: 0     EPINEPHrine (ANY BX GENERIC EQUIV) 0.3 MG/0.3ML injection 2-pack, Inject 0.3 mLs (0.3 mg) into the muscle as needed for anaphylaxis, Disp: 0.6 mL, Rfl: 1     HUMIRA *CF* PEN 40 MG/0.4ML pen kit, , Disp: , Rfl: 1     levonorgestrel (MIRENA) 20 MCG/24HR IUD, 1 each (20 mcg) by Intrauterine route continuous, Disp: , Rfl:      valACYclovir (VALTREX) 1000 mg tablet, Take 0.5 tablets  "(500 mg) by mouth 2 times daily, Disp: 20 tablet, Rfl: 11    Current Facility-Administered Medications:      omalizumab (XOLAIR) injection 300 mg, 300 mg, Subcutaneous, Q28 Days, Toñito Desir MD, 300 mg at 12/01/21 1430  Immunization History   Administered Date(s) Administered     COVID-19,PF,Pfizer (12+ Yrs) 01/20/2021, 02/10/2021, 11/10/2021     FLU 6-35 months 10/16/2002, 11/28/2003     Flu, Unspecified 10/16/2002, 11/28/2003     HepA-Adult 12/05/2017     Historical Hepb 10/12/2015, 12/05/2017     Influenza (IIV3) PF 11/02/2005, 10/17/2006, 11/07/2007, 10/21/2008     Influenza Vaccine IM > 6 months Valent IIV4 (Alfuria,Fluzone) 10/12/2015, 02/17/2020     Mantoux Tuberculin Skin Test 10/26/2015     Pneumo Conj 13-V (2010&after) 04/05/2021     Pneumococcal 23 valent 02/09/2018     TDAP Vaccine (Adacel) 09/07/2012     Allergies   Allergen Reactions     Azathioprine Other (See Comments)     pancreatitis     No Clinical Screening - See Comments Hives     From stress     OBJECTIVE:                                                                 /76   Pulse 77   Ht 1.575 m (5' 2\")   Wt 97.5 kg (215 lb)   SpO2 97%   BMI 39.32 kg/m          Physical Exam  Vitals and nursing note reviewed.   Constitutional:       General: She is not in acute distress.     Appearance: She is not ill-appearing, toxic-appearing or diaphoretic.   HENT:      Head: Normocephalic and atraumatic.      Right Ear: Tympanic membrane, ear canal and external ear normal.      Left Ear: Tympanic membrane, ear canal and external ear normal.      Nose: No mucosal edema, congestion or rhinorrhea.      Right Turbinates: Not enlarged, swollen or pale.      Left Turbinates: Not enlarged, swollen or pale.      Mouth/Throat:      Lips: Pink.      Mouth: Mucous membranes are moist.      Pharynx: Oropharynx is clear. No pharyngeal swelling, oropharyngeal exudate, posterior oropharyngeal erythema or uvula swelling.   Eyes:      General:         Right " eye: No discharge.         Left eye: No discharge.      Conjunctiva/sclera: Conjunctivae normal.   Cardiovascular:      Rate and Rhythm: Normal rate and regular rhythm.      Heart sounds: Normal heart sounds. No murmur heard.      Pulmonary:      Effort: Pulmonary effort is normal. No respiratory distress.      Breath sounds: Normal breath sounds and air entry. No stridor, decreased air movement or transmitted upper airway sounds. No decreased breath sounds, wheezing, rhonchi or rales.   Musculoskeletal:         General: Normal range of motion.      Cervical back: Normal range of motion.   Skin:     General: Skin is warm.      Comments: Several, 2 to 5 mm, circular, raised, erythematous, skin lesions consistent with urticaria on the upper extremities.   Neurological:      Mental Status: She is alert and oriented to person, place, and time.   Psychiatric:         Mood and Affect: Mood normal.         Behavior: Behavior normal.         Examination chaperoned by Michela Garcia MA.    ASSESSMENT/PLAN:    Chronic urticaria  Symptoms improved by 80%.  Still has breakthrough hives with omalizumab 300 mg.  -Increase cetirizine to 20 mg by mouth twice daily.  Once she is better, she may experiment with the dose ranging from 10 mg by mouth twice daily up to 20 mg by mouth twice daily.  -Continue omalizumab 200 mg every 4 weeks.       Return in about 3 months (around 3/1/2022), or if symptoms worsen or fail to improve.    Thank you for allowing us to participate in the care of this patient. Please feel free to contact us if there are any questions or concerns about the patient.    Disclaimer: This note consists of symbols derived from keyboarding, dictation and/or voice recognition software. As a result, there may be errors in the script that have gone undetected. Please consider this when interpreting information found in this chart.    Toñito Desir MD, FAAAAI, FACAAI  Allergy, Asthma and Immunology     [facility]  [facility]     Chaperone: I assisted the provider while performing a physical exam on patient.         Michela Garcia MA            Again, thank you for allowing me to participate in the care of your patient.        Sincerely,        Toñito Desir MD

## 2021-12-02 ENCOUNTER — APPOINTMENT (OUTPATIENT)
Dept: LAB | Facility: OTHER | Age: 41
End: 2021-12-02
Payer: COMMERCIAL

## 2021-12-02 PROCEDURE — 83993 ASSAY FOR CALPROTECTIN FECAL: CPT

## 2021-12-03 LAB — CALPROTECTIN STL-MCNT: 13.9 MG/KG (ref 0–49.9)

## 2021-12-13 DIAGNOSIS — B00.9 HSV (HERPES SIMPLEX VIRUS) INFECTION: ICD-10-CM

## 2021-12-15 RX ORDER — VALACYCLOVIR HYDROCHLORIDE 1 G/1
500 TABLET, FILM COATED ORAL 2 TIMES DAILY
Qty: 20 TABLET | Refills: 5 | Status: SHIPPED | OUTPATIENT
Start: 2021-12-15 | End: 2023-04-13

## 2021-12-20 ENCOUNTER — OFFICE VISIT (OUTPATIENT)
Dept: FAMILY MEDICINE | Facility: OTHER | Age: 41
End: 2021-12-20
Payer: COMMERCIAL

## 2021-12-20 VITALS
BODY MASS INDEX: 41.43 KG/M2 | OXYGEN SATURATION: 100 % | DIASTOLIC BLOOD PRESSURE: 80 MMHG | HEART RATE: 76 BPM | WEIGHT: 226.5 LBS | TEMPERATURE: 97.4 F | RESPIRATION RATE: 17 BRPM | SYSTOLIC BLOOD PRESSURE: 120 MMHG

## 2021-12-20 DIAGNOSIS — K50.80 CROHN'S DISEASE OF BOTH SMALL AND LARGE INTESTINE WITHOUT COMPLICATION (H): ICD-10-CM

## 2021-12-20 DIAGNOSIS — K21.9 GASTROESOPHAGEAL REFLUX DISEASE WITHOUT ESOPHAGITIS: ICD-10-CM

## 2021-12-20 DIAGNOSIS — I73.9 PAD (PERIPHERAL ARTERY DISEASE) (H): ICD-10-CM

## 2021-12-20 DIAGNOSIS — E03.9 ACQUIRED HYPOTHYROIDISM: ICD-10-CM

## 2021-12-20 DIAGNOSIS — E66.01 MORBID OBESITY (H): ICD-10-CM

## 2021-12-20 DIAGNOSIS — L85.3 DRY SKIN: ICD-10-CM

## 2021-12-20 DIAGNOSIS — M99.04 SEGMENTAL DYSFUNCTION OF SACRAL REGION: ICD-10-CM

## 2021-12-20 DIAGNOSIS — L67.8 DRY HAIR: ICD-10-CM

## 2021-12-20 DIAGNOSIS — F41.1 GAD (GENERALIZED ANXIETY DISORDER): ICD-10-CM

## 2021-12-20 DIAGNOSIS — M99.03 SEGMENTAL DYSFUNCTION OF LUMBAR REGION: Primary | ICD-10-CM

## 2021-12-20 DIAGNOSIS — F33.0 MAJOR DEPRESSIVE DISORDER, RECURRENT EPISODE, MILD (H): ICD-10-CM

## 2021-12-20 PROCEDURE — 99214 OFFICE O/P EST MOD 30 MIN: CPT | Performed by: PHYSICIAN ASSISTANT

## 2021-12-20 RX ORDER — ALPRAZOLAM 0.25 MG
.25-.5 TABLET ORAL 3 TIMES DAILY PRN
Qty: 60 TABLET | Refills: 0 | Status: SHIPPED | OUTPATIENT
Start: 2021-12-20 | End: 2022-10-03

## 2021-12-20 ASSESSMENT — ANXIETY QUESTIONNAIRES
7. FEELING AFRAID AS IF SOMETHING AWFUL MIGHT HAPPEN: MORE THAN HALF THE DAYS
6. BECOMING EASILY ANNOYED OR IRRITABLE: MORE THAN HALF THE DAYS
4. TROUBLE RELAXING: MORE THAN HALF THE DAYS
1. FEELING NERVOUS, ANXIOUS, OR ON EDGE: MORE THAN HALF THE DAYS
3. WORRYING TOO MUCH ABOUT DIFFERENT THINGS: MORE THAN HALF THE DAYS
GAD7 TOTAL SCORE: 13
GAD7 TOTAL SCORE: 13
2. NOT BEING ABLE TO STOP OR CONTROL WORRYING: MORE THAN HALF THE DAYS
7. FEELING AFRAID AS IF SOMETHING AWFUL MIGHT HAPPEN: MORE THAN HALF THE DAYS
GAD7 TOTAL SCORE: 13
5. BEING SO RESTLESS THAT IT IS HARD TO SIT STILL: SEVERAL DAYS

## 2021-12-20 ASSESSMENT — PATIENT HEALTH QUESTIONNAIRE - PHQ9
10. IF YOU CHECKED OFF ANY PROBLEMS, HOW DIFFICULT HAVE THESE PROBLEMS MADE IT FOR YOU TO DO YOUR WORK, TAKE CARE OF THINGS AT HOME, OR GET ALONG WITH OTHER PEOPLE: SOMEWHAT DIFFICULT
SUM OF ALL RESPONSES TO PHQ QUESTIONS 1-9: 9
SUM OF ALL RESPONSES TO PHQ QUESTIONS 1-9: 9

## 2021-12-20 NOTE — PROGRESS NOTES
"  Assessment & Plan     Segmental dysfunction of lumbar region  Segmental dysfunction of sacral region  Morbid obesity (H)  Given her history I think that this is coming from her low back.  She wishes to avoid surgery at this point time and so we will simply have physical therapy see and see if they can help her.  May need to have x-ray and/or MRI in the future depending on how she responds to physical therapy.  - Physical Therapy Referral; Future    Crohn's disease of both small and large intestine without complication (H)  Gastroesophageal reflux disease without esophagitis  Acquired hypothyroidism  PAD (peripheral artery disease) (H)  Dry hair  Dry skin  Based on her last lab results I highly doubt that she has anything wrong with her thyroid gland.  I would not pursue prescribing anything related to the thyroid without further evaluation by endocrinology.  I suspect that some of the dry hair and skin that she is having is due to questionable absorption due to Crohn's disease and or a very dry home situation.  Advise increase fluids and take a look at the humidity with in their log home.  Did offer nutritional consult.  She declines.  Did advise that she follow-up with GI specialist as well as her vascular specialist with her current concerns.      YUE (generalized anxiety disorder)  Major depressive disorder, recurrent episode, mild (H)  Offered some anxiety medications for a limited prescription.  No further refills without office visit.  Advised counseling.  Place mental health referral.  - ALPRAZolam (XANAX) 0.25 MG tablet; Take 1-2 tablets (0.25-0.5 mg) by mouth 3 times daily as needed for anxiety                   BMI:   Estimated body mass index is 41.43 kg/m  as calculated from the following:    Height as of 12/1/21: 1.575 m (5' 2\").    Weight as of this encounter: 102.7 kg (226 lb 8 oz).           No follow-ups on file.    Josue Vela PA-C  [facility]    Subjective   Tia is a 41 " year old who presents for the following health issues     History of Present Illness       Back Pain:  She presents for follow up of back pain. Patient's back pain is a chronic problem.  Location of back pain:  Right lower back, left lower back and right hip  Description of back pain: burning and gnawing  Back pain spreads: right thigh and right knee    Since patient first noticed back pain, pain is: always present, but gets better and worse  Does back pain interfere with her job:  No      Mental Health Follow-up:  Patient presents to follow-up on Depression & Anxiety.Patient's depression since last visit has been:  Better  The patient is not having other symptoms associated with depression.  Patient's anxiety since last visit has been:  Medium  The patient is having other symptoms associated with anxiety.  Any significant life events: No  Patient is feeling anxious or having panic attacks.  Patient has concerns about alcohol or drug use.     Social History  Tobacco Use    Smoking status: Former Smoker      Packs/day: 0.50      Quit date: 2016      Years since quittin.6    Smokeless tobacco: Never Used  Vaping Use    Vaping Use: Never used  Alcohol use: Yes    Alcohol/week: 0.0 standard drinks    Comment: occasional   Drug use: No      Today's PHQ-9         PHQ-9 Total Score:     (P) 9   PHQ-9 Q9 Thoughts of better off dead/self-harm past 2 weeks :   (P) Not at all   Thoughts of suicide or self harm:      Self-harm Plan:        Self-harm Action:          Safety concerns for self or others:           Hypothyroidism:     Since last visit, patient describes the following symptoms::  Anxiety, Constipation, Dry skin, Fatigue, Hair loss and Weight gain    Weight gain::  11-15 lbs.    Vascular Disease:  She presents for follow up of vascular disease.  She never takes nitroglycerin. She is not taking daily aspirin.    She eats 4 or more servings of fruits and vegetables daily.She consumes 0 sweetened beverage(s)  daily.She exercises with enough effort to increase her heart rate 10 to 19 minutes per day.  She exercises with enough effort to increase her heart rate 3 or less days per week.   She is taking medications regularly.             Review of Systems         Objective    There were no vitals taken for this visit.  There is no height or weight on file to calculate BMI.  Physical Exam                   Answers for HPI/ROS submitted by the patient on 12/20/2021  If you checked off any problems, how difficult have these problems made it for you to do your work, take care of things at home, or get along with other people?: Somewhat difficult  PHQ9 TOTAL SCORE: 9  YUE 7 TOTAL SCORE: 13

## 2021-12-21 ASSESSMENT — ANXIETY QUESTIONNAIRES: GAD7 TOTAL SCORE: 13

## 2021-12-21 ASSESSMENT — PATIENT HEALTH QUESTIONNAIRE - PHQ9: SUM OF ALL RESPONSES TO PHQ QUESTIONS 1-9: 9

## 2021-12-29 ENCOUNTER — ALLIED HEALTH/NURSE VISIT (OUTPATIENT)
Dept: ALLERGY | Facility: OTHER | Age: 41
End: 2021-12-29
Payer: COMMERCIAL

## 2021-12-29 DIAGNOSIS — L50.8 CHRONIC URTICARIA: Primary | ICD-10-CM

## 2021-12-29 PROCEDURE — 96372 THER/PROPH/DIAG INJ SC/IM: CPT | Performed by: ALLERGY & IMMUNOLOGY

## 2021-12-29 PROCEDURE — 99207 PR NO CHARGE NURSE ONLY: CPT

## 2021-12-29 NOTE — PROGRESS NOTES
Clinic Administered Medication Documentation    Administrations This Visit     omalizumab (XOLAIR) injection 300 mg     Admin Date  12/29/2021 Action  Given Dose  300 mg Route  Subcutaneous Site   Administered By  Huey España RN    Ordering Provider: Toñito Desir MD    NDC: 18086-654-07    Lot#: [id number]    : [id number]    Patient Supplied?: No                  Injectable Medication Documentation    Patient was given Xolair 300 mg. Prior to medication administration, verified patients identity using patient s name and date of birth. Please see MAR and medication order for additional information. Patient instructed to report any adverse reaction to staff immediately  and remain in clinic for 30 minutes.      Was entire vial of medication used? Yes  Vial/Syringe: Syringe  Expiration Date:  11302022  Was this medication supplied by the patient? No     Huey OLSON RN

## 2021-12-29 NOTE — PROGRESS NOTES
Patient presented after waiting 30 minutes with no reaction to Xolair injections. Discharged from clinic.    Huey OLSON RN

## 2022-02-02 ENCOUNTER — ALLIED HEALTH/NURSE VISIT (OUTPATIENT)
Dept: ALLERGY | Facility: OTHER | Age: 42
End: 2022-02-02
Payer: COMMERCIAL

## 2022-02-02 DIAGNOSIS — L50.8 CHRONIC URTICARIA: Primary | ICD-10-CM

## 2022-02-02 PROCEDURE — 96372 THER/PROPH/DIAG INJ SC/IM: CPT | Performed by: ALLERGY & IMMUNOLOGY

## 2022-02-02 PROCEDURE — 99207 PR NO CHARGE NURSE ONLY: CPT

## 2022-02-02 NOTE — PROGRESS NOTES
Clinic Administered Medication Documentation    Administrations This Visit     omalizumab (XOLAIR) injection 300 mg     Admin Date  02/02/2022 Action  Given Dose  300 mg Route  Subcutaneous Site   Administered By  Robinson Arango CMA    Ordering Provider: Toñito Desir MD    NDC: 52131-783-70    Lot#: [id number]    : [employer]    Patient Supplied?: No                  Injectable Medication Documentation    Patient was given Xolair 300 mg . Prior to medication administration, verified patients identity using patient s name and date of birth. Please see MAR and medication order for additional information. Patient instructed to remain 30 minutes in clinic.            Was entire vial of medication used? Yes  Vial/Syringe: Syringe  Expiration Date:  8/30/2022  Was this medication supplied by the patient? No     Robinson Arango CMA

## 2022-02-02 NOTE — PROGRESS NOTES
Patient presented after waiting 30 minutes with no reaction to Xolair injections. Discharged from clinic.    Huey España RN on 2/2/2022 at 10:01 AM

## 2022-03-02 ENCOUNTER — OFFICE VISIT (OUTPATIENT)
Dept: ALLERGY | Facility: OTHER | Age: 42
End: 2022-03-02
Payer: COMMERCIAL

## 2022-03-02 ENCOUNTER — ALLIED HEALTH/NURSE VISIT (OUTPATIENT)
Dept: ALLERGY | Facility: OTHER | Age: 42
End: 2022-03-02
Payer: COMMERCIAL

## 2022-03-02 VITALS
TEMPERATURE: 98 F | WEIGHT: 217 LBS | SYSTOLIC BLOOD PRESSURE: 126 MMHG | HEART RATE: 80 BPM | HEIGHT: 63 IN | BODY MASS INDEX: 38.45 KG/M2 | DIASTOLIC BLOOD PRESSURE: 76 MMHG | OXYGEN SATURATION: 99 %

## 2022-03-02 DIAGNOSIS — L50.8 CHRONIC URTICARIA: Primary | ICD-10-CM

## 2022-03-02 DIAGNOSIS — L50.9 URTICARIA, UNSPECIFIED: Primary | ICD-10-CM

## 2022-03-02 PROCEDURE — 99213 OFFICE O/P EST LOW 20 MIN: CPT | Mod: 25 | Performed by: ALLERGY & IMMUNOLOGY

## 2022-03-02 PROCEDURE — 96372 THER/PROPH/DIAG INJ SC/IM: CPT | Performed by: ALLERGY & IMMUNOLOGY

## 2022-03-02 PROCEDURE — 99207 PR DROP WITH A PROCEDURE: CPT

## 2022-03-02 RX ORDER — HYDROXYZINE HYDROCHLORIDE 25 MG/1
25-50 TABLET, FILM COATED ORAL
COMMUNITY
Start: 2021-08-26 | End: 2022-10-03

## 2022-03-02 ASSESSMENT — ENCOUNTER SYMPTOMS
WHEEZING: 0
RHINORRHEA: 0
CHEST TIGHTNESS: 0
VOMITING: 0
DIARRHEA: 0
EYE ITCHING: 0
FACIAL SWELLING: 0
SINUS PRESSURE: 0
HEADACHES: 0
ACTIVITY CHANGE: 0
FEVER: 0
ARTHRALGIAS: 0
CHILLS: 0
NAUSEA: 0
EYE DISCHARGE: 0
COUGH: 0
ADENOPATHY: 0
MYALGIAS: 0
SHORTNESS OF BREATH: 0
EYE REDNESS: 0

## 2022-03-02 NOTE — PROGRESS NOTES
Clinic Administered Medication Documentation    Administrations This Visit     omalizumab (XOLAIR) injection 300 mg     Admin Date  03/02/2022 Action  Given Dose  300 mg Route  Subcutaneous Site   Administered By  Tigist Barrientos MA    Ordering Provider: Toñito Desir MD    NDC: 72879-027-69    Lot#: [id number]    : [employer]    Patient Supplied?: No    Comments: Scanner not available                  Injectable Medication Documentation    Patient was given xolair. Prior to medication administration, verified patients identity using patient s name and date of birth. Please see MAR and medication order for additional information. Patient instructed to report any adverse reaction to staff immediately .      Was entire vial of medication used? Yes  Vial/Syringe: Syringe  Expiration Date:  11/30/2022  Was this medication supplied by the patient? No    Patient presented after waiting 30 minutes with normal reaction to  injections. Discharged from clinic.    Sandra DURAND RN, [facility] 03/02/22 2:43 PM

## 2022-03-02 NOTE — PATIENT INSTRUCTIONS
Continue Xolair every 4 weeks.   Continue cetirizine 10 mg by mouth twice daily. You can increase the dose to 20 mg by mouth twice daily as needed.         Allergy Staff Appt Hours Shot Hours Locations    Physician     Toñito Desir MD       Support Staff     Chelsea PADRON, BARBIE PADRON CMA  Tuesday:   [place] :  [place]: :         [place]:  [place] [place]        Tuesday: : : : :  & Wed:  & Thurs:        Fri:          [place] [facility]  [address] [place] [address]  Appt Line: [phone]    [facility]  [address] [place] [address]  Appt Line: [phone]    Pulmonary Function Scheduling:  [place]: [phone]  [place]: [phone]  [phone]     Prescription Assistance    If you need assistance with your prescriptions (cost, coverage, etc) please contact: [facility] [phone]    Important Scheduling Information    Appointments for skin testing: Appointment will last approximately 45 minutes.  Please call the appointment line for your clinic to schedule.  Discontinue oral antihistamines 7 days prior to the appointment.  Discontinue nasal and ocular antihistamines 4 days prior to appointment.    Appointments for challenges (oral food challenge, penicillin testing, aspirin desensitization) If your provider instructs you to that this additional testing is indicated, it will need to be scheduled directly through the allergy department.  Please contact them via [employer] or by calling the clinic and asking to speak with the allergy team.  They will provide additional information and instructions for the appointment.  Discontinue oral antihistamines 7 days prior to the appointment.  Discontinue nasal and ocular antihistamines 4  days prior to the appointment.    Thank you for trusting us with your care. Please feel free to contact us with any questions or concerns you may have.

## 2022-03-02 NOTE — PROGRESS NOTES
SUBJECTIVE:                                                                   Ashley Mcdaniels presents today to our Allergy Clinic at [facility] for a follow up visit. She is a 41 year old female with medical history of Crohn's disease, hypothyroidism on thyroid hormone replacement, depression, anxiety, and chronic urticaria.  Problem   Chronic Urticaria    The patient developed hives at the end of 2016.  Failed high-dose antihistamines. Started omalizumab in 2018.  It has been beneficial.  She tried stopping it in 2019 and developed hives 6 weeks after stopping the medicine.       She gets omalizumab every 4 weeks. The takes cetirizine 10 mg by mouth twice daily. On this regimen, she is well controlled. She had 2 mild outbreaks since the past visit only because she forgot to take it. Cetirizine 10 mg by mouth once daily doesn't control symptoms well.     Patient Active Problem List   Diagnosis     Acquired hypothyroidism     YUE (generalized anxiety disorder)     Major depressive disorder, recurrent episode, mild (H)     Crohn's disease of both small and large intestine without complication (H)     IUD (intrauterine device) in place     Chronic urticaria     ADHD (attention deficit hyperactivity disorder), inattentive type     Gastroesophageal reflux disease without esophagitis     Morbid obesity (H)     Segmental dysfunction of sacral region     Segmental dysfunction of lumbar region     Segmental dysfunction of thoracic region     Bilateral low back pain with left-sided sciatica     PAD (peripheral artery disease) (H)     Dry hair     Dry skin       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      Crohn's colitis (H)      YUE (generalized anxiety disorder)      GERD (gastroesophageal reflux disease)      Low back pain      Major depressive disorder, recurrent episode, mild (H)       Problem  (# of Occurrences) Relation (Name,Age of Onset)    Alcoholism (1) Father    Arthritis (1) Mother    Asthma (2) Father, Paternal Grandmother    Deep Vein Thrombosis (1) Mother    Diabetes (2) Maternal Grandmother, Paternal Grandmother    Heart Disease (2) Father: CAD, CHF, Paternal Grandmother    Hyperlipidemia (2) Father, Maternal Grandmother    Hypertension (3) Father, Maternal Grandmother, Paternal Grandmother    Liver Cancer (1) Father    Mental Illness (1) Brother    Pancreatic Cancer (1) Father    Substance Abuse (4) Maternal Grandmother, Maternal Grandfather, Paternal Grandfather, Brother        Past Surgical History:   Procedure Laterality Date     CHOLECYSTECTOMY  2007     DILATION AND CURETTAGE  May 2003    Non-OB     HC TOOTH EXTRACTION W/FORCEP  2006     LAP ADJUSTABLE GASTRIC BAND  May 2007     Social History     Socioeconomic History     Marital status:      Spouse name: None     Number of children: None     Years of education: None     Highest education level: None   Occupational History     Comment: consulting   Tobacco Use     Smoking status: Former Smoker     Packs/day: 0.50     Quit date: 2016     Years since quittin.8     Smokeless tobacco: Never Used   Vaping Use     Vaping Use: Never used   Substance and Sexual Activity     Alcohol use: Yes     Alcohol/week: 0.0 standard drinks     Comment: occasional      Drug use: No     Sexual activity: Yes     Partners: Male     Birth control/protection: I.U.D.   Other Topics Concern     Parent/sibling w/ CABG, MI or angioplasty before 65F 55M? No   Social History Narrative    ENVIRONMENTAL HISTORY: [place] The home is heated with a forced air. They do have central air conditioning. The patient's bedroom is furnished with carpeting in bedroom.  Pets inside the house include 6 cat(s), 1 dog(s), and 2 horses. There is no history of cockroach or mice infestation. There is/are 0 smokers in the  house.  The house does not have a damp basement.      Social Determinants of Health     Financial Resource Strain: Not on file   Food Insecurity: Not on file   Transportation Needs: Not on file   Physical Activity: Not on file   Stress: Not on file   Social Connections: Not on file   Intimate Partner Violence: Not on file   Housing Stability: Not on file       Review of Systems   Constitutional: Negative for activity change, chills and fever.   HENT: Negative for congestion, ear discharge, facial swelling, nosebleeds, postnasal drip, rhinorrhea, sinus pressure and sneezing.    Eyes: Negative for discharge, redness and itching.   Respiratory: Negative for cough, chest tightness, shortness of breath and wheezing.    Cardiovascular: Negative for chest pain.   Gastrointestinal: Negative for diarrhea, nausea and vomiting.   Musculoskeletal: Negative for arthralgias and myalgias.   Skin: Negative for rash.   Allergic/Immunologic: Negative for environmental allergies and food allergies.   Neurological: Negative for headaches.   Hematological: Negative for adenopathy.   Psychiatric/Behavioral: Negative for behavioral problems and self-injury.           Current Outpatient Medications:      cetirizine (ZYRTEC) 10 MG tablet, Take 1 tablet (10 mg) by mouth daily, Disp: 30 tablet, Rfl: 0     EPINEPHrine (ANY BX GENERIC EQUIV) 0.3 MG/0.3ML injection 2-pack, Inject 0.3 mLs (0.3 mg) into the muscle as needed for anaphylaxis, Disp: 0.6 mL, Rfl: 1     HUMIRA *CF* PEN 40 MG/0.4ML pen kit, , Disp: , Rfl: 1     levonorgestrel (MIRENA) 20 MCG/24HR IUD, 1 each (20 mcg) by Intrauterine route continuous, Disp: , Rfl:      valACYclovir (VALTREX) 1000 mg tablet, Take 0.5 tablets (500 mg) by mouth 2 times daily (Patient taking differently: Take 500 mg by mouth 2 times daily PRN), Disp: 20 tablet, Rfl: 5     ALPRAZolam (XANAX) 0.25 MG tablet, Take 1-2 tablets (0.25-0.5 mg) by mouth 3 times daily as needed for anxiety (Patient not taking:  "Reported on 3/2/2022), Disp: 60 tablet, Rfl: 0     hydrOXYzine (ATARAX) 25 MG tablet, Take 25-50 mg by mouth (Patient not taking: Reported on 3/2/2022), Disp: , Rfl:     Current Facility-Administered Medications:      omalizumab (XOLAIR) injection 300 mg, 300 mg, Subcutaneous, Q28 Days, Toñito Desir MD, 300 mg at 03/02/22 1410  Immunization History   Administered Date(s) Administered     COVID-19,PF,Pfizer (12+ Yrs) 01/20/2021, 02/10/2021, 11/10/2021     FLU 6-35 months 10/16/2002, 11/28/2003     Flu, Unspecified 10/16/2002, 11/28/2003     HepA-Adult 12/05/2017     Historical Hepb 10/12/2015, 12/05/2017     Influenza (IIV3) PF 11/02/2005, 10/17/2006, 11/07/2007, 10/21/2008     Influenza Vaccine IM > 6 months Valent IIV4 (Alfuria,Fluzone) 10/12/2015, 02/17/2020     Mantoux Tuberculin Skin Test 10/26/2015     Pneumo Conj 13-V (2010&after) 04/05/2021     Pneumococcal 23 valent 02/09/2018     TDAP Vaccine (Adacel) 09/07/2012     Allergies   Allergen Reactions     Azathioprine Other (See Comments)     pancreatitis     No Clinical Screening - See Comments Hives     From stress     OBJECTIVE:                                                                 /76   Pulse 80   Temp 98  F (36.7  C) (Temporal)   Ht 1.6 m (5' 3\")   Wt 98.4 kg (217 lb)   SpO2 99%   BMI 38.44 kg/m          Physical Exam  Vitals and nursing note reviewed.   Constitutional:       General: She is not in acute distress.     Appearance: She is not ill-appearing, toxic-appearing or diaphoretic.   HENT:      Head: Normocephalic and atraumatic.      Right Ear: Tympanic membrane, ear canal and external ear normal.      Left Ear: Tympanic membrane, ear canal and external ear normal.      Nose: No mucosal edema, congestion or rhinorrhea.      Right Turbinates: Not enlarged, swollen or pale.      Left Turbinates: Not enlarged, swollen or pale.      Mouth/Throat:      Lips: Pink.      Mouth: Mucous membranes are moist.      Pharynx: Oropharynx is " clear. No pharyngeal swelling, oropharyngeal exudate, posterior oropharyngeal erythema or uvula swelling.   Eyes:      General:         Right eye: No discharge.         Left eye: No discharge.      Conjunctiva/sclera: Conjunctivae normal.   Cardiovascular:      Rate and Rhythm: Normal rate and regular rhythm.      Heart sounds: Normal heart sounds. No murmur heard.  Pulmonary:      Effort: Pulmonary effort is normal. No respiratory distress.      Breath sounds: Normal breath sounds and air entry. No stridor, decreased air movement or transmitted upper airway sounds. No decreased breath sounds, wheezing, rhonchi or rales.   Musculoskeletal:         General: Normal range of motion.      Cervical back: Normal range of motion.   Skin:     General: Skin is warm and dry.      Comments: Mild xerosis of the skin with subtle dermatographism.  No active urticaria or angioedema noted on today's exam.   Neurological:      Mental Status: She is alert and oriented to person, place, and time.   Psychiatric:         Mood and Affect: Mood normal.         Behavior: Behavior normal.         ASSESSMENT/PLAN:    Chronic urticaria  Currently well controlled with omalizumab 300 mg every 4 weeks and cetirizine 10 mg by mouth twice daily.  -Continue as is.  -Depending on symptom control, she may increase the dose of cetirizine to 20 mg by mouth twice daily.       Return in about 1 year (around 3/2/2023), or if symptoms worsen or fail to improve.    Thank you for allowing us to participate in the care of this patient. Please feel free to contact us if there are any questions or concerns about the patient.    Disclaimer: This note consists of symbols derived from keyboarding, dictation and/or voice recognition software. As a result, there may be errors in the script that have gone undetected. Please consider this when interpreting information found in this chart.    Toñito Desir MD, FAAAAI, FACAAI  Allergy, Asthma and Immunology     [facility]

## 2022-03-02 NOTE — LETTER
3/2/2022         RE: Ashley Mcdaniels  [id number] [facility] [address]        Dear Colleague,    Thank you for referring your patient, Ashley Mcdaniels, to the [facility]. Please see a copy of my visit note below.    SUBJECTIVE:                                                                   Ashley Mcdaniels presents today to our Allergy Clinic at [facility] for a follow up visit. She is a 41 year old female with medical history of Crohn's disease, hypothyroidism on thyroid hormone replacement, depression, anxiety, and chronic urticaria.  Problem   Chronic Urticaria    The patient developed hives at the end of 2016.  Failed high-dose antihistamines. Started omalizumab in 2018.  It has been beneficial.  She tried stopping it in 2019 and developed hives 6 weeks after stopping the medicine.       She gets omalizumab every 4 weeks. The takes cetirizine 10 mg by mouth twice daily. On this regimen, she is well controlled. She had 2 mild outbreaks since the past visit only because she forgot to take it. Cetirizine 10 mg by mouth once daily doesn't control symptoms well.     Patient Active Problem List   Diagnosis     Acquired hypothyroidism     YUE (generalized anxiety disorder)     Major depressive disorder, recurrent episode, mild (H)     Crohn's disease of both small and large intestine without complication (H)     IUD (intrauterine device) in place     Chronic urticaria     ADHD (attention deficit hyperactivity disorder), inattentive type     Gastroesophageal reflux disease without esophagitis     Morbid obesity (H)     Segmental dysfunction of sacral region     Segmental dysfunction of lumbar region     Segmental dysfunction of thoracic region     Bilateral low back pain with left-sided sciatica     PAD (peripheral artery disease) (H)     Dry hair     Dry skin       Past Medical History:   Diagnosis Date     Acquired hypothyroidism      ADHD (attention deficit  hyperactivity disorder)     Inattentive, dx 2012      Crohn's colitis (H)      YUE (generalized anxiety disorder)      GERD (gastroesophageal reflux disease)      Low back pain      Major depressive disorder, recurrent episode, mild (H)       Problem (# of Occurrences) Relation (Name,Age of Onset)    Alcoholism (1) Father    Arthritis (1) Mother    Asthma (2) Father, Paternal Grandmother    Deep Vein Thrombosis (1) Mother    Diabetes (2) Maternal Grandmother, Paternal Grandmother    Heart Disease (2) Father: CAD, CHF, Paternal Grandmother    Hyperlipidemia (2) Father, Maternal Grandmother    Hypertension (3) Father, Maternal Grandmother, Paternal Grandmother    Liver Cancer (1) Father    Mental Illness (1) Brother    Pancreatic Cancer (1) Father    Substance Abuse (4) Maternal Grandmother, Maternal Grandfather, Paternal Grandfather, Brother        Past Surgical History:   Procedure Laterality Date     CHOLECYSTECTOMY  2007     DILATION AND CURETTAGE  May 2003    Non-OB     HC TOOTH EXTRACTION W/FORCEP  2006     LAP ADJUSTABLE GASTRIC BAND  May 2007     Social History     Socioeconomic History     Marital status:      Spouse name: None     Number of children: None     Years of education: None     Highest education level: None   Occupational History     Comment: consulting   Tobacco Use     Smoking status: Former Smoker     Packs/day: 0.50     Quit date: 2016     Years since quittin.8     Smokeless tobacco: Never Used   Vaping Use     Vaping Use: Never used   Substance and Sexual Activity     Alcohol use: Yes     Alcohol/week: 0.0 standard drinks     Comment: occasional      Drug use: No     Sexual activity: Yes     Partners: Male     Birth control/protection: I.U.D.   Other Topics Concern     Parent/sibling w/ CABG, MI or angioplasty before 65F 55M? No   Social History Narrative    ENVIRONMENTAL HISTORY: [place] The home is heated with a forced  air. They do have central air conditioning. The patient's bedroom is furnished with carpeting in bedroom.  Pets inside the house include 6 cat(s), 1 dog(s), and 2 horses. There is no history of cockroach or mice infestation. There is/are 0 smokers in the house.  The house does not have a damp basement.      Social Determinants of Health     Financial Resource Strain: Not on file   Food Insecurity: Not on file   Transportation Needs: Not on file   Physical Activity: Not on file   Stress: Not on file   Social Connections: Not on file   Intimate Partner Violence: Not on file   Housing Stability: Not on file       Review of Systems   Constitutional: Negative for activity change, chills and fever.   HENT: Negative for congestion, ear discharge, facial swelling, nosebleeds, postnasal drip, rhinorrhea, sinus pressure and sneezing.    Eyes: Negative for discharge, redness and itching.   Respiratory: Negative for cough, chest tightness, shortness of breath and wheezing.    Cardiovascular: Negative for chest pain.   Gastrointestinal: Negative for diarrhea, nausea and vomiting.   Musculoskeletal: Negative for arthralgias and myalgias.   Skin: Negative for rash.   Allergic/Immunologic: Negative for environmental allergies and food allergies.   Neurological: Negative for headaches.   Hematological: Negative for adenopathy.   Psychiatric/Behavioral: Negative for behavioral problems and self-injury.           Current Outpatient Medications:      cetirizine (ZYRTEC) 10 MG tablet, Take 1 tablet (10 mg) by mouth daily, Disp: 30 tablet, Rfl: 0     EPINEPHrine (ANY BX GENERIC EQUIV) 0.3 MG/0.3ML injection 2-pack, Inject 0.3 mLs (0.3 mg) into the muscle as needed for anaphylaxis, Disp: 0.6 mL, Rfl: 1     HUMIRA *CF* PEN 40 MG/0.4ML pen kit, , Disp: , Rfl: 1     levonorgestrel (MIRENA) 20 MCG/24HR IUD, 1 each (20 mcg) by Intrauterine route continuous, Disp: , Rfl:      valACYclovir (VALTREX) 1000 mg tablet, Take 0.5 tablets (500 mg) by  "mouth 2 times daily (Patient taking differently: Take 500 mg by mouth 2 times daily PRN), Disp: 20 tablet, Rfl: 5     ALPRAZolam (XANAX) 0.25 MG tablet, Take 1-2 tablets (0.25-0.5 mg) by mouth 3 times daily as needed for anxiety (Patient not taking: Reported on 3/2/2022), Disp: 60 tablet, Rfl: 0     hydrOXYzine (ATARAX) 25 MG tablet, Take 25-50 mg by mouth (Patient not taking: Reported on 3/2/2022), Disp: , Rfl:     Current Facility-Administered Medications:      omalizumab (XOLAIR) injection 300 mg, 300 mg, Subcutaneous, Q28 Days, Toñito Desir MD, 300 mg at 03/02/22 1410  Immunization History   Administered Date(s) Administered     COVID-19,PF,Pfizer (12+ Yrs) 01/20/2021, 02/10/2021, 11/10/2021     FLU 6-35 months 10/16/2002, 11/28/2003     Flu, Unspecified 10/16/2002, 11/28/2003     HepA-Adult 12/05/2017     Historical Hepb 10/12/2015, 12/05/2017     Influenza (IIV3) PF 11/02/2005, 10/17/2006, 11/07/2007, 10/21/2008     Influenza Vaccine IM > 6 months Valent IIV4 (Alfuria,Fluzone) 10/12/2015, 02/17/2020     Mantoux Tuberculin Skin Test 10/26/2015     Pneumo Conj 13-V (2010&after) 04/05/2021     Pneumococcal 23 valent 02/09/2018     TDAP Vaccine (Adacel) 09/07/2012     Allergies   Allergen Reactions     Azathioprine Other (See Comments)     pancreatitis     No Clinical Screening - See Comments Hives     From stress     OBJECTIVE:                                                                 /76   Pulse 80   Temp 98  F (36.7  C) (Temporal)   Ht 1.6 m (5' 3\")   Wt 98.4 kg (217 lb)   SpO2 99%   BMI 38.44 kg/m          Physical Exam  Vitals and nursing note reviewed.   Constitutional:       General: She is not in acute distress.     Appearance: She is not ill-appearing, toxic-appearing or diaphoretic.   HENT:      Head: Normocephalic and atraumatic.      Right Ear: Tympanic membrane, ear canal and external ear normal.      Left Ear: Tympanic membrane, ear canal and external ear normal.      Nose: No " mucosal edema, congestion or rhinorrhea.      Right Turbinates: Not enlarged, swollen or pale.      Left Turbinates: Not enlarged, swollen or pale.      Mouth/Throat:      Lips: Pink.      Mouth: Mucous membranes are moist.      Pharynx: Oropharynx is clear. No pharyngeal swelling, oropharyngeal exudate, posterior oropharyngeal erythema or uvula swelling.   Eyes:      General:         Right eye: No discharge.         Left eye: No discharge.      Conjunctiva/sclera: Conjunctivae normal.   Cardiovascular:      Rate and Rhythm: Normal rate and regular rhythm.      Heart sounds: Normal heart sounds. No murmur heard.  Pulmonary:      Effort: Pulmonary effort is normal. No respiratory distress.      Breath sounds: Normal breath sounds and air entry. No stridor, decreased air movement or transmitted upper airway sounds. No decreased breath sounds, wheezing, rhonchi or rales.   Musculoskeletal:         General: Normal range of motion.      Cervical back: Normal range of motion.   Skin:     General: Skin is warm and dry.      Comments: Mild xerosis of the skin with subtle dermatographism.  No active urticaria or angioedema noted on today's exam.   Neurological:      Mental Status: She is alert and oriented to person, place, and time.   Psychiatric:         Mood and Affect: Mood normal.         Behavior: Behavior normal.         ASSESSMENT/PLAN:    Chronic urticaria  Currently well controlled with omalizumab 300 mg every 4 weeks and cetirizine 10 mg by mouth twice daily.  -Continue as is.  -Depending on symptom control, she may increase the dose of cetirizine to 20 mg by mouth twice daily.       Return in about 1 year (around 3/2/2023), or if symptoms worsen or fail to improve.    Thank you for allowing us to participate in the care of this patient. Please feel free to contact us if there are any questions or concerns about the patient.    Disclaimer: This note consists of symbols derived from keyboarding, dictation and/or  voice recognition software. As a result, there may be errors in the script that have gone undetected. Please consider this when interpreting information found in this chart.    Toñito Desir MD, FAAAAI, FACFAVIANI  Allergy, Asthma and Immunology     [facility]       Again, thank you for allowing me to participate in the care of your patient.        Sincerely,        Toñito Desir MD

## 2022-04-12 DIAGNOSIS — K50.80 CROHN'S DISEASE OF BOTH SMALL AND LARGE INTESTINE WITHOUT COMPLICATION (H): Primary | ICD-10-CM

## 2022-04-18 ENCOUNTER — LAB (OUTPATIENT)
Dept: LAB | Facility: OTHER | Age: 42
End: 2022-04-18
Payer: COMMERCIAL

## 2022-04-18 DIAGNOSIS — K50.80 CROHN'S DISEASE OF BOTH SMALL AND LARGE INTESTINE WITHOUT COMPLICATION (H): ICD-10-CM

## 2022-04-18 LAB
ALBUMIN SERPL-MCNC: 3.4 G/DL (ref 3.4–5)
ALP SERPL-CCNC: 85 U/L (ref 40–150)
ALT SERPL W P-5'-P-CCNC: 22 U/L (ref 0–50)
AST SERPL W P-5'-P-CCNC: 17 U/L (ref 0–45)
BASOPHILS # BLD AUTO: 0 10E3/UL (ref 0–0.2)
BASOPHILS NFR BLD AUTO: 0 %
BILIRUB DIRECT SERPL-MCNC: 0.1 MG/DL (ref 0–0.2)
BILIRUB SERPL-MCNC: 0.4 MG/DL (ref 0.2–1.3)
CREAT SERPL-MCNC: 0.72 MG/DL (ref 0.52–1.04)
EOSINOPHIL # BLD AUTO: 0.2 10E3/UL (ref 0–0.7)
EOSINOPHIL NFR BLD AUTO: 2 %
ERYTHROCYTE [DISTWIDTH] IN BLOOD BY AUTOMATED COUNT: 13.6 % (ref 10–15)
GFR SERPL CREATININE-BSD FRML MDRD: >90 ML/MIN/1.73M2
HCT VFR BLD AUTO: 41.3 % (ref 35–47)
HGB BLD-MCNC: 13.8 G/DL (ref 11.7–15.7)
LYMPHOCYTES # BLD AUTO: 3.2 10E3/UL (ref 0.8–5.3)
LYMPHOCYTES NFR BLD AUTO: 30 %
MCH RBC QN AUTO: 31.2 PG (ref 26.5–33)
MCHC RBC AUTO-ENTMCNC: 33.4 G/DL (ref 31.5–36.5)
MCV RBC AUTO: 93 FL (ref 78–100)
MONOCYTES # BLD AUTO: 0.8 10E3/UL (ref 0–1.3)
MONOCYTES NFR BLD AUTO: 7 %
NEUTROPHILS # BLD AUTO: 6.5 10E3/UL (ref 1.6–8.3)
NEUTROPHILS NFR BLD AUTO: 61 %
PLATELET # BLD AUTO: 305 10E3/UL (ref 150–450)
PROT SERPL-MCNC: 7.4 G/DL (ref 6.8–8.8)
RBC # BLD AUTO: 4.42 10E6/UL (ref 3.8–5.2)
WBC # BLD AUTO: 10.7 10E3/UL (ref 4–11)

## 2022-04-18 PROCEDURE — 87328 CRYPTOSPORIDIUM AG IA: CPT

## 2022-04-18 PROCEDURE — 36415 COLL VENOUS BLD VENIPUNCTURE: CPT

## 2022-04-18 PROCEDURE — 87329 GIARDIA AG IA: CPT | Mod: 59

## 2022-04-18 PROCEDURE — 83993 ASSAY FOR CALPROTECTIN FECAL: CPT

## 2022-04-18 PROCEDURE — 85025 COMPLETE CBC W/AUTO DIFF WBC: CPT

## 2022-04-18 PROCEDURE — 82565 ASSAY OF CREATININE: CPT

## 2022-04-18 PROCEDURE — 87493 C DIFF AMPLIFIED PROBE: CPT | Mod: 59

## 2022-04-18 PROCEDURE — 80076 HEPATIC FUNCTION PANEL: CPT

## 2022-04-18 PROCEDURE — 87506 IADNA-DNA/RNA PROBE TQ 6-11: CPT

## 2022-04-19 ENCOUNTER — LAB (OUTPATIENT)
Dept: LAB | Facility: OTHER | Age: 42
End: 2022-04-19
Payer: COMMERCIAL

## 2022-04-19 DIAGNOSIS — K50.80 CROHN'S DISEASE OF BOTH SMALL AND LARGE INTESTINE WITHOUT COMPLICATION (H): ICD-10-CM

## 2022-04-19 LAB — C DIFF TOX B STL QL: NEGATIVE

## 2022-04-20 LAB
C COLI+JEJUNI+LARI FUSA STL QL NAA+PROBE: NOT DETECTED
C PARVUM AG STL QL IA: NEGATIVE
CALPROTECTIN STL-MCNT: 39.9 MG/KG (ref 0–49.9)
EC STX1 GENE STL QL NAA+PROBE: NOT DETECTED
EC STX2 GENE STL QL NAA+PROBE: NOT DETECTED
G LAMBLIA AG STL QL IA: NEGATIVE
NOROV GI+II ORF1-ORF2 JNC STL QL NAA+PR: NOT DETECTED
RVA NSP5 STL QL NAA+PROBE: NOT DETECTED
SALMONELLA SP RPOD STL QL NAA+PROBE: NOT DETECTED
SHIGELLA SP+EIEC IPAH STL QL NAA+PROBE: NOT DETECTED
V CHOL+PARA RFBL+TRKH+TNAA STL QL NAA+PR: NOT DETECTED
Y ENTERO RECN STL QL NAA+PROBE: NOT DETECTED

## 2022-05-10 ENCOUNTER — ALLIED HEALTH/NURSE VISIT (OUTPATIENT)
Dept: ALLERGY | Facility: OTHER | Age: 42
End: 2022-05-10
Payer: COMMERCIAL

## 2022-05-10 DIAGNOSIS — L50.8 CHRONIC URTICARIA: Primary | ICD-10-CM

## 2022-05-10 DIAGNOSIS — L50.8 CHRONIC URTICARIA: ICD-10-CM

## 2022-05-10 PROCEDURE — 96372 THER/PROPH/DIAG INJ SC/IM: CPT | Performed by: ALLERGY & IMMUNOLOGY

## 2022-05-10 PROCEDURE — 99207 PR NO CHARGE NURSE ONLY: CPT

## 2022-05-10 RX ORDER — EPINEPHRINE 0.3 MG/.3ML
0.3 INJECTION SUBCUTANEOUS
Qty: 0.6 ML | Refills: 1 | Status: SHIPPED | OUTPATIENT
Start: 2022-05-10 | End: 2023-02-28

## 2022-05-10 NOTE — PROGRESS NOTES
Patient presented after waiting 30 minutes with no reaction to Xolair injections. Discharged from clinic.    Huey España RN on 5/10/2022 at 1:48 PM

## 2022-05-10 NOTE — TELEPHONE ENCOUNTER
Prescription approved per [facility] Refill Protocol.    Huey España RN on 5/10/2022 at 12:53 PM

## 2022-05-10 NOTE — NURSING NOTE
Clinic Administered Medication Documentation    Administrations This Visit     omalizumab (XOLAIR) injection 300 mg     Admin Date  05/10/2022 Action  Given Dose  300 mg Route  Subcutaneous Site   Administered By  Madonna Phillips CMA    Ordering Provider: Toñito Desir MD    NDC: 22272-125-43, 69984-904-71    Lot#: [id number], [id number]    : [place] [place] [employer], [employer]    Patient Supplied?: No                  Injectable Medication Documentation    Patient was given Xolair 300mg. Prior to medication administration, verified patients identity using patient s name and date of birth. Please see MAR and medication order for additional information. Patient instructed to report any adverse reaction to staff immediately  and remain in clinic for 30 minutes.      Was entire vial of medication used? Yes  Vial/Syringe: Syringe  Expiration Date:  2/28/2023  Was this medication supplied by the patient? No     Madonna MONTES MA

## 2022-06-10 ENCOUNTER — ANCILLARY PROCEDURE (OUTPATIENT)
Dept: MAMMOGRAPHY | Facility: CLINIC | Age: 42
End: 2022-06-10
Attending: PHYSICIAN ASSISTANT
Payer: COMMERCIAL

## 2022-06-10 DIAGNOSIS — Z12.31 VISIT FOR SCREENING MAMMOGRAM: ICD-10-CM

## 2022-06-10 PROCEDURE — 77067 SCR MAMMO BI INCL CAD: CPT | Performed by: RADIOLOGY

## 2022-06-10 PROCEDURE — 77063 BREAST TOMOSYNTHESIS BI: CPT | Performed by: RADIOLOGY

## 2022-06-25 ENCOUNTER — HEALTH MAINTENANCE LETTER (OUTPATIENT)
Age: 42
End: 2022-06-25

## 2022-07-13 ENCOUNTER — ALLIED HEALTH/NURSE VISIT (OUTPATIENT)
Dept: ALLERGY | Facility: OTHER | Age: 42
End: 2022-07-13
Payer: COMMERCIAL

## 2022-07-13 DIAGNOSIS — L50.8 CHRONIC URTICARIA: Primary | ICD-10-CM

## 2022-07-13 PROCEDURE — 99207 PR DROP WITH A PROCEDURE: CPT

## 2022-07-13 PROCEDURE — 96372 THER/PROPH/DIAG INJ SC/IM: CPT | Performed by: ALLERGY & IMMUNOLOGY

## 2022-07-13 NOTE — PROGRESS NOTES
SUBJECTIVE:   CC: Ashley Mcdaniels is an 41 year old woman who presents for preventive health visit.       Patient has been advised of split billing requirements and indicates understanding: Yes  Healthy Habits:     Getting at least 3 servings of Calcium per day:  Yes    Bi-annual eye exam:  Yes    Dental care twice a year:  Yes    Sleep apnea or symptoms of sleep apnea:  Daytime drowsiness    Diet:  Regular (no restrictions)    Frequency of exercise:  1 day/week    Duration of exercise:  Less than 15 minutes    Taking medications regularly:  No    Barriers to taking medications:  Problems remembering to take them    Medication side effects:  None    PHQ-2 Total Score: 3    Additional concerns today:  Yes          Cramping and spotting    Today's PHQ-2 Score:   PHQ-2 (  [employer]) 2021   Q1: Little interest or pleasure in doing things -   Q2: Feeling down, depressed or hopeless -   PHQ-2 Score -   PHQ-2 Total Score (12-17 Years)- Positive if 3 or more points; Administer PHQ-A if positive -   Q1: Little interest or pleasure in doing things -   Q2: Feeling down, depressed or hopeless -   PHQ-2 Score Incomplete       Abuse: Current or Past (Physical, Sexual or Emotional) - No  Do you feel safe in your environment? Yes    Have you ever done Advance Care Planning? (For example, a Health Directive, POLST, or a discussion with a medical provider or your loved ones about your wishes): No, advance care planning information given to patient to review.  Patient declined advance care planning discussion at this time.    Social History     Tobacco Use     Smoking status: Former Smoker     Packs/day: 0.50     Quit date: 2016     Years since quittin.2     Smokeless tobacco: Never Used   Substance Use Topics     Alcohol use: Yes     Alcohol/week: 0.0 standard drinks     Comment: occasional      If you drink alcohol do you typically have >3 drinks per day or >7 drinks per week? No    Alcohol Use 2018   Prescreen:  >3 drinks/day or >7 drinks/week? No   Prescreen: >3 drinks/day or >7 drinks/week? -     AUDIT - Alcohol Use Disorders Identification Test - Reproduced from the World Health Organization Audit 2001 (Second Edition) 7/18/2022   1.  How often do you have a drink containing alcohol? 2 to 3 times a week   2.  How many drinks containing alcohol do you have on a typical day when you are drinking? 5 or 6   3.  How often do you have five or more drinks on one occasion? Weekly   4.  How often during the last year have you found that you were not able to stop drinking once you had started? Weekly   5.  How often during the last year have you failed to do what was normally expected of you because of drinking? Never   6.  How often during the last year have you needed a first drink in the morning to get yourself going after a heavy drinking session? Less than monthly   7.  How often during the last year have you had a feeling of guilt or remorse after drinking? Daily or almost daily   8.  How often during the last year have you been unable to remember what happened the night before because of your drinking? Weekly   9.  Have you or someone else been injured because of your drinking? No   10. Has a relative, friend, doctor or other health care worker been concerned about your drinking or suggested you cut down? No   TOTAL SCORE 19       Reviewed orders with patient.  Reviewed health maintenance and updated orders accordingly - Yes  Lab work is in process    Breast Cancer Screening:    FHS-7:   Breast CA Risk Assessment (FHS-7) 6/10/2022   Did any of your first-degree relatives have breast or ovarian cancer? No   Did any man in your family have breast cancer? No   Did any woman in your family have breast and ovarian cancer? No   Did any woman in your family have breast cancer before age 50 y? No   Do you have 2 or more relatives with breast and/or ovarian cancer? No   Do you have 2 or more relatives with breast and/or bowel  "cancer? No       Mammogram Screening - Offered annual screening and updated Health Maintenance for mutual plan based on risk factor consideration    Pertinent mammograms are reviewed under the imaging tab.    History of abnormal Pap smear: NO - age 30-65 PAP every 5 years with negative HPV co-testing recommended  PAP / HPV Latest Ref Rng & Units 4/26/2021 7/12/2016   PAP (Historical) - NIL NIL   HPV16 NEG:Negative Negative Negative   HPV18 NEG:Negative Negative Negative   HRHPV NEG:Negative Negative Negative     Reviewed and updated as needed this visit by clinical staff                    Reviewed and updated as needed this visit by Provider                       Review of Systems  CONSTITUTIONAL: NEGATIVE for fever, chills, change in weight  INTEGUMENTARU/SKIN: NEGATIVE for worrisome rashes, moles or lesions  EYES: NEGATIVE for vision changes or irritation  ENT: NEGATIVE for ear, mouth and throat problems  RESP: NEGATIVE for significant cough or SOB  BREAST: NEGATIVE for masses, tenderness or discharge  CV: NEGATIVE for chest pain, palpitations or peripheral edema  GI: NEGATIVE for nausea, abdominal pain, heartburn, or change in bowel habits.  Has one hemorrhoid from time to time that bothers her  : NEGATIVE for unusual urinary or vaginal symptoms. Starting to have spotting and cramping with Mirena.    MUSCULOSKELETAL: NEGATIVE for significant arthralgias or myalgia  NEURO: NEGATIVE for weakness, dizziness or paresthesias  ENDOCRINE: NEGATIVE for temperature intolerance, skin/hair changes.  Feels she is having nights sweats about 1x per night most nights  HEME/ALLERGY/IMMUNE: NEGATIVE for bleeding problems  PSYCHIATRIC: NEGATIVE for changes in mood or affect.  Worried about her alcohol intake.  Doesn't feel she can do inpatient treatment and has tried multiple times to quit.      OBJECTIVE:   /76 (BP Location: Right arm, Patient Position: Sitting, Cuff Size: Adult Large)   Pulse 87   Ht 1.6 m (5' 3\")   " "Wt 104 kg (229 lb 6 oz)   BMI 40.63 kg/m    Physical Exam  GENERAL: healthy, alert and no distress  EYES: Eyes grossly normal to inspection, PERRL and conjunctivae and sclerae normal  HENT: ear canals and TM's normal, nose and mouth without ulcers or lesions  NECK: no adenopathy, no asymmetry, masses, or scars and thyroid normal to palpation  RESP: lungs clear to auscultation - no rales, rhonchi or wheezes  BREAST: normal without masses, tenderness or nipple discharge and no palpable axillary masses or adenopathy  CV: regular rate and rhythm, normal S1 S2, no S3 or S4, no murmur, click or rub, no peripheral edema and peripheral pulses strong  ABDOMEN: soft, nontender, no hepatosplenomegaly, no masses and bowel sounds normal   (female): normal female external genitalia, normal urethral meatus, vaginal mucosa pink, moist, well rugated, and normal cervix/IUD strings noted at os 3 cm long without masses or discharge  MS: no gross musculoskeletal defects noted, no edema  SKIN: no suspicious lesions or rashes  NEURO: Normal strength and tone, mentation intact and speech normal  PSYCH: mentation appears normal, affect normal/bright    Diagnostic Test Results:  Labs reviewed in Epic  No results found for this or any previous visit (from the past 24 hour(s)).    ASSESSMENT/PLAN:   (Z01.419) Encounter for well woman exam with routine gynecological exam  (primary encounter diagnosis)  Comment:   Plan:     (Z30.431) IUD check up  Comment:   Plan:   Consider replacement of her IUD    (Z13.1) Screening for diabetes mellitus  Comment:   Plan: Lipid panel reflex to direct LDL Fasting            (Z13.220) Lipid screening  Comment:   Plan: Hemoglobin A1c                  COUNSELING:  Reviewed preventive health counseling, as reflected in patient instructions    Estimated body mass index is 38.44 kg/m  as calculated from the following:    Height as of 3/2/22: 1.6 m (5' 3\").    Weight as of 3/2/22: 98.4 kg (217 lb).        She " reports that she quit smoking about 6 years ago. She smoked 0.50 packs per day. She has never used smokeless tobacco.    Long discussion about her alcohol use.  Recommended that she see her PCP.   Not interested in therapy here.  Discussed relief measures for her hemorrhoid  Counseling Resources:  ATP IV Guidelines  Pooled Cohorts Equation Calculator  Breast Cancer Risk Calculator  BRCA-Related Cancer Risk Assessment: FHS-7 Tool  FRAX Risk Assessment  ICSI Preventive Guidelines  Dietary Guidelines for Americans, 2010  [employer]'s MyPlate  ASA Prophylaxis  Lung CA Screening    [facility]  Answers for HPI/ROS submitted by the patient on 7/18/2022  If you checked off any problems, how difficult have these problems made it for you to do your work, take care of things at home, or get along with other people?: Somewhat difficult  PHQ9 TOTAL SCORE: 12

## 2022-07-13 NOTE — PROGRESS NOTES
Clinic Administered Medication Documentation    Administrations This Visit     omalizumab (XOLAIR) injection 300 mg     Admin Date  07/13/2022 Action  Given Dose  300 mg Route  Subcutaneous Site   Administered By  Huey España RN    Ordering Provider: Toñito Desir MD    Patient Supplied?: No                  Injectable Medication Documentation    Patient was given Xolair 300 mg. Prior to medication administration, verified patients identity using patient s name and date of birth. Please see MAR and medication order for additional information. Patient instructed to report any adverse reaction to staff immediately  and remain in clinic for 30 minutes.      Was entire vial of medication used? Yes  Vial/Syringe: Syringe  Expiration Date:  03/31/2023  Was this medication supplied by the patient? No     Huey España RN on 7/13/2022 at 3:46 PM

## 2022-07-13 NOTE — PROGRESS NOTES
Patient presented after waiting 30 minutes with no reaction to Xolair injections. Discharged from clinic.      Huey España RN on 7/13/2022 at 4:45 PM

## 2022-07-18 ENCOUNTER — OFFICE VISIT (OUTPATIENT)
Dept: OBGYN | Facility: OTHER | Age: 42
End: 2022-07-18
Payer: COMMERCIAL

## 2022-07-18 VITALS
DIASTOLIC BLOOD PRESSURE: 76 MMHG | HEART RATE: 87 BPM | WEIGHT: 229.38 LBS | SYSTOLIC BLOOD PRESSURE: 112 MMHG | BODY MASS INDEX: 40.64 KG/M2 | HEIGHT: 63 IN

## 2022-07-18 DIAGNOSIS — Z01.419 ENCOUNTER FOR WELL WOMAN EXAM WITH ROUTINE GYNECOLOGICAL EXAM: Primary | ICD-10-CM

## 2022-07-18 DIAGNOSIS — Z30.431 IUD CHECK UP: ICD-10-CM

## 2022-07-18 DIAGNOSIS — Z13.1 SCREENING FOR DIABETES MELLITUS: ICD-10-CM

## 2022-07-18 DIAGNOSIS — Z13.220 LIPID SCREENING: ICD-10-CM

## 2022-07-18 PROCEDURE — 99396 PREV VISIT EST AGE 40-64: CPT | Performed by: ADVANCED PRACTICE MIDWIFE

## 2022-07-18 ASSESSMENT — ENCOUNTER SYMPTOMS
CONSTIPATION: 0
ABDOMINAL PAIN: 1
NERVOUS/ANXIOUS: 1
MYALGIAS: 1
BREAST MASS: 0
JOINT SWELLING: 0
NAUSEA: 0
DYSURIA: 0
DIZZINESS: 0
PALPITATIONS: 1
FEVER: 0
PARESTHESIAS: 0
ARTHRALGIAS: 1
HEARTBURN: 1
EYE PAIN: 0
SORE THROAT: 0
DIARRHEA: 0
FREQUENCY: 1
WEAKNESS: 0
HEADACHES: 0
HEMATURIA: 0
CHILLS: 0
HEMATOCHEZIA: 0
COUGH: 0
SHORTNESS OF BREATH: 1

## 2022-07-18 ASSESSMENT — PATIENT HEALTH QUESTIONNAIRE - PHQ9
10. IF YOU CHECKED OFF ANY PROBLEMS, HOW DIFFICULT HAVE THESE PROBLEMS MADE IT FOR YOU TO DO YOUR WORK, TAKE CARE OF THINGS AT HOME, OR GET ALONG WITH OTHER PEOPLE: SOMEWHAT DIFFICULT
SUM OF ALL RESPONSES TO PHQ QUESTIONS 1-9: 12
SUM OF ALL RESPONSES TO PHQ QUESTIONS 1-9: 12

## 2022-08-16 ENCOUNTER — ALLIED HEALTH/NURSE VISIT (OUTPATIENT)
Dept: ALLERGY | Facility: OTHER | Age: 42
End: 2022-08-16
Payer: COMMERCIAL

## 2022-08-16 DIAGNOSIS — L50.1 CHRONIC IDIOPATHIC URTICARIA: Primary | ICD-10-CM

## 2022-08-16 PROCEDURE — 99207 PR DROP WITH A PROCEDURE: CPT

## 2022-08-16 PROCEDURE — 96372 THER/PROPH/DIAG INJ SC/IM: CPT | Performed by: ALLERGY & IMMUNOLOGY

## 2022-08-16 NOTE — PROGRESS NOTES
Patient presented after waiting 30 minutes with no reaction to Xolair injections. Discharged from clinic.    Huey España RN ............   8/16/2022...1:15 PM

## 2022-08-16 NOTE — PROGRESS NOTES
Clinic Administered Medication Documentation    Administrations This Visit     omalizumab (XOLAIR) injection 300 mg     Admin Date  08/16/2022 Action  Given Dose  300 mg Route  Subcutaneous Site   Administered By  Huey España RN    Ordering Provider: Toñito Desir MD    Patient Supplied?: No                  Injectable Medication Documentation    Patient was given Xolair 300 mg. Prior to medication administration, verified patients identity using patient s name and date of birth. Please see MAR and medication order for additional information. Patient instructed to report any adverse reaction to staff immediately  and remain in clinic for 30 minutes.      Was entire vial of medication used? Yes  Vial/Syringe: Syringe  Expiration Date:  05/31/2023  Was this medication supplied by the patient? No     Huey España RN on 8/16/2022 at 12:47 PM

## 2022-09-14 ENCOUNTER — ALLIED HEALTH/NURSE VISIT (OUTPATIENT)
Dept: ALLERGY | Facility: OTHER | Age: 42
End: 2022-09-14
Payer: COMMERCIAL

## 2022-09-14 ENCOUNTER — TELEPHONE (OUTPATIENT)
Dept: ALLERGY | Facility: OTHER | Age: 42
End: 2022-09-14

## 2022-09-14 DIAGNOSIS — L50.1 CHRONIC IDIOPATHIC URTICARIA: Primary | ICD-10-CM

## 2022-09-14 PROCEDURE — 99207 PR DROP WITH A PROCEDURE: CPT

## 2022-09-14 PROCEDURE — 96372 THER/PROPH/DIAG INJ SC/IM: CPT | Performed by: ALLERGY & IMMUNOLOGY

## 2022-09-14 NOTE — PROGRESS NOTES
Patient presented after waiting 30 minutes with no reaction to Xolair injections. Discharged from clinic.    Huey España RN on 9/14/2022 at 2:49 PM

## 2022-09-14 NOTE — PROGRESS NOTES
Clinic Administered Medication Documentation    Administrations This Visit     omalizumab (XOLAIR) injection 300 mg     Admin Date  09/14/2022 Action  Given Dose  300 mg Route  Subcutaneous Site   Administered By  Huey España RN    Ordering Provider: Toñito Desir MD    Patient Supplied?: No                  Injectable Medication Documentation    Patient was given Xolair 300 mg. Prior to medication administration, verified patients identity using patient s name and date of birth. Please see MAR and medication order for additional information. Patient instructed to report any adverse reaction to staff immediately  and remain in clinic for 30 minutes.      Was entire vial of medication used? Yes  Vial/Syringe: Syringe  Expiration Date:  06/30/2023  Was this medication supplied by the patient? No     Huey España RN on 9/14/2022 at 1:48 PM

## 2022-09-14 NOTE — TELEPHONE ENCOUNTER
Patient's prior authorization for Xolair 300 mg every 28 days is due to  2022, will need an updated PA prior to future appointments at [facility].     Huey España RN on 2022 at 2:06 PM

## 2022-09-14 NOTE — TELEPHONE ENCOUNTER
Medication refill: Xolair 300 mg every 28 days was approved per recent provider notes on 03/02/2022.     ASSESSMENT/PLAN:     Chronic urticaria  Currently well controlled with omalizumab 300 mg every 4 weeks and cetirizine 10 mg by mouth twice daily.  -Continue as is.    Toñito Desir MD, FAAAAI, FACAAI  Allergy, Asthma and Immunology      Huey España RN on 9/14/2022 at 2:14 PM

## 2022-09-19 DIAGNOSIS — K50.80 CROHN'S DISEASE OF BOTH SMALL AND LARGE INTESTINE WITHOUT COMPLICATION (H): Primary | ICD-10-CM

## 2022-09-21 NOTE — TELEPHONE ENCOUNTER
PA has been approved from 09/28/22 - 09/27/23 if insurance continues to stay the same per Yesenia Mcmahon.    Huey España RN on 9/21/2022 at 2:45 PM

## 2022-10-03 ENCOUNTER — OFFICE VISIT (OUTPATIENT)
Dept: FAMILY MEDICINE | Facility: OTHER | Age: 42
End: 2022-10-03
Payer: COMMERCIAL

## 2022-10-03 ENCOUNTER — TELEPHONE (OUTPATIENT)
Dept: OTHER | Facility: CLINIC | Age: 42
End: 2022-10-03

## 2022-10-03 VITALS
TEMPERATURE: 97.4 F | HEART RATE: 85 BPM | OXYGEN SATURATION: 97 % | BODY MASS INDEX: 41.81 KG/M2 | WEIGHT: 236 LBS | SYSTOLIC BLOOD PRESSURE: 104 MMHG | DIASTOLIC BLOOD PRESSURE: 78 MMHG | RESPIRATION RATE: 18 BRPM

## 2022-10-03 DIAGNOSIS — E66.01 MORBID OBESITY (H): ICD-10-CM

## 2022-10-03 DIAGNOSIS — F33.0 MAJOR DEPRESSIVE DISORDER, RECURRENT EPISODE, MILD (H): Primary | ICD-10-CM

## 2022-10-03 DIAGNOSIS — M25.511 CHRONIC RIGHT SHOULDER PAIN: ICD-10-CM

## 2022-10-03 DIAGNOSIS — Z23 NEED FOR INFLUENZA VACCINATION: ICD-10-CM

## 2022-10-03 DIAGNOSIS — F41.1 GAD (GENERALIZED ANXIETY DISORDER): ICD-10-CM

## 2022-10-03 DIAGNOSIS — G89.29 CHRONIC RIGHT SHOULDER PAIN: ICD-10-CM

## 2022-10-03 DIAGNOSIS — D84.9 IMMUNOSUPPRESSED STATUS (H): ICD-10-CM

## 2022-10-03 DIAGNOSIS — K50.80 CROHN'S DISEASE OF BOTH SMALL AND LARGE INTESTINE WITHOUT COMPLICATION (H): ICD-10-CM

## 2022-10-03 DIAGNOSIS — Z23 NEED FOR COVID-19 VACCINE: ICD-10-CM

## 2022-10-03 DIAGNOSIS — Z23 NEED FOR TDAP VACCINATION: ICD-10-CM

## 2022-10-03 DIAGNOSIS — I73.9 PAD (PERIPHERAL ARTERY DISEASE) (H): ICD-10-CM

## 2022-10-03 DIAGNOSIS — I87.2 VENOUS INSUFFICIENCY OF RIGHT LEG: ICD-10-CM

## 2022-10-03 PROCEDURE — 0124A COVID-19,PF,PFIZER BOOSTER BIVALENT: CPT | Performed by: PHYSICIAN ASSISTANT

## 2022-10-03 PROCEDURE — 99215 OFFICE O/P EST HI 40 MIN: CPT | Mod: 25 | Performed by: PHYSICIAN ASSISTANT

## 2022-10-03 PROCEDURE — 90715 TDAP VACCINE 7 YRS/> IM: CPT | Performed by: PHYSICIAN ASSISTANT

## 2022-10-03 PROCEDURE — 90472 IMMUNIZATION ADMIN EACH ADD: CPT | Performed by: PHYSICIAN ASSISTANT

## 2022-10-03 PROCEDURE — 90686 IIV4 VACC NO PRSV 0.5 ML IM: CPT | Performed by: PHYSICIAN ASSISTANT

## 2022-10-03 PROCEDURE — 91312 COVID-19,PF,PFIZER BOOSTER BIVALENT: CPT | Performed by: PHYSICIAN ASSISTANT

## 2022-10-03 PROCEDURE — 90471 IMMUNIZATION ADMIN: CPT | Performed by: PHYSICIAN ASSISTANT

## 2022-10-03 RX ORDER — CYANOCOBALAMIN 1000 UG/ML
1000 INJECTION, SOLUTION INTRAMUSCULAR; SUBCUTANEOUS
COMMUNITY
Start: 2021-08-16

## 2022-10-03 RX ORDER — VALACYCLOVIR HYDROCHLORIDE 500 MG/1
TABLET, FILM COATED ORAL
COMMUNITY
Start: 2022-09-14 | End: 2022-10-03

## 2022-10-03 RX ORDER — BUPROPION HYDROCHLORIDE 150 MG/1
150 TABLET ORAL EVERY MORNING
Qty: 30 TABLET | Refills: 1 | Status: SHIPPED | OUTPATIENT
Start: 2022-10-03 | End: 2022-12-29

## 2022-10-03 RX ORDER — CYCLOBENZAPRINE HCL 5 MG
5-10 TABLET ORAL
Qty: 60 TABLET | Refills: 3 | Status: SHIPPED | OUTPATIENT
Start: 2022-10-03 | End: 2023-12-13

## 2022-10-03 RX ORDER — HYDROXYZINE HYDROCHLORIDE 50 MG/1
50-100 TABLET, FILM COATED ORAL EVERY 6 HOURS PRN
Qty: 60 TABLET | Refills: 2 | Status: SHIPPED | OUTPATIENT
Start: 2022-10-03 | End: 2023-10-13

## 2022-10-03 ASSESSMENT — ANXIETY QUESTIONNAIRES
IF YOU CHECKED OFF ANY PROBLEMS ON THIS QUESTIONNAIRE, HOW DIFFICULT HAVE THESE PROBLEMS MADE IT FOR YOU TO DO YOUR WORK, TAKE CARE OF THINGS AT HOME, OR GET ALONG WITH OTHER PEOPLE: VERY DIFFICULT
2. NOT BEING ABLE TO STOP OR CONTROL WORRYING: MORE THAN HALF THE DAYS
3. WORRYING TOO MUCH ABOUT DIFFERENT THINGS: MORE THAN HALF THE DAYS
6. BECOMING EASILY ANNOYED OR IRRITABLE: MORE THAN HALF THE DAYS
8. IF YOU CHECKED OFF ANY PROBLEMS, HOW DIFFICULT HAVE THESE MADE IT FOR YOU TO DO YOUR WORK, TAKE CARE OF THINGS AT HOME, OR GET ALONG WITH OTHER PEOPLE?: VERY DIFFICULT
1. FEELING NERVOUS, ANXIOUS, OR ON EDGE: NEARLY EVERY DAY
7. FEELING AFRAID AS IF SOMETHING AWFUL MIGHT HAPPEN: MORE THAN HALF THE DAYS
5. BEING SO RESTLESS THAT IT IS HARD TO SIT STILL: NOT AT ALL
7. FEELING AFRAID AS IF SOMETHING AWFUL MIGHT HAPPEN: MORE THAN HALF THE DAYS
GAD7 TOTAL SCORE: 13
4. TROUBLE RELAXING: MORE THAN HALF THE DAYS

## 2022-10-03 ASSESSMENT — PATIENT HEALTH QUESTIONNAIRE - PHQ9
SUM OF ALL RESPONSES TO PHQ QUESTIONS 1-9: 15
10. IF YOU CHECKED OFF ANY PROBLEMS, HOW DIFFICULT HAVE THESE PROBLEMS MADE IT FOR YOU TO DO YOUR WORK, TAKE CARE OF THINGS AT HOME, OR GET ALONG WITH OTHER PEOPLE: VERY DIFFICULT
SUM OF ALL RESPONSES TO PHQ QUESTIONS 1-9: 15

## 2022-10-03 ASSESSMENT — PAIN SCALES - GENERAL: PAINLEVEL: SEVERE PAIN (6)

## 2022-10-03 NOTE — TELEPHONE ENCOUNTER
Pt referred to [facility] by Melva You PA-C for venous insufficiency of right leg    Pt needs to be scheduled for new pt in [facility] consult with vascular medicine (Chelsea White PA-C).  Will route to scheduling to coordinate an appointment at next available.     Appt note: new pt ref by  Melva You PA-C for venous insufficiency of right leg    ERIN Skinner, RN  [facility]

## 2022-10-03 NOTE — PROGRESS NOTES
Assessment & Plan     ICD-10-CM    1. Major depressive disorder, recurrent episode, mild (H)  F33.0 buPROPion (WELLBUTRIN XL) 150 MG 24 hr tablet   2. YUE (generalized anxiety disorder)  F41.1 buPROPion (WELLBUTRIN XL) 150 MG 24 hr tablet     hydrOXYzine (ATARAX) 50 MG tablet   3. Morbid obesity (H)  E66.01    4. Chronic right shoulder pain  M25.511 cyclobenzaprine (FLEXERIL) 5 MG tablet    G89.29 Orthopedic  Referral   5. Need for COVID-19 vaccine  Z23 COVID-19,PF,PFIZER BOOSTER BIVALENT (12+YRS)   6. Need for influenza vaccination  Z23 INFLUENZA VACCINE IM > 6 MONTHS VALENT IIV4 (AFLURIA/FLUZONE)   7. Need for Tdap vaccination  Z23 TDAP VACCINE (Adacel, Boostrix)   8. Venous insufficiency of right leg  I87.2 Vascular Medicine Referral   9. PAD (peripheral artery disease) (H)  I73.9    10. Crohn's disease of both small and large intestine without complication (H)  K50.80    11. Immunosuppressed status (H)  D84.9      1-3.. Mood & weight   - Patient reports worsening of mood prior to loss of step-father, feels needs to go back on something   - Chart shows was on Wellbutrin in the past, also celexa, patient also remembers Prozac and Effexor   - Discussed SSRI/SNRI risks and benefits including side effects  - Patient would like to try Wellbutrin again as is trying to get the weight off   - Will start at 150 mg, reviewed use and side effects  - Did discuss Gene Sight if doesn't get relief   - Will also do Hydroxyzine to help with panic/sleep as this has helped in the past   - Reviewed use and side effects  - Recheck 1 month     4. Shoulder pain   - Ongoing for months on right side, right handed dominant   - Most consistent with rotator cuff injury vs. Impingement   - Recommend flexeril to help with spasm, reviewed use and side effects including sedation, no driving on this medication  - Recommend orthopedics for further follow up     5-7. Discussed need for vaccination, due to her immunosuppression she would  like these, ordered and given     8. & 9.   - Right leg pain and bilateral feet numbness   - Was initially started seeing vascular medicine, more testing was ordered, but then COVID-19 hit so everything got canceled, she also doesn't want to drive where she was referred to     Reviewed ultrasound done in 2019 with patient   - Will refer internally to ealth     10 & 11.   - Reports still on Humira, follows closely with her GI team, reports that last colonoscopy showed her disease is in remission       Review of the result(s) of each unique test - See list         Today - PHQ9 & GAD7        1/2/19 - US venous competency   Diagnosis or treatment significantly limited by social determinants of health - None     40 minutes spent on the date of the encounter doing chart review, history and exam, documentation and further activities as noted above    Return in about 1 month (around 11/3/2022) for Recheck.    Melva You PA-C  [facility] ADELITA Ramirez is a 42 year old, presenting for the following health issues:  Musculoskeletal Problem      Musculoskeletal Problem    History of Present Illness       Back Pain:  She presents for follow up of back pain. Patient's back pain is a chronic problem.  Location of back pain:  Right lower back and left lower back  Description of back pain: burning  Back pain spreads: right knee    Since patient first noticed back pain, pain is: gradually worsening  Does back pain interfere with her job:  No      Mental Health Follow-up:  Patient presents to follow-up on Depression & Anxiety.Patient's depression since last visit has been:  Worse  The patient is having other symptoms associated with depression.  Patient's anxiety since last visit has been:  Medium  The patient is having other symptoms associated with anxiety.  Any significant life events: grief or loss  Patient is feeling anxious or having panic attacks.  Patient has concerns about  alcohol or drug use.    Reason for visit:  Depression, pain in shoulder and legs, numbness in toes  Symptom onset:  3-4 weeks ago  Symptom intensity:  Moderate  Symptom progression:  Staying the same  Had these symptoms before:  Yes  Has tried/received treatment for these symptoms:  Yes  Previous treatment was successful:  Yes  Prior treatment description:  Ssri    She eats 2-3 servings of fruits and vegetables daily.She consumes 1 sweetened beverage(s) daily.She exercises with enough effort to increase her heart rate 10 to 19 minutes per day.  She exercises with enough effort to increase her heart rate 3 or less days per week. She is missing 3 dose(s) of medications per week.    Today's PHQ-9         PHQ-9 Total Score: 15    PHQ-9 Q9 Thoughts of better off dead/self-harm past 2 weeks :   Not at all    How difficult have these problems made it for you to do your work, take care of things at home, or get along with other people: Very difficult  Today's YUE-7 Score: 13     Mood   - Struggling with depression, step dad  last month   - Stress, work, health   - Uses some CBD for sleep and anxiety, helps   - Thinks exercise will help   - Panic sometimes at night      Was given Xanax but didn't take it   - Doesn't sleep well, maybe 2 nights a week   - Hydroxyzine, same bottle for a long time, mostly for hives but has tried for anxiety and does work, doesn't put to sleep   - Possibly tried Prozac and Effexor     Got membership to [facility]  - Right shoulder, can't throw or lift past 90 degrees      Hurts to touch top and posteriorly   - Can't lift anything   - Right hand dominant      - Was supposed to follow up with vascular   - Feet go numb, leg pain (right leg)           PHQ 2021 2022 10/3/2022   PHQ-9 Total Score 9 12 15   Q9: Thoughts of better off dead/self-harm past 2 weeks Not at all Not at all Not at all   F/U: Thoughts of suicide or self-harm - - -   F/U: Safety concerns - - -     YUE-7 SCORE 2020  12/20/2021 10/3/2022   Total Score 7 (mild anxiety) 13 (moderate anxiety) 13 (moderate anxiety)   Total Score 7 13 13       Review of Systems   Constitutional, HEENT, cardiovascular, pulmonary, gi and gu systems are negative, except as otherwise noted.      Objective    /78   Pulse 85   Temp 97.4  F (36.3  C) (Temporal)   Resp 18   Wt 107 kg (236 lb)   SpO2 97%   BMI 41.81 kg/m    Body mass index is 41.81 kg/m .  Physical Exam   GENERAL APPEARANCE: healthy, alert and no distress  EYES: Eyes grossly normal to inspection, PERRLA, conjunctivae and sclerae without injection or discharge, EOM intact   RESP: Lungs clear to auscultation - no rales, rhonchi or wheezes    CV: Regular rates and rhythm, normal S1 S2, no S3 or S4, no murmur, click or rub, no peripheral edema and peripheral pulses strong and symmetric bilaterally   MS:   NECK: no midline tenderness, right paracervical tenderness with spasm, normal ROM   Right shoulder: Normal ROM, tender anteriorly and posteriorly along deltoid and into upper trapezius, spasm extends to right side of neck, no edema, CMS intact, normal sensory exam, normal strength, positive Neer's, positive Hawking's, negative lift off and belly press   Right elbow: Normal ROM, non-tender, no edema, CMS intact, normal sensory exam, normal strength   Left shoulder: Normal ROM, non-tender, no edema, CMS intact, normal sensory exam, normal strength, negative Neer's   No musculoskeletal defects are noted and gait is age appropriate without ataxia   SKIN: No suspicious lesions or rashes, hydration status appears adeuqate with normal skin turgor   PSYCH: Alert and oriented x3; speech- coherent , normal rate and volume; able to articulate logical thoughts, able to abstract reason, no tangential thoughts, no hallucinations or delusions, mentation appears normal, Mood is euthymic. Affect is appropriate for this mood state and bright. Thought content is free of suicidal ideation,  hallucinations, and delusions. Dress is adequate and upkept. Eye contact is good during conversation.       Diagnostics: none

## 2022-10-05 NOTE — TELEPHONE ENCOUNTER
Pt scheduled as follow:  Future Appointments   Date Time Provider Department Center   10/7/2022  1:20 PM Chelsea White PA-C [facility]

## 2022-10-12 ENCOUNTER — LAB (OUTPATIENT)
Dept: LAB | Facility: OTHER | Age: 42
End: 2022-10-12
Payer: COMMERCIAL

## 2022-10-12 ENCOUNTER — ALLIED HEALTH/NURSE VISIT (OUTPATIENT)
Dept: ALLERGY | Facility: OTHER | Age: 42
End: 2022-10-12
Payer: COMMERCIAL

## 2022-10-12 DIAGNOSIS — Z13.220 LIPID SCREENING: ICD-10-CM

## 2022-10-12 DIAGNOSIS — K50.80 CROHN'S DISEASE OF BOTH SMALL AND LARGE INTESTINE WITHOUT COMPLICATION (H): ICD-10-CM

## 2022-10-12 DIAGNOSIS — Z13.1 SCREENING FOR DIABETES MELLITUS: ICD-10-CM

## 2022-10-12 DIAGNOSIS — L50.1 CHRONIC IDIOPATHIC URTICARIA: Primary | ICD-10-CM

## 2022-10-12 LAB
ALBUMIN SERPL-MCNC: 3.5 G/DL (ref 3.4–5)
ALP SERPL-CCNC: 58 U/L (ref 40–150)
ALT SERPL W P-5'-P-CCNC: 23 U/L (ref 0–50)
AST SERPL W P-5'-P-CCNC: 16 U/L (ref 0–45)
BILIRUB DIRECT SERPL-MCNC: <0.1 MG/DL (ref 0–0.2)
BILIRUB SERPL-MCNC: 0.4 MG/DL (ref 0.2–1.3)
CHOLEST SERPL-MCNC: 168 MG/DL
CREAT SERPL-MCNC: 0.71 MG/DL (ref 0.52–1.04)
FASTING STATUS PATIENT QL REPORTED: YES
GFR SERPL CREATININE-BSD FRML MDRD: >90 ML/MIN/1.73M2
HBA1C MFR BLD: 5.2 % (ref 0–5.6)
HDLC SERPL-MCNC: 65 MG/DL
LDLC SERPL CALC-MCNC: 74 MG/DL
NONHDLC SERPL-MCNC: 103 MG/DL
PROT SERPL-MCNC: 7.8 G/DL (ref 6.8–8.8)
TRIGL SERPL-MCNC: 147 MG/DL

## 2022-10-12 PROCEDURE — 82565 ASSAY OF CREATININE: CPT

## 2022-10-12 PROCEDURE — 85025 COMPLETE CBC W/AUTO DIFF WBC: CPT

## 2022-10-12 PROCEDURE — 83036 HEMOGLOBIN GLYCOSYLATED A1C: CPT

## 2022-10-12 PROCEDURE — 99207 PR DROP WITH A PROCEDURE: CPT

## 2022-10-12 PROCEDURE — 96372 THER/PROPH/DIAG INJ SC/IM: CPT | Performed by: ALLERGY & IMMUNOLOGY

## 2022-10-12 PROCEDURE — 36415 COLL VENOUS BLD VENIPUNCTURE: CPT

## 2022-10-12 PROCEDURE — 80061 LIPID PANEL: CPT

## 2022-10-12 PROCEDURE — 80076 HEPATIC FUNCTION PANEL: CPT

## 2022-10-12 PROCEDURE — 86481 TB AG RESPONSE T-CELL SUSP: CPT

## 2022-10-12 NOTE — PROGRESS NOTES
Patient presented after waiting 30 minutes with no reaction to Xolair injections. Discharged from clinic.    Huey España RN on 10/12/2022 at 8:47 AM

## 2022-10-12 NOTE — PROGRESS NOTES
Clinic Administered Medication Documentation    Administrations This Visit     omalizumab (XOLAIR) injection 300 mg     Admin Date  10/12/2022 Action  Given Dose  300 mg Route  Subcutaneous Site  Left Arm Administered By  Christin Dye    Ordering Provider: Toñito Desir MD    NDC: 14643-236-05, 17530-396-73    Lot#: [id number], [id number]    : [employer]    Patient Supplied?: No                  Injectable Medication Documentation    Patient was given xolair. Prior to medication administration, verified patients identity using patient s name and date of birth. Please see MAR and medication order for additional information. Patient instructed to report any adverse reaction to staff immediately  and Pt also instructed to remain in clinic for 30 minutes for observation.      Was entire vial of medication used? Yes  Vial/Syringe: Syringe  Expiration Date:  05/31/2023  Was this medication supplied by the patient? No       Christin LONG CMA

## 2022-10-13 LAB
BASOPHILS # BLD AUTO: 0 10E3/UL (ref 0–0.2)
BASOPHILS NFR BLD AUTO: 1 %
EOSINOPHIL # BLD AUTO: 0.2 10E3/UL (ref 0–0.7)
EOSINOPHIL NFR BLD AUTO: 3 %
ERYTHROCYTE [DISTWIDTH] IN BLOOD BY AUTOMATED COUNT: 13.2 % (ref 10–15)
GAMMA INTERFERON BACKGROUND BLD IA-ACNC: 0.01 IU/ML
HCT VFR BLD AUTO: 43 % (ref 35–47)
HGB BLD-MCNC: 14.2 G/DL (ref 11.7–15.7)
LYMPHOCYTES # BLD AUTO: 2.7 10E3/UL (ref 0.8–5.3)
LYMPHOCYTES NFR BLD AUTO: 33 %
M TB IFN-G BLD-IMP: NEGATIVE
M TB IFN-G CD4+ BCKGRND COR BLD-ACNC: 9.99 IU/ML
MCH RBC QN AUTO: 30.6 PG (ref 26.5–33)
MCHC RBC AUTO-ENTMCNC: 33 G/DL (ref 31.5–36.5)
MCV RBC AUTO: 93 FL (ref 78–100)
MITOGEN IGNF BCKGRD COR BLD-ACNC: 0.02 IU/ML
MITOGEN IGNF BCKGRD COR BLD-ACNC: 0.05 IU/ML
MONOCYTES # BLD AUTO: 0.7 10E3/UL (ref 0–1.3)
MONOCYTES NFR BLD AUTO: 8 %
NEUTROPHILS # BLD AUTO: 4.5 10E3/UL (ref 1.6–8.3)
NEUTROPHILS NFR BLD AUTO: 55 %
PLATELET # BLD AUTO: 311 10E3/UL (ref 150–450)
QUANTIFERON MITOGEN: 10 IU/ML
QUANTIFERON NIL TUBE: 0.01 IU/ML
QUANTIFERON TB1 TUBE: 0.03 IU/ML
QUANTIFERON TB2 TUBE: 0.06
RBC # BLD AUTO: 4.64 10E6/UL (ref 3.8–5.2)
WBC # BLD AUTO: 8.1 10E3/UL (ref 4–11)

## 2022-11-09 ENCOUNTER — ALLIED HEALTH/NURSE VISIT (OUTPATIENT)
Dept: ALLERGY | Facility: OTHER | Age: 42
End: 2022-11-09
Payer: COMMERCIAL

## 2022-11-09 DIAGNOSIS — L50.1 CHRONIC IDIOPATHIC URTICARIA: Primary | ICD-10-CM

## 2022-11-09 PROCEDURE — 99207 PR DROP WITH A PROCEDURE: CPT

## 2022-11-09 PROCEDURE — 96372 THER/PROPH/DIAG INJ SC/IM: CPT | Performed by: ALLERGY & IMMUNOLOGY

## 2022-11-09 NOTE — PROGRESS NOTES
Patient presented after waiting 30 minutes with no reaction to Xolair injections. Discharged from clinic.    Huey España RN on 11/9/2022 at 8:39 AM

## 2022-11-09 NOTE — PROGRESS NOTES
Ashley SKELTON Enedelia presents to clinic today at the request of Toñito Desir MD  (ordering provider) for Xolair (omalizumab) injection(s).       This service provided today was under the care of Toñito Desir MD ; the supervising provider of the day; who was available if needed.    Huey España RN

## 2022-11-09 NOTE — PROGRESS NOTES
Clinic Administered Medication Documentation    Administrations This Visit     omalizumab (XOLAIR) injection 300 mg     Admin Date  11/09/2022 Action  Given Dose  300 mg Route  Subcutaneous Site   Administered By  Huey España RN    Ordering Provider: Toñito Desir MD    Patient Supplied?: No                  Injectable Medication Documentation    Patient was given Xolair 300 mg. Prior to medication administration, verified patients identity using patient s name and date of birth. Please see MAR and medication order for additional information. Patient instructed to report any adverse reaction to staff immediately  and remain in clinic for 30 minutes.      Was entire vial of medication used? Yes  Vial/Syringe: Syringe  Expiration Date:  05/31/2023  Was this medication supplied by the patient? No     Huey España RN on 11/9/2022 at 8:10 AM

## 2022-12-07 ENCOUNTER — E-VISIT (OUTPATIENT)
Dept: FAMILY MEDICINE | Facility: OTHER | Age: 42
End: 2022-12-07

## 2022-12-07 DIAGNOSIS — B37.31 YEAST INFECTION OF THE VAGINA: ICD-10-CM

## 2022-12-07 DIAGNOSIS — J01.90 ACUTE SINUSITIS WITH COEXISTING CONDITION REQUIRING PROPHYLACTIC TREATMENT: Primary | ICD-10-CM

## 2022-12-07 PROCEDURE — 99421 OL DIG E/M SVC 5-10 MIN: CPT | Performed by: PHYSICIAN ASSISTANT

## 2022-12-07 NOTE — TELEPHONE ENCOUNTER
Provider E-Visit time total (minutes): 6 min    Rene You PA-C  [facility]

## 2022-12-08 RX ORDER — AZITHROMYCIN 250 MG/1
TABLET, FILM COATED ORAL
Qty: 6 TABLET | Refills: 0 | Status: SHIPPED | OUTPATIENT
Start: 2022-12-08 | End: 2022-12-13

## 2022-12-08 RX ORDER — FLUCONAZOLE 150 MG/1
150 TABLET ORAL ONCE
Qty: 2 TABLET | Refills: 0 | Status: SHIPPED | OUTPATIENT
Start: 2022-12-08 | End: 2022-12-08

## 2022-12-08 NOTE — PATIENT INSTRUCTIONS
Dear Ashley Mcdaniels    After reviewing your responses, I've been able to diagnose you with Acute sinusitis with coexisting condition requiring prophylactic treatment.      Based on your responses and diagnosis, I have prescribed   Orders Placed This Encounter     azithromycin (ZITHROMAX) 250 MG tablet    to treat your symptoms. I have sent this to your pharmacy.?     It is also important to stay well hydrated, get lots of rest and take over-the-counter decongestants,?tylenol?or ibuprofen if you?are able to?take those medications per your primary care provider to help relieve discomfort.?     It is important that you take?all of?your prescribed medication even if your symptoms are improving after a few doses.? Taking?all of?your medicine helps prevent the symptoms from returning.?     If your symptoms worsen, you develop severe headache, vomiting, high fever (>102), or are not improving in 7 days, please contact your primary care provider for an appointment or visit any of our convenient Walk-in Care or Urgent Care Centers to be seen which can be found on our website?here.?     Thanks again for choosing?us?as your health care partner,?   ?  Melva You PA-C?

## 2022-12-13 ENCOUNTER — ALLIED HEALTH/NURSE VISIT (OUTPATIENT)
Dept: ALLERGY | Facility: OTHER | Age: 42
End: 2022-12-13
Payer: COMMERCIAL

## 2022-12-13 DIAGNOSIS — L50.1 CHRONIC IDIOPATHIC URTICARIA: Primary | ICD-10-CM

## 2022-12-13 PROCEDURE — 96372 THER/PROPH/DIAG INJ SC/IM: CPT | Performed by: ALLERGY & IMMUNOLOGY

## 2022-12-13 NOTE — PROGRESS NOTES
Clinic Administered Medication Documentation    Administrations This Visit     omalizumab (XOLAIR) injection 300 mg     Admin Date  12/13/2022 Action  Given Dose  300 mg Route  Subcutaneous Site   Administered By  Huey España RN    Ordering Provider: Toñito Desir MD    Patient Supplied?: No                  Injectable Medication Documentation    Patient was given Xolair 300 mg. Prior to medication administration, verified patients identity using patient s name and date of birth. Please see MAR and medication order for additional information. Patient instructed to report any adverse reaction to staff immediately  and remain in clinic for 30 minutes.      Was entire vial of medication used? Yes  Vial/Syringe: Syringe  Expiration Date:  09/30/2023  Was this medication supplied by the patient? No     Huey España RN on 12/13/2022 at 11:28 AM

## 2022-12-13 NOTE — PROGRESS NOTES
Patient presented after waiting 30 minutes with no reaction to Xolair injections. Discharged from clinic.    Huey España RN on 12/13/2022 at 11:57 AM

## 2022-12-29 ENCOUNTER — E-VISIT (OUTPATIENT)
Dept: FAMILY MEDICINE | Facility: OTHER | Age: 42
End: 2022-12-29
Payer: COMMERCIAL

## 2022-12-29 DIAGNOSIS — F33.0 MAJOR DEPRESSIVE DISORDER, RECURRENT EPISODE, MILD (H): ICD-10-CM

## 2022-12-29 DIAGNOSIS — F41.1 GAD (GENERALIZED ANXIETY DISORDER): ICD-10-CM

## 2022-12-29 PROCEDURE — 99421 OL DIG E/M SVC 5-10 MIN: CPT | Performed by: PHYSICIAN ASSISTANT

## 2022-12-29 RX ORDER — BUPROPION HYDROCHLORIDE 150 MG/1
150 TABLET ORAL EVERY MORNING
Qty: 30 TABLET | Refills: 1 | Status: SHIPPED | OUTPATIENT
Start: 2022-12-29 | End: 2022-12-29

## 2022-12-29 RX ORDER — BUPROPION HYDROCHLORIDE 300 MG/1
300 TABLET ORAL EVERY MORNING
Qty: 180 TABLET | Refills: 0 | Status: SHIPPED | OUTPATIENT
Start: 2022-12-29 | End: 2023-05-25

## 2022-12-29 ASSESSMENT — ANXIETY QUESTIONNAIRES
GAD7 TOTAL SCORE: 11
2. NOT BEING ABLE TO STOP OR CONTROL WORRYING: MORE THAN HALF THE DAYS
3. WORRYING TOO MUCH ABOUT DIFFERENT THINGS: MORE THAN HALF THE DAYS
GAD7 TOTAL SCORE: 11
1. FEELING NERVOUS, ANXIOUS, OR ON EDGE: NEARLY EVERY DAY
8. IF YOU CHECKED OFF ANY PROBLEMS, HOW DIFFICULT HAVE THESE MADE IT FOR YOU TO DO YOUR WORK, TAKE CARE OF THINGS AT HOME, OR GET ALONG WITH OTHER PEOPLE?: SOMEWHAT DIFFICULT
6. BECOMING EASILY ANNOYED OR IRRITABLE: SEVERAL DAYS
7. FEELING AFRAID AS IF SOMETHING AWFUL MIGHT HAPPEN: SEVERAL DAYS
4. TROUBLE RELAXING: MORE THAN HALF THE DAYS
5. BEING SO RESTLESS THAT IT IS HARD TO SIT STILL: NOT AT ALL
GAD7 TOTAL SCORE: 11
7. FEELING AFRAID AS IF SOMETHING AWFUL MIGHT HAPPEN: SEVERAL DAYS

## 2022-12-29 ASSESSMENT — PATIENT HEALTH QUESTIONNAIRE - PHQ9
SUM OF ALL RESPONSES TO PHQ QUESTIONS 1-9: 8
SUM OF ALL RESPONSES TO PHQ QUESTIONS 1-9: 8
10. IF YOU CHECKED OFF ANY PROBLEMS, HOW DIFFICULT HAVE THESE PROBLEMS MADE IT FOR YOU TO DO YOUR WORK, TAKE CARE OF THINGS AT HOME, OR GET ALONG WITH OTHER PEOPLE: SOMEWHAT DIFFICULT

## 2022-12-29 NOTE — PATIENT INSTRUCTIONS
Depression: Tips to Help Yourself    As your healthcare providers help treat your depression, you can also help yourself. Keep in mind that your illness affects you emotionally, physically, mentally, and socially. So full recovery will take time. Take care of your body and your soul, and be patient with yourself as you get better.  Self-care    Educate yourself. Read about treatment and medicine options. If you have the energy, attend local conferences or support groups. Keep a list of useful websites and helpful books and use them as needed. This illness is not your fault. Don t blame yourself for your depression.    Manage early symptoms. If you notice symptoms returning, experience triggers, or identify other factors that may lead to a depressive episode, get help as soon as possible. Ask trusted friends and family to monitor your behavior and let you know if they see anything of concern.    Work with your provider. Find a provider you can trust. Communicate honestly with that person and share information on your treatment for depression and your reaction to medicines.    Be prepared for a crisis. Know what to do if you experience a crisis. Keep the phone number of a crisis hotline and know the location of your community's urgent care centers and the closest emergency department.    Hold off on big decisions. Depression can cloud your judgment. So wait until you feel better before making major life decisions, such as changing jobs, moving, or getting  or .    Be patient. Recovering from depression is a process. Don t be discouraged if it takes some time to feel better.    Keep it simple. Depression saps your energy and concentration. So you won t be able to do all the things you used to do. Set small goals and do what you can.    Be with others. Don t isolate yourself--you ll only feel worse. Try to be with other people. And take part in fun activities when you can. Go to a movie, ballgame,  Jain service, or social event. Talk openly with people you can trust. And accept help when it s offered.    Take care of your body  People with depression often lose the desire to take care of themselves. That only makes their problems worse. During treatment and afterward, make a point to:    Exercise. It s a great way to take care of your body. And studies have shown that exercise helps fight depression. Aim for 30 minutes of moderate activity a day. Walking in small blocks of time (5-10 minutes) is a good way to start, but anything that gets you moving (gardening, house cleaning) counts.    Don't use drugs and alcohol. These may ease the pain in the short term. But they ll only make your problems worse in the long run.    Get relief from stress. Ask your healthcare provider for relaxation exercises and techniques to help relieve stress. Consider activities like meditation, yoga, or [employer].    Eat right. A balanced and healthy diet helps keep your body healthy.    Get adequate sleep. Aim for 8 hours per night. Too much or too little sleep can cause other physical and emotional problems.  [employer] last reviewed this educational content on 12/1/2019 2000-2021 [employer]. All rights reserved. This information is not intended as a substitute for professional medical care. Always follow your healthcare professional's instructions.          Depression Affects Your Mind and Body  Everyone feels sad or  blue  from time to time for a few days or weeks. Depression is when these feelings don't go away and they interfere with daily life. Depression is a real illness that can develop at any age. It is one of the most common mental health problems in the [place] Depression makes you feel sad, helpless, and hopeless. It gets in the way of your life and relationships. Depression causes chemical changes in the brain that inhibit your ability to think and act. But, with help, you can feel better again.      Depression affects your whole body  Brain chemicals affect your body as well as your mood. So depression may do more than just make you feel low. You may also feel bad physically. Depression can:     Cause trouble with mental tasks such as remembering, concentrating, or making decisions    Make you feel nervous and jumpy    Cause trouble sleeping. Or you may sleep too much.    Change your appetite    Cause headaches, stomachaches, or other aches and pains    Drain your body of energy  Depression and other illness  It is common for people who have chronic health problems to also have depression. It can often be hard to tell which one caused the other. A person might become depressed after finding out they have a health problem. But some studies suggest being depressed may make certain health problems more likely. And some depressed people stop taking care of themselves. This may make them more likely to get sick.   [employer] last reviewed this educational content on 5/1/2020 2000-2021 [employer]. All rights reserved. This information is not intended as a substitute for professional medical care. Always follow your healthcare professional's instructions.

## 2022-12-30 ASSESSMENT — PATIENT HEALTH QUESTIONNAIRE - PHQ9: SUM OF ALL RESPONSES TO PHQ QUESTIONS 1-9: 8

## 2022-12-30 ASSESSMENT — ANXIETY QUESTIONNAIRES: GAD7 TOTAL SCORE: 11

## 2023-01-18 ENCOUNTER — ALLIED HEALTH/NURSE VISIT (OUTPATIENT)
Dept: ALLERGY | Facility: OTHER | Age: 43
End: 2023-01-18
Payer: COMMERCIAL

## 2023-01-18 DIAGNOSIS — L50.1 CHRONIC IDIOPATHIC URTICARIA: Primary | ICD-10-CM

## 2023-01-18 PROCEDURE — 96372 THER/PROPH/DIAG INJ SC/IM: CPT | Performed by: ALLERGY & IMMUNOLOGY

## 2023-01-18 NOTE — PROGRESS NOTES
Clinic Administered Medication Documentation    Administrations This Visit     omalizumab (XOLAIR) injection 300 mg     Admin Date  01/18/2023 Action  Given Dose  300 mg Route  Subcutaneous Site   Administered By  Robinson Arango CMA    Ordering Provider: Toñito Desir MD    NDC: 09840-848-20, 58082-734-03    Lot#: [id number], [id number]    : Oony, Oony    Patient Supplied?: No                  Injectable Medication Documentation    Patient was given Xolair 300mg. Prior to medication administration, verified patients identity using patient s name and date of birth. Please see MAR and medication order for additional information. Patient instructed to Patient was instructed to remain 30 minutes in clinic. .      Was entire vial of medication used? Yes  Vial/Syringe: Single dose vial  Expiration Date:  10/31/202  Was this medication supplied by the patient? No     Robinson Arango MA

## 2023-01-18 NOTE — PROGRESS NOTES
Patient presented after waiting 30 minutes with no reaction to Xolair injections. Discharged from clinic.    Huey España RN ............   1/18/2023...9:10 AM

## 2023-02-12 ENCOUNTER — LAB (OUTPATIENT)
Dept: LAB | Facility: CLINIC | Age: 43
End: 2023-02-12
Payer: COMMERCIAL

## 2023-02-12 DIAGNOSIS — K50.80 CROHN'S DISEASE OF BOTH SMALL AND LARGE INTESTINE WITHOUT COMPLICATION (H): ICD-10-CM

## 2023-02-12 LAB
ALBUMIN SERPL BCG-MCNC: 4 G/DL (ref 3.5–5.2)
ALP SERPL-CCNC: 69 U/L (ref 35–104)
ALT SERPL W P-5'-P-CCNC: 25 U/L (ref 10–35)
AST SERPL W P-5'-P-CCNC: 23 U/L (ref 10–35)
BASOPHILS # BLD AUTO: 0 10E3/UL (ref 0–0.2)
BASOPHILS NFR BLD AUTO: 1 %
BILIRUB DIRECT SERPL-MCNC: <0.2 MG/DL (ref 0–0.3)
BILIRUB SERPL-MCNC: 0.3 MG/DL
CREAT SERPL-MCNC: 0.8 MG/DL (ref 0.51–0.95)
EOSINOPHIL # BLD AUTO: 0.2 10E3/UL (ref 0–0.7)
EOSINOPHIL NFR BLD AUTO: 3 %
ERYTHROCYTE [DISTWIDTH] IN BLOOD BY AUTOMATED COUNT: 12.9 % (ref 10–15)
GFR SERPL CREATININE-BSD FRML MDRD: >90 ML/MIN/1.73M2
HCT VFR BLD AUTO: 41.2 % (ref 35–47)
HGB BLD-MCNC: 13.6 G/DL (ref 11.7–15.7)
IMM GRANULOCYTES # BLD: 0 10E3/UL
IMM GRANULOCYTES NFR BLD: 0 %
LYMPHOCYTES # BLD AUTO: 2.5 10E3/UL (ref 0.8–5.3)
LYMPHOCYTES NFR BLD AUTO: 35 %
MCH RBC QN AUTO: 30.2 PG (ref 26.5–33)
MCHC RBC AUTO-ENTMCNC: 33 G/DL (ref 31.5–36.5)
MCV RBC AUTO: 91 FL (ref 78–100)
MONOCYTES # BLD AUTO: 0.5 10E3/UL (ref 0–1.3)
MONOCYTES NFR BLD AUTO: 7 %
NEUTROPHILS # BLD AUTO: 3.7 10E3/UL (ref 1.6–8.3)
NEUTROPHILS NFR BLD AUTO: 54 %
NRBC # BLD AUTO: 0 10E3/UL
NRBC BLD AUTO-RTO: 0 /100
PLATELET # BLD AUTO: 294 10E3/UL (ref 150–450)
PROT SERPL-MCNC: 7.3 G/DL (ref 6.4–8.3)
RBC # BLD AUTO: 4.51 10E6/UL (ref 3.8–5.2)
WBC # BLD AUTO: 7 10E3/UL (ref 4–11)

## 2023-02-12 PROCEDURE — 36415 COLL VENOUS BLD VENIPUNCTURE: CPT

## 2023-02-12 PROCEDURE — 82565 ASSAY OF CREATININE: CPT

## 2023-02-12 PROCEDURE — 85025 COMPLETE CBC W/AUTO DIFF WBC: CPT

## 2023-02-12 PROCEDURE — 80076 HEPATIC FUNCTION PANEL: CPT

## 2023-02-12 PROCEDURE — 82607 VITAMIN B-12: CPT

## 2023-02-12 PROCEDURE — 82306 VITAMIN D 25 HYDROXY: CPT

## 2023-02-14 DIAGNOSIS — K50.80 REGIONAL ENTERITIS OF SMALL INTESTINE WITH LARGE INTESTINE (H): Primary | ICD-10-CM

## 2023-02-14 DIAGNOSIS — E53.8 BIOTIN-(PROPIONYL-COA-CARBOXYLASE) LIGASE DEFICIENCY: ICD-10-CM

## 2023-02-15 LAB — VIT B12 SERPL-MCNC: 868 PG/ML (ref 232–1245)

## 2023-02-20 LAB
DEPRECATED CALCIDIOL+CALCIFEROL SERPL-MC: <37 UG/L (ref 20–75)
VITAMIN D2 SERPL-MCNC: <5 UG/L
VITAMIN D3 SERPL-MCNC: 32 UG/L

## 2023-02-28 ENCOUNTER — ALLIED HEALTH/NURSE VISIT (OUTPATIENT)
Dept: ALLERGY | Facility: OTHER | Age: 43
End: 2023-02-28
Payer: COMMERCIAL

## 2023-02-28 DIAGNOSIS — L50.1 CHRONIC IDIOPATHIC URTICARIA: Primary | ICD-10-CM

## 2023-02-28 DIAGNOSIS — L50.8 CHRONIC URTICARIA: ICD-10-CM

## 2023-02-28 PROCEDURE — 96372 THER/PROPH/DIAG INJ SC/IM: CPT | Performed by: ALLERGY & IMMUNOLOGY

## 2023-02-28 RX ORDER — EPINEPHRINE 0.3 MG/.3ML
0.3 INJECTION SUBCUTANEOUS
Qty: 0.3 ML | Refills: 0 | Status: SHIPPED | OUTPATIENT
Start: 2023-02-28 | End: 2023-02-28

## 2023-02-28 RX ORDER — EPINEPHRINE 0.3 MG/.3ML
0.3 INJECTION SUBCUTANEOUS
Qty: 0.6 ML | Refills: 0 | Status: SHIPPED | OUTPATIENT
Start: 2023-02-28 | End: 2023-09-06

## 2023-02-28 NOTE — PROGRESS NOTES
Patient presented after waiting 30 minutes with no reaction to Xolair injections. Discharged from clinic.    Huey España RN on 2/28/2023 at 5:14 PM

## 2023-02-28 NOTE — TELEPHONE ENCOUNTER
Patient forgot Epi Pen, refilled for one epi pen to be dispensed.    Huey España RN on 2/28/2023 at 4:33 PM

## 2023-02-28 NOTE — PROGRESS NOTES
Clinic Administered Medication Documentation    Administrations This Visit     omalizumab (XOLAIR) injection 300 mg     Admin Date  02/28/2023 Action  $Given Dose  300 mg Route  Subcutaneous Site   Administered By  Roni Davis LPN    Ordering Provider: Toñito Desir MD    Patient Supplied?: No    Comments: 150 mg SAMMIE  150 mg PITO                  Injectable Medication Documentation    Patient was given Xolair 300 mg. Prior to medication administration, verified patients identity using patient s name and date of birth. Please see MAR and medication order for additional information.     Was entire vial of medication used? Yes  Vial/Syringe: Syringe    Was this medication supplied by the patient? No

## 2023-04-05 ENCOUNTER — ALLIED HEALTH/NURSE VISIT (OUTPATIENT)
Dept: ALLERGY | Facility: OTHER | Age: 43
End: 2023-04-05
Payer: COMMERCIAL

## 2023-04-05 DIAGNOSIS — L50.8 CHRONIC URTICARIA: Primary | ICD-10-CM

## 2023-04-05 PROCEDURE — 96372 THER/PROPH/DIAG INJ SC/IM: CPT | Performed by: ALLERGY & IMMUNOLOGY

## 2023-04-05 NOTE — PROGRESS NOTES
Clinic Administered Medication Documentation      Injectable Medication Documentation    Is there an active order (written within the past 365 days, with administrations remaining, not ) in the chart? Yes.     Patient was given Xolair 300 mg. Prior to medication administration, verified patient's identity using patient s name and date of birth. Please see MAR and medication order for additional information.  Vial/Syringe: Syringe  Was this medication supplied by the patient? No  Is this a medication the patient will need to receive again? Yes. Verified that the patient has refills remaining in their prescription.

## 2023-04-12 DIAGNOSIS — B00.9 HSV (HERPES SIMPLEX VIRUS) INFECTION: ICD-10-CM

## 2023-04-13 RX ORDER — VALACYCLOVIR HYDROCHLORIDE 500 MG/1
500 TABLET, FILM COATED ORAL 2 TIMES DAILY
Qty: 40 TABLET | Refills: 5 | Status: SHIPPED | OUTPATIENT
Start: 2023-04-13 | End: 2023-09-13

## 2023-04-13 NOTE — TELEPHONE ENCOUNTER
Pending Prescriptions:                       Disp   Refills    valACYclovir (VALTREX) 500 MG tablet [Phar*40 tab*5        Sig: TAKE ONE TABLET BY MOUTH TWICE A DAY    Routing refill request to provider for review/approval because:  Medication requested is not the same dose as what is on med list

## 2023-05-03 ENCOUNTER — ALLIED HEALTH/NURSE VISIT (OUTPATIENT)
Dept: ALLERGY | Facility: OTHER | Age: 43
End: 2023-05-03
Payer: COMMERCIAL

## 2023-05-03 DIAGNOSIS — L50.8 CHRONIC URTICARIA: Primary | ICD-10-CM

## 2023-05-03 PROCEDURE — 96372 THER/PROPH/DIAG INJ SC/IM: CPT | Performed by: ALLERGY & IMMUNOLOGY

## 2023-05-03 NOTE — PROGRESS NOTES
Patient presented after waiting 30 minutes with no reaction to Xolair injections. Discharged from clinic.    Huey España RN on 5/3/2023 at 4:05 PM

## 2023-05-23 DIAGNOSIS — F41.1 GAD (GENERALIZED ANXIETY DISORDER): ICD-10-CM

## 2023-05-23 DIAGNOSIS — F33.0 MAJOR DEPRESSIVE DISORDER, RECURRENT EPISODE, MILD (H): ICD-10-CM

## 2023-05-25 RX ORDER — BUPROPION HYDROCHLORIDE 300 MG/1
TABLET ORAL
Qty: 180 TABLET | Refills: 0 | Status: SHIPPED | OUTPATIENT
Start: 2023-05-25 | End: 2023-09-13

## 2023-05-31 ENCOUNTER — ALLIED HEALTH/NURSE VISIT (OUTPATIENT)
Dept: ALLERGY | Facility: OTHER | Age: 43
End: 2023-05-31
Payer: COMMERCIAL

## 2023-05-31 DIAGNOSIS — L50.1 CHRONIC IDIOPATHIC URTICARIA: Primary | ICD-10-CM

## 2023-05-31 PROCEDURE — 96372 THER/PROPH/DIAG INJ SC/IM: CPT | Performed by: ALLERGY & IMMUNOLOGY

## 2023-05-31 NOTE — PROGRESS NOTES
Clinic Administered Medication Documentation      Injectable Medication Documentation    Is there an active order (written within the past 365 days, with administrations remaining, not ) in the chart? Yes.     Patient was given Xolair 300 mg . Prior to medication administration, verified patient's identity using patient s name and date of birth. Please see MAR and medication order for additional information. Patient instructed to report any adverse reaction to staff immediately and remain in clinic for 30 minutes.    Vial/Syringe: Syringe  Was this medication supplied by the patient? No  Is this a medication the patient will need to receive again? Yes. Verified that the patient has refills remaining in their prescription.    Huey España RN on 2023 at 3:18 PM

## 2023-05-31 NOTE — PROGRESS NOTES
SUBJECTIVE:                                                    Ashley Mcdaniels is a 36 year old female who presents to clinic today for the following health issues:    History of Present Illness  Diet::  Carbohydrate counting and Gluten-free/reduced  Frequency of exercise::  2-3 days/week  Duration of exercise::  15-30 minutes  Taking medications regularly::  Yes  Medication side effects::  None  Additional concerns today::  No      Rash     Onset: Tuesday     Description:   Location: face down neck, and will pop up anywhere  Character: blotchy, raised  Itching (Pruritis): YES    Progression of Symptoms:  worsening    Accompanying Signs & Symptoms:  Fever: no   Body aches or joint pain: YES- and headache  Sore throat symptoms: no - itchy  Recent cold symptoms: no    History:   Previous similar rash: YES- 10 years ago    Precipitating factors:   Exposure to similar rash: no   New exposures: None   Recent travel: no     Alleviating factors:  none     Therapies Tried and outcome: Claritin not helping    Onset last Tuesday. She reports that she did stop eating gluten, this did not resolve her problem. She reports she could try eliminating dairy, this have never been a problem for her. She does not have any other symptoms of a food allergy no diarrhea or nausea. She denies fevers and chills.   She reports that it may be due to stress.   She did have a similar episode during her last pregnancy years ago. She reports the hives continued after pregnancy, she had allergy testing, no significant findings.   For the current hive outbreak she has tried Claritin without relief. She reports the hives come and go. Most recently today it occurred along part of  her neck and chin. She reports that it itches.   She has used atarax in the past with relief, does not like the drowsy side effects. Same with Benadryl.       Problem list and histories reviewed & adjusted, as indicated.  Additional history: as documented      Problem list,  "Medication list, Allergies, and Medical/Social/Surgical histories reviewed in [facility] and updated as appropriate.    ROS:  Constitutional, HEENT, cardiovascular, pulmonary, GI, , musculoskeletal, neuro, skin, endocrine and psych systems are negative, except as otherwise noted.    OBJECTIVE:                                                    /70 mmHg  Pulse 78  Temp(Src) 98.8  F (37.1  C) (Temporal)  Resp 12  Ht 5' 2.91\" (1.598 m)  Wt 193 lb 6.4 oz (87.726 kg)  BMI 34.35 kg/m2  SpO2 98%  Body mass index is 34.35 kg/(m^2).  Physical Exam   Constitutional: Vital signs are normal. She appears well-developed.   Skin:   Edematous plaques, pink/red in color along left side to mid neck up towards chin region, consistent with acute urticaria.          Diagnostic Test Results:  none      ASSESSMENT/PLAN:                                                        ICD-10-CM    1. Urticaria, acute L50.8 ALLERGY/ASTHMA ADULT REFERRAL     hydrOXYzine (ATARAX) 25 MG tablet     predniSONE (DELTASONE) 20 MG tablet     1. Recommend further allergy testing. Will give a rx for atarax for PRN itching. Recommend a trial of prednisone to help with inflammation. Also advised started a different antihistamine such as allegra or zyrtec daily, bend rayl cream for itching and cool compress. At this time I am unsure the etiology of her hives. We discussed possibly ordering a referral from dermatology depending on what allergy appointment shows.     See Patient Instructions    SHARIF Neal [facility]          " No

## 2023-05-31 NOTE — PROGRESS NOTES
Patient presented after waiting 30 minutes with no reaction to Xolair injections. Discharged from clinic.    Huey España RN on 5/31/2023 at 4:00 PM

## 2023-06-19 ENCOUNTER — PATIENT OUTREACH (OUTPATIENT)
Dept: CARE COORDINATION | Facility: CLINIC | Age: 43
End: 2023-06-19
Payer: COMMERCIAL

## 2023-06-21 ENCOUNTER — ANCILLARY PROCEDURE (OUTPATIENT)
Dept: MAMMOGRAPHY | Facility: CLINIC | Age: 43
End: 2023-06-21
Attending: PHYSICIAN ASSISTANT
Payer: COMMERCIAL

## 2023-06-21 DIAGNOSIS — Z12.31 VISIT FOR SCREENING MAMMOGRAM: ICD-10-CM

## 2023-06-21 PROCEDURE — 77067 SCR MAMMO BI INCL CAD: CPT | Mod: GC | Performed by: STUDENT IN AN ORGANIZED HEALTH CARE EDUCATION/TRAINING PROGRAM

## 2023-06-21 PROCEDURE — 77063 BREAST TOMOSYNTHESIS BI: CPT | Mod: GC | Performed by: STUDENT IN AN ORGANIZED HEALTH CARE EDUCATION/TRAINING PROGRAM

## 2023-06-28 ENCOUNTER — ALLIED HEALTH/NURSE VISIT (OUTPATIENT)
Dept: ALLERGY | Facility: OTHER | Age: 43
End: 2023-06-28
Payer: COMMERCIAL

## 2023-06-28 DIAGNOSIS — L50.1 CHRONIC IDIOPATHIC URTICARIA: Primary | ICD-10-CM

## 2023-06-28 PROCEDURE — 96372 THER/PROPH/DIAG INJ SC/IM: CPT | Performed by: ALLERGY & IMMUNOLOGY

## 2023-06-28 NOTE — PROGRESS NOTES
Aslhey Mcdaniels presents to clinic today at the request of Toñito Desir MD  (ordering provider) for Xolair (omalizumab) injection(s).       This service provided today was under the care of Toñito Desir MD ; the supervising provider of the day; who was available if needed.      Patient presented after waiting 30 minutes with no reaction to  injections. Discharged from clinic.    Camille Vaca RN

## 2023-07-03 ENCOUNTER — PATIENT OUTREACH (OUTPATIENT)
Dept: CARE COORDINATION | Facility: CLINIC | Age: 43
End: 2023-07-03
Payer: COMMERCIAL

## 2023-07-04 ENCOUNTER — E-VISIT (OUTPATIENT)
Dept: URGENT CARE | Facility: CLINIC | Age: 43
End: 2023-07-04
Payer: COMMERCIAL

## 2023-07-04 DIAGNOSIS — M54.50 LOW BACK PAIN WITHOUT SCIATICA, UNSPECIFIED BACK PAIN LATERALITY, UNSPECIFIED CHRONICITY: ICD-10-CM

## 2023-07-04 DIAGNOSIS — R30.0 DYSURIA: Primary | ICD-10-CM

## 2023-07-04 PROCEDURE — 99207 PR NON-BILLABLE SERV PER CHARTING: CPT | Performed by: INTERNAL MEDICINE

## 2023-07-04 NOTE — PATIENT INSTRUCTIONS
Dear Ashley Mcdaniels,    We are sorry you are not feeling well. Based on the responses you provided, it is recommended that you be seen in-person in urgent care so we can better evaluate your symptoms as the amount of back pain you have could indicate a more severe type of urine infection that needs specific treatment. Please click here to find the nearest urgent care location to you.   You will not be charged for this Visit. Thank you for trusting us with your care.    Cindy Kang MD

## 2023-08-02 ENCOUNTER — ALLIED HEALTH/NURSE VISIT (OUTPATIENT)
Dept: ALLERGY | Facility: OTHER | Age: 43
End: 2023-08-02
Payer: COMMERCIAL

## 2023-08-02 DIAGNOSIS — L50.1 CHRONIC IDIOPATHIC URTICARIA: Primary | ICD-10-CM

## 2023-08-02 PROCEDURE — 96372 THER/PROPH/DIAG INJ SC/IM: CPT | Performed by: ALLERGY & IMMUNOLOGY

## 2023-08-02 NOTE — PROGRESS NOTES
Ashley Mcdaniels presents to clinic today at the request of Toñito Desir MD  (ordering provider) for Xolair (omalizumab) injection(s).       This service provided today was under the care of Toñito Desir MD ; the supervising provider of the day; who was available if needed.      Patient presented after waiting 30 minutes with no reaction to  injections. Discharged from clinic.    Camille Vaca RN

## 2023-08-02 NOTE — PROGRESS NOTES
Clinic Administered Medication Documentation      Injectable Medication Documentation    Is there an active order (written within the past 365 days, with administrations remaining, not ) in the chart? Yes.     Patient was given  Xolair 300mg . Prior to medication administration, verified patient's identity using patient s name and date of birth. Please see MAR and medication order for additional information. .    Vial/Syringe: Syringe  Was this medication supplied by the patient? No  Is this a medication the patient will need to receive again? Yes. Verified that the patient has refills remaining in their prescription.

## 2023-08-08 ENCOUNTER — TELEPHONE (OUTPATIENT)
Dept: OTHER | Facility: CLINIC | Age: 43
End: 2023-08-08
Payer: COMMERCIAL

## 2023-08-08 NOTE — TELEPHONE ENCOUNTER
Please arrange for new patient consult with Vascular Medicine at next available.    Appt Note:    Self-referral for aching legs... possible venous insufficiency?

## 2023-08-08 NOTE — TELEPHONE ENCOUNTER
[facility]    Who is the name of the provider?:  None    What is the location you see this provider at/preferred location?: [place]  Person calling / Facility: Ashley Mcdaniels  Phone number:  [phone]  Nurse call back needed:  N/A     Reason for call:  Previously referred (3-4yrs ago)    Patient called wanting to schedule    Pain in both legs initially just the right leg; Both aching but not swelling. Patient mentioned venous insufficiency     Pharmacy location:  n/a  Outside Imaging: n/a   Can we leave a detailed message on this number?  yes

## 2023-08-14 ENCOUNTER — APPOINTMENT (OUTPATIENT)
Dept: GENERAL RADIOLOGY | Facility: CLINIC | Age: 43
End: 2023-08-14
Attending: EMERGENCY MEDICINE
Payer: COMMERCIAL

## 2023-08-14 ENCOUNTER — HOSPITAL ENCOUNTER (EMERGENCY)
Facility: CLINIC | Age: 43
Discharge: HOME | End: 2023-08-14
Attending: EMERGENCY MEDICINE | Admitting: EMERGENCY MEDICINE
Payer: COMMERCIAL

## 2023-08-14 VITALS
RESPIRATION RATE: 20 BRPM | OXYGEN SATURATION: 100 % | SYSTOLIC BLOOD PRESSURE: 116 MMHG | DIASTOLIC BLOOD PRESSURE: 68 MMHG | HEART RATE: 79 BPM

## 2023-08-14 DIAGNOSIS — S81.812A LACERATION OF LEFT LOWER EXTREMITY, INITIAL ENCOUNTER: ICD-10-CM

## 2023-08-14 DIAGNOSIS — M79.672 LEFT FOOT PAIN: ICD-10-CM

## 2023-08-14 PROCEDURE — 99284 EMERGENCY DEPT VISIT MOD MDM: CPT

## 2023-08-14 PROCEDURE — 73630 X-RAY EXAM OF FOOT: CPT | Mod: LT

## 2023-08-14 PROCEDURE — 250N000013 HC RX MED GY IP 250 OP 250 PS 637: Performed by: EMERGENCY MEDICINE

## 2023-08-14 PROCEDURE — 99284 EMERGENCY DEPT VISIT MOD MDM: CPT | Performed by: EMERGENCY MEDICINE

## 2023-08-14 PROCEDURE — 73610 X-RAY EXAM OF ANKLE: CPT | Mod: LT

## 2023-08-14 RX ORDER — OXYCODONE HYDROCHLORIDE 5 MG/1
5 TABLET ORAL ONCE
Status: COMPLETED | OUTPATIENT
Start: 2023-08-14 | End: 2023-08-14

## 2023-08-14 RX ORDER — OXYCODONE HYDROCHLORIDE 5 MG/1
5 TABLET ORAL EVERY 6 HOURS PRN
Qty: 10 TABLET | Refills: 0 | Status: SHIPPED | OUTPATIENT
Start: 2023-08-14 | End: 2023-09-13

## 2023-08-14 RX ORDER — IBUPROFEN 200 MG
400 TABLET ORAL ONCE
Status: COMPLETED | OUTPATIENT
Start: 2023-08-14 | End: 2023-08-14

## 2023-08-14 RX ORDER — ACETAMINOPHEN 500 MG
1000 TABLET ORAL ONCE
Status: COMPLETED | OUTPATIENT
Start: 2023-08-14 | End: 2023-08-14

## 2023-08-14 RX ORDER — BACITRACIN ZINC 500 [USP'U]/G
OINTMENT TOPICAL 2 TIMES DAILY
Qty: 425 G | Refills: 0 | Status: SHIPPED | OUTPATIENT
Start: 2023-08-14 | End: 2023-09-13

## 2023-08-14 RX ADMIN — OXYCODONE HYDROCHLORIDE 5 MG: 5 TABLET ORAL at 06:40

## 2023-08-14 RX ADMIN — IBUPROFEN 400 MG: 200 TABLET, FILM COATED ORAL at 06:40

## 2023-08-14 RX ADMIN — ACETAMINOPHEN 1000 MG: 500 TABLET ORAL at 06:40

## 2023-08-14 ASSESSMENT — ACTIVITIES OF DAILY LIVING (ADL): ADLS_ACUITY_SCORE: 35

## 2023-08-14 NOTE — ED PROVIDER NOTES
History     chief complaint  HPI  Patient is a 43-year-old female with a history immunosuppression, venous insufficiency, peripheral artery disease who is presenting with left foot pain.  Patient states that last night around 10 PM she was stepped on by her horse.  She initially was able to bear weight but this morning she cannot bear weight.  She did irrigate her foot with a hose as she did sustain a laceration.  No other injuries.    Review of Systems:  All organ systems below were reviewed and are negative unless indicated in the HPI.    Constitutional  Musculoskeletal  Skin  Neuro    Allergies:  Allergies   Allergen Reactions    Azathioprine Other (See Comments)     pancreatitis    No Clinical Screening - See Comments Hives     From stress       Problem List:    Patient Active Problem List    Diagnosis Date Noted    Immunosuppressed status (H) 10/03/2022     Priority: Medium    Venous insufficiency of right leg 10/03/2022     Priority: Medium    PAD (peripheral artery disease) (H) 12/20/2021     Priority: Medium    Dry hair 12/20/2021     Priority: Medium    Dry skin 12/20/2021     Priority: Medium    Segmental dysfunction of sacral region 01/27/2020     Priority: Medium    Segmental dysfunction of lumbar region 01/27/2020     Priority: Medium    Segmental dysfunction of thoracic region 01/27/2020     Priority: Medium    Bilateral low back pain with left-sided sciatica 01/27/2020     Priority: Medium    Morbid obesity (H) 08/24/2018     Priority: Medium    Gastroesophageal reflux disease without esophagitis 09/19/2017     Priority: Medium    ADHD (attention deficit hyperactivity disorder), inattentive type 08/18/2017     Priority: Medium    Chronic urticaria 01/25/2017     Priority: Medium     The patient developed hives at the end of 2016.  Failed high-dose antihistamines. Started omalizumab in 2018.  It has been beneficial.  She tried stopping it in 2019 and developed hives 6 weeks after stopping the  medicine.      IUD (intrauterine device) in place 06/02/2016     Priority: Medium     Mirena placed 6/2/2016        Crohn's disease of both small and large intestine without complication (H) 05/10/2016     Priority: Medium    YUE (generalized anxiety disorder) 04/26/2016     Priority: Medium    Major depressive disorder, recurrent episode, mild (H) 04/26/2016     Priority: Medium        Past Medical History:    Past Medical History:   Diagnosis Date    Acquired hypothyroidism     ADHD (attention deficit hyperactivity disorder)     Crohn's colitis (H)     YUE (generalized anxiety disorder)     GERD (gastroesophageal reflux disease)     Low back pain     Major depressive disorder, recurrent episode, mild (H)        Past Surgical History:    Past Surgical History:   Procedure Laterality Date    CHOLECYSTECTOMY  Feb 2007    DILATION AND CURETTAGE  May 2003    Non-OB    HC TOOTH EXTRACTION W/FORCEP  Nov 2006    LAP ADJUSTABLE GASTRIC BAND  May 2007       Family History:    Family History   Problem Relation Age of Onset    Arthritis Mother     Deep Vein Thrombosis Mother     Heart Disease Father         CAD, CHF    Alcoholism Father     Asthma Father     Hypertension Father     Hyperlipidemia Father     Pancreatic Cancer Father     Liver Cancer Father     Diabetes Maternal Grandmother     Substance Abuse Maternal Grandmother     Hyperlipidemia Maternal Grandmother     Hypertension Maternal Grandmother     Substance Abuse Maternal Grandfather     Asthma Paternal Grandmother     Heart Disease Paternal Grandmother     Diabetes Paternal Grandmother     Hypertension Paternal Grandmother     Substance Abuse Paternal Grandfather     Mental Illness Brother     Substance Abuse Brother        Medications:    amoxicillin-clavulanate (AUGMENTIN) 875-125 MG tablet  oxyCODONE (ROXICODONE) 5 MG tablet  buPROPion (WELLBUTRIN XL) 300 MG 24 hr tablet  cetirizine (ZYRTEC) 10 MG tablet  cyanocobalamin (CYANOCOBALAMIN) 1000 MCG/ML  injection  cyclobenzaprine (FLEXERIL) 5 MG tablet  EPINEPHrine (ANY BX GENERIC EQUIV) 0.3 MG/0.3ML injection 2-pack  HUMIRA *CF* PEN 40 MG/0.4ML pen kit  hydrOXYzine (ATARAX) 50 MG tablet  levonorgestrel (MIRENA) 20 MCG/24HR IUD  valACYclovir (VALTREX) 500 MG tablet          Physical Exam   BP: 116/68  Pulse: 79  Resp: 20  SpO2: 100 %      Gen: Vital signs reviewed  Eyes: Sclera white, pupils round  ENT: External ears and nares normal  Card: Appears well-perfused  Resp: No respiratory distress.   Extremities/skin: Patient's dorsal aspect of her left foot is ecchymotic, swollen, and tender.  She has a 2 cm laceration to the distal tibial region cephalad to the ankle joint without muscle, tendon, or bone visible.  Able to dorsiflex and plantarflex though dorsiflexion limited due to pain.  Sensation intact distally.  Neuro: Alert.      ED Course        Procedures             Results for orders placed or performed during the hospital encounter of 08/14/23 (from the past 24 hour(s))   Foot XR, G/E 3 views, left    Narrative    EXAM: XR FOOT LEFT G/E 3 VIEWS  LOCATION: [facility]  DATE: 8/14/2023    INDICATION: stepped on by horse  COMPARISON: 07/31/2018      Impression    IMPRESSION: Normal joint spaces and alignment. No fracture.   XR Ankle Left G/E 3 Views    Narrative    EXAM: XR ANKLE LEFT G/E 3 VIEWS  LOCATION: [facility]  DATE: 8/14/2023    INDICATION: stepped on by horse  COMPARISON: None.      Impression    IMPRESSION: Normal joint spaces and alignment. No fracture.       Medications   oxyCODONE (ROXICODONE) tablet 5 mg (5 mg Oral $Given 8/14/23 0640)   acetaminophen (TYLENOL) tablet 1,000 mg (1,000 mg Oral $Given 8/14/23 0640)   ibuprofen (ADVIL/MOTRIN) tablet 400 mg (400 mg Oral $Given 8/14/23 0640)         Consultations:  None    Social Determinants of Health:  Presents with her     Assessments & Plan (with Medical Decision Making)       I  have reviewed the nursing notes.    I have reviewed the findings, diagnosis, plan and need for follow up with the patient.      Medical Decision Making  On arrival, patient has normal vitals.  She has normal DP and PT pulses.  I favor contusion versus foot sprain given that she was initially able to bear weight and now this morning she cannot.  X-rays obtained.  Because her horse created the laceration and had a dirty foot and we are over 8 hours from the time of injury, as well as her peripheral artery disease and immunosuppression history, I do not feel that it is a good idea to repair her laceration primarily, rather allow it to heal by secondary intention.  Patient okay with this and acknowledges the increase scar formation.  I will start her on prophylactic antibiotics for 3 days.  Her tetanus is up-to-date.  No fractures on x-ray.  Discussed weightbearing as tolerated.  She already has crutches  Discussed wound care.  Discharged home in stable condition.    Final diagnoses:   Left foot pain   Laceration of left lower extremity, initial encounter         Armando Marquez M.D.   [facility] Emergency Department     Armando Marquez MD  08/14/23 3530

## 2023-08-14 NOTE — ED TRIAGE NOTES
Triage Assessment       Row Name 08/14/23 0626       Triage Assessment (Adult)    Airway WDL WDL       Respiratory WDL    Respiratory WDL WDL                  Approx 2200 last night horse stepped on left foot.

## 2023-08-14 NOTE — DISCHARGE INSTRUCTIONS
Use your boot to weight-bear as tolerated.  There was no broken bone seen today thankfully.    Follow-up with your doctor in 1 to 2 weeks if you are still having significant pain.  Use the antibiotics for the first 3 days.    Use bacitracin to cover your wound and washed it out twice per day until it heals.    Return here if you develop redness streaking up your leg or fevers.

## 2023-08-29 ENCOUNTER — TELEPHONE (OUTPATIENT)
Dept: ALLERGY | Facility: OTHER | Age: 43
End: 2023-08-29
Payer: COMMERCIAL

## 2023-08-29 ENCOUNTER — MYC MEDICAL ADVICE (OUTPATIENT)
Dept: ALLERGY | Facility: OTHER | Age: 43
End: 2023-08-29
Payer: COMMERCIAL

## 2023-08-29 NOTE — TELEPHONE ENCOUNTER
Patient's current prescription for Xolair ends today 8/29/23. Patient has appointment for tomorrow 8/30/23. Prior auth is still good until 9/27/23.    Routing to provider to renew prescription so medication may be administered at upcoming appointment.    Camille LONG, Specialty RN 8/29/2023 7:12 AM

## 2023-08-29 NOTE — TELEPHONE ENCOUNTER
Noted patient had not read [employer] at this time. RN called patient and explained that she will need a visit with Dr. Desir prior to him reordering medication so unable to give injection tomorrow. Appt moved to same day as follow up    Camille LONG, Specialty RN 8/29/2023 4:15 PM

## 2023-08-30 ENCOUNTER — OFFICE VISIT (OUTPATIENT)
Dept: OTHER | Facility: CLINIC | Age: 43
End: 2023-08-30
Attending: INTERNAL MEDICINE
Payer: COMMERCIAL

## 2023-08-30 VITALS
HEIGHT: 63 IN | SYSTOLIC BLOOD PRESSURE: 120 MMHG | HEART RATE: 72 BPM | DIASTOLIC BLOOD PRESSURE: 84 MMHG | WEIGHT: 229 LBS | OXYGEN SATURATION: 98 % | BODY MASS INDEX: 40.57 KG/M2

## 2023-08-30 DIAGNOSIS — I87.2 VENOUS (PERIPHERAL) INSUFFICIENCY: ICD-10-CM

## 2023-08-30 DIAGNOSIS — R60.9 LIPEDEMA: Primary | ICD-10-CM

## 2023-08-30 DIAGNOSIS — E66.01 MORBID OBESITY (H): ICD-10-CM

## 2023-08-30 DIAGNOSIS — K50.80 CROHN'S DISEASE OF BOTH SMALL AND LARGE INTESTINE WITHOUT COMPLICATION (H): ICD-10-CM

## 2023-08-30 DIAGNOSIS — D84.9 IMMUNOSUPPRESSED STATUS (H): ICD-10-CM

## 2023-08-30 DIAGNOSIS — Z97.5 IUD (INTRAUTERINE DEVICE) IN PLACE: ICD-10-CM

## 2023-08-30 PROCEDURE — G0463 HOSPITAL OUTPT CLINIC VISIT: HCPCS

## 2023-08-30 PROCEDURE — 99205 OFFICE O/P NEW HI 60 MIN: CPT | Performed by: INTERNAL MEDICINE

## 2023-08-30 NOTE — PROGRESS NOTES
"Patient is here to discuss consult    /80 (BP Location: Right arm, Patient Position: Chair, Cuff Size: Adult Large)   Pulse 71   Ht 5' 3\" (1.6 m)   Wt 229 lb (103.9 kg)   SpO2 98%   BMI 40.57 kg/m      Questions patient would like addressed today are: N/A.    Refills are needed: No    Has homecare services and agency name:  [facility]    "

## 2023-08-30 NOTE — PROGRESS NOTES
[facility] INITIAL VASCULAR MEDICINE CONSULT    ( New Patient visit)     PRIMARY HEALTH CARE PROVIDER:  Melva You PA-C      REFERRING HEALTH CARE PROVIDER; Melva You PA-C      REASON FOR CONSULT: Evaluation and management of known history of venous insufficiency in a very pleasant young female morbidly obese BMI greater than 40 more than 15 years ago she had a gastric banding initially successful then followed by gained weight with ongoing leg heaviness fatigue, tiredness etc..      HPI: Ashley Mcdaniels is a 43 year old very pleasant female with history of Crohn's disease on immunosuppressive medication, generalized anxiety, morbid obesity more than 15 years ago she underwent gastric banding successfully lost weight then followed by regained weight with history of a snoring but no witnessed apneic episodes few years ago underwent venous competency studies for varicose veins she does have right GSV proximal thigh area incompetence but no history of DVT.  She is a former smoker.  She has a easy bruising and hyperextensibility of the joints tender to touch buttocks and thigh area and lumpy bumpy thigh and buttock area.  She gives a history of attaining puberty around age 12 then followed by lower portion of the body was disproportionately larger than upper part and eventually got worse after the pregnancy and childbirth and now upper arms are larger than the forearms.  She is unable to wear compression stockings    Family history significant for mother and maternal aunts similar body habitus    She has a Mirena IUD in place    She is new to this clinic reviewed available records and imaging studies in the epic and updated chart        PAST MEDICAL HISTORY  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     Crohn's colitis (H)     YUE (generalized anxiety  disorder)     GERD (gastroesophageal reflux disease)     Low back pain     Major depressive disorder, recurrent episode, mild (H)        CURRENT MEDICATIONS  bacitracin 500 UNIT/GM external ointment, Apply topically 2 times daily  buPROPion (WELLBUTRIN XL) 300 MG 24 hr tablet, TAKE ONE TABLET BY MOUTH EVERY MORNING  cetirizine (ZYRTEC) 10 MG tablet, Take 1 tablet (10 mg) by mouth daily  cyanocobalamin (CYANOCOBALAMIN) 1000 MCG/ML injection, Inject 1,000 mcg into the muscle  cyclobenzaprine (FLEXERIL) 5 MG tablet, Take 1-2 tablets (5-10 mg) by mouth nightly as needed for muscle spasms  EPINEPHrine (ANY BX GENERIC EQUIV) 0.3 MG/0.3ML injection 2-pack, Inject 0.3 mLs (0.3 mg) into the muscle once as needed for anaphylaxis  HUMIRA *CF* PEN 40 MG/0.4ML pen kit,   hydrOXYzine (ATARAX) 50 MG tablet, Take 1-2 tablets ( mg) by mouth every 6 hours as needed for anxiety or other (sleep)  levonorgestrel (MIRENA) 20 MCG/24HR IUD, 1 each (20 mcg) by Intrauterine route continuous  oxyCODONE (ROXICODONE) 5 MG tablet, Take 1 tablet (5 mg) by mouth every 6 hours as needed for pain  valACYclovir (VALTREX) 500 MG tablet, Take 1 tablet (500 mg) by mouth 2 times daily    [] omalizumab (XOLAIR) injection 300 mg        PAST SURGICAL HISTORY:  Past Surgical History:   Procedure Laterality Date    CHOLECYSTECTOMY  2007    DILATION AND CURETTAGE  May 2003    Non-OB    HC TOOTH EXTRACTION W/FORCEP  2006    LAP ADJUSTABLE GASTRIC BAND  May 2007       ALLERGIES     Allergies   Allergen Reactions    Azathioprine Other (See Comments)     pancreatitis    No Clinical Screening - See Comments Hives     From stress       FAMILY HISTORY  Family History   Problem Relation Age of Onset    Arthritis Mother     Deep Vein Thrombosis Mother     Heart Disease Father         CAD, CHF    Alcoholism Father     Asthma Father     Hypertension Father     Hyperlipidemia Father     Pancreatic Cancer Father     Liver Cancer Father     Diabetes  Maternal Grandmother     Substance Abuse Maternal Grandmother     Hyperlipidemia Maternal Grandmother     Hypertension Maternal Grandmother     Substance Abuse Maternal Grandfather     Asthma Paternal Grandmother     Heart Disease Paternal Grandmother     Diabetes Paternal Grandmother     Hypertension Paternal Grandmother     Substance Abuse Paternal Grandfather     Mental Illness Brother     Substance Abuse Brother        VASCULAR FAMILY HISTORY  1st order relative with atherosclerotic PAD: No  1st order relative with AAA: No  Family history of Familial Hyperlipidemia No  Family History of Hypercoagulable state:No    VASCULAR RISK FACTORS  1. Diabetes:No   2. Smoking: quit smoking some time ago.  3. HTN: normotensive  4.Hyperlipidemia: No      SOCIAL HISTORY  Social History     Socioeconomic History    Marital status:      Spouse name: Not on file    Number of children: Not on file    Years of education: Not on file    Highest education level: Not on file   Occupational History     Comment: consulting   Tobacco Use    Smoking status: Every Day     Packs/day: 0.50     Types: Cigarettes, Other     Last attempt to quit: 2016     Years since quittin.3    Smokeless tobacco: Never    Tobacco comments:     Vapes daily   Vaping Use    Vaping Use: Never used   Substance and Sexual Activity    Alcohol use: Yes     Comment: 2x a week    Drug use: No    Sexual activity: Yes     Partners: Male     Birth control/protection: I.U.D.   Other Topics Concern    Parent/sibling w/ CABG, MI or angioplasty before 65F 55M? No   Social History Narrative    ENVIRONMENTAL HISTORY: [place] The home is heated with a forced air. They do have central air conditioning. The patient's bedroom is furnished with carpeting in bedroom.  Pets inside the house include 6 cat(s), 1 dog(s), and 2 horses. There is no history of cockroach or mice infestation. There is/are 0 smokers in the house.   "The house does not have a damp basement.      Social Determinants of Health     Financial Resource Strain: Not on file   Food Insecurity: Not on file   Transportation Needs: Not on file   Physical Activity: Not on file   Stress: Not on file   Social Connections: Not on file   Intimate Partner Violence: Not on file   Housing Stability: Not on file       ROS:   General: No change in weight, sleep or appetite.  Normal energy.  No fever or chills  Eyes: Negative for vision changes or eye problems  ENT: No problems with ears, nose or throat.  No difficulty swallowing.  Resp: No coughing, wheezing or shortness of breath  CV: No chest pains or palpitations  GI: No nausea, vomiting,  heartburn, abdominal pain, diarrhea, constipation or change in bowel habits  : No urinary frequency or dysuria, bladder or kidney problems  Musculoskeletal: No significant muscle or joint pains  Neurologic: No headaches, numbness, tingling, weakness, problems with balance or coordination  Psychiatric: No problems with anxiety, depression or mental health  Heme/immune/allergy: No history of bleeding or clotting problems or anemia.  No allergies or immune system problems  Endocrine: No history of thyroid disease, diabetes or other endocrine disorders  Skin: No rashes,worrisome lesions or skin problems  Vascular: Bilateral leg heaviness, fullness fatigue and tiredness for many years  Lower portion of the body is disproportionately larger than upper portion of the body mainly buttocks and thighs since the puberty then followed by childbirth  Easy bruising  Hyperextensibility of the joints    EXAM:  /84 (BP Location: Left arm, Patient Position: Chair, Cuff Size: Adult Large)   Pulse 72   Ht 5' 3\" (1.6 m)   Wt 229 lb (103.9 kg)   SpO2 98%   BMI 40.57 kg/m    In general, the patient is a pleasant female in no apparent distress.    HEENT: NC/AT.  PERRLA.  EOMI.  Sclerae white, not injected.  Nares clear.  Pharynx without erythema or " exudate.  Dentition intact.    Neck: No adenopathy.  No thyromegaly. Carotids +2/2 bilaterally without bruits.  No jugular venous distension.   Heart: RRR. Normal S1, S2 splits physiologically. No murmur, rub, click, or gallop. The PMI is in the 5th ICS in the midclavicular line. There is no heave.    Lungs: CTA.  No ronchi, wheezes, rales.  No dullness to percussion.   Abdomen: Soft, nontender, nondistended. No organomegaly. No AAA.  No bruits.   Extremities: Vascular:  She has a good palpable symmetrical peripheral pulses in both lower extremities and upper extremities  Bilateral lower extremity varicose veins CEAP 2 CVI  No foot ulcers or leg ulcers  Mattress phenomenon of buttocks and thighs  Tender to touch thighs and buttocks area  Hyperextensibility of the knees and elbows  Stemmer sign negative  Fat overhanging knee area  Lower legs are not involved      Labs:  LIPID RESULTS:  Lab Results   Component Value Date    CHOL 168 10/12/2022    CHOL 187 07/06/2020    HDL 65 10/12/2022    HDL 60 07/06/2020    LDL 74 10/12/2022    LDL 81 07/06/2020    TRIG 147 10/12/2022    TRIG 228 (H) 07/06/2020       LIVER ENZYME RESULTS:  Lab Results   Component Value Date    AST 23 02/12/2023    AST 17 12/04/2020    ALT 25 02/12/2023    ALT 22 12/04/2020       CBC RESULTS:  Lab Results   Component Value Date    WBC 7.0 02/12/2023    WBC 12.2 (H) 12/04/2020    RBC 4.51 02/12/2023    RBC 4.50 12/04/2020    HGB 13.6 02/12/2023    HGB 13.7 12/04/2020    HCT 41.2 02/12/2023    HCT 41.1 12/04/2020    MCV 91 02/12/2023    MCV 91 12/04/2020    MCH 30.2 02/12/2023    MCH 30.4 12/04/2020    MCHC 33.0 02/12/2023    MCHC 33.3 12/04/2020    RDW 12.9 02/12/2023    RDW 13.5 12/04/2020     02/12/2023     12/04/2020       BMP RESULTS:  Lab Results   Component Value Date     01/10/2019    POTASSIUM 4.1 01/10/2019    CHLORIDE 103 01/10/2019    CO2 30 01/10/2019    ANIONGAP 4 01/10/2019    GLC 87 01/10/2019    BUN 10 01/10/2019     CR 0.80 02/12/2023    CR 0.78 12/04/2020    GFRESTIMATED >90 02/12/2023    GFRESTIMATED >90 12/04/2020    GFRESTBLACK >90 12/04/2020    JOVANNI 8.7 01/10/2019        A1C RESULTS:  Lab Results   Component Value Date    A1C 5.2 10/12/2022       THYROID RESULTS:  Lab Results   Component Value Date    TSH 2.03 11/29/2021    TSH 1.20 07/06/2020       Procedures:   VENOUS ULTRASOUND BILATERAL LEG(S)  1/2/2019 11:59 AM      HISTORY: Bilateral lower extremity pain.     COMPARISON: None.     FINDINGS: Examination of the deep veins with graded compression and  color flow Doppler with spectral wave form analysis was performed.  Images show no evidence of thrombus in the bilateral common femoral  vein, femoral vein, popliteal vein or calf veins.     Right: There is deep venous insufficiency in the right external iliac  and common femoral vein with a reflux time of 3.4 seconds. There is  superficial venous insufficiency in the right great saphenous vein at  the level of the proximal thigh with a reflux time of 0.9 seconds. No  superficial venous insufficiency in the right small saphenous or  anterior accessory saphenous veins.     Left: No deep venous insufficiency. No superficial venous  insufficiency in the left great saphenous or small saphenous veins.  The anterior accessory saphenous vein was not visualized.                                                                      IMPRESSION:  1. No deep vein thrombosis in either lower extremity.  2. Deep venous insufficiency in the right external iliac and common  femoral veins.  3. Superficial venous insufficiency in the right great saphenous vein  at the level of proximal thigh.  4. No deep or superficial venous insufficiency in the left.     JOAQUIN BABB DO        Courtesy from fat disorders.org    Assessment and Plan:     1. Lipedema  - Lymphedema Therapy Referral; Future    2. Venous (peripheral) insufficiency RT GSV incompetence proximal thigh jan 2019 study    3.  Morbid obesity (H) BMI 40.5    4. Crohn's disease of both small and large intestine without complication (H)    5. IUD (intrauterine device) in place    6. Immunosuppressed status (H)      This is a very pleasant 43-year-old female with multiple medical issues including the Crohn's disease currently on immunosuppressive medication, morbid obese, snoring with no formal diagnosis of sleep apnea, bilateral lower extremity varicose veins underwent venous competency study in 2019 which showed right GSV incompetence in proximal thigh area no DVT and no SVT and she has a IUD in place she has a 2 grownup children's age 20 and 17.  She gives a history of ongoing leg heaviness, fatigue [place] and tiredness for many years.  Lower body disproportionately larger than upper body since the puberty and got worsened after the pregnancy and childbirth.  Her exam is consistent with lipedema and venous insufficiency  She also has easy bruising, hyperextensibility of the joints and mattress phenomenon  Negative Stemmer sign no clinical evidence of lymphedema    I had a lengthy discussion with the patient and shared about diagram from fat [email or web address] and reviewed multimodality approach of lipedema    Improve the lymphatic flow with compression, MLD, wraps, vibration, pump therapy etc.    Water aerobics, cycling, Pilates, lymphatic yoga etc. discussed with the patient    Suggested taking lymphatic formula 1 pill daily information and prescription given    Suggested patient to discuss with the primary and see the weight loss clinic and go for formal sleep evaluation    Muscle toning exercises discussed    Join lipedema support groups    Return to clinic in 6 months       60 minutes spent on the date of the encounter doing chart review, history and exam, documentation, and further activities as noted above.  She is new to this clinic reviewed available records in the epic and updated chart  Thank you for the consultation  Copy of this note  to primary care provider  Return to clinic in 6 months    Julio Savage MD,CRISTINO,FSVM,FNLA, FACP  Vascular Medicine  Clinical Hypertension Specialist   Clinical Lipidologist

## 2023-08-30 NOTE — TELEPHONE ENCOUNTER
Called and spoke to patient yesterday 8/29 (see mychart encounter). Rescheduled patient to after here appointment with provider as PA will still be good, just needs new rx.    Camille LONG, Specialty RN 8/30/2023 10:14 AM

## 2023-08-31 ENCOUNTER — OFFICE VISIT (OUTPATIENT)
Dept: FAMILY MEDICINE | Facility: OTHER | Age: 43
End: 2023-08-31
Payer: COMMERCIAL

## 2023-08-31 ENCOUNTER — NURSE TRIAGE (OUTPATIENT)
Dept: FAMILY MEDICINE | Facility: OTHER | Age: 43
End: 2023-08-31

## 2023-08-31 VITALS
OXYGEN SATURATION: 100 % | HEART RATE: 76 BPM | BODY MASS INDEX: 40.57 KG/M2 | DIASTOLIC BLOOD PRESSURE: 70 MMHG | WEIGHT: 229 LBS | HEIGHT: 63 IN | SYSTOLIC BLOOD PRESSURE: 116 MMHG | RESPIRATION RATE: 18 BRPM | TEMPERATURE: 97.5 F

## 2023-08-31 DIAGNOSIS — I73.9 PAD (PERIPHERAL ARTERY DISEASE) (H): ICD-10-CM

## 2023-08-31 DIAGNOSIS — L08.9 LOCAL INFECTION OF SKIN AND SUBCUTANEOUS TISSUE: Primary | ICD-10-CM

## 2023-08-31 DIAGNOSIS — K50.80 CROHN'S DISEASE OF BOTH SMALL AND LARGE INTESTINE WITHOUT COMPLICATION (H): ICD-10-CM

## 2023-08-31 DIAGNOSIS — I87.2 VENOUS INSUFFICIENCY OF RIGHT LEG: ICD-10-CM

## 2023-08-31 PROCEDURE — 99207 AEROBIC BACTERIAL CULTURE ROUTINE: CPT | Performed by: PHYSICIAN ASSISTANT

## 2023-08-31 PROCEDURE — 87077 CULTURE AEROBIC IDENTIFY: CPT | Performed by: PHYSICIAN ASSISTANT

## 2023-08-31 PROCEDURE — 99214 OFFICE O/P EST MOD 30 MIN: CPT | Performed by: PHYSICIAN ASSISTANT

## 2023-08-31 PROCEDURE — 87070 CULTURE OTHR SPECIMN AEROBIC: CPT | Performed by: PHYSICIAN ASSISTANT

## 2023-08-31 PROCEDURE — 87186 SC STD MICRODIL/AGAR DIL: CPT | Performed by: PHYSICIAN ASSISTANT

## 2023-08-31 RX ORDER — FLUCONAZOLE 150 MG/1
150 TABLET ORAL ONCE
Qty: 1 TABLET | Refills: 0 | Status: SHIPPED | OUTPATIENT
Start: 2023-08-31 | End: 2023-08-31

## 2023-08-31 ASSESSMENT — PATIENT HEALTH QUESTIONNAIRE - PHQ9
SUM OF ALL RESPONSES TO PHQ QUESTIONS 1-9: 6
SUM OF ALL RESPONSES TO PHQ QUESTIONS 1-9: 6

## 2023-08-31 ASSESSMENT — PAIN SCALES - GENERAL: PAINLEVEL: SEVERE PAIN (6)

## 2023-08-31 NOTE — PROGRESS NOTES
Assessment & Plan     1. Local infection of skin and subcutaneous tissue    2. PAD (peripheral artery disease) (H)    3. Venous insufficiency of right leg    4. Crohn's disease of both small and large intestine without complication (H)      Patient was working with her horse 3 weeks ago, while wearing flip flops, when the horse stepped/stomped on her foot. She was seen at the [facility] ED on the day of the injury, 08/14. Review of this visit shows that xrays were negative for fracture. She was noted to have laceration along the anterior left ankle and large bruise over the dorsal aspect of the distal left lateral foot. She has been using topical antibiotic ointment over both of these areas, daily, for the past few weeks. She called triage this morning as the bruised area developed an open sore and is feeling more numb around the area. Wound culture collected today and patient started on oral antibiotics to cover for infection. She will discontinue use of topical ointment due to concerns for maceration of the skin. Skin cares reviewed. Patient will send photos through [employer].   - amoxicillin-clavulanate (AUGMENTIN) 875-125 MG tablet; Take 1 tablet by mouth 2 times daily  - fluconazole (DIFLUCAN) 150 MG tablet; Take 1 tablet (150 mg) by mouth once for 1 dose  - Skin Aerobic Bacterial Culture Routine    Ramiro Engle PA-C  [facility]    Ulises Ramirez is a 43 year old, presenting for the following health issues:  Wound Check      8/31/2023     9:39 AM   Additional Questions   Roomed by Delores MONTES   Accompanied by self       Wound Check    History of Present Illness       Reason for visit:  Follow up on foot injury    She eats 4 or more servings of fruits and vegetables daily.She consumes 1 sweetened beverage(s) daily.She exercises with enough effort to increase her heart rate 20 to 29 minutes per day.  She exercises with enough effort to increase her heart rate 3 or less days per week. She  "is missing 1 dose(s) of medications per week.  She is not taking prescribed medications regularly due to remembering to take.       Wants to get her left foot checked for possible infection. Triage call from today:  Patient scheduled a virtual appointment for today for: \"My foot was injured a few weeks ago by a horse stepping on it and it is still swelling and a wound opened up on the top that I am concerned about and there are numb spots \"        Contacted patient. She did go to ER from this. The laceration has healed well. There were no broken bones. Where the blood pooled at the top of the foot has now opened. It is approximately a quarter inch in diameter. There is cream to yellowish colored discharge from the open area. Patient has been using antibiotic cream and covering it with a band aid. The area is swollen and when she puts her thumb in it it leaves a print. She has dependent edema. It increases with walking and decreases with elevation. It is not hot to the touch. There is some spreading redness around the wound. The redness is greater than 2 inches in size. There is some numbness between the wound and her toes. It is not affecting her ability to walk or her balance. It is painful. Rates the pain 5-6/10.      Per protocol, recommended that patient be seen in clinic. Patient is in agreement. Appointment scheduled.      Review of Systems   Constitutional, HEENT, cardiovascular, pulmonary, gi and gu systems are negative, except as otherwise noted.      Objective    /70   Pulse 76   Temp 97.5  F (36.4  C) (Temporal)   Resp 18   Ht 1.602 m (5' 3.07\")   Wt 103.9 kg (229 lb)   SpO2 100%   BMI 40.47 kg/m    Body mass index is 40.47 kg/m .  Physical Exam   GENERAL: healthy, alert and no distress  RESP: lungs clear to auscultation - no rales, rhonchi or wheezes  CV: regular rate and rhythm, normal S1 S2, no S3 or S4, no murmur, click or rub, no peripheral edema and peripheral pulses strong  SKIN: small " asymmetric area of bruising present along the dorsal surface of the left distal foot, open sore ~1in in the center of the bruise. Well healing laceration along the left anterior ankle.   PSYCH: mentation appears normal, affect normal/bright

## 2023-08-31 NOTE — TELEPHONE ENCOUNTER
"Patient scheduled a virtual appointment for today for: \"My foot was injured a few weeks ago by a horse stepping on it and it is still swelling and a wound opened up on the top that I am concerned about and there are numb spots \"      Contacted patient. She did go to ER from this. The laceration has healed well. There were no broken bones. Where the blood pooled at the top of the foot has now opened. It is approximately a quarter inch in diameter. There is cream to yellowish colored discharge from the open area. Patient has been using antibiotic cream and covering it with a band aid. The area is swollen and when she puts her thumb in it it leaves a print. She has dependent edema. It increases with walking and decreases with elevation. It is not hot to the touch. There is some spreading redness around the wound. The redness is greater than 2 inches in size. There is some numbness between the wound and her toes. It is not affecting her ability to walk or her balance. It is painful. Rates the pain 5-6/10.     Per protocol, recommended that patient be seen in clinic. Patient is in agreement. Appointment scheduled.     Appointments in Next Year      Aug 31, 2023 10:00 AM  (Arrive by 9:40 AM)  Provider Visit with Ramiro Engle PA-C  [facility] ([facility] ) [phone]     Marti Patterson, MARCON, RN, PHN  Registered Nurse-Clinic Triage  [facility]  8/31/2023 at 8:08 AM      Reason for Disposition   Skin redness around the wound larger than 2 inches (5 cm)    Additional Information   Negative: Stitches and not infected   Negative: Surgical wound infection suspected (post-op)   Negative: Bright red, wide-spread, sunburn-like rash   Negative: Black (necrotic) or blisters develop in wound   Negative: Looks infected (spreading redness, red streak, pus) and fever   Negative: Patient sounds very sick or weak to the triager   Negative: Severe pain in " the wound   Negative: Red streak runs from the wound   Negative: Facial wound looks infected (spreading redness)   Negative: Finger wound and entire finger swollen    Protocols used: Wound Infection-A-[place]

## 2023-09-01 ENCOUNTER — MYC MEDICAL ADVICE (OUTPATIENT)
Dept: FAMILY MEDICINE | Facility: OTHER | Age: 43
End: 2023-09-01
Payer: COMMERCIAL

## 2023-09-01 DIAGNOSIS — A49.8 PSEUDOMONAS AERUGINOSA INFECTION: Primary | ICD-10-CM

## 2023-09-01 RX ORDER — LEVOFLOXACIN 750 MG/1
750 TABLET, FILM COATED ORAL DAILY
Qty: 10 TABLET | Refills: 0 | Status: SHIPPED | OUTPATIENT
Start: 2023-09-01 | End: 2023-11-20

## 2023-09-04 LAB
BACTERIA SKIN AEROBE CULT: ABNORMAL

## 2023-09-06 ENCOUNTER — TELEPHONE (OUTPATIENT)
Dept: ALLERGY | Facility: OTHER | Age: 43
End: 2023-09-06

## 2023-09-06 ENCOUNTER — OFFICE VISIT (OUTPATIENT)
Dept: ALLERGY | Facility: OTHER | Age: 43
End: 2023-09-06
Payer: COMMERCIAL

## 2023-09-06 ENCOUNTER — ALLIED HEALTH/NURSE VISIT (OUTPATIENT)
Dept: ALLERGY | Facility: OTHER | Age: 43
End: 2023-09-06
Payer: COMMERCIAL

## 2023-09-06 VITALS — OXYGEN SATURATION: 98 % | DIASTOLIC BLOOD PRESSURE: 71 MMHG | HEART RATE: 83 BPM | SYSTOLIC BLOOD PRESSURE: 103 MMHG

## 2023-09-06 DIAGNOSIS — L50.8 CHRONIC URTICARIA: Primary | ICD-10-CM

## 2023-09-06 DIAGNOSIS — Z91.09 OTHER ALLERGY, OTHER THAN TO MEDICINAL AGENTS: ICD-10-CM

## 2023-09-06 PROCEDURE — 96372 THER/PROPH/DIAG INJ SC/IM: CPT | Performed by: ALLERGY & IMMUNOLOGY

## 2023-09-06 PROCEDURE — 99213 OFFICE O/P EST LOW 20 MIN: CPT | Mod: 25 | Performed by: ALLERGY & IMMUNOLOGY

## 2023-09-06 RX ORDER — EPINEPHRINE 0.3 MG/.3ML
0.3 INJECTION SUBCUTANEOUS
Qty: 0.6 ML | Refills: 0 | Status: SHIPPED | OUTPATIENT
Start: 2023-09-06 | End: 2024-09-11

## 2023-09-06 RX ORDER — EPINEPHRINE 0.3 MG/.3ML
0.3 INJECTION SUBCUTANEOUS
Qty: 0.6 ML | Refills: 0 | Status: SHIPPED | OUTPATIENT
Start: 2023-09-06 | End: 2023-09-06

## 2023-09-06 ASSESSMENT — ENCOUNTER SYMPTOMS
CONSTIPATION: 0
FEVER: 0
LIGHT-HEADEDNESS: 0
HEADACHES: 0
VOMITING: 0
DIARRHEA: 0
SHORTNESS OF BREATH: 0
NERVOUS/ANXIOUS: 0
ADENOPATHY: 0
EYE DISCHARGE: 0
COUGH: 0
DIZZINESS: 0
NAUSEA: 0
CHILLS: 0
WHEEZING: 0
RHINORRHEA: 0
ACTIVITY CHANGE: 0
CHEST TIGHTNESS: 0
EYE ITCHING: 0
FATIGUE: 0
SORE THROAT: 0
SINUS PRESSURE: 0
JOINT SWELLING: 0
ABDOMINAL PAIN: 0
EYE REDNESS: 0

## 2023-09-06 NOTE — LETTER
9/6/2023         RE: Ashley Mcdaniels  [address]  [place] [place] [address]        Dear Colleague,    Thank you for referring your patient, Ashley Mcdaniels, to the [facility]. Please see a copy of my visit note below.    SUBJECTIVE:                                                                   Ashley Mcdaniels presents today to our Allergy Clinic at [facility] for a follow up visit.  She is a 43-year-old female with chronic idiopathic urticaria, history of Crohn's disease, hypothyroidism on thyroid hormone replacement, depression, and anxiety.    Problem   Chronic Urticaria    The patient developed hives at the end of 2016.  Failed high-dose antihistamines. Started omalizumab in 2018.  It has been beneficial.  She tried stopping it in 2019 and developed hives 6 weeks after stopping the medicine.         Chronic urticaria is fairly well controlled with omalizumab 300 mg every 14 weeks and cetirizine 10 mg by mouth twice daily.  She tried to take 10 mg by mouth once daily, and it does not control her symptoms well. She may develop an itchy skin by the end of the third week after receiving omalizumab injection.  If she misses 1 dose of cetirizine at that time, she will break out in hives.  As long as she is compliant with omalizumab and cetirizine twice daily regimen, she is doing well.    Patient Active Problem List   Diagnosis     YUE (generalized anxiety disorder)     Major depressive disorder, recurrent episode, mild (H)     Crohn's disease of both small and large intestine without complication (H)     IUD (intrauterine device) in place     Chronic urticaria     ADHD (attention deficit hyperactivity disorder), inattentive type     Gastroesophageal reflux disease without esophagitis     Morbid obesity (H)     Segmental dysfunction of sacral region     Segmental dysfunction of lumbar region     Segmental dysfunction of thoracic region     Bilateral low back pain with  left-sided sciatica     PAD (peripheral artery disease) (H)     Dry hair     Dry skin     Immunosuppressed status (H)     Venous insufficiency of right leg       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      Crohn's colitis (H)      YUE (generalized anxiety disorder)      GERD (gastroesophageal reflux disease)      Low back pain      Major depressive disorder, recurrent episode, mild (H)       Problem (# of Occurrences) Relation (Name,Age of Onset)    Substance Abuse (4) Maternal Grandmother, Maternal Grandfather, Paternal Grandfather, Brother    Mental Illness (1) Brother    Arthritis (1) Mother    Asthma (2) Father, Paternal Grandmother    Diabetes (2) Maternal Grandmother, Paternal Grandmother    Heart Disease (2) Father: CAD, CHF, Paternal Grandmother    Hypertension (3) Father, Maternal Grandmother, Paternal Grandmother    Hyperlipidemia (2) Father, Maternal Grandmother    Deep Vein Thrombosis (1) Mother    Liver Cancer (1) Father    Pancreatic Cancer (1) Father    Alcoholism (1) Father          Past Surgical History:   Procedure Laterality Date     CHOLECYSTECTOMY  Feb 2007     DILATION AND CURETTAGE  May 2003    Non-OB     HC TOOTH EXTRACTION W/FORCEP  Nov 2006     LAP ADJUSTABLE GASTRIC BAND  May 2007     Social History     Socioeconomic History     Marital status:      Spouse name: None     Number of children: None     Years of education: None     Highest education level: None   Occupational History     Comment: consulting   Tobacco Use     Smoking status: Former     Smokeless tobacco: Never     Tobacco comments:     Vapes daily   Vaping Use     Vaping Use: Every day   Substance and Sexual Activity     Alcohol use: Yes     Comment: 2x a week     Drug use: No     Sexual activity: Yes     Partners: Male     Birth control/protection: I.U.D.   Other Topics Concern     Parent/sibling w/ CABG, MI or  angioplasty before 65F 55M? No   Social History Narrative    09/06/23        ENVIRONMENTAL HISTORY: [place] home is heated with a forced air. They do have central air conditioning. The patient's bedroom is furnished with carpeting in bedroom.  Pets inside the house include 1 cat(s)outdoor cat, 1 dog(s), and 2 horses. There is no history of cockroach or mice infestation. There is/are 0 smokers in the house.  The house does not have a damp basement.            Review of Systems   Constitutional:  Negative for activity change, chills, fatigue and fever.   HENT:  Negative for congestion, nosebleeds, postnasal drip, rhinorrhea, sinus pressure, sneezing and sore throat.    Eyes:  Negative for discharge, redness and itching.   Respiratory:  Negative for cough, chest tightness, shortness of breath and wheezing.    Cardiovascular:  Negative for chest pain.   Gastrointestinal:  Negative for abdominal pain, constipation, diarrhea, nausea and vomiting.   Musculoskeletal:  Negative for joint swelling.   Skin:  Negative for rash.   Neurological:  Negative for dizziness, light-headedness and headaches.   Hematological:  Negative for adenopathy.   Psychiatric/Behavioral:  Negative for behavioral problems. The patient is not nervous/anxious.            Current Outpatient Medications:      bacitracin 500 UNIT/GM external ointment, Apply topically 2 times daily, Disp: 425 g, Rfl: 0     buPROPion (WELLBUTRIN XL) 300 MG 24 hr tablet, TAKE ONE TABLET BY MOUTH EVERY MORNING, Disp: 180 tablet, Rfl: 0     cetirizine (ZYRTEC) 10 MG tablet, Take 1 tablet (10 mg) by mouth daily, Disp: 30 tablet, Rfl: 0     cyanocobalamin (CYANOCOBALAMIN) 1000 MCG/ML injection, Inject 1,000 mcg into the muscle, Disp: , Rfl:      cyclobenzaprine (FLEXERIL) 5 MG tablet, Take 1-2 tablets (5-10 mg) by mouth nightly as needed for muscle spasms, Disp: 60 tablet, Rfl: 3     EPINEPHrine (ANY BX GENERIC EQUIV) 0.3  MG/0.3ML injection 2-pack, Inject 0.3 mLs (0.3 mg) into the muscle once as needed for anaphylaxis, Disp: 0.6 mL, Rfl: 0     HUMIRA *CF* PEN 40 MG/0.4ML pen kit, , Disp: , Rfl: 1     hydrOXYzine (ATARAX) 50 MG tablet, Take 1-2 tablets ( mg) by mouth every 6 hours as needed for anxiety or other (sleep), Disp: 60 tablet, Rfl: 2     levofloxacin (LEVAQUIN) 750 MG tablet, Take 1 tablet (750 mg) by mouth daily, Disp: 10 tablet, Rfl: 0     levonorgestrel (MIRENA) 20 MCG/24HR IUD, 1 each (20 mcg) by Intrauterine route continuous, Disp: , Rfl:      valACYclovir (VALTREX) 500 MG tablet, Take 1 tablet (500 mg) by mouth 2 times daily, Disp: 40 tablet, Rfl: 5     amoxicillin-clavulanate (AUGMENTIN) 875-125 MG tablet, Take 1 tablet by mouth 2 times daily (Patient not taking: Reported on 9/6/2023), Disp: 20 tablet, Rfl: 0     oxyCODONE (ROXICODONE) 5 MG tablet, Take 1 tablet (5 mg) by mouth every 6 hours as needed for pain (Patient not taking: Reported on 9/6/2023), Disp: 10 tablet, Rfl: 0    Current Facility-Administered Medications:      omalizumab (XOLAIR) injection 300 mg, 300 mg, Subcutaneous, Q28 Days, Toñito Desir MD, 300 mg at 09/06/23 1442  Immunization History   Administered Date(s) Administered     COVID-19 Bivalent 12+ (Pfizer) 10/03/2022     COVID-19 MONOVALENT 12+ (Pfizer) 01/20/2021, 02/10/2021, 11/10/2021     FLU 6-35 months 10/16/2002, 11/28/2003     Flu, Unspecified 10/16/2002, 11/28/2003     Hepatitis A (ADULT 19+) 12/05/2017     Historical Hepb 10/12/2015, 12/05/2017     Influenza (IIV3) PF 11/02/2005, 10/17/2006, 11/07/2007, 10/21/2008     Influenza Vaccine >6 months (Alfuria,Fluzone) 10/12/2015, 02/17/2020, 10/03/2022     Mantoux Tuberculin Skin Test 10/26/2015     Pneumo Conj 13-V (2010&after) 04/05/2021     Pneumococcal 23 valent 02/09/2018     TDAP (Adacel,Boostrix) 10/03/2022     TDAP Vaccine (Adacel) 09/07/2012     Allergies   Allergen Reactions     Azathioprine Other (See Comments)      pancreatitis     No Clinical Screening - See Comments Hives     From stress     OBJECTIVE:                                                                 /71 (BP Location: Left arm, Patient Position: Sitting, Cuff Size: Adult Large)   Pulse 83   SpO2 98%         Physical Exam  Vitals and nursing note reviewed.   Constitutional:       General: She is not in acute distress.     Appearance: She is not ill-appearing, toxic-appearing or diaphoretic.   HENT:      Head: Normocephalic and atraumatic.      Right Ear: Tympanic membrane, ear canal and external ear normal.      Left Ear: Tympanic membrane, ear canal and external ear normal.      Nose: No mucosal edema, congestion or rhinorrhea.      Right Turbinates: Not enlarged, swollen or pale.      Left Turbinates: Not enlarged, swollen or pale.      Mouth/Throat:      Lips: Pink.      Mouth: Mucous membranes are moist.      Pharynx: Oropharynx is clear. No pharyngeal swelling, oropharyngeal exudate, posterior oropharyngeal erythema or uvula swelling.   Eyes:      General:         Right eye: No discharge.         Left eye: No discharge.      Conjunctiva/sclera: Conjunctivae normal.   Cardiovascular:      Rate and Rhythm: Normal rate and regular rhythm.      Heart sounds: Normal heart sounds. No murmur heard.  Pulmonary:      Effort: Pulmonary effort is normal. No respiratory distress.      Breath sounds: Normal breath sounds and air entry. No stridor, decreased air movement or transmitted upper airway sounds. No decreased breath sounds, wheezing, rhonchi or rales.   Skin:     General: Skin is warm.      Comments: No active urticaria or angioedema noted on today's exam.   Neurological:      Mental Status: She is alert and oriented to person, place, and time.   Psychiatric:         Mood and Affect: Mood normal.         Behavior: Behavior normal.           ASSESSMENT/PLAN:    Chronic urticaria    Currently fairly well controlled with omalizumab 300 mg every 4 weeks  and cetirizine 10 mg by mouth twice daily.  -Continue as is.    - EPINEPHrine (ANY BX GENERIC EQUIV) 0.3 MG/0.3ML injection 2-pack  Dispense: 0.6 mL; Refill: 0       Follow-up in 1 year or sooner if it is needed.    Thank you for allowing us to participate in the care of this patient. Please feel free to contact us if there are any questions or concerns about the patient.    Disclaimer: This note consists of symbols derived from keyboarding, dictation and/or voice recognition software. As a result, there may be errors in the script that have gone undetected. Please consider this when interpreting information found in this chart.    Toñito Desir MD, FAAAAI, FACAAI  Allergy, Asthma and Immunology     [facility]        Again, thank you for allowing me to participate in the care of your patient.        Sincerely,        Toñito Desir MD

## 2023-09-06 NOTE — TELEPHONE ENCOUNTER
Patient's current prior authorization for Xolair 300 mg every 28 days will  before her next injection on 10/04/2023.     Dr. Desir has renewed her Xolair 300 mg every 28 day prescription.     Can you please submit a request to renew her prior authorization?    Huey España RN on 2023 at 2:48 PM

## 2023-09-06 NOTE — PROGRESS NOTES
Ashley Mcdaniels presents to clinic today at the request of Toñito Desir MD  (ordering provider) for Xolair (omalizumab) injection(s).       This service provided today was under the care of Toñito Desir MD ; the supervising provider of the day; who was available if needed.    Patient presented after waiting 30 minutes with no reaction to Xolair injections. Discharged from clinic.    Huey España RN on 9/6/2023 at 3:19 PM

## 2023-09-06 NOTE — PROGRESS NOTES
Clinic Administered Medication Documentation      Injectable Medication Documentation      Patient was given Xolair 300 mg. Prior to medication administration, verified patient's identity using patient s name and date of birth. Please see MAR and medication order for additional information. Patient instructed to remain in clinic for 30 minutes, report any adverse reaction to staff immediately, and must check in with staff prior to discharge from clinic.    Was this medication supplied by the patient? No

## 2023-09-07 NOTE — TELEPHONE ENCOUNTER
Xolair rx'd 9/6/2023 at in clinic appointment with provider.    Camille LONG, Specialty RN 9/7/2023 9:13 AM

## 2023-09-12 ENCOUNTER — THERAPY VISIT (OUTPATIENT)
Dept: PHYSICAL THERAPY | Facility: CLINIC | Age: 43
End: 2023-09-12
Attending: INTERNAL MEDICINE
Payer: COMMERCIAL

## 2023-09-12 DIAGNOSIS — R60.9 LIPEDEMA: Primary | ICD-10-CM

## 2023-09-12 PROCEDURE — 97140 MANUAL THERAPY 1/> REGIONS: CPT | Mod: GP | Performed by: PHYSICAL THERAPIST

## 2023-09-12 PROCEDURE — 97161 PT EVAL LOW COMPLEX 20 MIN: CPT | Mod: GP | Performed by: PHYSICAL THERAPIST

## 2023-09-12 NOTE — PROGRESS NOTES
PHYSICAL THERAPY EVALUATION  Type of Visit: Evaluation    See electronic medical record for Abuse and Falls Screening details.    Subjective   Chief complaint:  Achy legs a few years. Aggravating factors include: More activity causes more achiness. Alleviating/Easing factors include: Stretching helps, ibuprofen.  Prior interventions include none.  She did start lymphatic support supplements. 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   Pain is 2/10 at best, 7/10 worst, and 5/10 currently. Throbbing thoughout the legs. Goal for PT: know what to do.   She had a lap band procedure in 2006. She states has really small stomach to eat but still struggles with weight. She will see PCP to discuss weight loss clinic for [facility]. Regular exercise: none at this time.     Per MD note 8/30: HPI: Ashley Mcdaniels is a 43 year old very pleasant female with history of Crohn's disease on immunosuppressive medication, generalized anxiety, morbid obesity more than 15 years ago she underwent gastric banding successfully lost weight then followed by regained weight with history of a snoring but no witnessed apneic episodes few years ago underwent venous competency studies for varicose veins she does have right GSV proximal thigh area incompetence but no history of DVT.  She is a former smoker.  She has a easy bruising and hyperextensibility of the joints tender to touch buttocks and thigh area and lumpy bumpy thigh and buttock area.  She gives a history of attaining puberty around age 12 then followed by lower portion of the body was disproportionately larger than upper part and eventually got worse after the pregnancy and childbirth and now upper arms are larger than the forearms.        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     Crohn's colitis (H)     YUE (generalized anxiety disorder)     GERD  (gastroesophageal reflux disease)     Low back pain     Major depressive disorder, recurrent episode, mild (H)        Past Surgical History:   Procedure Laterality Date    CHOLECYSTECTOMY  Feb 2007    DILATION AND CURETTAGE  May 2003    Non-OB    HC TOOTH EXTRACTION W/FORCEP  Nov 2006    LAP ADJUSTABLE GASTRIC BAND  May 2007           Presenting condition or subjective complaint: :Lipedema  Date of onset: 08/30/23 (Date of referral)        Prior diagnostic imaging/testing results:       FINDINGS: Examination of the deep veins with graded compression and  color flow Doppler with spectral wave form analysis was performed.  Images show no evidence of thrombus in the bilateral common femoral  vein, femoral vein, popliteal vein or calf veins.     Right: There is deep venous insufficiency in the right external iliac  and common femoral vein with a reflux time of 3.4 seconds. There is  superficial venous insufficiency in the right great saphenous vein at  the level of the proximal thigh with a reflux time of 0.9 seconds. No  superficial venous insufficiency in the right small saphenous or  anterior accessory saphenous veins.     Left: No deep venous insufficiency. No superficial venous  insufficiency in the left great saphenous or small saphenous veins.  The anterior accessory saphenous vein was not visualized.  Prior therapy history for the same diagnosis, illness or injury:    Was told to wear thigh compression after being seen in vein clinic a few years ago for R CVI    Prior Level of Function  Transfers: Independent  Ambulation: Independent  ADL: Independent  IADL:   Patient goals for therapy:  Reduce fat and fluid buildup, decrease joint and muscle pain       Objective       EDEMA EVALUATION  Additional history:  Body part affected by edema:  BLE  If cancer related, treatment:  None  If not cancer related, problems with veins or cause of swelling:  Lipedema,vein problems  Distance able to walk:  unlimited  Time able  to stand:  unlimited  Sensation problems in hands/feet:      Edema etiology:  lipedema    FUNCTIONAL SCALES  Lower Extremity Functional Scale (score out of 80). A lower score indicates greater impairment:    Shoulder Pain and Disability Index (score out of 100).  A higher score indicates greater impairment:      Cognitive Status Examination  Orientation: Oriented to person, place and time   Level of Consciousness: Alert  Follows Commands and Answers Questions: 100% of the time  Personal Safety and Judgement: Intact  Memory: Intact    EDEMA  Skin Condition: Intact, Non-pitting, L foot wound healing with scab from a horse incident, was on abx for suspected infection. No s/s of infection now. No fibrosis or major skin changes outside . Medial thigh lobules with mild firmness.   She has a good palpable symmetrical peripheral pulses in both lower extremities and upper extremities  Bilateral lower extremity varicose veins CEAP 2 CVI per MD  Mattress phenomenon of buttocks and thighs  Tender to touch thighs and buttocks area  Hyperextensibility of the knees and elbows  Stemmer sign negative  Fat overhanging knee area  Lower legs are not involved with negative stemmer sign, though she does state the foot and lower leg will swell with more time on her feet, not seen today.   Scar: No  Capillary Refill: Symmetrical      GIRTH MEASUREMENTS: Refer to separate girth measurement flowsheet.     VOLUME UE  VOLUME LE  Right LE (mL) 27972.9   Left LE (mL) 10103   LE Volume Comparison RLE volume greater than LLE volume   % Difference 5.3   Head/Neck Volume     Trunk Volume    Genital Volume       RANGE OF MOTION:  B LE WFL. States from fall R hip ER is reduced, and has posterior innomiate on the right which corrects well with MET. Hip ROM restored back to normal. HS length is normal.   STRENGTH:  Functionally fair.   POSTURE: WFL  PALPATION: Tender to palpation diffusely BLEs  ACTIVITIES OF DAILY LIVING: Ind  BED MOBILITY:  WNL  TRANSFERS: WNL  GAIT/LOCOMOTION: No major gait deviations outside of body habitus.   BALANCE: WFL  Plan to assess low back mechanically to see if contributing to achiness in legs.   Assessment & Plan   CLINICAL IMPRESSIONS  Medical Diagnosis: Lipedema    Treatment Diagnosis: Lipedema   Impression/Assessment: Patient is a 43 year old female with lipedema, leg heaviness, fatigue complaints.  The following significant findings have been identified: Pain, Edema, and Decreased activity tolerance. These impairments interfere with their ability to perform self care tasks, work tasks, and recreational activities as compared to previous level of function.     Clinical Decision Making (Complexity):  Clinical Presentation: Stable/Uncomplicated  Clinical Presentation Rationale: based on medical and personal factors listed in PT evaluation  Clinical Decision Making (Complexity): Low complexity    PLAN OF CARE  Treatment Interventions:  Interventions: Manual Therapy, Therapeutic Activity, Therapeutic Exercise, Self-Care/Home Management    Long Term Goals     PT Goal 1  Goal Identifier: Garments  Goal Description: Patient will have well fitting compression garment to help manage LE fatigue/soreness and lipedema symptoms  Goal Progress: W+6  PT Goal 2  Goal Identifier: Pain  Goal Description: Patient will report 50% reduced pain in the legs with compression, HEP and targeted program to address pain contributors  Target Date: 11/07/23      Frequency of Treatment: 3-4 visits  Duration of Treatment: 8 weeks    Recommended Referrals to Other Professionals:   for compression garments, order in Epic placed for cosign  Education Assessment:   Learner/Method: Patient  Education Comments: education    Risks and benefits of evaluation/treatment have been explained.   Patient/Family/caregiver agrees with Plan of Care.     Evaluation Time:     PT Eval, Low Complexity Minutes (46640): 20       Signing Clinician: Cindy Arce  PT      [facility]                                                                                   OUTPATIENT PHYSICAL THERAPY      PLAN OF TREATMENT FOR OUTPATIENT REHABILITATION   Patient's Last Name, First Name, Ashley Hyde YOB: 1980   Provider's Name   [facility]   Medical Record No.  [id number]     Onset Date: 08/30/23 (Date of referral)  Start of Care Date: 09/12/23     Medical Diagnosis:  Lipedema      PT Treatment Diagnosis:  Lipedema Plan of Treatment  Frequency/Duration: 3-4 visits/ 8 weeks    Certification date from 09/12/23 to 11/07/23         See note for plan of treatment details and functional goals     Cindy Arce, PT                         I CERTIFY THE NEED FOR THESE SERVICES FURNISHED UNDER        THIS PLAN OF TREATMENT AND WHILE UNDER MY CARE     (Physician attestation of this document indicates review and certification of the therapy plan).                Referring Provider:  Julio Savage      Initial Assessment  See Epic Evaluation- Start of Care Date: 09/12/23

## 2023-09-13 ENCOUNTER — VIRTUAL VISIT (OUTPATIENT)
Dept: FAMILY MEDICINE | Facility: OTHER | Age: 43
End: 2023-09-13
Payer: COMMERCIAL

## 2023-09-13 DIAGNOSIS — F41.1 GAD (GENERALIZED ANXIETY DISORDER): ICD-10-CM

## 2023-09-13 DIAGNOSIS — R06.83 SNORING: Primary | ICD-10-CM

## 2023-09-13 DIAGNOSIS — E66.01 MORBID OBESITY (H): ICD-10-CM

## 2023-09-13 DIAGNOSIS — F33.0 MAJOR DEPRESSIVE DISORDER, RECURRENT EPISODE, MILD (H): ICD-10-CM

## 2023-09-13 PROCEDURE — 99214 OFFICE O/P EST MOD 30 MIN: CPT | Mod: VID | Performed by: PHYSICIAN ASSISTANT

## 2023-09-13 RX ORDER — BUPROPION HYDROCHLORIDE 300 MG/1
300 TABLET ORAL EVERY MORNING
Qty: 90 TABLET | Refills: 3 | Status: SHIPPED | OUTPATIENT
Start: 2023-09-13 | End: 2023-12-13

## 2023-09-13 NOTE — PROGRESS NOTES
Tia is a 43 year old who is being evaluated via a billable video visit.      How would you like to obtain your AVS? MyChart  If the video visit is dropped, the invitation should be resent by: Text to cell phone: [phone]  Will anyone else be joining your video visit? No    Assessment & Plan     ICD-10-CM    1. Snoring  R06.83 Adult Sleep Eval & Management  Referral      2. Morbid obesity (H)  E66.01 Adult Comprehensive Weight Management  Referral      3. Major depressive disorder, recurrent episode, mild (H)  F33.0 buPROPion (WELLBUTRIN XL) 300 MG 24 hr tablet      4. YUE (generalized anxiety disorder)  F41.1 buPROPion (WELLBUTRIN XL) 300 MG 24 hr tablet        - Patient dealing with lymphedema      Told to consider sleep study and weight loss clinic   - Stop-Bang 3, snoring, and daytime fatigue - referral placed   - Weight management discussed, referral placed     - Mood      Reports stable on Wellbutrin 300 mg once daily     Continues with monthly counseling      Reviewed use and side effects, refilled     Review of the result(s) of each unique test - PHQ9    Diagnosis or treatment significantly limited by social determinants of health - none     15 minutes spent on the date of the encounter doing chart review, history and exam, documentation and further activities as noted above    PA student as below was present and participated in shadow capacity only    Luis Felipe Rao PA-S   [facility] PA-C  [facility]    Subjective   Tia is a 43 year old, presenting for the following health issues:  Referral      9/13/2023     9:52 AM   Additional Questions   Roomed by elise   Accompanied by self       HPI   Pt is seeing provider for a referral for weight loss and sleep clinic  - Did follow up in [place] at vascular clinic, diagnosis with lymphedema   - Doing physical therapy   - They recommended sleep and weight loss     - Snoring, mouth  breather   - Doesn't feel rested when waking up   - Stop bang - 3      - Started anti-inflammatory diet 3 weeks ago      Lost about 9 lbs   - Needs to add more exercise in     - Once a month with therapist      Doing well with mental health         10/3/2022     8:48 AM 12/29/2022     8:41 AM 8/31/2023     9:35 AM   PHQ   PHQ-9 Total Score 15 8 6   Q9: Thoughts of better off dead/self-harm past 2 weeks Not at all Not at all Not at all       Review of Systems   Constitutional, HEENT, cardiovascular, pulmonary, gi and gu systems are negative, except as otherwise noted.      Objective    Vitals - Patient Reported  Pain Score: No Pain (0)      Physical Exam   GENERAL: Healthy, alert and no distress  EYES: Eyes grossly normal to inspection.  No discharge or erythema, or obvious scleral/conjunctival abnormalities.  RESP: No audible wheeze, cough, or visible cyanosis.  No visible retractions or increased work of breathing.    SKIN: Visible skin clear. No significant rash, abnormal pigmentation or lesions.  NEURO: Cranial nerves grossly intact.  Mentation and speech appropriate for age.  PSYCH: Mentation appears normal, affect normal/bright, judgement and insight intact, normal speech and appearance well-groomed.    Diagnostics: none         Video-Visit Details    Type of service:  Video Visit   Video Start Time: 10:20 AM  Video End Time:10:29 AM  Originating Location (pt. Location): Home  Distant Location (provider location):  Off-site  Platform used for Video Visit: Judah

## 2023-09-13 NOTE — PROGRESS NOTES
09/12/23 1600   Signing Clinician's Name / Credentials   Signing clinician's name / credentials Cindy Arce, PT, DPT   Discipline   Discipline PT   Lower Extremity Girth Measurements   RT: Great Toe 7 cms   RT: MTP's 22.1 cms   RT: Arch of Foot 21.5 cms   RT: Calcaneous 29   RT: Malleolus 25.5 cms   RT: 10 cm above heel 23 cms   RT: 20 cm above heel 37 cms   RT: 30 cm above heel 45.5 cms   RT: 40 cm above heel 41.5 cms   RT: 50 cm above heel 60 cms   RT: 60 cm above heel 73 cms   RT: 70 cm above heel 78 cms   RT: Lower Extremity Total Volume 71042.9   LT: Great Toe 7 cms   LT: MTP's 23 cms   LT: Arch of Foot 22.5 cms   LT: Calcaneous 29.5   LT:  Malleolus 24 cms   LT: 10 cm above heel 23 cms   LT: 20 cm above heel 35 cms   LT: 30 cm above heel 45 cms   LT: 40 cm above heel 41 cms   LT: 50 cm above heel 56.5 cms   LT: 60 cm above heel 71 cms   LT: 70 cm above heel 77 cms   LT: Lower Extremity Total Volume 19368.72        BP Readings from Last 3 Encounters:   04/13/19 (!) 185/81   04/05/19 131/77   04/05/19 127/80     Slightly elevated likely due to steroids.   Cont metoprolol to treat  Home ACE on hold   Low Na diet

## 2023-09-16 ENCOUNTER — HEALTH MAINTENANCE LETTER (OUTPATIENT)
Age: 43
End: 2023-09-16

## 2023-09-20 ENCOUNTER — OFFICE VISIT (OUTPATIENT)
Dept: OBGYN | Facility: CLINIC | Age: 43
End: 2023-09-20
Payer: COMMERCIAL

## 2023-09-20 VITALS
BODY MASS INDEX: 39.59 KG/M2 | SYSTOLIC BLOOD PRESSURE: 117 MMHG | WEIGHT: 224 LBS | OXYGEN SATURATION: 98 % | HEART RATE: 72 BPM | DIASTOLIC BLOOD PRESSURE: 82 MMHG

## 2023-09-20 DIAGNOSIS — Z12.4 CERVICAL CANCER SCREENING: Primary | ICD-10-CM

## 2023-09-20 PROCEDURE — 87624 HPV HI-RISK TYP POOLED RSLT: CPT | Performed by: OBSTETRICS & GYNECOLOGY

## 2023-09-20 PROCEDURE — G0145 SCR C/V CYTO,THINLAYER,RESCR: HCPCS | Performed by: OBSTETRICS & GYNECOLOGY

## 2023-09-20 PROCEDURE — 99213 OFFICE O/P EST LOW 20 MIN: CPT | Performed by: OBSTETRICS & GYNECOLOGY

## 2023-09-20 NOTE — PATIENT INSTRUCTIONS
If you have any questions regarding your visit, Please contact your care team.    [facility]: [phone]    To Schedule an Appointment 24/7  Call: [phone]   DO. Janeth Bishop -Surgery Scheduler  Ileana - Surgery Scheduler    BARBIE Jenkins, BARBIE Elliott, BARBIE   [place]  Wednesday and Friday  8:30 a.m-5:00 p.m  [place]-Temporary  Monday 8:30 a.m-5:00 p.m  Typical Surgery day:  Tuesday [facility]  [address] [place] [address] [phone] Phone  [phone] Fax    Imaging Scheduling-All Locations [phone]    [facility]  [address] [place] [address]     Urgent Care locations:  [facility] Monday-Friday   10 am - 8 pm  Saturday and Sunday   9 am - 5 pm [phone]   **Surgeries** Our Surgery Schedulers will contact you to schedule. If you do not receive a call within 3 business days, please call [phone].    [facility] Labor and Delivery:  [phone]    If you need a medication refill, please contact your pharmacy. Please allow 3 business days for your refill to be completed.  As always, Thank you for trusting us with your healthcare needs!  see additional instructions from your care team below

## 2023-09-20 NOTE — PROGRESS NOTES
This 44 y/o female, , using 's vas for contraception, presents for a pap smear collection since she is on Humira.  Her current Mirena IUD was inserted on 2016 which she uses for menstrual control.  However, she forgot to take Ibuprofen this morning so prefers to have this removed and replaced next 2024.  She has been experiencing a light menses each month but very manageable.  /82 (BP Location: Right arm, Patient Position: Chair, Cuff Size: Adult Large)   Pulse 72   Wt 101.6 kg (224 lb)   SpO2 98%   Breastfeeding No   BMI 39.59 kg/m    ROS:  10 systems were reviewed and the positives were listed under problems.  In the lithotomy position, a bi-valve speculum was placed and the pap was collected and submitted.  Her cervix appears multiparous and is nontender with motion.  The vaginal mucosa appears normal without lesions or defects.  Assessment - Pap collection due to medication use (Humira)  Plan - We discussed that her current Mirena IUD has  for use for heavy menstrual bleeding but is approved for 8-years of use for contraception.  Since she forgot to take Ibuprofen this morning, she prefers to return next 2024 for removal and replacement of her Mirena IUD.  A pap was collected and submitted due to her use of Humira.  20 minutes were spent today in chart review, the patient visit, review of tests, and documentation in regard to the issues noted above.

## 2023-09-22 LAB
BKR LAB AP GYN ADEQUACY: NORMAL
BKR LAB AP GYN INTERPRETATION: NORMAL
BKR LAB AP HPV REFLEX: NORMAL
BKR LAB AP PREVIOUS ABNORMAL: NORMAL
PATH REPORT.COMMENTS IMP SPEC: NORMAL
PATH REPORT.COMMENTS IMP SPEC: NORMAL
PATH REPORT.RELEVANT HX SPEC: NORMAL

## 2023-09-25 DIAGNOSIS — K50.80 CROHN'S DISEASE OF BOTH SMALL AND LARGE INTESTINE WITHOUT COMPLICATION (H): Primary | ICD-10-CM

## 2023-09-25 LAB
HUMAN PAPILLOMA VIRUS 16 DNA: NEGATIVE
HUMAN PAPILLOMA VIRUS 18 DNA: NEGATIVE
HUMAN PAPILLOMA VIRUS FINAL DIAGNOSIS: NORMAL
HUMAN PAPILLOMA VIRUS OTHER HR: NEGATIVE

## 2023-09-27 ENCOUNTER — THERAPY VISIT (OUTPATIENT)
Dept: PHYSICAL THERAPY | Facility: CLINIC | Age: 43
End: 2023-09-27
Attending: INTERNAL MEDICINE
Payer: COMMERCIAL

## 2023-09-27 DIAGNOSIS — R60.9 LIPEDEMA: Primary | ICD-10-CM

## 2023-09-27 PROCEDURE — 97110 THERAPEUTIC EXERCISES: CPT | Mod: GP | Performed by: PHYSICAL THERAPIST

## 2023-09-27 PROCEDURE — 97140 MANUAL THERAPY 1/> REGIONS: CPT | Mod: GP | Performed by: PHYSICAL THERAPIST

## 2023-09-29 ENCOUNTER — LAB (OUTPATIENT)
Dept: LAB | Facility: CLINIC | Age: 43
End: 2023-09-29
Payer: COMMERCIAL

## 2023-09-29 DIAGNOSIS — K50.80 CROHN'S DISEASE OF BOTH SMALL AND LARGE INTESTINE WITHOUT COMPLICATION (H): ICD-10-CM

## 2023-09-29 LAB
ALBUMIN SERPL BCG-MCNC: 4.3 G/DL (ref 3.5–5.2)
ALP SERPL-CCNC: 66 U/L (ref 35–104)
ALT SERPL W P-5'-P-CCNC: 29 U/L (ref 0–50)
AST SERPL W P-5'-P-CCNC: 32 U/L (ref 0–45)
BASOPHILS # BLD AUTO: 0 10E3/UL (ref 0–0.2)
BASOPHILS NFR BLD AUTO: 1 %
BILIRUB DIRECT SERPL-MCNC: <0.2 MG/DL (ref 0–0.3)
BILIRUB SERPL-MCNC: 0.5 MG/DL
CREAT SERPL-MCNC: 0.82 MG/DL (ref 0.51–0.95)
EGFRCR SERPLBLD CKD-EPI 2021: >90 ML/MIN/1.73M2
EOSINOPHIL # BLD AUTO: 0.2 10E3/UL (ref 0–0.7)
EOSINOPHIL NFR BLD AUTO: 3 %
ERYTHROCYTE [DISTWIDTH] IN BLOOD BY AUTOMATED COUNT: 13.2 % (ref 10–15)
HCT VFR BLD AUTO: 43.4 % (ref 35–47)
HGB BLD-MCNC: 14.9 G/DL (ref 11.7–15.7)
IMM GRANULOCYTES # BLD: 0 10E3/UL
IMM GRANULOCYTES NFR BLD: 0 %
LYMPHOCYTES # BLD AUTO: 2.7 10E3/UL (ref 0.8–5.3)
LYMPHOCYTES NFR BLD AUTO: 36 %
MCH RBC QN AUTO: 31 PG (ref 26.5–33)
MCHC RBC AUTO-ENTMCNC: 34.3 G/DL (ref 31.5–36.5)
MCV RBC AUTO: 90 FL (ref 78–100)
MONOCYTES # BLD AUTO: 0.6 10E3/UL (ref 0–1.3)
MONOCYTES NFR BLD AUTO: 8 %
NEUTROPHILS # BLD AUTO: 4.1 10E3/UL (ref 1.6–8.3)
NEUTROPHILS NFR BLD AUTO: 52 %
NRBC # BLD AUTO: 0 10E3/UL
NRBC BLD AUTO-RTO: 0 /100
PLATELET # BLD AUTO: 311 10E3/UL (ref 150–450)
PROT SERPL-MCNC: 7.8 G/DL (ref 6.4–8.3)
RBC # BLD AUTO: 4.8 10E6/UL (ref 3.8–5.2)
WBC # BLD AUTO: 7.7 10E3/UL (ref 4–11)

## 2023-09-29 PROCEDURE — 85025 COMPLETE CBC W/AUTO DIFF WBC: CPT

## 2023-09-29 PROCEDURE — 80076 HEPATIC FUNCTION PANEL: CPT

## 2023-09-29 PROCEDURE — 86481 TB AG RESPONSE T-CELL SUSP: CPT

## 2023-09-29 PROCEDURE — 36415 COLL VENOUS BLD VENIPUNCTURE: CPT

## 2023-09-29 PROCEDURE — 82565 ASSAY OF CREATININE: CPT

## 2023-10-02 LAB
GAMMA INTERFERON BACKGROUND BLD IA-ACNC: 0 IU/ML
M TB IFN-G BLD-IMP: NEGATIVE
M TB IFN-G CD4+ BCKGRND COR BLD-ACNC: 10 IU/ML
MITOGEN IGNF BCKGRD COR BLD-ACNC: 0.02 IU/ML
MITOGEN IGNF BCKGRD COR BLD-ACNC: 0.02 IU/ML
QUANTIFERON MITOGEN: 10 IU/ML
QUANTIFERON NIL TUBE: 0 IU/ML
QUANTIFERON TB1 TUBE: 0.02 IU/ML
QUANTIFERON TB2 TUBE: 0.02

## 2023-10-03 ENCOUNTER — VIRTUAL VISIT (OUTPATIENT)
Dept: ENDOCRINOLOGY | Facility: CLINIC | Age: 43
End: 2023-10-03
Attending: PHYSICIAN ASSISTANT
Payer: COMMERCIAL

## 2023-10-03 VITALS — BODY MASS INDEX: 39.87 KG/M2 | HEIGHT: 63 IN | WEIGHT: 225 LBS

## 2023-10-03 DIAGNOSIS — E66.01 MORBID OBESITY (H): ICD-10-CM

## 2023-10-03 DIAGNOSIS — E66.812 CLASS 2 OBESITY WITHOUT SERIOUS COMORBIDITY WITH BODY MASS INDEX (BMI) OF 39.0 TO 39.9 IN ADULT, UNSPECIFIED OBESITY TYPE: Primary | ICD-10-CM

## 2023-10-03 PROCEDURE — 99203 OFFICE O/P NEW LOW 30 MIN: CPT | Mod: VID | Performed by: INTERNAL MEDICINE

## 2023-10-03 ASSESSMENT — PAIN SCALES - GENERAL: PAINLEVEL: MILD PAIN (3)

## 2023-10-03 ASSESSMENT — SLEEP AND FATIGUE QUESTIONNAIRES
HOW LIKELY ARE YOU TO NOD OFF OR FALL ASLEEP WHILE SITTING INACTIVE IN A PUBLIC PLACE: SLIGHT CHANCE OF DOZING
HOW LIKELY ARE YOU TO NOD OFF OR FALL ASLEEP WHILE SITTING QUIETLY AFTER LUNCH WITHOUT ALCOHOL: WOULD NEVER DOZE
HOW LIKELY ARE YOU TO NOD OFF OR FALL ASLEEP IN A CAR, WHILE STOPPED FOR A FEW MINUTES IN TRAFFIC: WOULD NEVER DOZE
HOW LIKELY ARE YOU TO NOD OFF OR FALL ASLEEP WHEN YOU ARE A PASSENGER IN A CAR FOR AN HOUR WITHOUT A BREAK: SLIGHT CHANCE OF DOZING
HOW LIKELY ARE YOU TO NOD OFF OR FALL ASLEEP WHILE SITTING AND READING: MODERATE CHANCE OF DOZING
HOW LIKELY ARE YOU TO NOD OFF OR FALL ASLEEP WHILE LYING DOWN TO REST IN THE AFTERNOON WHEN CIRCUMSTANCES PERMIT: HIGH CHANCE OF DOZING
HOW LIKELY ARE YOU TO NOD OFF OR FALL ASLEEP WHILE SITTING AND TALKING TO SOMEONE: WOULD NEVER DOZE
HOW LIKELY ARE YOU TO NOD OFF OR FALL ASLEEP WHILE WATCHING TV: MODERATE CHANCE OF DOZING
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
## 2023-10-03 NOTE — PROGRESS NOTES
Virtual Visit Details    Type of service:  Video Visit     Originating Location (pt. Location): Home    Distant Location (provider location):  Off-site  Platform used for Video Visit: Judah

## 2023-10-03 NOTE — NURSING NOTE
Is the patient currently in the state of [place]? YES    Visit mode:VIDEO    If the visit is dropped, the patient can be reconnected by: VIDEO VISIT: Text to cell phone:   Telephone Information:   Mobile [phone]       Will anyone else be joining the visit? NO  (If patient encounters technical issues they should call [phone])    How would you like to obtain your AVS? MyChart    Are changes needed to the allergy or medication list? No    Reason for visit: Consult    Pt states chronic hip/joint/back pain 3/10 today.    Torito CERNA

## 2023-10-03 NOTE — PATIENT INSTRUCTIONS
Start phentermine 8 mg daily    See Lauren Bloch, PharmD in 2 month(s)  See Dr.Tasma MINER in 4 month(s)    If you have any questions, please do not hesitate to call Weight management clinic at [phone] or [phone].  If you need to fax, please fax to [phone].    Sincerely,    Samuel Gonzalez MD  Endocrinology

## 2023-10-04 ENCOUNTER — ALLIED HEALTH/NURSE VISIT (OUTPATIENT)
Dept: ALLERGY | Facility: OTHER | Age: 43
End: 2023-10-04
Payer: COMMERCIAL

## 2023-10-04 ENCOUNTER — TELEPHONE (OUTPATIENT)
Dept: ENDOCRINOLOGY | Facility: CLINIC | Age: 43
End: 2023-10-04

## 2023-10-04 DIAGNOSIS — L50.8 CHRONIC URTICARIA: Primary | ICD-10-CM

## 2023-10-04 PROCEDURE — 96372 THER/PROPH/DIAG INJ SC/IM: CPT | Performed by: ALLERGY & IMMUNOLOGY

## 2023-10-04 NOTE — PROGRESS NOTES
Ashley Mcdaniels presents to clinic today at the request of Toñito Desir MD  (ordering provider) for Xolair (omalizumab) injection(s).       This service provided today was under the care of Toñito Desir MD ; the supervising provider of the day; who was available if needed.    Patient presented after waiting 30 minutes with no reaction to Xolair injections. Discharged from clinic.    Huey España RN on 10/4/2023 at 9:47 AM

## 2023-10-04 NOTE — TELEPHONE ENCOUNTER
PRIOR AUTHORIZATION DENIED    Medication: PHENTERMINE HCL 8 MG PO TABS  Insurance Company: [employer] [place] - Phone [phone] Fax [phone]  Denial Date: 10/4/2023  Denial Rational: FORMULARY EXCEPTION HAS BEEN APPROVED FROM 07/06/2023 TO 10/04/2024        Appeal Information:     Patient Notified: No

## 2023-10-10 ENCOUNTER — TELEPHONE (OUTPATIENT)
Dept: ENDOCRINOLOGY | Facility: CLINIC | Age: 43
End: 2023-10-10
Payer: COMMERCIAL

## 2023-10-13 DIAGNOSIS — F41.1 GAD (GENERALIZED ANXIETY DISORDER): ICD-10-CM

## 2023-10-13 RX ORDER — HYDROXYZINE HYDROCHLORIDE 50 MG/1
50-100 TABLET, FILM COATED ORAL EVERY 6 HOURS PRN
Qty: 60 TABLET | Refills: 0 | Status: SHIPPED | OUTPATIENT
Start: 2023-10-13 | End: 2024-01-22

## 2023-10-18 ENCOUNTER — THERAPY VISIT (OUTPATIENT)
Dept: PHYSICAL THERAPY | Facility: CLINIC | Age: 43
End: 2023-10-18
Attending: INTERNAL MEDICINE
Payer: COMMERCIAL

## 2023-10-18 DIAGNOSIS — R60.9 LIPEDEMA: Primary | ICD-10-CM

## 2023-10-18 PROCEDURE — 97140 MANUAL THERAPY 1/> REGIONS: CPT | Mod: GP | Performed by: PHYSICAL THERAPIST

## 2023-11-08 ENCOUNTER — ALLIED HEALTH/NURSE VISIT (OUTPATIENT)
Dept: ALLERGY | Facility: OTHER | Age: 43
End: 2023-11-08
Payer: COMMERCIAL

## 2023-11-08 DIAGNOSIS — L50.8 CHRONIC URTICARIA: Primary | ICD-10-CM

## 2023-11-08 PROCEDURE — 96372 THER/PROPH/DIAG INJ SC/IM: CPT | Performed by: ALLERGY & IMMUNOLOGY

## 2023-11-08 NOTE — PROGRESS NOTES
Ashley Mcdaniels presents to clinic today at the request of Toñito Desir MD  (ordering provider) for Xolair (omalizumab) injection(s).       This service provided today was under the care of Toñito Desir MD ; the supervising provider of the day; who was available if needed.      Patient presented after waiting 30 minutes with no reaction to  injections. Discharged from clinic.    Chelsea Batista RN

## 2023-11-19 DIAGNOSIS — B00.1 RECURRENT COLD SORES: ICD-10-CM

## 2023-11-19 DIAGNOSIS — Z83.1 FAMILY HISTORY OF COLD SORES: Primary | ICD-10-CM

## 2023-11-20 RX ORDER — VALACYCLOVIR HYDROCHLORIDE 500 MG/1
500 TABLET, FILM COATED ORAL 2 TIMES DAILY
Qty: 40 TABLET | Refills: 0 | Status: SHIPPED | OUTPATIENT
Start: 2023-11-20 | End: 2023-12-13

## 2023-11-20 NOTE — TELEPHONE ENCOUNTER
Aziza refill given. Due in Dec for med check. Please assist in scheduling     Rene You PA-C  [facility]

## 2023-12-13 ENCOUNTER — VIRTUAL VISIT (OUTPATIENT)
Dept: FAMILY MEDICINE | Facility: OTHER | Age: 43
End: 2023-12-13
Payer: COMMERCIAL

## 2023-12-13 ENCOUNTER — ALLIED HEALTH/NURSE VISIT (OUTPATIENT)
Dept: ALLERGY | Facility: OTHER | Age: 43
End: 2023-12-13
Payer: COMMERCIAL

## 2023-12-13 DIAGNOSIS — F41.1 GAD (GENERALIZED ANXIETY DISORDER): ICD-10-CM

## 2023-12-13 DIAGNOSIS — L50.8 CHRONIC URTICARIA: Primary | ICD-10-CM

## 2023-12-13 DIAGNOSIS — F33.0 MAJOR DEPRESSIVE DISORDER, RECURRENT EPISODE, MILD (H): ICD-10-CM

## 2023-12-13 DIAGNOSIS — B00.1 RECURRENT COLD SORES: ICD-10-CM

## 2023-12-13 PROCEDURE — 96372 THER/PROPH/DIAG INJ SC/IM: CPT | Performed by: ALLERGY & IMMUNOLOGY

## 2023-12-13 PROCEDURE — 99214 OFFICE O/P EST MOD 30 MIN: CPT | Mod: VID | Performed by: PHYSICIAN ASSISTANT

## 2023-12-13 RX ORDER — VALACYCLOVIR HYDROCHLORIDE 500 MG/1
500 TABLET, FILM COATED ORAL 2 TIMES DAILY
Qty: 40 TABLET | Refills: 3 | Status: SHIPPED | OUTPATIENT
Start: 2023-12-13 | End: 2024-06-05

## 2023-12-13 RX ORDER — BUPROPION HYDROCHLORIDE 300 MG/1
300 TABLET ORAL EVERY MORNING
Qty: 90 TABLET | Refills: 3 | Status: SHIPPED | OUTPATIENT
Start: 2023-12-13 | End: 2024-07-15

## 2023-12-13 ASSESSMENT — ANXIETY QUESTIONNAIRES
GAD7 TOTAL SCORE: 10
5. BEING SO RESTLESS THAT IT IS HARD TO SIT STILL: SEVERAL DAYS
4. TROUBLE RELAXING: SEVERAL DAYS
7. FEELING AFRAID AS IF SOMETHING AWFUL MIGHT HAPPEN: SEVERAL DAYS
GAD7 TOTAL SCORE: 10
3. WORRYING TOO MUCH ABOUT DIFFERENT THINGS: MORE THAN HALF THE DAYS
1. FEELING NERVOUS, ANXIOUS, OR ON EDGE: MORE THAN HALF THE DAYS
2. NOT BEING ABLE TO STOP OR CONTROL WORRYING: SEVERAL DAYS
6. BECOMING EASILY ANNOYED OR IRRITABLE: MORE THAN HALF THE DAYS
IF YOU CHECKED OFF ANY PROBLEMS ON THIS QUESTIONNAIRE, HOW DIFFICULT HAVE THESE PROBLEMS MADE IT FOR YOU TO DO YOUR WORK, TAKE CARE OF THINGS AT HOME, OR GET ALONG WITH OTHER PEOPLE: SOMEWHAT DIFFICULT

## 2023-12-13 ASSESSMENT — PATIENT HEALTH QUESTIONNAIRE - PHQ9
SUM OF ALL RESPONSES TO PHQ QUESTIONS 1-9: 9
10. IF YOU CHECKED OFF ANY PROBLEMS, HOW DIFFICULT HAVE THESE PROBLEMS MADE IT FOR YOU TO DO YOUR WORK, TAKE CARE OF THINGS AT HOME, OR GET ALONG WITH OTHER PEOPLE: SOMEWHAT DIFFICULT
SUM OF ALL RESPONSES TO PHQ QUESTIONS 1-9: 9

## 2023-12-13 NOTE — PROGRESS NOTES
Clinic Administered Medication Documentation      Injectable Medication Documentation      Patient was given 300 mg Xolair. Prior to medication administration, verified patient's identity using patient s name and date of birth. Please see MAR and medication order for additional information. Patient instructed to remain in clinic for 30 minutes, report any adverse reaction to staff immediately, and must check in with staff prior to discharge from clinic.    Was this medication supplied by the patient? No

## 2023-12-13 NOTE — PROGRESS NOTES
Tia is a 43 year old who is being evaluated via a billable video visit.      How would you like to obtain your AVS? MyChart  If the video visit is dropped, the invitation should be resent by: Text to cell phone: [phone] Email please.  Will anyone else be joining your video visit? No      Assessment & Plan     ICD-10-CM    1. Recurrent cold sores  B00.1 valACYclovir (VALTREX) 500 MG tablet      2. Major depressive disorder, recurrent episode, mild (H24)  F33.0 buPROPion (WELLBUTRIN XL) 300 MG 24 hr tablet      3. YUE (generalized anxiety disorder)  F41.1 buPROPion (WELLBUTRIN XL) 300 MG 24 hr tablet        Cold sores   - Needs medication for 3-4 days if gets it in right away   - Reviewed use and side effects, refilled     2 & 3. Mood   - Reviewed medication use and side effects, refilled       Review of the result(s) of each unique test - None    Diagnosis or treatment significantly limited by social determinants of health - None     15 minutes spent on the date of the encounter doing chart review, history and exam, documentation and further activities as noted above    The patient indicates understanding of these issues and agrees with the plan.    Follow up: 1 year or PRN     PENNIE Oliva [facility]    Subjective   Tia is a 43 year old, presenting for the following health issues:  No chief complaint on file.      History of Present Illness       Reason for visit:  Med review    She eats 2-3 servings of fruits and vegetables daily.She consumes 1 sweetened beverage(s) daily.She exercises with enough effort to increase her heart rate 30 to 60 minutes per day.  She exercises with enough effort to increase her heart rate 4 days per week. She is missing 1 dose(s) of medications per week.  She is not taking prescribed medications regularly due to remembering to take.       - Things been going better   Sleep clinic appointment coming up    Takes hydroxyzine as needed      Valacyclovir   - Has been taking more often recently  - No side effects   - Takes 3-4 times before feeling relief    -Weight management sent something but did not get    Phentermine?         Review of Systems   Constitutional, HEENT, cardiovascular, pulmonary, gi and gu systems are negative, except as otherwise noted.      Objective           Vitals:  No vitals were obtained today due to virtual visit.    Physical Exam   GENERAL: Healthy, alert and no distress  EYES: Eyes grossly normal to inspection.  No discharge or erythema, or obvious scleral/conjunctival abnormalities.  RESP: No audible wheeze, cough, or visible cyanosis.  No visible retractions or increased work of breathing.    SKIN: Visible skin clear. No significant rash, abnormal pigmentation or lesions.  NEURO: Cranial nerves grossly intact.  Mentation and speech appropriate for age.  PSYCH: Mentation appears normal, affect normal/bright, judgement and insight intact, normal speech and appearance well-groomed.    Diagnostics; none         Video-Visit Details    Type of service:  Video Visit   Originating Location (pt. Location): Home  Distant Location (provider location):  Off-site  Platform used for Video Visit: Judah

## 2023-12-13 NOTE — PROGRESS NOTES
Ashley Mcdaniels presents to clinic today at the request of Toñito Desir MD  (ordering provider) for Xolair (omalizumab) injection(s).       This service provided today was under the care of Toñito Desir MD ; the supervising provider of the day; who was available if needed.      Patient presented after waiting 30 minutes with no reaction to  injections. Discharged from clinic.    Ada LONG RN, BSN, PHN

## 2023-12-18 ENCOUNTER — VIRTUAL VISIT (OUTPATIENT)
Dept: SLEEP MEDICINE | Facility: CLINIC | Age: 43
End: 2023-12-18
Attending: PHYSICIAN ASSISTANT
Payer: COMMERCIAL

## 2023-12-18 VITALS — WEIGHT: 210 LBS | BODY MASS INDEX: 37.21 KG/M2 | HEIGHT: 63 IN

## 2023-12-18 DIAGNOSIS — R53.82 CHRONIC FATIGUE: Primary | ICD-10-CM

## 2023-12-18 DIAGNOSIS — E66.9 OBESITY (BMI 35.0-39.9 WITHOUT COMORBIDITY): ICD-10-CM

## 2023-12-18 DIAGNOSIS — R51.9 SLEEP RELATED HEADACHES: ICD-10-CM

## 2023-12-18 DIAGNOSIS — G47.63 SLEEP RELATED BRUXISM: ICD-10-CM

## 2023-12-18 DIAGNOSIS — R06.83 SNORING: ICD-10-CM

## 2023-12-18 DIAGNOSIS — G47.00 PERSISTENT INSOMNIA: ICD-10-CM

## 2023-12-18 PROCEDURE — 99204 OFFICE O/P NEW MOD 45 MIN: CPT | Mod: VID | Performed by: INTERNAL MEDICINE

## 2023-12-18 ASSESSMENT — SLEEP AND FATIGUE QUESTIONNAIRES
HOW LIKELY ARE YOU TO NOD OFF OR FALL ASLEEP WHILE WATCHING TV: HIGH CHANCE OF DOZING
HOW LIKELY ARE YOU TO NOD OFF OR FALL ASLEEP WHILE SITTING AND READING: MODERATE CHANCE OF DOZING
HOW LIKELY ARE YOU TO NOD OFF OR FALL ASLEEP WHILE SITTING INACTIVE IN A PUBLIC PLACE: SLIGHT CHANCE OF DOZING
HOW LIKELY ARE YOU TO NOD OFF OR FALL ASLEEP WHEN YOU ARE A PASSENGER IN A CAR FOR AN HOUR WITHOUT A BREAK: MODERATE CHANCE OF DOZING
HOW LIKELY ARE YOU TO NOD OFF OR FALL ASLEEP WHILE LYING DOWN TO REST IN THE AFTERNOON WHEN CIRCUMSTANCES PERMIT: MODERATE CHANCE OF DOZING
HOW LIKELY ARE YOU TO NOD OFF OR FALL ASLEEP IN A CAR, WHILE STOPPED FOR A FEW MINUTES IN TRAFFIC: WOULD NEVER DOZE
HOW LIKELY ARE YOU TO NOD OFF OR FALL ASLEEP WHILE SITTING AND TALKING TO SOMEONE: WOULD NEVER DOZE
HOW LIKELY ARE YOU TO NOD OFF OR FALL ASLEEP WHILE SITTING QUIETLY AFTER LUNCH WITHOUT ALCOHOL: SLIGHT CHANCE OF DOZING

## 2023-12-18 ASSESSMENT — PAIN SCALES - GENERAL: PAINLEVEL: NO PAIN (0)

## 2023-12-18 NOTE — PATIENT INSTRUCTIONS
What is a Home Sleep Study?    your doctor can give you a portable sleep monitor to use at home, so you don t have to spend the night in the sleep lab. But you should use a portable monitor only if:   ?Your doctor thinks you have a condition that makes you stop breathing for short periods while you are asleep, called  sleep apnea.    ?You do not have other serious medical problems, such as heart disease or lung disease.    Please bring the home sleep study device back to the sleep center as soon as you are finished with it so we can score it.     The cost of care estimate line is [phone]. They are able to give the patient an estimate of the charges and also an estimate of their insurance coverage/patient responsibility.   After your sleep study is performed, please call us at [phone] or [phone] to schedule for a follow up to review the results of the sleep study.    Consider using one tab of low dose melatonin 3 mg or less on the night of the study.    It is completely voluntary.    Do not drive or operate machinery after intake of melatonin.     Due to the pandemic, we have many people waiting in line for sleep studies- this wait list is improving each week.   You should receive a call within 2 weeks from our staff to schedule you for  your test.   Depending on the delay in approval by your insurance carrier, the study will be completed within a few days to 2 weeks of that call.    Please make every effort you can to sleep on your back with one pillow behind your head.    Please also call your insurance company next week to see if your sleep study is approved and find out your co-pay.

## 2023-12-18 NOTE — PROGRESS NOTES
Virtual Visit Details    Type of service:  Video Visit     Originating Location (pt. Location): Home  Distant Location (provider location):  Off-site  Platform used for Video Visit: InnaVirVax    Additional 15 minutes on the date of service was spent performing the following:    -Preparing to see the patient  -Obtaining and/or reviewing separately obtained history   -Ordering medications, tests, or procedures   -Documenting clinical information in the electronic or other health record     Thank you for the opportunity to participate in the care of  Ashley Mcdaniels.    Assessment and Plan:    In summary Ashley Mcdaniels is a 43 year old year old female here for sleep disturbance.  1. Chronic fatigue/Snoring/Persistent Insomnia/Sleep related headaches/Sleep related bruxism/Obesity   Ashley Mcdaniels has high risk for obstructive sleep apnea based on the history of chronic fatigue, snoring and a crowded airway. I educated the patient on the underlying pathophysiology of obstructive sleep apnea. We reviewed the risks associated with sleep apnea, including increased cardiovascular risk and overall death. We talked about treatments briefly. I recommend getting a Home sleep study. The patient should return to the clinic to discuss results and treatment option in a patient-centered approach.    Lab reviewed: Discussed with patient.    History of present illness:    She is a 43 year old female who comes to the virtual clinic with a chief complaint of chronic fatigue that has been going on for more than 5 years. While the patient denies any episodes of witness apnea, she has been told that she has loud snoring during sleep. The patient also complains of waking up  frequently with headaches and does grind her teeth during sleep. She also complains of difficulty initiating sleep. Her BMI is elevated at 37.20.     Ideal Sleep-Wake Cycle(devoid of societal pressure):    Patient would try to initiate sleep at around 11-12 at night with a sleep  latency of more than 30 minutes. The patient would have 1 awakenings. Final wake up time is around 9-10 AM.    REJI:  REJI Total Score: 19  Total score - [place]: 11 (12/18/2023  2:35 PM)    Patient told to return in one week after the sleep study is interpreted.    Patient Active Problem List   Diagnosis    YUE (generalized anxiety disorder)    Major depressive disorder, recurrent episode, mild (H24)    Crohn's disease of both small and large intestine without complication (H)    IUD (intrauterine device) in place    Chronic urticaria    ADHD (attention deficit hyperactivity disorder), inattentive type    Gastroesophageal reflux disease without esophagitis    Morbid obesity (H)    Segmental dysfunction of sacral region    Segmental dysfunction of lumbar region    Segmental dysfunction of thoracic region    Bilateral low back pain with left-sided sciatica    PAD (peripheral artery disease) (H24)    Dry hair    Dry skin    Immunosuppressed status (H24)    Venous insufficiency of right leg    Lipedema     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     Crohn's colitis (H)     YUE (generalized anxiety disorder)     GERD (gastroesophageal reflux disease)     Low back pain     Major depressive disorder, recurrent episode, mild (H24)      Past Surgical History:   Procedure Laterality Date    CHOLECYSTECTOMY  Feb 2007    DILATION AND CURETTAGE  May 2003    Non-OB    HC TOOTH EXTRACTION W/FORCEP  Nov 2006    LAP ADJUSTABLE GASTRIC BAND  May 2007     Current Outpatient Medications   Medication Sig Dispense Refill    buPROPion (WELLBUTRIN XL) 300 MG 24 hr tablet Take 1 tablet (300 mg) by mouth every morning 90 tablet 3    cetirizine (ZYRTEC) 10 MG tablet Take 1 tablet (10 mg) by mouth daily 30 tablet 0    cyanocobalamin (CYANOCOBALAMIN) 1000 MCG/ML injection Inject 1,000 mcg into the muscle      EPINEPHrine (ANY BX GENERIC  EQUIV) 0.3 MG/0.3ML injection 2-pack Inject 0.3 mLs (0.3 mg) into the muscle once as needed for anaphylaxis 0.6 mL 0    HUMIRA *CF* PEN 40 MG/0.4ML pen kit   1    hydrOXYzine (ATARAX) 50 MG tablet TAKE 1-2 TABLETS ( MG) BY MOUTH EVERY 6 HOURS AS NEEDED FOR ANXIETY OR OTHER (SLEEP) 60 tablet 0    levonorgestrel (MIRENA) 20 MCG/24HR IUD 1 each (20 mcg) by Intrauterine route continuous      semaglutide (OZEMPIC) 2 MG/1.5ML SOPN pen       valACYclovir (VALTREX) 500 MG tablet Take 1 tablet (500 mg) by mouth 2 times daily For 3-4 days 40 tablet 3     Azathioprine and No clinical screening - see comments  Social History     Socioeconomic History    Marital status:      Spouse name: Not on file    Number of children: Not on file    Years of education: Not on file    Highest education level: Not on file   Occupational History     Comment: consulting   Tobacco Use    Smoking status: Former    Smokeless tobacco: Never    Tobacco comments:     Vapes daily   Vaping Use    Vaping Use: Every day    Substances: Nicotine, Flavoring   Substance and Sexual Activity    Alcohol use: Yes     Comment: 2x a week    Drug use: No    Sexual activity: Yes     Partners: Male     Birth control/protection: I.U.D.   Other Topics Concern    Parent/sibling w/ CABG, MI or angioplasty before 65F 55M? No   Social History Narrative    09/06/23        ENVIRONMENTAL HISTORY: [place] The home is heated with a forced air. They do have central air conditioning. The patient's bedroom is furnished with carpeting in bedroom.  Pets inside the house include 1 cat(s)outdoor cat, 1 dog(s), and 2 horses. There is no history of cockroach or mice infestation. There is/are 0 smokers in the house.  The house does not have a damp basement.      Social Determinants of Health     Financial Resource Strain: Low Risk  (12/13/2023)    Financial Resource Strain     Within the past 12 months, have you or your family  "members you live with been unable to get utilities (heat, electricity) when it was really needed?: No   Food Insecurity: Low Risk  (12/13/2023)    Food Insecurity     Within the past 12 months, did you worry that your food would run out before you got money to buy more?: No     Within the past 12 months, did the food you bought just not last and you didn t have money to get more?: No   Transportation Needs: Low Risk  (12/13/2023)    Transportation Needs     Within the past 12 months, has lack of transportation kept you from medical appointments, getting your medicines, non-medical meetings or appointments, work, or from getting things that you need?: No   Physical Activity: Not on file   Stress: Not on file   Social Connections: Not on file   Interpersonal Safety: Not on file   Housing Stability: Low Risk  (12/13/2023)    Housing Stability     Do you have housing? : Yes     Are you worried about losing your housing?: No     Family History   Problem Relation Age of Onset    Arthritis Mother     Deep Vein Thrombosis Mother     Heart Disease Father         CAD, CHF    Alcoholism Father     Asthma Father     Hypertension Father     Hyperlipidemia Father     Pancreatic Cancer Father     Liver Cancer Father     Diabetes Maternal Grandmother     Substance Abuse Maternal Grandmother     Hyperlipidemia Maternal Grandmother     Hypertension Maternal Grandmother     Substance Abuse Maternal Grandfather     Asthma Paternal Grandmother     Heart Disease Paternal Grandmother     Diabetes Paternal Grandmother     Hypertension Paternal Grandmother     Substance Abuse Paternal Grandfather     Mental Illness Brother     Substance Abuse Brother         Visual Exam:  GEN: NAD,   Head: Normocephalic.  EYES: EOMI  ENT: Oropharynx is clear, Little class 4+ airway.   Psych: normal mood, normal affect  Snore test:(+)     Labs/Studies:     No results found for: \"PH\", \"PHARTERIAL\", \"PO2\", \"GU5UQSPAXHS\", \"SAT\", \"PCO2\", \"HCO3\", \"BASEEXCESS\", " "\"CRISTINE\", \"BEB\"  Lab Results   Component Value Date    TSH 2.03 11/29/2021    TSH 1.20 07/06/2020     Lab Results   Component Value Date    GLC 87 01/10/2019     (H) 10/31/2017     Lab Results   Component Value Date    HGB 14.9 09/29/2023    HGB 13.6 02/12/2023     Lab Results   Component Value Date    BUN 10 01/10/2019    BUN 7 10/31/2017    CR 0.82 09/29/2023    CR 0.80 02/12/2023     Lab Results   Component Value Date    AST 32 09/29/2023    AST 23 02/12/2023    ALT 29 09/29/2023    ALT 25 02/12/2023    ALKPHOS 66 09/29/2023    ALKPHOS 69 02/12/2023    BILITOTAL 0.5 09/29/2023    BILITOTAL 0.3 02/12/2023     No results found for: \"UAMP\", \"UBARB\", \"BENZODIAZEUR\", \"UCANN\", \"UCOC\", \"OPIT\", \"UPCP\"    Recent Labs   Lab Test 09/29/23  0928 02/12/23  1154 03/12/19  0837 01/10/19  0938 05/09/18  0834 04/10/18  0952 10/31/17  1318   NA  --   --   --  137  --   --  137   POTASSIUM  --   --   --  4.1  --   --  3.9   CHLORIDE  --   --   --  103  --   --  103   CO2  --   --   --  30  --   --  25   ANIONGAP  --   --   --  4  --   --  9   GLC  --   --   --  87  --   --  106*   BUN  --   --   --  10  --   --  7   CR 0.82 0.80   < > 0.86   < >  --  0.84   JOVANNI  --   --   --  8.7  --  8.3* 8.6    < > = values in this interval not displayed.       No results found for: \"JUAQUIN\"    Nitish Kulkarni DO  Board Certified in Internal Medicine and Sleep Medicine    (Note created with Dragon voice recognition and unintended spelling errors and word substitutions may occur)     "

## 2023-12-18 NOTE — NURSING NOTE
Has patient had flu shot for current/most recent flu season? If so, when? No      Is the patient currently in the state of [place]? YES    Visit mode:VIDEO    If the visit is dropped, the patient can be reconnected by: VIDEO VISIT: Text to cell phone:   Telephone Information:   Mobile [phone]       Will anyone else be joining the visit? NO  (If patient encounters technical issues they should call [phone])    How would you like to obtain your AVS? MyChart    Are changes needed to the allergy or medication list? No    Reason for visit: Consult    Chen CERNA

## 2024-01-02 DIAGNOSIS — K50.80 CROHN'S DISEASE OF SMALL AND LARGE INTESTINES (H): Primary | ICD-10-CM

## 2024-01-10 ENCOUNTER — ALLIED HEALTH/NURSE VISIT (OUTPATIENT)
Dept: ALLERGY | Facility: OTHER | Age: 44
End: 2024-01-10
Payer: COMMERCIAL

## 2024-01-10 DIAGNOSIS — L50.8 CHRONIC URTICARIA: Primary | ICD-10-CM

## 2024-01-10 PROCEDURE — 96372 THER/PROPH/DIAG INJ SC/IM: CPT | Performed by: ALLERGY & IMMUNOLOGY

## 2024-01-10 NOTE — PROGRESS NOTES
Clinic Administered Medication Documentation      Injectable Medication Documentation      Patient was given 300 mg Xolair. Prior to medication administration, verified patient's identity using patient s name and date of birth. Please see MAR and medication order for additional information. Patient instructed to remain in clinic for 30 minutes, report any adverse reaction to staff immediately, and must check in with staff prior to discharge from clinic.    Was this medication supplied by the patient? No      Yes

## 2024-01-21 DIAGNOSIS — F41.1 GAD (GENERALIZED ANXIETY DISORDER): ICD-10-CM

## 2024-01-22 RX ORDER — HYDROXYZINE HYDROCHLORIDE 50 MG/1
TABLET, FILM COATED ORAL
Qty: 60 TABLET | Refills: 0 | Status: SHIPPED | OUTPATIENT
Start: 2024-01-22 | End: 2024-07-15

## 2024-02-07 ENCOUNTER — ALLIED HEALTH/NURSE VISIT (OUTPATIENT)
Dept: ALLERGY | Facility: OTHER | Age: 44
End: 2024-02-07
Payer: COMMERCIAL

## 2024-02-07 ENCOUNTER — TELEPHONE (OUTPATIENT)
Dept: ALLERGY | Facility: OTHER | Age: 44
End: 2024-02-07

## 2024-02-07 DIAGNOSIS — L50.8 CHRONIC URTICARIA: Primary | ICD-10-CM

## 2024-02-07 PROCEDURE — 96372 THER/PROPH/DIAG INJ SC/IM: CPT | Performed by: ALLERGY & IMMUNOLOGY

## 2024-02-07 NOTE — PROGRESS NOTES
Clinic Administered Medication Documentation      Injectable Medication Documentation      Patient was given Xolair 300 mg. Prior to medication administration, verified patient's identity using patient s name and date of birth. Please see MAR and medication order for additional information. Patient instructed to remain in clinic for 30 minutes, report any adverse reaction to staff immediately, and must check in with staff prior to discharge from clinic.    Was this medication supplied by the patient? No   Xolair 300 mg

## 2024-02-07 NOTE — TELEPHONE ENCOUNTER
Patient requested information regarding Xolair patient assistance at her appointment today 2/7/2024. Writer provided patient with the application form. Patient reports she filled out patient portion online. She is aware provider is ERIK this week to fill out provider portion. Forms at writer's desk for provider.    Camille LONG, Specialty RN 2/7/2024 9:28 AM

## 2024-02-07 NOTE — NURSING NOTE
RAPID3 (0-30) Cumulative Score  14.5          RAPID3 Weighted Score (divide #4 by 3 and that is the weighted score)  4.8       Sandra SWENSON RN, Specialty Clinic 02/07/24 8:30 AM

## 2024-02-07 NOTE — PROGRESS NOTES
Ashley Mcdaniels presents to clinic today at the request of Toñito Desir MD  (ordering provider) for Xolair (omalizumab) injection(s).       This service provided today was under the care of Diana Aguilera MD; the supervising provider of the day; who was available if needed.      Patient presented after waiting 30 minutes with no reaction to  injections. Discharged from clinic.    Camille Vaca RN

## 2024-02-09 NOTE — PROGRESS NOTES
DISCHARGE  Reason for Discharge: Patient has failed to schedule further appointments.   Seen for 3 visits Sept /Oct 2023, garments recommended and working with fitter on proper product as of October 2023.     Equipment Issued: MLD, nutrition ed, compression guidance, ex guidance    Discharge Plan: Patient to continue home program.    Referring Provider:  Julio Arce................... PT, DPT, CLT   2/8/2024, 6:16 PM  [phone]

## 2024-02-14 NOTE — TELEPHONE ENCOUNTER
Forms signed by provider and faxed to [employer]. Confirmed fax sent successfully.    Camille LONG, Specialty RN 2/14/2024 1:32 PM

## 2024-02-27 ENCOUNTER — LAB (OUTPATIENT)
Dept: LAB | Facility: CLINIC | Age: 44
End: 2024-02-27
Payer: COMMERCIAL

## 2024-02-27 DIAGNOSIS — K50.80 CROHN'S DISEASE OF SMALL AND LARGE INTESTINES (H): ICD-10-CM

## 2024-02-27 LAB
ALBUMIN SERPL BCG-MCNC: 4.4 G/DL (ref 3.5–5.2)
ALP SERPL-CCNC: 79 U/L (ref 40–150)
ALT SERPL W P-5'-P-CCNC: 23 U/L (ref 0–50)
AST SERPL W P-5'-P-CCNC: 26 U/L (ref 0–45)
BASOPHILS # BLD AUTO: 0 10E3/UL (ref 0–0.2)
BASOPHILS NFR BLD AUTO: 0 %
BILIRUB DIRECT SERPL-MCNC: <0.2 MG/DL (ref 0–0.3)
BILIRUB SERPL-MCNC: 0.2 MG/DL
CREAT SERPL-MCNC: 0.8 MG/DL (ref 0.51–0.95)
EGFRCR SERPLBLD CKD-EPI 2021: >90 ML/MIN/1.73M2
EOSINOPHIL # BLD AUTO: 0.2 10E3/UL (ref 0–0.7)
EOSINOPHIL NFR BLD AUTO: 2 %
ERYTHROCYTE [DISTWIDTH] IN BLOOD BY AUTOMATED COUNT: 13.5 % (ref 10–15)
HCT VFR BLD AUTO: 41.9 % (ref 35–47)
HGB BLD-MCNC: 14.2 G/DL (ref 11.7–15.7)
IMM GRANULOCYTES # BLD: 0 10E3/UL
IMM GRANULOCYTES NFR BLD: 0 %
LYMPHOCYTES # BLD AUTO: 3.2 10E3/UL (ref 0–5.3)
LYMPHOCYTES NFR BLD AUTO: 27 %
MCH RBC QN AUTO: 31.1 PG (ref 26.5–33)
MCHC RBC AUTO-ENTMCNC: 33.9 G/DL (ref 31.5–36.5)
MCV RBC AUTO: 92 FL (ref 78–100)
MONOCYTES # BLD AUTO: 0.8 10E3/UL (ref 0–1.3)
MONOCYTES NFR BLD AUTO: 7 %
NEUTROPHILS # BLD AUTO: 7.8 10E3/UL (ref 1.6–8.3)
NEUTROPHILS NFR BLD AUTO: 64 %
NRBC # BLD AUTO: 0 10E3/UL
NRBC BLD AUTO-RTO: 0 /100
PLATELET # BLD AUTO: 315 10E3/UL (ref 150–450)
PROT SERPL-MCNC: 8.3 G/DL (ref 6.4–8.3)
RBC # BLD AUTO: 4.57 10E6/UL (ref 3.8–5.2)
WBC # BLD AUTO: 12.1 10E3/UL (ref 4–11)

## 2024-02-27 PROCEDURE — 36415 COLL VENOUS BLD VENIPUNCTURE: CPT

## 2024-02-27 PROCEDURE — 82607 VITAMIN B-12: CPT

## 2024-02-27 PROCEDURE — 85025 COMPLETE CBC W/AUTO DIFF WBC: CPT

## 2024-02-27 PROCEDURE — 80076 HEPATIC FUNCTION PANEL: CPT

## 2024-02-27 PROCEDURE — 82565 ASSAY OF CREATININE: CPT

## 2024-02-28 ENCOUNTER — VIRTUAL VISIT (OUTPATIENT)
Dept: OTHER | Facility: CLINIC | Age: 44
End: 2024-02-28
Attending: INTERNAL MEDICINE
Payer: COMMERCIAL

## 2024-02-28 DIAGNOSIS — R60.9 LIPEDEMA: ICD-10-CM

## 2024-02-28 DIAGNOSIS — K50.80 CROHN'S DISEASE OF BOTH SMALL AND LARGE INTESTINE WITHOUT COMPLICATION (H): ICD-10-CM

## 2024-02-28 DIAGNOSIS — E66.01 MORBID OBESITY (H): ICD-10-CM

## 2024-02-28 DIAGNOSIS — I87.2 VENOUS (PERIPHERAL) INSUFFICIENCY: ICD-10-CM

## 2024-02-28 DIAGNOSIS — Z97.5 IUD (INTRAUTERINE DEVICE) IN PLACE: ICD-10-CM

## 2024-02-28 DIAGNOSIS — D84.9 IMMUNOSUPPRESSED STATUS (H): ICD-10-CM

## 2024-02-28 LAB — VIT B12 SERPL-MCNC: 741 PG/ML (ref 232–1245)

## 2024-02-28 PROCEDURE — 99443 PR PHYSICIAN TELEPHONE EVALUATION 21-30 MIN: CPT | Mod: 93 | Performed by: INTERNAL MEDICINE

## 2024-02-28 NOTE — PATIENT INSTRUCTIONS
Continue current treatment plan and take lymphatic formula 1000 , 1 pill daily    Compression, MLD, lymphatic yoga    Congratulations for 17 pound weight loss continue the same    Follow-up with me in 6 months virtually or in the clinic

## 2024-02-28 NOTE — PROGRESS NOTES
Tia is a 43 year old who is being evaluated via a billable telephone visit.      What phone number would you like to be contacted at? [phone]  How would you like to obtain your AVS? Basilio  Originating Location (pt. Location): Home    Distant Location (provider location):  On-site    Subjective   Tia is a 43 year old, presenting for the following health issues:  Telephone ([phone] /6 month follow-up to 8/30/23 .SEB /)      Objective         Vitals:  No vitals were obtained today due to virtual visit.    MASHA EUCEDA    Provider visit note:    Follow up visit  Seen by edema therapist , weight loss clinic  Wearing compression   known history of venous insufficiency in a very pleasant young female morbidly obese BMI greater than 40 more than 15 years ago she had a gastric banding initially successful then followed by gained weight with ongoing leg heaviness fatigue, tiredness etc..   Lost 15 lbs      Review of systems: Reviewed all 12 point review of systems as per HPI otherwise unremarkable    Physical exam:( no physical exam done this is virtual visit)    Reviewed recent laboratory tests, imaging studies in the epic and updated chart    Assessment and plan:    1. Lipedema     2. Venous (peripheral) insufficiency RT GSV incompetence proximal thigh jan 2019 study     3. Morbid obesity (H) BMI  37.2 <--40.5     4. Crohn's disease of both small and large intestine without complication (H)     5. IUD (intrauterine device) in place     6. Immunosuppressed status (H)        This is a very pleasant 43-year-old female with multiple medical issues including the Crohn's disease currently on immunosuppressive medication, morbid obese, snoring with no formal diagnosis of sleep apnea, bilateral lower extremity varicose veins underwent venous competency study in 2019 which showed right GSV incompetence in proximal thigh area no DVT and no SVT and she has a IUD in place she has a 2 grownup children's age 20 and 17.  She  gives a history of ongoing leg heaviness, fatigue Licking and tiredness for many years.  Lower body disproportionately larger than upper body since the puberty and got worsened after the pregnancy and childbirth.  Her exam is consistent with lipedema and venous insufficiency  She also has easy bruising, hyperextensibility of the joints and mattress phenomenon  Negative Stemmer sign no clinical evidence of lymphedema     I had a lengthy discussion with the patient last visit and today  reviewed multimodality approach of lipedema     Improve the lymphatic flow with compression, MLD, wraps, vibration, pump therapy etc.     Water aerobics, cycling, Pilates, lymphatic yoga etc. discussed with the patient     Suggested taking lymphatic formula 1 pill daily information and prescription given last visit     Suggested patient to discuss with the primary and see the weight loss clinic and go for formal sleep evaluation     Muscle toning exercises discussed     Join lipedema support groups     Return to clinic in 6 months    Phone visit start time: 4:16 PM  Phone visit end time: 4:30 PM  Location of patient : At her Home   Location of provider: [facility]        21 minutes spent on the date of the encounter doing chart review, history , documentation, and further activities as noted above.      Copy of this note to primary care provider      This visit is being conducted as a virtual visit due to the emphasis on mitigation of the COVID-19 virus pandemic. The clinician has decided that the risk of an in-office visit outweighs the benefit for this patient.       Julio Savage MD,CRISTINO,FSVM,FNLA, FACP  Vascular Medicine  Clinical Hypertension Specialist   Clinical Lipidologist

## 2024-03-06 ENCOUNTER — ALLIED HEALTH/NURSE VISIT (OUTPATIENT)
Dept: ALLERGY | Facility: OTHER | Age: 44
End: 2024-03-06
Payer: COMMERCIAL

## 2024-03-06 DIAGNOSIS — L50.8 CHRONIC URTICARIA: Primary | ICD-10-CM

## 2024-03-06 PROCEDURE — 96372 THER/PROPH/DIAG INJ SC/IM: CPT | Performed by: ALLERGY & IMMUNOLOGY

## 2024-03-06 NOTE — PROGRESS NOTES
Clinic Administered Medication Documentation      Injectable Medication Documentation      Patient was given 300 mg Xolair. Prior to medication administration, verified patient's identity using patient s name and date of birth. Please see MAR and medication order for additional information. Patient instructed to remain in clinic for 30 minutes, report any adverse reaction to staff immediately, and must check in with staff prior to discharge from clinic.    Was this medication supplied by the patient? No   300 mg Xolair

## 2024-03-26 DIAGNOSIS — Z13.1 SCREENING FOR DIABETES MELLITUS: Primary | ICD-10-CM

## 2024-03-26 DIAGNOSIS — K50.80 REGIONAL ENTERITIS OF SMALL INTESTINE WITH LARGE INTESTINE (H): ICD-10-CM

## 2024-04-10 ENCOUNTER — ALLIED HEALTH/NURSE VISIT (OUTPATIENT)
Dept: ALLERGY | Facility: OTHER | Age: 44
End: 2024-04-10
Payer: COMMERCIAL

## 2024-04-10 DIAGNOSIS — L50.8 CHRONIC URTICARIA: Primary | ICD-10-CM

## 2024-04-10 PROCEDURE — 96372 THER/PROPH/DIAG INJ SC/IM: CPT | Performed by: ALLERGY & IMMUNOLOGY

## 2024-04-30 DIAGNOSIS — Z79.69 NEED FOR PROPHYLACTIC CHEMOTHERAPY: Primary | ICD-10-CM

## 2024-04-30 DIAGNOSIS — K50.80 REGIONAL ENTERITIS OF SMALL INTESTINE WITH LARGE INTESTINE (H): ICD-10-CM

## 2024-05-08 ENCOUNTER — ALLIED HEALTH/NURSE VISIT (OUTPATIENT)
Dept: ALLERGY | Facility: OTHER | Age: 44
End: 2024-05-08
Payer: COMMERCIAL

## 2024-05-08 DIAGNOSIS — L50.8 CHRONIC URTICARIA: Primary | ICD-10-CM

## 2024-05-08 PROCEDURE — 96372 THER/PROPH/DIAG INJ SC/IM: CPT | Performed by: ALLERGY & IMMUNOLOGY

## 2024-06-05 ENCOUNTER — ALLIED HEALTH/NURSE VISIT (OUTPATIENT)
Dept: ALLERGY | Facility: OTHER | Age: 44
End: 2024-06-05
Payer: COMMERCIAL

## 2024-06-05 ENCOUNTER — MYC REFILL (OUTPATIENT)
Dept: FAMILY MEDICINE | Facility: OTHER | Age: 44
End: 2024-06-05

## 2024-06-05 DIAGNOSIS — L50.8 CHRONIC URTICARIA: Primary | ICD-10-CM

## 2024-06-05 DIAGNOSIS — B00.1 RECURRENT COLD SORES: ICD-10-CM

## 2024-06-05 PROCEDURE — 96372 THER/PROPH/DIAG INJ SC/IM: CPT | Performed by: ALLERGY & IMMUNOLOGY

## 2024-06-05 RX ORDER — VALACYCLOVIR HYDROCHLORIDE 500 MG/1
500 TABLET, FILM COATED ORAL 2 TIMES DAILY
Qty: 40 TABLET | Refills: 1 | Status: SHIPPED | OUTPATIENT
Start: 2024-06-05 | End: 2024-07-15

## 2024-06-05 NOTE — PROGRESS NOTES
Ashley Mcdaniels presents to clinic today at the request of Toñito Desir MD  (ordering provider) for Xolair (omalizumab) injection(s).       This service provided today was under the care of Toñito Desir MD ; the supervising provider of the day; who was available if needed.      Patient presented after waiting 30 minutes with no reaction to  injections. Discharged from clinic.    MARCO JackmanN, RN, PHN

## 2024-06-26 DIAGNOSIS — K50.80 CROHN'S DISEASE OF BOTH SMALL AND LARGE INTESTINE WITHOUT COMPLICATION (H): Primary | ICD-10-CM

## 2024-06-27 ENCOUNTER — MYC MEDICAL ADVICE (OUTPATIENT)
Dept: FAMILY MEDICINE | Facility: OTHER | Age: 44
End: 2024-06-27
Payer: COMMERCIAL

## 2024-06-28 ENCOUNTER — LAB (OUTPATIENT)
Dept: LAB | Facility: CLINIC | Age: 44
End: 2024-06-28
Payer: COMMERCIAL

## 2024-06-28 DIAGNOSIS — Z13.1 SCREENING FOR DIABETES MELLITUS: ICD-10-CM

## 2024-06-28 DIAGNOSIS — K50.80 REGIONAL ENTERITIS OF SMALL INTESTINE WITH LARGE INTESTINE (H): ICD-10-CM

## 2024-06-28 DIAGNOSIS — K50.80 CROHN'S DISEASE OF BOTH SMALL AND LARGE INTESTINE WITHOUT COMPLICATION (H): ICD-10-CM

## 2024-06-28 LAB
ALBUMIN SERPL BCG-MCNC: 4.2 G/DL (ref 3.5–5.2)
ALP SERPL-CCNC: 68 U/L (ref 40–150)
ALT SERPL W P-5'-P-CCNC: 26 U/L (ref 0–50)
AST SERPL W P-5'-P-CCNC: 24 U/L (ref 0–45)
BASOPHILS # BLD AUTO: 0.1 10E3/UL (ref 0–0.2)
BASOPHILS NFR BLD AUTO: 1 %
BILIRUB DIRECT SERPL-MCNC: <0.2 MG/DL (ref 0–0.3)
BILIRUB SERPL-MCNC: 0.2 MG/DL
CREAT SERPL-MCNC: 0.82 MG/DL (ref 0.51–0.95)
EGFRCR SERPLBLD CKD-EPI 2021: >90 ML/MIN/1.73M2
EOSINOPHIL # BLD AUTO: 0.2 10E3/UL (ref 0–0.7)
EOSINOPHIL NFR BLD AUTO: 2 %
ERYTHROCYTE [DISTWIDTH] IN BLOOD BY AUTOMATED COUNT: 13.3 % (ref 10–15)
FASTING STATUS PATIENT QL REPORTED: YES
GLUCOSE SERPL-MCNC: 96 MG/DL (ref 70–99)
HCT VFR BLD AUTO: 41.9 % (ref 35–47)
HGB BLD-MCNC: 14.1 G/DL (ref 11.7–15.7)
IMM GRANULOCYTES # BLD: 0 10E3/UL
IMM GRANULOCYTES NFR BLD: 0 %
LYMPHOCYTES # BLD AUTO: 3 10E3/UL (ref 0.8–5.3)
LYMPHOCYTES NFR BLD AUTO: 33 %
MCH RBC QN AUTO: 30.3 PG (ref 26.5–33)
MCHC RBC AUTO-ENTMCNC: 33.7 G/DL (ref 31.5–36.5)
MCV RBC AUTO: 90 FL (ref 78–100)
MONOCYTES # BLD AUTO: 0.6 10E3/UL (ref 0–1.3)
MONOCYTES NFR BLD AUTO: 7 %
NEUTROPHILS # BLD AUTO: 5.1 10E3/UL (ref 1.6–8.3)
NEUTROPHILS NFR BLD AUTO: 57 %
NRBC # BLD AUTO: 0 10E3/UL
NRBC BLD AUTO-RTO: 0 /100
PLATELET # BLD AUTO: 316 10E3/UL (ref 150–450)
PROT SERPL-MCNC: 7.7 G/DL (ref 6.4–8.3)
RBC # BLD AUTO: 4.65 10E6/UL (ref 3.8–5.2)
TSH SERPL DL<=0.005 MIU/L-ACNC: 1.32 UIU/ML (ref 0.3–4.2)
WBC # BLD AUTO: 9 10E3/UL (ref 4–11)

## 2024-06-28 PROCEDURE — 82947 ASSAY GLUCOSE BLOOD QUANT: CPT

## 2024-06-28 PROCEDURE — 85025 COMPLETE CBC W/AUTO DIFF WBC: CPT

## 2024-06-28 PROCEDURE — 80076 HEPATIC FUNCTION PANEL: CPT

## 2024-06-28 PROCEDURE — 36415 COLL VENOUS BLD VENIPUNCTURE: CPT

## 2024-06-28 PROCEDURE — 82565 ASSAY OF CREATININE: CPT

## 2024-06-28 PROCEDURE — 84443 ASSAY THYROID STIM HORMONE: CPT

## 2024-07-15 ENCOUNTER — OFFICE VISIT (OUTPATIENT)
Dept: FAMILY MEDICINE | Facility: OTHER | Age: 44
End: 2024-07-15
Payer: COMMERCIAL

## 2024-07-15 VITALS
SYSTOLIC BLOOD PRESSURE: 120 MMHG | TEMPERATURE: 98.1 F | RESPIRATION RATE: 16 BRPM | BODY MASS INDEX: 41.29 KG/M2 | DIASTOLIC BLOOD PRESSURE: 82 MMHG | OXYGEN SATURATION: 96 % | HEART RATE: 96 BPM | HEIGHT: 63 IN | WEIGHT: 233 LBS

## 2024-07-15 DIAGNOSIS — I73.9 PAD (PERIPHERAL ARTERY DISEASE) (H): ICD-10-CM

## 2024-07-15 DIAGNOSIS — B00.1 RECURRENT COLD SORES: ICD-10-CM

## 2024-07-15 DIAGNOSIS — F33.0 MAJOR DEPRESSIVE DISORDER, RECURRENT EPISODE, MILD (H): ICD-10-CM

## 2024-07-15 DIAGNOSIS — F41.1 GAD (GENERALIZED ANXIETY DISORDER): ICD-10-CM

## 2024-07-15 DIAGNOSIS — Z00.00 ROUTINE GENERAL MEDICAL EXAMINATION AT A HEALTH CARE FACILITY: Primary | ICD-10-CM

## 2024-07-15 DIAGNOSIS — Z23 NEED FOR PNEUMOCOCCAL 20-VALENT CONJUGATE VACCINATION: ICD-10-CM

## 2024-07-15 DIAGNOSIS — B65.3 SWIMMER'S ITCH: ICD-10-CM

## 2024-07-15 PROCEDURE — 90677 PCV20 VACCINE IM: CPT | Performed by: PHYSICIAN ASSISTANT

## 2024-07-15 PROCEDURE — 99396 PREV VISIT EST AGE 40-64: CPT | Mod: 25 | Performed by: PHYSICIAN ASSISTANT

## 2024-07-15 PROCEDURE — 99214 OFFICE O/P EST MOD 30 MIN: CPT | Mod: 25 | Performed by: PHYSICIAN ASSISTANT

## 2024-07-15 PROCEDURE — 90471 IMMUNIZATION ADMIN: CPT | Performed by: PHYSICIAN ASSISTANT

## 2024-07-15 RX ORDER — VALACYCLOVIR HYDROCHLORIDE 500 MG/1
500 TABLET, FILM COATED ORAL 2 TIMES DAILY
Qty: 40 TABLET | Refills: 1 | Status: SHIPPED | OUTPATIENT
Start: 2024-07-15

## 2024-07-15 RX ORDER — HYDROXYZINE HYDROCHLORIDE 50 MG/1
25-50 TABLET, FILM COATED ORAL EVERY 6 HOURS PRN
Qty: 60 TABLET | Refills: 3 | Status: SHIPPED | OUTPATIENT
Start: 2024-07-15

## 2024-07-15 RX ORDER — METHYLPREDNISOLONE 4 MG
TABLET, DOSE PACK ORAL
Qty: 21 TABLET | Refills: 0 | Status: SHIPPED | OUTPATIENT
Start: 2024-07-15 | End: 2024-09-25

## 2024-07-15 RX ORDER — BUPROPION HYDROCHLORIDE 300 MG/1
300 TABLET ORAL EVERY MORNING
Qty: 90 TABLET | Refills: 3 | Status: SHIPPED | OUTPATIENT
Start: 2024-07-15

## 2024-07-15 SDOH — HEALTH STABILITY: PHYSICAL HEALTH: ON AVERAGE, HOW MANY DAYS PER WEEK DO YOU ENGAGE IN MODERATE TO STRENUOUS EXERCISE (LIKE A BRISK WALK)?: 2 DAYS

## 2024-07-15 ASSESSMENT — ANXIETY QUESTIONNAIRES
IF YOU CHECKED OFF ANY PROBLEMS ON THIS QUESTIONNAIRE, HOW DIFFICULT HAVE THESE PROBLEMS MADE IT FOR YOU TO DO YOUR WORK, TAKE CARE OF THINGS AT HOME, OR GET ALONG WITH OTHER PEOPLE: SOMEWHAT DIFFICULT
3. WORRYING TOO MUCH ABOUT DIFFERENT THINGS: SEVERAL DAYS
1. FEELING NERVOUS, ANXIOUS, OR ON EDGE: MORE THAN HALF THE DAYS
7. FEELING AFRAID AS IF SOMETHING AWFUL MIGHT HAPPEN: SEVERAL DAYS
GAD7 TOTAL SCORE: 8
5. BEING SO RESTLESS THAT IT IS HARD TO SIT STILL: NOT AT ALL
GAD7 TOTAL SCORE: 8
4. TROUBLE RELAXING: SEVERAL DAYS
GAD7 TOTAL SCORE: 8
2. NOT BEING ABLE TO STOP OR CONTROL WORRYING: MORE THAN HALF THE DAYS
7. FEELING AFRAID AS IF SOMETHING AWFUL MIGHT HAPPEN: SEVERAL DAYS
8. IF YOU CHECKED OFF ANY PROBLEMS, HOW DIFFICULT HAVE THESE MADE IT FOR YOU TO DO YOUR WORK, TAKE CARE OF THINGS AT HOME, OR GET ALONG WITH OTHER PEOPLE?: SOMEWHAT DIFFICULT
6. BECOMING EASILY ANNOYED OR IRRITABLE: SEVERAL DAYS

## 2024-07-15 ASSESSMENT — SOCIAL DETERMINANTS OF HEALTH (SDOH): HOW OFTEN DO YOU GET TOGETHER WITH FRIENDS OR RELATIVES?: ONCE A WEEK

## 2024-07-15 ASSESSMENT — PAIN SCALES - GENERAL: PAINLEVEL: NO PAIN (0)

## 2024-07-15 NOTE — PROGRESS NOTES
"Preventive Care Visit  [facility]  Melva VITO You PA-C, Family Medicine  Jul 15, 2024    Assessment & Plan     ICD-10-CM    1. Routine general medical examination at a health care facility  Z00.00       2. Major depressive disorder, recurrent episode, mild (H24)  F33.0 buPROPion (WELLBUTRIN XL) 300 MG 24 hr tablet      3. PAD (peripheral artery disease) (H24)  I73.9       4. Swimmer's itch  B65.3 methylPREDNISolone (MEDROL DOSEPAK) 4 MG tablet therapy pack      5. YUE (generalized anxiety disorder)  F41.1 buPROPion (WELLBUTRIN XL) 300 MG 24 hr tablet     hydrOXYzine HCl (ATARAX) 50 MG tablet      6. Recurrent cold sores  B00.1 valACYclovir (VALTREX) 500 MG tablet      7. Need for pneumococcal 20-valent conjugate vaccination  Z23 PNEUMOCOCCAL 20 VALENT CONJUGATE (PREVNAR 20)          Patient has been advised of split billing requirements and indicates understanding: Yes    BMI  Estimated body mass index is 40.95 kg/m  as calculated from the following:    Height as of this encounter: 1.607 m (5' 3.25\").    Weight as of this encounter: 105.7 kg (233 lb).   Weight management plan: Discussed healthy diet and exercise guidelines    Counseling  Appropriate preventive services were discussed with this patient, including applicable screening as appropriate for fall prevention, nutrition, physical activity, Tobacco-use cessation, weight loss and cognition.  Checklist reviewing preventive services available has been given to the patient.  Reviewed patient's diet, addressing concerns and/or questions.   She is at risk for lack of exercise and has been provided with information to increase physical activity for the benefit of her well-being.   The patient reports drinking more than 3 alcoholic drinks per day and/or more than 7 drhnks per week. The patient was counseled and given information about possible harmful effects of excessive alcohol intake.The patient's PHQ-9 score is consistent " with mild depression. She was provided with information regarding depression.     - Reviewed chronic problems     Labs already updated      Reviewed these      Reviewed medication use and side effects     - On Enbrel, follows with GI specialist     - Will do medrol dose pack due to skin appearance      Reviewed use and side effects of medication         Review of the result(s) of each unique test - See list        6/28/24 - all labs       9/20/23 - PAP       6/21/23 - Mammogram   Diagnosis or treatment significantly limited by social determinants of health - None    20 minutes spent on the date of the encounter doing chart review, history and exam, documentation and further activities as noted above    The patient indicates understanding of these issues and agrees with the plan.    Follow up: 1 year or PRN     Rene Damon-PENNIE Nunez  [facility] [place]       Subjective   Tia is a 43 year old, presenting for the following:  Physical         Health Care Directive  Patient does not have a Health Care Directive or Living Will: Discussed advance care planning with patient; however, patient declined at this time.    YA Monroy's itch   - Cleaned her beach, all over now     Wellbutrin   - Going well, doesn't need hydroxyzine very often     Cold sores have been bad this summer, when in the sun a lot           7/15/2024   General Health   How would you rate your overall physical health? (!) FAIR   Feel stress (tense, anxious, or unable to sleep) Rather much      (!) STRESS CONCERN      7/15/2024   Nutrition   Three or more servings of calcium each day? Yes   Diet: Regular (no restrictions)   How many servings of fruit and vegetables per day? (!) 2-3   How many sweetened beverages each day? 0-1            7/15/2024   Exercise   Days per week of moderate/strenous exercise 2 days      (!) EXERCISE CONCERN      7/15/2024   Social Factors   Frequency of gathering with friends or relatives Once a week    Worry food won't last until get money to buy more No   Food not last or not have enough money for food? No   Do you have housing? (Housing is defined as stable permanent housing and does not include staying ouside in a car, in a tent, in an abandoned building, in an overnight shelter, or couch-surfing.) Yes   Are you worried about losing your housing? No   Lack of transportation? No   Unable to get utilities (heat,electricity)? No            7/15/2024   Dental   Dentist two times every year? Yes             Today's PHQ-9 Score:       7/15/2024     1:12 PM   PHQ-9 SCORE   PHQ-9 Total Score MyChart 7 (Mild depression)   PHQ-9 Total Score 7         7/15/2024   Substance Use   Alcohol more than 3/day or more than 7/wk Yes   How often do you have a drink containing alcohol 4 or more times a week   How many alcohol drinks on typical day 5 or 6   How often do you have 5+ drinks at one occasion Weekly   Audit 2/3 Score 5   How often not able to stop drinking once started Never   How often failed to do what normally expected Never   How often needed first drink in am after a heavy drinking session Never   How often feeling of guilt or remorse after drinking Monthly   How often unable to remember what happened the night before Weekly   Have you or someone else been injured because of your drinking No   Has anyone been concerned or suggested you cut down on drinking No   TOTAL SCORE - AUDIT 14   Do you use any other substances recreationally? (!) ALCOHOL        Social History     Tobacco Use    Smoking status: Former    Smokeless tobacco: Never    Tobacco comments:     Vapes daily   Vaping Use    Vaping status: Every Day    Substances: Nicotine, Flavoring   Substance Use Topics    Alcohol use: Yes     Comment: 2x a week    Drug use: No         6/21/2023   LAST FHS-7 RESULTS   1st degree relative breast or ovarian cancer No   Any relative bilateral breast cancer No   Any male have breast cancer No   Any ONE woman have BOTH  breast AND ovarian cancer No   Any woman with breast cancer before 50yrs No   2 or more relatives with breast AND/OR ovarian cancer No   2 or more relatives with breast AND/OR bowel cancer No     Mammogram Screening - Mammogram every 1-2 years updated in Health Maintenance based on mutual decision making        7/15/2024   STI Screening   New sexual partner(s) since last STI/HIV test? No        History of abnormal Pap smear: No - age 30-64 HPV with reflex Pap every 5 years recommended        Latest Ref Rng & Units 9/20/2023    10:10 AM 4/26/2021    11:56 AM 4/26/2021    11:55 AM   PAP / HPV   PAP  Negative for Intraepithelial Lesion or Malignancy (NILM)      PAP (Historical)   NIL     HPV 16 DNA Negative Negative   Negative    HPV 18 DNA Negative Negative   Negative    Other HR HPV Negative Negative   Negative      ASCVD Risk   The 10-year ASCVD risk score (Stepan FRANK, et al., 2019) is: 0.3%    Values used to calculate the score:      Age: 43 years      Sex: Female      Is Non- : No      Diabetic: No      Tobacco smoker: No      Systolic Blood Pressure: 120 mmHg      Is BP treated: No      HDL Cholesterol: 65 mg/dL      Total Cholesterol: 168 mg/dL        7/15/2024   Contraception/Family Planning   Questions about contraception or family planning No      Reviewed and updated as needed this visit by Provider   Tobacco  Allergies  Meds  Problems  Med Hx  Surg Hx  Fam Hx            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     Crohn's colitis (H)     YUE (generalized anxiety disorder)     GERD (gastroesophageal reflux disease)     Low back pain     Major depressive disorder, recurrent episode, mild (H24)      Past Surgical History:   Procedure Laterality Date    CHOLECYSTECTOMY  Feb 2007    DILATION AND CURETTAGE  May 2003    Non-OB    HC TOOTH EXTRACTION W/FORCEP  Nov 2006     "LAP ADJUSTABLE GASTRIC BAND  May 2007     Lab work is in process  Labs reviewed in EPIC  BP Readings from Last 3 Encounters:   07/15/24 120/82   09/20/23 117/82   09/06/23 103/71    Wt Readings from Last 3 Encounters:   07/15/24 105.7 kg (233 lb)   12/18/23 95.3 kg (210 lb)   10/03/23 102.1 kg (225 lb)             Review of Systems  Constitutional, neuro, ENT, endocrine, pulmonary, cardiac, gastrointestinal, genitourinary, musculoskeletal, integument and psychiatric systems are negative, except as otherwise noted.     Objective    Exam  /82   Pulse 96   Temp 98.1  F (36.7  C) (Temporal)   Resp 16   Ht 1.607 m (5' 3.25\")   Wt 105.7 kg (233 lb)   SpO2 96%   BMI 40.95 kg/m     Estimated body mass index is 40.95 kg/m  as calculated from the following:    Height as of this encounter: 1.607 m (5' 3.25\").    Weight as of this encounter: 105.7 kg (233 lb).    Physical Exam  Constitutional:       General: She is not in acute distress.     Appearance: She is well-developed.   HENT:      Right Ear: External ear normal.      Left Ear: External ear normal.      Nose: Nose normal.      Mouth/Throat:      Pharynx: No oropharyngeal exudate.   Eyes:      General:         Right eye: No discharge.         Left eye: No discharge.      Conjunctiva/sclera: Conjunctivae normal.      Pupils: Pupils are equal, round, and reactive to light.   Neck:      Thyroid: No thyromegaly.      Vascular: No JVD.      Trachea: No tracheal deviation.   Cardiovascular:      Rate and Rhythm: Normal rate and regular rhythm.      Heart sounds: Normal heart sounds. No murmur heard.     No friction rub. No gallop.   Pulmonary:      Effort: Pulmonary effort is normal. No respiratory distress.      Breath sounds: Normal breath sounds. No stridor. No wheezing or rales.   Chest:   Breasts:     Breasts are symmetrical.      Right: No inverted nipple, mass, nipple discharge or skin change.      Left: No inverted nipple, mass, nipple discharge or skin " change.   Abdominal:      General: Bowel sounds are normal. There is no distension.      Palpations: Abdomen is soft. There is no mass.      Tenderness: There is no abdominal tenderness. There is no guarding or rebound.      Hernia: No hernia is present.   Musculoskeletal:         General: Normal range of motion.      Cervical back: Normal range of motion and neck supple.   Lymphadenopathy:      Cervical: No cervical adenopathy.   Skin:     General: Skin is warm and dry.      Comments: Diffuse circular rashes with surrounding erythema   Neurological:      Mental Status: She is alert and oriented to person, place, and time.   Psychiatric:         Behavior: Behavior normal.         Thought Content: Thought content normal.         Judgment: Judgment normal.     Pelvic not indicated   Breast deferred - has mammogram later this week       Diagnostics: reviewed in Epic, see orders pending in Epic       Signed Electronically by: Melva You PA-C       Prior to immunization administration, verified patients identity using patient s name and date of birth. Please see Immunization Activity for additional information.     Screening Questionnaire for Adult Immunization    Are you sick today?   No   Do you have allergies to medications, food, a vaccine component or latex?   No   Have you ever had a serious reaction after receiving a vaccination?   No   Do you have a long-term health problem with heart, lung, kidney, or metabolic disease (e.g., diabetes), asthma, a blood disorder, no spleen, complement component deficiency, a cochlear implant, or a spinal fluid leak?  Are you on long-term aspirin therapy?   No   Do you have cancer, leukemia, HIV/AIDS, or any other immune system problem?   No   Do you have a parent, brother, or sister with an immune system problem?   No   In the past 3 months, have you taken medications that affect  your immune system, such as prednisone, other steroids, or anticancer drugs;  drugs for the treatment of rheumatoid arthritis, Crohn s disease, or psoriasis; or have you had radiation treatments?   No   Have you had a seizure, or a brain or other nervous system problem?   No   During the past year, have you received a transfusion of blood or blood    products, or been given immune (gamma) globulin or antiviral drug?   No   For women: Are you pregnant or is there a chance you could become       pregnant during the next month?   No   Have you received any vaccinations in the past 4 weeks?   No     Immunization questionnaire answers were all negative.      Patient instructed to remain in clinic for 15 minutes afterwards, and to report any adverse reactions.     Screening performed by Lita Stephenson MA on 7/15/2024 at 1:58 PM.

## 2024-07-15 NOTE — PATIENT INSTRUCTIONS
Patient Education   Preventive Care Advice   This is general advice given by our system to help you stay healthy. However, your care team may have specific advice just for you. Please talk to your care team about your preventive care needs.  Nutrition  Eat 5 or more servings of fruits and vegetables each day.  Try wheat bread, brown rice and whole grain pasta (instead of white bread, rice, and pasta).  Get enough calcium and vitamin D. Check the label on foods and aim for 100% of the RDA (recommended daily allowance).  Lifestyle  Exercise at least 150 minutes each week  (30 minutes a day, 5 days a week).  Do muscle strengthening activities 2 days a week. These help control your weight and prevent disease.  No smoking.  Wear sunscreen to prevent skin cancer.  Have a dental exam and cleaning every 6 months.  Yearly exams  See your health care team every year to talk about:  Any changes in your health.  Any medicines your care team has prescribed.  Preventive care, family planning, and ways to prevent chronic diseases.  Shots (vaccines)   HPV shots (up to age 26), if you've never had them before.  Hepatitis B shots (up to age 59), if you've never had them before.  COVID-19 shot: Get this shot when it's due.  Flu shot: Get a flu shot every year.  Tetanus shot: Get a tetanus shot every 10 years.  Pneumococcal, hepatitis A, and RSV shots: Ask your care team if you need these based on your risk.  Shingles shot (for age 50 and up)  General health tests  Diabetes screening:  Starting at age 35, Get screened for diabetes at least every 3 years.  If you are younger than age 35, ask your care team if you should be screened for diabetes.  Cholesterol test: At age 39, start having a cholesterol test every 5 years, or more often if advised.  Bone density scan (DEXA): At age 50, ask your care team if you should have this scan for osteoporosis (brittle bones).  Hepatitis C: Get tested at least once in your life.  STIs (sexually  transmitted infections)  Before age 24: Ask your care team if you should be screened for STIs.  After age 24: Get screened for STIs if you're at risk. You are at risk for STIs (including HIV) if:  You are sexually active with more than one person.  You don't use condoms every time.  You or a partner was diagnosed with a sexually transmitted infection.  If you are at risk for HIV, ask about PrEP medicine to prevent HIV.  Get tested for HIV at least once in your life, whether you are at risk for HIV or not.  Cancer screening tests  Cervical cancer screening: If you have a cervix, begin getting regular cervical cancer screening tests starting at age 21.  Breast cancer scan (mammogram): If you've ever had breasts, begin having regular mammograms starting at age 40. This is a scan to check for breast cancer.  Colon cancer screening: It is important to start screening for colon cancer at age 45.  Have a colonoscopy test every 10 years (or more often if you're at risk) Or, ask your provider about stool tests like a FIT test every year or Cologuard test every 3 years.  To learn more about your testing options, visit:   .  For help making a decision, visit:   https://bit.ly/qb67798.  Prostate cancer screening test: If you have a prostate, ask your care team if a prostate cancer screening test (PSA) at age 55 is right for you.  Lung cancer screening: If you are a current or former smoker ages 50 to 80, ask your care team if ongoing lung cancer screenings are right for you.  For informational purposes only. Not to replace the advice of your health care provider. Copyright   2023 [facility]. All rights reserved. Clinically reviewed by the [facility]. [employer] 694160 - REV 01/24.  Learning About Stress  What is stress?     Stress is your body's response to a hard situation. Your body can have a physical, emotional, or mental response. Stress is a fact of life for most people, and it  affects everyone differently. What causes stress for you may not be stressful for someone else.  A lot of things can cause stress. You may feel stress when you go on a job interview, take a test, or run a race. This kind of short-term stress is normal and even useful. It can help you if you need to work hard or react quickly. For example, stress can help you finish an important job on time.  Long-term stress is caused by ongoing stressful situations or events. Examples of long-term stress include long-term health problems, ongoing problems at work, or conflicts in your family. Long-term stress can harm your health.  How does stress affect your health?  When you are stressed, your body responds as though you are in danger. It makes hormones that speed up your heart, make you breathe faster, and give you a burst of energy. This is called the fight-or-flight stress response. If the stress is over quickly, your body goes back to normal and no harm is done.  But if stress happens too often or lasts too long, it can have bad effects. Long-term stress can make you more likely to get sick, and it can make symptoms of some diseases worse. If you tense up when you are stressed, you may develop neck, shoulder, or low back pain. Stress is linked to high blood pressure and heart disease.  Stress also harms your emotional health. It can make you carbajal, tense, or depressed. Your relationships may suffer, and you may not do well at work or school.  What can you do to manage stress?  You can try these things to help manage stress:   Do something active. Exercise or activity can help reduce stress. Walking is a great way to get started. Even everyday activities such as housecleaning or yard work can help.  Try yoga or syl chi. These techniques combine exercise and meditation. You may need some training at first to learn them.  Do something you enjoy. For example, listen to music or go to a movie. Practice your hobby or do volunteer  "work.  Meditate. This can help you relax, because you are not worrying about what happened before or what may happen in the future.  Do guided imagery. Imagine yourself in any setting that helps you feel calm. You can use online videos, books, or a teacher to guide you.  Do breathing exercises. For example:  From a standing position, bend forward from the waist with your knees slightly bent. Let your arms dangle close to the floor.  Breathe in slowly and deeply as you return to a standing position. Roll up slowly and lift your head last.  Hold your breath for just a few seconds in the standing position.  Breathe out slowly and bend forward from the waist.  Let your feelings out. Talk, laugh, cry, and express anger when you need to. Talking with supportive friends or family, a counselor, or a christie leader about your feelings is a healthy way to relieve stress. Avoid discussing your feelings with people who make you feel worse.  Write. It may help to write about things that are bothering you. This helps you find out how much stress you feel and what is causing it. When you know this, you can find better ways to cope.  What can you do to prevent stress?  You might try some of these things to help prevent stress:  Manage your time. This helps you find time to do the things you want and need to do.  Get enough sleep. Your body recovers from the stresses of the day while you are sleeping.  Get support. Your family, friends, and community can make a difference in how you experience stress.  Limit your news feed. Avoid or limit time on social media or news that may make you feel stressed.  Do something active. Exercise or activity can help reduce stress. Walking is a great way to get started.  Where can you learn more?  Go to https://www.healthwise.net/patiented  Enter N032 in the search box to learn more about \"Learning About Stress.\"  Current as of: October 24, 2023               Content Version: 14.0    2556-2706 " [employer].   Care instructions adapted under license by your healthcare professional. If you have questions about a medical condition or this instruction, always ask your healthcare professional. [employer] disclaims any warranty or liability for your use of this information.      Learning About Depression Screening  What is depression screening?  Depression screening is a way to see if you have depression symptoms. It may be done by a doctor or counselor. It's often part of a routine checkup. That's because your mental health is just as important as your physical health.  Depression is a mental health condition that affects how you feel, think, and act. You may:  Have less energy.  Lose interest in your daily activities.  Feel sad and grouchy for a long time.  Depression is very common. It affects people of all ages.  Many things can lead to depression. Some people become depressed after they have a stroke or find out they have a major illness like cancer or heart disease. The death of a loved one or a breakup may lead to depression. It can run in families. Most experts believe that a combination of inherited genes and stressful life events can cause it.  What happens during screening?  You may be asked to fill out a form about your depression symptoms. You and the doctor will discuss your answers. The doctor may ask you more questions to learn more about how you think, act, and feel.  What happens after screening?  If you have symptoms of depression, your doctor will talk to you about your options.  Doctors usually treat depression with medicines or counseling. Often, combining the two works best. Many people don't get help because they think that they'll get over the depression on their own. But people with depression may not get better unless they get treatment.  The cause of depression is not well understood. There may be many factors involved. But if you have depression, it's not  "your fault.  A serious symptom of depression is thinking about death or suicide. If you or someone you care about talks about this or about feeling hopeless, get help right away.  It's important to know that depression can be treated. Medicine, counseling, and self-care may help.  Where can you learn more?  Go to https://www.Finding Something [employer].net/patiented  Enter T185 in the search box to learn more about \"Learning About Depression Screening.\"  Current as of: June 24, 2023               Content Version: 14.0    0718-0001 [employer].   Care instructions adapted under license by your healthcare professional. If you have questions about a medical condition or this instruction, always ask your healthcare professional. [employer] disclaims any warranty or liability for your use of this information.      9 Ways to Cut Back on Drinking  Maybe you've found yourself drinking more alcohol than you'd prefer. If you want to cut back, here are some ideas to try.    Think before you drink.  Do you really want a drink, or is it just a habit? If you're used to having a drink at a certain time, try doing something else then.     Look for substitutes.  Find some no-alcohol drinks that you enjoy, like flavored seltzer water, tea with honey, or tonic with a slice of lime. Or try alcohol-free beer or \"virgin\" cocktails (without the alcohol).     Drink more water.  Use water to quench your thirst. Drink a glass of water before you have any alcohol. Have another glass along with every drink or between drinks.     Shrink your drink.  For example, have a bottle of beer instead of a pint. Use a smaller glass for wine. Choose drinks with lower alcohol content (ABV%). Or use less liquor and more mixer in cocktails.     Slow down.  It's easy to drink quickly and without thinking about it. Pay attention, and make each drink last longer.     Do the math.  Total up how much you spend on alcohol each month. How much is that a " "year? If you cut back, what could you do with the money you save?     Take a break.  Choose a day or two each week when you won't drink at all. Notice how you feel on those days, physically and emotionally. How did you sleep? Do you feel better? Over time, add more break days.     Count calories.  Would you like to lose some weight? For some people that's a good motivator for cutting back. Figure out how many calories are in each drink. How many does that add up to in a day? In a week? In a month?     Practice saying no.  Be ready when someone offers you a drink. Try: \"Thanks, I've had enough.\" Or \"Thanks, but I'm cutting back.\" Or \"No, thanks. I feel better when I drink less.\"   Current as of: November 15, 2023               Content Version: 14.0    5258-3741 [employer].   Care instructions adapted under license by your healthcare professional. If you have questions about a medical condition or this instruction, always ask your healthcare professional. [employer] disclaims any warranty or liability for your use of this information.    Substance Use Disorder: Care Instructions  Overview     You can improve your life and health by stopping your use of alcohol or drugs. When you don't drink or use drugs, you may feel and sleep better. You may get along better with your family, friends, and coworkers. There are medicines and programs that can help with substance use disorder.  How can you care for yourself at home?  Here are some ways to help you stay sober and prevent relapse.  If you have been given medicine to help keep you sober or reduce your cravings, be sure to take it exactly as prescribed.  Talk to your doctor about programs that can help you stop using drugs or drinking alcohol.  Do not keep alcohol or drugs in your home.  Plan ahead. Think about what you'll say if other people ask you to drink or use drugs. Try not to spend time with people who drink or use drugs.  Use the time " and money spent on drinking or drugs to do something that's important to you.  Preventing a relapse  Have a plan to deal with relapse. Learn to recognize changes in your thinking that lead you to drink or use drugs. Get help before you start to drink or use drugs again.  Try to stay away from situations, friends, or places that may lead you to drink or use drugs.  If you feel the need to drink alcohol or use drugs again, seek help right away. Call a trusted friend or family member. Some people get support from organizations such as Narcotics Anonymous or [employer] or from treatment facilities.  If you relapse, get help as soon as you can. Some people make a plan with another person that outlines what they want that person to do for them if they relapse. The plan usually includes how to handle the relapse and who to notify in case of relapse.  Don't give up. Remember that a relapse doesn't mean that you have failed. Use the experience to learn the triggers that lead you to drink or use drugs. Then quit again. Recovery is a lifelong process. Many people have several relapses before they are able to quit for good.  Follow-up care is a key part of your treatment and safety. Be sure to make and go to all appointments, and call your doctor if you are having problems. It's also a good idea to know your test results and keep a list of the medicines you take.  When should you call for help?   Call 911  anytime you think you may need emergency care. For example, call if you or someone else:    Has overdosed or has withdrawal signs. Be sure to tell the emergency workers that you are or someone else is using or trying to quit using drugs. Overdose or withdrawal signs may include:  Losing consciousness.  Seizure.  Seeing or hearing things that aren't there (hallucinations).     Is thinking or talking about suicide or harming others.   Where to get help 24 hours a day, 7 days a week   If you or someone you know talks about  "suicide, self-harm, a mental health crisis, a substance use crisis, or any other kind of emotional distress, get help right away. You can:    Call the Suicide and Crisis Lifeline at 988.     Call 9-625-342-EZFY (1-707.192.6851).     Text HOME to 605555 to access the Crisis Text Line.   Consider saving these numbers in your phone.  Go to [employer] for more information or to chat online.  Call your doctor now or seek immediate medical care if:    You are having withdrawal symptoms. These may include nausea or vomiting, sweating, shakiness, and anxiety.   Watch closely for changes in your health, and be sure to contact your doctor if:    You have a relapse.     You need more help or support to stop.   Where can you learn more?  Go to https://www.[employer].net/patiented  Enter H573 in the search box to learn more about \"Substance Use Disorder: Care Instructions.\"  Current as of: November 15, 2023               Content Version: 14.0    2056-5847 [employer].   Care instructions adapted under license by your healthcare professional. If you have questions about a medical condition or this instruction, always ask your healthcare professional. [employer] disclaims any warranty or liability for your use of this information.         "

## 2024-07-17 ENCOUNTER — ANCILLARY PROCEDURE (OUTPATIENT)
Dept: MAMMOGRAPHY | Facility: CLINIC | Age: 44
End: 2024-07-17
Attending: PHYSICIAN ASSISTANT
Payer: COMMERCIAL

## 2024-07-17 ENCOUNTER — ALLIED HEALTH/NURSE VISIT (OUTPATIENT)
Dept: ALLERGY | Facility: OTHER | Age: 44
End: 2024-07-17
Payer: COMMERCIAL

## 2024-07-17 DIAGNOSIS — L50.8 CHRONIC URTICARIA: Primary | ICD-10-CM

## 2024-07-17 DIAGNOSIS — Z12.31 VISIT FOR SCREENING MAMMOGRAM: ICD-10-CM

## 2024-07-17 PROCEDURE — 96372 THER/PROPH/DIAG INJ SC/IM: CPT | Performed by: ALLERGY & IMMUNOLOGY

## 2024-07-17 PROCEDURE — 77063 BREAST TOMOSYNTHESIS BI: CPT | Performed by: STUDENT IN AN ORGANIZED HEALTH CARE EDUCATION/TRAINING PROGRAM

## 2024-07-17 PROCEDURE — 77067 SCR MAMMO BI INCL CAD: CPT | Performed by: STUDENT IN AN ORGANIZED HEALTH CARE EDUCATION/TRAINING PROGRAM

## 2024-08-13 ENCOUNTER — TELEPHONE (OUTPATIENT)
Dept: ALLERGY | Facility: OTHER | Age: 44
End: 2024-08-13
Payer: COMMERCIAL

## 2024-08-13 DIAGNOSIS — L50.8 CHRONIC URTICARIA: ICD-10-CM

## 2024-08-13 NOTE — TELEPHONE ENCOUNTER
Patient's xolair prescription has  at this time. Patient is scheduled to come in tomorrow 24 for injections. PA is still good through November.    Routing to provider for approval of new prescription.    MARCO AllisonN, RN, PHN 2024 8:07 AM

## 2024-08-14 ENCOUNTER — ALLIED HEALTH/NURSE VISIT (OUTPATIENT)
Dept: ALLERGY | Facility: OTHER | Age: 44
End: 2024-08-14
Payer: COMMERCIAL

## 2024-08-14 DIAGNOSIS — L50.8 CHRONIC URTICARIA: Primary | ICD-10-CM

## 2024-08-14 PROCEDURE — 96372 THER/PROPH/DIAG INJ SC/IM: CPT | Performed by: ALLERGY & IMMUNOLOGY

## 2024-09-06 DIAGNOSIS — K50.80 CROHN'S DISEASE OF BOTH SMALL AND LARGE INTESTINE WITHOUT COMPLICATION (H): Primary | ICD-10-CM

## 2024-09-11 ENCOUNTER — ALLIED HEALTH/NURSE VISIT (OUTPATIENT)
Dept: ALLERGY | Facility: OTHER | Age: 44
End: 2024-09-11
Payer: COMMERCIAL

## 2024-09-11 ENCOUNTER — OFFICE VISIT (OUTPATIENT)
Dept: ALLERGY | Facility: OTHER | Age: 44
End: 2024-09-11
Attending: ALLERGY & IMMUNOLOGY
Payer: COMMERCIAL

## 2024-09-11 VITALS
HEART RATE: 85 BPM | BODY MASS INDEX: 40.53 KG/M2 | SYSTOLIC BLOOD PRESSURE: 112 MMHG | OXYGEN SATURATION: 98 % | DIASTOLIC BLOOD PRESSURE: 77 MMHG | WEIGHT: 230.6 LBS

## 2024-09-11 DIAGNOSIS — L50.8 CHRONIC URTICARIA: Primary | ICD-10-CM

## 2024-09-11 PROCEDURE — 96372 THER/PROPH/DIAG INJ SC/IM: CPT | Performed by: ALLERGY & IMMUNOLOGY

## 2024-09-11 PROCEDURE — 99207 PR NO CHARGE LOS: CPT

## 2024-09-11 PROCEDURE — 99214 OFFICE O/P EST MOD 30 MIN: CPT | Mod: 25 | Performed by: ALLERGY & IMMUNOLOGY

## 2024-09-11 RX ORDER — EPINEPHRINE 0.3 MG/.3ML
0.3 INJECTION SUBCUTANEOUS
Qty: 2 EACH | Refills: 2 | Status: SHIPPED | OUTPATIENT
Start: 2024-09-11 | End: 2024-09-11

## 2024-09-11 RX ORDER — EPINEPHRINE 0.3 MG/.3ML
0.3 INJECTION SUBCUTANEOUS
Qty: 2 EACH | Refills: 2 | Status: SHIPPED | OUTPATIENT
Start: 2024-09-11

## 2024-09-11 NOTE — PROGRESS NOTES
SUBJECTIVE:                                                                   Ashley Mcdaniels presents today to our Allergy Clinic at [facility] for a follow up visit. She is a 44 year old female with chronic idiopathic urticaria, history of Crohn's disease, hypothyroidism on thyroid hormone replacement, depression, and anxiety.   Problem   Chronic Urticaria    The patient developed hives at the end of 2016.  Failed high-dose antihistamines. Started omalizumab in 2018.  It has been beneficial.  She tried stopping it in 2019 and developed hives 6 weeks after stopping the medicine.          The patient remains hive-free as long as she takes cetirizine 10 mg by mouth twice daily and omalizumab 300 mg every 4 weeks. In June, during a trip to [place], she extended her omalizumab dose to 5 or 6 weeks, which led to the recurrence of hives. Additionally, if she skips her evening dose of cetirizine, she experiences a breakout of hives.     Patient Active Problem List   Diagnosis    YUE (generalized anxiety disorder)    Major depressive disorder, recurrent episode, mild (H24)    Crohn's disease of both small and large intestine without complication (H)    IUD (intrauterine device) in place    Chronic urticaria    ADHD (attention deficit hyperactivity disorder), inattentive type    Gastroesophageal reflux disease without esophagitis    Morbid obesity (H)    Segmental dysfunction of sacral region    Segmental dysfunction of lumbar region    Segmental dysfunction of thoracic region    Bilateral low back pain with left-sided sciatica    PAD (peripheral artery disease) (H24)    Dry hair    Dry skin    Immunosuppressed status (H24)    Venous insufficiency of right leg    Lipedema       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     Crohn's colitis (H)     YUE (generalized anxiety disorder)     GERD  (gastroesophageal reflux disease)     Low back pain     Major depressive disorder, recurrent episode, mild (H24)       Problem (# of Occurrences) Relation (Name,Age of Onset)    Substance Abuse (4) Brother, Maternal Grandmother, Maternal Grandfather, Paternal Grandfather    Mental Illness (1) Brother    Arthritis (1) Mother    Asthma (2) Father, Paternal Grandmother    Diabetes (2) Maternal Grandmother, Paternal Grandmother    Heart Disease (2) Father: CAD, CHF, Paternal Grandmother    Hypertension (3) Father, Maternal Grandmother, Paternal Grandmother    Hyperlipidemia (2) Father, Maternal Grandmother    Deep Vein Thrombosis (1) Mother    Liver Cancer (1) Father    Pancreatic Cancer (1) Father    Snoring (2) Mother, Father    Alcoholism (1) Father          Past Surgical History:   Procedure Laterality Date    CHOLECYSTECTOMY  Feb 2007    DILATION AND CURETTAGE  May 2003    Non-OB    HC TOOTH EXTRACTION W/FORCEP  Nov 2006    LAP ADJUSTABLE GASTRIC BAND  May 2007     Social History     Socioeconomic History    Marital status:      Spouse name: None    Number of children: None    Years of education: None    Highest education level: None   Occupational History     Comment: consulting   Tobacco Use    Smoking status: Former     Passive exposure: Never    Smokeless tobacco: Never    Tobacco comments:     Vapes daily   Vaping Use    Vaping status: Every Day    Substances: Nicotine, Flavoring   Substance and Sexual Activity    Alcohol use: Yes     Comment: 2x a week    Drug use: No    Sexual activity: Yes     Partners: Male     Birth control/protection: I.U.D.   Other Topics Concern    Parent/sibling w/ CABG, MI or angioplasty before 65F 55M? No   Social History Narrative    September 11, 2024        ENVIRONMENTAL HISTORY: [place] home is heated with a forced air. They do have central air conditioning. The patient's bedroom is furnished with carpeting in bedroom.   Pets inside the house include 2 dog(s), and 1 horses. There is no history of cockroach or mice infestation. There is/are 0 smokers in the house.  The house does not have a damp basement.      Social Determinants of Health     Financial Resource Strain: Low Risk  (7/15/2024)    Financial Resource Strain     Within the past 12 months, have you or your family members you live with been unable to get utilities (heat, electricity) when it was really needed?: No   Food Insecurity: Low Risk  (7/15/2024)    Food Insecurity     Within the past 12 months, did you worry that your food would run out before you got money to buy more?: No     Within the past 12 months, did the food you bought just not last and you didn t have money to get more?: No   Transportation Needs: Low Risk  (7/15/2024)    Transportation Needs     Within the past 12 months, has lack of transportation kept you from medical appointments, getting your medicines, non-medical meetings or appointments, work, or from getting things that you need?: No   Physical Activity: Unknown (7/15/2024)    Exercise Vital Sign     Days of Exercise per Week: 2 days   Stress: Stress Concern Present (7/15/2024)    [employer] - Occupational Stress Questionnaire     Feeling of Stress : Rather much   Social Connections: Unknown (7/15/2024)    Social Connection and Isolation Panel [NHANES]     Frequency of Social Gatherings with Friends and Family: Once a week   Interpersonal Safety: Low Risk  (7/15/2024)    Interpersonal Safety     Do you feel physically and emotionally safe where you currently live?: Yes     Within the past 12 months, have you been hit, slapped, kicked or otherwise physically hurt by someone?: No     Within the past 12 months, have you been humiliated or emotionally abused in other ways by your partner or ex-partner?: No   Housing Stability: Low Risk  (7/15/2024)    Housing Stability     Do you have housing? : Yes     Are you worried  about losing your housing?: No         Current Outpatient Medications:     buPROPion (WELLBUTRIN XL) 300 MG 24 hr tablet, Take 1 tablet (300 mg) by mouth every morning, Disp: 90 tablet, Rfl: 3    cetirizine (ZYRTEC) 10 MG tablet, Take 1 tablet (10 mg) by mouth daily, Disp: 30 tablet, Rfl: 0    cyanocobalamin (CYANOCOBALAMIN) 1000 MCG/ML injection, Inject 1,000 mcg into the muscle, Disp: , Rfl:     EPINEPHrine (ANY BX GENERIC EQUIV) 0.3 MG/0.3ML injection 2-pack, Inject 0.3 mLs (0.3 mg) into the muscle once as needed for anaphylaxis, Disp: 0.6 mL, Rfl: 0    HUMIRA *CF* PEN 40 MG/0.4ML pen kit, , Disp: , Rfl: 1    hydrOXYzine HCl (ATARAX) 50 MG tablet, Take 0.5-1 tablets (25-50 mg) by mouth every 6 hours as needed for itching, Disp: 60 tablet, Rfl: 3    levonorgestrel (MIRENA) 20 MCG/24HR IUD, 1 each (20 mcg) by Intrauterine route continuous, Disp: , Rfl:     valACYclovir (VALTREX) 500 MG tablet, Take 1 tablet (500 mg) by mouth 2 times daily For 3-4 days, Disp: 40 tablet, Rfl: 1    methylPREDNISolone (MEDROL DOSEPAK) 4 MG tablet therapy pack, Follow Package Directions (Patient not taking: Reported on 9/11/2024), Disp: 21 tablet, Rfl: 0    Current Facility-Administered Medications:     omalizumab (XOLAIR) injection 300 mg, 300 mg, Subcutaneous, Q28 Days, , 300 mg at 09/11/24 1010  Immunization History   Administered Date(s) Administered    COVID-19 Bivalent 12+ (Pfizer) 10/03/2022    COVID-19 MONOVALENT 12+ (Pfizer) 01/20/2021, 02/10/2021, 11/10/2021    Flu, Unspecified 10/16/2002, 11/28/2003    Hepatitis A (ADULT 19+) 12/05/2017    Historical Hepb 10/12/2015, 12/05/2017    Influenza (IIV3) PF 11/02/2005, 10/17/2006, 11/07/2007, 10/21/2008    Influenza Vaccine >6 months,quad, PF 10/12/2015, 02/17/2020, 10/03/2022    Influenza, seasonal, injectable, PF 10/16/2002, 11/28/2003    Mantoux Tuberculin Skin Test 10/26/2015    Pneumo Conj 13-V (2010&after) 04/05/2021    Pneumococcal 20 valent Conjugate (Prevnar 20) 07/15/2024     Pneumococcal 23 valent 02/09/2018    TDAP (Adacel,Boostrix) 10/03/2022    TDAP Vaccine (Adacel) 09/07/2012     Allergies   Allergen Reactions    Azathioprine Other (See Comments)     pancreatitis    No Clinical Screening - See Comments Hives     From stress     OBJECTIVE:                                                                 /77 (BP Location: Left arm, Patient Position: Sitting, Cuff Size: Adult Large)   Pulse 85   Wt 104.6 kg (230 lb 9.6 oz)   SpO2 98%   BMI 40.53 kg/m          Physical Exam  Vitals and nursing note reviewed.   Constitutional:       General: She is not in acute distress.     Appearance: She is not ill-appearing, toxic-appearing or diaphoretic.   HENT:      Head: Normocephalic and atraumatic.      Right Ear: Tympanic membrane, ear canal and external ear normal.      Left Ear: Tympanic membrane, ear canal and external ear normal.      Nose: No mucosal edema, congestion or rhinorrhea.      Right Turbinates: Not enlarged, swollen or pale.      Left Turbinates: Not enlarged, swollen or pale.      Mouth/Throat:      Lips: Pink.      Mouth: Mucous membranes are moist.      Pharynx: Oropharynx is clear. No pharyngeal swelling, oropharyngeal exudate, posterior oropharyngeal erythema or uvula swelling.   Eyes:      General:         Right eye: No discharge.         Left eye: No discharge.      Conjunctiva/sclera: Conjunctivae normal.   Cardiovascular:      Rate and Rhythm: Normal rate and regular rhythm.      Heart sounds: Normal heart sounds. No murmur heard.  Pulmonary:      Effort: Pulmonary effort is normal. No respiratory distress.      Breath sounds: Normal breath sounds and air entry. No stridor, decreased air movement or transmitted upper airway sounds. No decreased breath sounds, wheezing, rhonchi or rales.   Skin:     General: Skin is warm.      Comments: No active urticaria or angioedema noted on today's exam.   Neurological:      Mental Status: She is alert and oriented to  person, place, and time.   Psychiatric:         Mood and Affect: Mood normal.         Behavior: Behavior normal.       ASSESSMENT/PLAN:      Chronic urticaria    The patient's hives are currently fairly well controlled with omalizumab 300 mg every 4 weeks and cetirizine 10 mg by mouth twice daily.   The plan is to continue with this regimen as is.  Increasing the gap between omalizumab injections caused hives couple of months ago.    Follow-up in 1 year or sooner if needed.    Thank you for allowing us to participate in the care of this patient. Please feel free to contact us if there are any questions or concerns about the patient.    Disclaimer: This note consists of symbols derived from keyboarding, dictation and/or voice recognition software. As a result, there may be errors in the script that have gone undetected. Please consider this when interpreting information found in this chart.    Toñito Desir MD, FAAAAI, FACAAI  Allergy, Asthma and Immunology     [facility]

## 2024-09-11 NOTE — PATIENT INSTRUCTIONS
Dr Desri [phone]    All visits for food challenges, venom allergy testing, and medication/drug challenges MUST be scheduled through the allergy clinic nurse. Please send a [employer] message or call the allergy scheduling line and ask to speak with Dr Desir's team for scheduling these appointments. Appointments for these visits that are made through the schedulers or via [employer] may be cancelled or rescheduled.    Allergy Shot Room ([place]): [phone]    Pulmonary Function [phone]    Prescription Assistance  If you need assistance with your prescriptions (cost, coverage, etc) please contact: [place] [facility] [phone]

## 2024-09-11 NOTE — LETTER
9/11/2024      Ashley Mcdaniels  [id number] [facility] [address]      Dear Colleague,    Thank you for referring your patient, Ashley Mcdaniels, to the [facility]. Please see a copy of my visit note below.    SUBJECTIVE:                                                                   Ashley Mcdaniels presents today to our Allergy Clinic at [facility] for a follow up visit. She is a 44 year old female with chronic idiopathic urticaria, history of Crohn's disease, hypothyroidism on thyroid hormone replacement, depression, and anxiety.   Problem   Chronic Urticaria    The patient developed hives at the end of 2016.  Failed high-dose antihistamines. Started omalizumab in 2018.  It has been beneficial.  She tried stopping it in 2019 and developed hives 6 weeks after stopping the medicine.          The patient remains hive-free as long as she takes cetirizine 10 mg by mouth twice daily and omalizumab 300 mg every 4 weeks. In June, during a trip to [place], she extended her omalizumab dose to 5 or 6 weeks, which led to the recurrence of hives. Additionally, if she skips her evening dose of cetirizine, she experiences a breakout of hives.     Patient Active Problem List   Diagnosis     YUE (generalized anxiety disorder)     Major depressive disorder, recurrent episode, mild (H24)     Crohn's disease of both small and large intestine without complication (H)     IUD (intrauterine device) in place     Chronic urticaria     ADHD (attention deficit hyperactivity disorder), inattentive type     Gastroesophageal reflux disease without esophagitis     Morbid obesity (H)     Segmental dysfunction of sacral region     Segmental dysfunction of lumbar region     Segmental dysfunction of thoracic region     Bilateral low back pain with left-sided sciatica     PAD (peripheral artery disease) (H24)     Dry hair     Dry skin     Immunosuppressed status (H24)     Venous insufficiency of right leg      Lipedema       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      Crohn's colitis (H)      YUE (generalized anxiety disorder)      GERD (gastroesophageal reflux disease)      Low back pain      Major depressive disorder, recurrent episode, mild (H24)       Problem (# of Occurrences) Relation (Name,Age of Onset)    Substance Abuse (4) Brother, Maternal Grandmother, Maternal Grandfather, Paternal Grandfather    Mental Illness (1) Brother    Arthritis (1) Mother    Asthma (2) Father, Paternal Grandmother    Diabetes (2) Maternal Grandmother, Paternal Grandmother    Heart Disease (2) Father: CAD, CHF, Paternal Grandmother    Hypertension (3) Father, Maternal Grandmother, Paternal Grandmother    Hyperlipidemia (2) Father, Maternal Grandmother    Deep Vein Thrombosis (1) Mother    Liver Cancer (1) Father    Pancreatic Cancer (1) Father    Snoring (2) Mother, Father    Alcoholism (1) Father          Past Surgical History:   Procedure Laterality Date     CHOLECYSTECTOMY  Feb 2007     DILATION AND CURETTAGE  May 2003    Non-OB     HC TOOTH EXTRACTION W/FORCEP  Nov 2006     LAP ADJUSTABLE GASTRIC BAND  May 2007     Social History     Socioeconomic History     Marital status:      Spouse name: None     Number of children: None     Years of education: None     Highest education level: None   Occupational History     Comment: consulting   Tobacco Use     Smoking status: Former     Passive exposure: Never     Smokeless tobacco: Never     Tobacco comments:     Vapes daily   Vaping Use     Vaping status: Every Day     Substances: Nicotine, Flavoring   Substance and Sexual Activity     Alcohol use: Yes     Comment: 2x a week     Drug use: No     Sexual activity: Yes     Partners: Male     Birth control/protection: I.U.D.   Other Topics Concern     Parent/sibling w/ CABG, MI or angioplasty before 65F 55M? No   Social  History Narrative    September 11, 2024        ENVIRONMENTAL HISTORY: [place] home is heated with a forced air. They do have central air conditioning. The patient's bedroom is furnished with carpeting in bedroom.  Pets inside the house include 2 dog(s), and 1 horses. There is no history of cockroach or mice infestation. There is/are 0 smokers in the house.  The house does not have a damp basement.      Social Determinants of Health     Financial Resource Strain: Low Risk  (7/15/2024)    Financial Resource Strain      Within the past 12 months, have you or your family members you live with been unable to get utilities (heat, electricity) when it was really needed?: No   Food Insecurity: Low Risk  (7/15/2024)    Food Insecurity      Within the past 12 months, did you worry that your food would run out before you got money to buy more?: No      Within the past 12 months, did the food you bought just not last and you didn t have money to get more?: No   Transportation Needs: Low Risk  (7/15/2024)    Transportation Needs      Within the past 12 months, has lack of transportation kept you from medical appointments, getting your medicines, non-medical meetings or appointments, work, or from getting things that you need?: No   Physical Activity: Unknown (7/15/2024)    Exercise Vital Sign      Days of Exercise per Week: 2 days   Stress: Stress Concern Present (7/15/2024)    [employer] [place] [employer] - Occupational Stress Questionnaire      Feeling of Stress : Rather much   Social Connections: Unknown (7/15/2024)    Social Connection and Isolation Panel [NHANES]      Frequency of Social Gatherings with Friends and Family: Once a week   Interpersonal Safety: Low Risk  (7/15/2024)    Interpersonal Safety      Do you feel physically and emotionally safe where you currently live?: Yes      Within the past 12 months, have you been hit, slapped, kicked or otherwise  physically hurt by someone?: No      Within the past 12 months, have you been humiliated or emotionally abused in other ways by your partner or ex-partner?: No   Housing Stability: Low Risk  (7/15/2024)    Housing Stability      Do you have housing? : Yes      Are you worried about losing your housing?: No         Current Outpatient Medications:      buPROPion (WELLBUTRIN XL) 300 MG 24 hr tablet, Take 1 tablet (300 mg) by mouth every morning, Disp: 90 tablet, Rfl: 3     cetirizine (ZYRTEC) 10 MG tablet, Take 1 tablet (10 mg) by mouth daily, Disp: 30 tablet, Rfl: 0     cyanocobalamin (CYANOCOBALAMIN) 1000 MCG/ML injection, Inject 1,000 mcg into the muscle, Disp: , Rfl:      EPINEPHrine (ANY BX GENERIC EQUIV) 0.3 MG/0.3ML injection 2-pack, Inject 0.3 mLs (0.3 mg) into the muscle once as needed for anaphylaxis, Disp: 0.6 mL, Rfl: 0     HUMIRA *CF* PEN 40 MG/0.4ML pen kit, , Disp: , Rfl: 1     hydrOXYzine HCl (ATARAX) 50 MG tablet, Take 0.5-1 tablets (25-50 mg) by mouth every 6 hours as needed for itching, Disp: 60 tablet, Rfl: 3     levonorgestrel (MIRENA) 20 MCG/24HR IUD, 1 each (20 mcg) by Intrauterine route continuous, Disp: , Rfl:      valACYclovir (VALTREX) 500 MG tablet, Take 1 tablet (500 mg) by mouth 2 times daily For 3-4 days, Disp: 40 tablet, Rfl: 1     methylPREDNISolone (MEDROL DOSEPAK) 4 MG tablet therapy pack, Follow Package Directions (Patient not taking: Reported on 9/11/2024), Disp: 21 tablet, Rfl: 0    Current Facility-Administered Medications:      omalizumab (XOLAIR) injection 300 mg, 300 mg, Subcutaneous, Q28 Days, , 300 mg at 09/11/24 1010  Immunization History   Administered Date(s) Administered     COVID-19 Bivalent 12+ (Pfizer) 10/03/2022     COVID-19 MONOVALENT 12+ (Pfizer) 01/20/2021, 02/10/2021, 11/10/2021     Flu, Unspecified 10/16/2002, 11/28/2003     Hepatitis A (ADULT 19+) 12/05/2017     Historical Hepb 10/12/2015, 12/05/2017     Influenza (IIV3) PF 11/02/2005, 10/17/2006, 11/07/2007,  10/21/2008     Influenza Vaccine >6 months,quad, PF 10/12/2015, 02/17/2020, 10/03/2022     Influenza, seasonal, injectable, PF 10/16/2002, 11/28/2003     Mantoux Tuberculin Skin Test 10/26/2015     Pneumo Conj 13-V (2010&after) 04/05/2021     Pneumococcal 20 valent Conjugate (Prevnar 20) 07/15/2024     Pneumococcal 23 valent 02/09/2018     TDAP (Adacel,Boostrix) 10/03/2022     TDAP Vaccine (Adacel) 09/07/2012     Allergies   Allergen Reactions     Azathioprine Other (See Comments)     pancreatitis     No Clinical Screening - See Comments Hives     From stress     OBJECTIVE:                                                                 /77 (BP Location: Left arm, Patient Position: Sitting, Cuff Size: Adult Large)   Pulse 85   Wt 104.6 kg (230 lb 9.6 oz)   SpO2 98%   BMI 40.53 kg/m          Physical Exam  Vitals and nursing note reviewed.   Constitutional:       General: She is not in acute distress.     Appearance: She is not ill-appearing, toxic-appearing or diaphoretic.   HENT:      Head: Normocephalic and atraumatic.      Right Ear: Tympanic membrane, ear canal and external ear normal.      Left Ear: Tympanic membrane, ear canal and external ear normal.      Nose: No mucosal edema, congestion or rhinorrhea.      Right Turbinates: Not enlarged, swollen or pale.      Left Turbinates: Not enlarged, swollen or pale.      Mouth/Throat:      Lips: Pink.      Mouth: Mucous membranes are moist.      Pharynx: Oropharynx is clear. No pharyngeal swelling, oropharyngeal exudate, posterior oropharyngeal erythema or uvula swelling.   Eyes:      General:         Right eye: No discharge.         Left eye: No discharge.      Conjunctiva/sclera: Conjunctivae normal.   Cardiovascular:      Rate and Rhythm: Normal rate and regular rhythm.      Heart sounds: Normal heart sounds. No murmur heard.  Pulmonary:      Effort: Pulmonary effort is normal. No respiratory distress.      Breath sounds: Normal breath sounds and air  entry. No stridor, decreased air movement or transmitted upper airway sounds. No decreased breath sounds, wheezing, rhonchi or rales.   Skin:     General: Skin is warm.      Comments: No active urticaria or angioedema noted on today's exam.   Neurological:      Mental Status: She is alert and oriented to person, place, and time.   Psychiatric:         Mood and Affect: Mood normal.         Behavior: Behavior normal.       ASSESSMENT/PLAN:      Chronic urticaria    The patient's hives are currently fairly well controlled with omalizumab 300 mg every 4 weeks and cetirizine 10 mg by mouth twice daily.   The plan is to continue with this regimen as is.  Increasing the gap between omalizumab injections caused hives couple of months ago.    Follow-up in 1 year or sooner if needed.    Thank you for allowing us to participate in the care of this patient. Please feel free to contact us if there are any questions or concerns about the patient.    Disclaimer: This note consists of symbols derived from keyboarding, dictation and/or voice recognition software. As a result, there may be errors in the script that have gone undetected. Please consider this when interpreting information found in this chart.    Toñito Desir MD, FAAAAI, FACAAI  Allergy, Asthma and Immunology     [facility]        Again, thank you for allowing me to participate in the care of your patient.        Sincerely,        Toñito Desir MD

## 2024-09-25 ENCOUNTER — VIRTUAL VISIT (OUTPATIENT)
Dept: FAMILY MEDICINE | Facility: OTHER | Age: 44
End: 2024-09-25
Payer: COMMERCIAL

## 2024-09-25 DIAGNOSIS — R52 BODY ACHES: Primary | ICD-10-CM

## 2024-09-25 DIAGNOSIS — R53.83 OTHER FATIGUE: ICD-10-CM

## 2024-09-25 DIAGNOSIS — I89.0 LYMPHEDEMA: ICD-10-CM

## 2024-09-25 PROCEDURE — 99214 OFFICE O/P EST MOD 30 MIN: CPT | Mod: 95 | Performed by: PHYSICIAN ASSISTANT

## 2024-09-25 ASSESSMENT — PATIENT HEALTH QUESTIONNAIRE - PHQ9
SUM OF ALL RESPONSES TO PHQ QUESTIONS 1-9: 10
10. IF YOU CHECKED OFF ANY PROBLEMS, HOW DIFFICULT HAVE THESE PROBLEMS MADE IT FOR YOU TO DO YOUR WORK, TAKE CARE OF THINGS AT HOME, OR GET ALONG WITH OTHER PEOPLE: NOT DIFFICULT AT ALL
SUM OF ALL RESPONSES TO PHQ QUESTIONS 1-9: 10

## 2024-09-25 NOTE — PROGRESS NOTES
Tia is a 44 year old who is being evaluated via a billable video visit.    How would you like to obtain your AVS? MyChart  If the video visit is dropped, the invitation should be resent by: Text to cell phone: [phone]  Will anyone else be joining your video visit? No    Assessment & Plan     ICD-10-CM    1. Body aches  R52 Anti Nuclear Carmel IgG by IFA with Reflex     Rheumatoid factor     Lupus Anticoagulant Panel     Vitamin B12     Folate     Iron and iron binding capacity     CBC with platelets     TSH with free T4 reflex     Comprehensive metabolic panel (BMP + Alb, Alk Phos, ALT, AST, Total. Bili, TP)      2. Other fatigue  R53.83 Anti Nuclear Carmel IgG by IFA with Reflex     Rheumatoid factor     Lupus Anticoagulant Panel     Vitamin B12     Folate     Iron and iron binding capacity     CBC with platelets     TSH with free T4 reflex     Comprehensive metabolic panel (BMP + Alb, Alk Phos, ALT, AST, Total. Bili, TP)      3. Lymphedema  I89.0         - Patient with 1 month of body aches and fatigue     Also reporting fingers and toes turning purple   - Recommend office visit for evaluation of vasculature     Patient also has lymphedema and is immunosuppressed due to Crohn's   - Recommend lab work up for fatigue including anemia, electrolyte/vitamin deficiency, autoimmune disorder, thyroid issue, vs. Other     Labs scheduled for later this week      Await results   - Patient would like to go back to lymphedema, referral was placed     Review of the result(s) of each unique test - Reviewed in Epic      6/28/24 - all labs   Diagnosis or treatment significantly limited by social determinants of health - None     15 minutes spent on the date of the encounter doing chart review, history and exam, documentation and further activities as noted above    The patient indicates understanding of these issues and agrees with the plan.    Follow up: next week in office     Rene You PA-C  [facility] [place]           Subjective   Tia is a 44 year old, presenting for the following health issues:  Generalized Body Aches        9/25/2024     9:09 AM   Additional Questions   Roomed by megan   Accompanied by self     History of Present Illness       Reason for visit:  Body Aches, tiredness, lump in jaw, feet go numb, fingers and toes turn purple  Symptom onset:  More than a month  Symptoms include:  Body ache more in lower body, throbbing in legs, fingers and toes turn purple are random times  Symptom intensity:  Moderate  Symptom progression:  Worsening  Had these symptoms before:  No  What makes it worse:  Any movement  What makes it better:  None   She is taking medications regularly.     - Knees and ankles throb, especially at night   - At first thought it was COVID, but then didn't go away   - Thinks might be poor circulation and lymph edema   - Extremities go numb very easily      Hands and arms when sleeping   - Toes numb if on her feet   - Doesn't swell or turn red but fingers and toes turn purple at times     - Cindy in [place] for lymphedema physical therapy         Review of Systems  Constitutional, neuro, ENT, endocrine, pulmonary, cardiac, gastrointestinal, genitourinary, musculoskeletal, integument and psychiatric systems are negative, except as otherwise noted.      Objective       Vitals:  No vitals were obtained today due to virtual visit.    Physical Exam   GENERAL: alert and no distress  EYES: Eyes grossly normal to inspection.  No discharge or erythema, or obvious scleral/conjunctival abnormalities.  RESP: No audible wheeze, cough, or visible cyanosis.    SKIN: Visible skin clear. No significant rash, abnormal pigmentation or lesions.  NEURO: Cranial nerves grossly intact.  Mentation and speech appropriate for age.  PSYCH: Appropriate affect, tone, and pace of words    Diagnostics: Reviewed in Epic, see orders pending in Epic         Video-Visit Details    Type of service:  Video  Visit   Originating Location (pt. Location): Home  Distant Location (provider location):  Off-site  Platform used for Video Visit: Judah  Signed Electronically by: Melva You PA-C

## 2024-09-27 ENCOUNTER — LAB (OUTPATIENT)
Dept: LAB | Facility: OTHER | Age: 44
End: 2024-09-27
Payer: COMMERCIAL

## 2024-09-27 DIAGNOSIS — R53.83 OTHER FATIGUE: ICD-10-CM

## 2024-09-27 DIAGNOSIS — R52 BODY ACHES: ICD-10-CM

## 2024-09-27 DIAGNOSIS — K50.80 CROHN'S DISEASE OF BOTH SMALL AND LARGE INTESTINE WITHOUT COMPLICATION (H): ICD-10-CM

## 2024-09-27 LAB
ALBUMIN SERPL BCG-MCNC: 4.1 G/DL (ref 3.5–5.2)
ALP SERPL-CCNC: 66 U/L (ref 40–150)
ALT SERPL W P-5'-P-CCNC: 19 U/L (ref 0–50)
ANION GAP SERPL CALCULATED.3IONS-SCNC: 12 MMOL/L (ref 7–15)
AST SERPL W P-5'-P-CCNC: 26 U/L (ref 0–45)
BASOPHILS # BLD AUTO: 0 10E3/UL (ref 0–0.2)
BASOPHILS NFR BLD AUTO: 0 %
BILIRUB DIRECT SERPL-MCNC: <0.2 MG/DL (ref 0–0.3)
BILIRUB SERPL-MCNC: 0.4 MG/DL
BUN SERPL-MCNC: 10.2 MG/DL (ref 6–20)
CALCIUM SERPL-MCNC: 9.2 MG/DL (ref 8.8–10.4)
CHLORIDE SERPL-SCNC: 108 MMOL/L (ref 98–107)
CREAT SERPL-MCNC: 0.77 MG/DL (ref 0.51–0.95)
EGFRCR SERPLBLD CKD-EPI 2021: >90 ML/MIN/1.73M2
EOSINOPHIL # BLD AUTO: 0.1 10E3/UL (ref 0–0.7)
EOSINOPHIL NFR BLD AUTO: 2 %
ERYTHROCYTE [DISTWIDTH] IN BLOOD BY AUTOMATED COUNT: 12.9 % (ref 10–15)
FOLATE SERPL-MCNC: 6.4 NG/ML (ref 4.6–34.8)
GLUCOSE SERPL-MCNC: 91 MG/DL (ref 70–99)
HCO3 SERPL-SCNC: 22 MMOL/L (ref 22–29)
HCT VFR BLD AUTO: 42.3 % (ref 35–47)
HGB BLD-MCNC: 14.1 G/DL (ref 11.7–15.7)
IMM GRANULOCYTES # BLD: 0 10E3/UL
IMM GRANULOCYTES NFR BLD: 0 %
IRON BINDING CAPACITY (ROCHE): 300 UG/DL (ref 240–430)
IRON SATN MFR SERPL: 26 % (ref 15–46)
IRON SERPL-MCNC: 78 UG/DL (ref 37–145)
LYMPHOCYTES # BLD AUTO: 2.4 10E3/UL (ref 0.8–5.3)
LYMPHOCYTES NFR BLD AUTO: 36 %
MCH RBC QN AUTO: 30.2 PG (ref 26.5–33)
MCHC RBC AUTO-ENTMCNC: 33.3 G/DL (ref 31.5–36.5)
MCV RBC AUTO: 91 FL (ref 78–100)
MONOCYTES # BLD AUTO: 0.5 10E3/UL (ref 0–1.3)
MONOCYTES NFR BLD AUTO: 7 %
NEUTROPHILS # BLD AUTO: 3.7 10E3/UL (ref 1.6–8.3)
NEUTROPHILS NFR BLD AUTO: 55 %
PLATELET # BLD AUTO: 313 10E3/UL (ref 150–450)
POTASSIUM SERPL-SCNC: 4.3 MMOL/L (ref 3.4–5.3)
PROT SERPL-MCNC: 7.7 G/DL (ref 6.4–8.3)
RBC # BLD AUTO: 4.67 10E6/UL (ref 3.8–5.2)
RHEUMATOID FACT SERPL-ACNC: <10 IU/ML
SODIUM SERPL-SCNC: 142 MMOL/L (ref 135–145)
TSH SERPL DL<=0.005 MIU/L-ACNC: 1.67 UIU/ML (ref 0.3–4.2)
VIT B12 SERPL-MCNC: 1790 PG/ML (ref 232–1245)
WBC # BLD AUTO: 6.7 10E3/UL (ref 4–11)

## 2024-09-27 PROCEDURE — 86431 RHEUMATOID FACTOR QUANT: CPT

## 2024-09-27 PROCEDURE — 82607 VITAMIN B-12: CPT

## 2024-09-27 PROCEDURE — 84443 ASSAY THYROID STIM HORMONE: CPT

## 2024-09-27 PROCEDURE — 82746 ASSAY OF FOLIC ACID SERUM: CPT

## 2024-09-27 PROCEDURE — 36415 COLL VENOUS BLD VENIPUNCTURE: CPT

## 2024-09-27 PROCEDURE — 85025 COMPLETE CBC W/AUTO DIFF WBC: CPT

## 2024-09-27 PROCEDURE — 82248 BILIRUBIN DIRECT: CPT

## 2024-09-27 PROCEDURE — 86038 ANTINUCLEAR ANTIBODIES: CPT

## 2024-09-27 PROCEDURE — 83540 ASSAY OF IRON: CPT

## 2024-09-27 PROCEDURE — 85390 FIBRINOLYSINS SCREEN I&R: CPT | Performed by: PATHOLOGY

## 2024-09-27 PROCEDURE — 85730 THROMBOPLASTIN TIME PARTIAL: CPT

## 2024-09-27 PROCEDURE — 80053 COMPREHEN METABOLIC PANEL: CPT

## 2024-09-27 PROCEDURE — 83550 IRON BINDING TEST: CPT

## 2024-09-27 PROCEDURE — 85613 RUSSELL VIPER VENOM DILUTED: CPT

## 2024-09-27 PROCEDURE — 86039 ANTINUCLEAR ANTIBODIES (ANA): CPT

## 2024-09-27 PROCEDURE — 86481 TB AG RESPONSE T-CELL SUSP: CPT

## 2024-09-28 LAB — QUANTIFERON MITOGEN: 10 IU/ML

## 2024-09-29 LAB
GAMMA INTERFERON BACKGROUND BLD IA-ACNC: 0.02 IU/ML
M TB IFN-G BLD-IMP: NEGATIVE
M TB IFN-G CD4+ BCKGRND COR BLD-ACNC: 9.98 IU/ML
MITOGEN IGNF BCKGRD COR BLD-ACNC: 0.02 IU/ML
MITOGEN IGNF BCKGRD COR BLD-ACNC: 0.03 IU/ML
QUANTIFERON NIL TUBE: 0.02 IU/ML
QUANTIFERON TB1 TUBE: 0.04 IU/ML
QUANTIFERON TB2 TUBE: 0.05

## 2024-09-30 LAB
ANA PAT SER IF-IMP: ABNORMAL
ANA SER QL IF: ABNORMAL
ANA TITR SER IF: ABNORMAL {TITER}
DRVVT SCREEN RATIO: 0.91
INR PPP: 0.97 (ref 0.85–1.15)
LA PPP-IMP: NEGATIVE
LUPUS INTERPRETATION: NORMAL
PTT RATIO: 0.97
THROMBIN TIME: 17.6 SECONDS (ref 13–19)

## 2024-09-30 NOTE — RESULT ENCOUNTER NOTE
Hello Tia    Your results were normal so far. Still awaiting a couple.     The results are attached for your review.       Rene You PA-C

## 2024-09-30 NOTE — RESULT ENCOUNTER NOTE
Hello Tia    Your anti-nuclear antibody test was borderline positive. We will discuss this at your apt tomorrow.     The results are attached for your review.       Rene You PA-C

## 2024-10-01 ENCOUNTER — ANCILLARY PROCEDURE (OUTPATIENT)
Dept: GENERAL RADIOLOGY | Facility: OTHER | Age: 44
End: 2024-10-01
Attending: PHYSICIAN ASSISTANT
Payer: COMMERCIAL

## 2024-10-01 ENCOUNTER — OFFICE VISIT (OUTPATIENT)
Dept: FAMILY MEDICINE | Facility: OTHER | Age: 44
End: 2024-10-01
Payer: COMMERCIAL

## 2024-10-01 VITALS
OXYGEN SATURATION: 98 % | DIASTOLIC BLOOD PRESSURE: 80 MMHG | BODY MASS INDEX: 41.46 KG/M2 | HEART RATE: 79 BPM | SYSTOLIC BLOOD PRESSURE: 110 MMHG | WEIGHT: 234 LBS | TEMPERATURE: 96.9 F | HEIGHT: 63 IN | RESPIRATION RATE: 20 BRPM

## 2024-10-01 DIAGNOSIS — K50.80 CROHN'S DISEASE OF BOTH SMALL AND LARGE INTESTINE WITHOUT COMPLICATION (H): Primary | ICD-10-CM

## 2024-10-01 DIAGNOSIS — M25.552 HIP PAIN, LEFT: ICD-10-CM

## 2024-10-01 DIAGNOSIS — M79.10 MYALGIA: ICD-10-CM

## 2024-10-01 DIAGNOSIS — R76.0 ABNORMAL ANTINUCLEAR ANTIBODY TITER: Primary | ICD-10-CM

## 2024-10-01 PROCEDURE — 73502 X-RAY EXAM HIP UNI 2-3 VIEWS: CPT | Mod: TC | Performed by: RADIOLOGY

## 2024-10-01 PROCEDURE — 99214 OFFICE O/P EST MOD 30 MIN: CPT | Performed by: PHYSICIAN ASSISTANT

## 2024-10-01 RX ORDER — METHYLPREDNISOLONE 4 MG
TABLET, DOSE PACK ORAL
Qty: 21 TABLET | Refills: 0 | Status: SHIPPED | OUTPATIENT
Start: 2024-10-01

## 2024-10-01 ASSESSMENT — ENCOUNTER SYMPTOMS: FATIGUE: 1

## 2024-10-01 ASSESSMENT — PAIN SCALES - GENERAL: PAINLEVEL: NO PAIN (0)

## 2024-10-01 NOTE — PROGRESS NOTES
Assessment & Plan     ICD-10-CM    1. Abnormal antinuclear antibody titer  R76.0 methylPREDNISolone (MEDROL DOSEPAK) 4 MG tablet therapy pack     Adult Rheumatology  Referral      2. Myalgia  M79.10 methylPREDNISolone (MEDROL DOSEPAK) 4 MG tablet therapy pack     Adult Rheumatology  Referral      3. Hip pain, left  M25.552 XR Pelvis w Hip Left G/E 2 Views     Orthopedic  Referral        1 & 2.   - As previous, patient reporting 1 month of all over body aches/pain and fatigue     Also reporting fingers and toes turning purple   - Lab work was done after last visit, showing only borderline positive REJI but no other etiology for her symptoms      Exam normal      Discussed possible etiology could still be autoimmune disorder vs. Fibromyalgia vs. Raynaud's vs. Other   - Recommend trial of Medrol dose pack to see if bringing inflammation down helps      Reviewed use and side effects      Patient to update provider on if helps or not      If doesn't help, recommend trial of Lyrica as diagnostic therapeutic for possible fibromyalgia   - Reviewed medication use and side effects   - Also recommend rheumatology consult, this was ordered     3. Left hip pain   - Ongoing, concerning for ligamentous injury   - Xray negative for fracture or malalignment   - Referred to ortho for further evaluation and management       Review of the result(s) of each unique test - See list         Today - XR left hip       9/27/27 - all labs   Diagnosis or treatment significantly limited by social determinants of health - None     20 minutes spent on the date of the encounter doing chart review, history and exam, documentation and further activities as noted above    The patient indicates understanding of these issues and agrees with the plan.    Follow up: as above     Rene Damon-PENNIE Nunez  [facility] [place]         Subjective   Tia is a 44 year old, presenting for the following health  "issues:  leg pain  and Fatigue      10/1/2024     3:29 PM   Additional Questions   Roomed by Helene LONG   Accompanied by Self     Fatigue  Associated symptoms include fatigue.      Pain History:  When did you first notice your pain? About a month    Have you seen anyone else for your pain? No  How has your pain affected your ability to work? Not applicable  Where in your body do you have pain?  Leg pain     - Feeling crappy all the time     - Hands, feet, ankles, elbows - throb turn purple      Localized to left hip, can't sleep on it         Review of Systems  Constitutional, neuro, ENT, endocrine, pulmonary, cardiac, gastrointestinal, genitourinary, musculoskeletal, integument and psychiatric systems are negative, except as otherwise noted.      Objective    /80   Pulse 79   Temp 96.9  F (36.1  C) (Temporal)   Resp 20   Ht 1.607 m (5' 3.25\")   Wt 106.1 kg (234 lb)   SpO2 98%   BMI 41.12 kg/m    Body mass index is 41.12 kg/m .  Physical Exam   GENERAL APPEARANCE: healthy, alert and no distress  EYES: Eyes grossly normal to inspection, PERRLA, conjunctivae and sclerae without injection or discharge, EOM intact   RESP: Lungs clear to auscultation - no rales, rhonchi or wheezes    CV: Regular rates and rhythm, normal S1 S2, no S3 or S4, no murmur, click or rub, no peripheral edema and peripheral pulses strong and symmetric bilaterally   MS:   Left hip: Normal ROM but painful with external rotation, tender over lateral aspect and down IT band, no edema, CMS intact, normal sensory exam, normal strength, negative straight leg raise  Left knee: Normal ROM, non-tender, no edema, CMS intact, normal sensory exam, normal strength  No other musculoskeletal defects are noted and gait is age appropriate without ataxia   SKIN: No suspicious lesions or rashes, hydration status appears adeuqate with normal skin turgor   PSYCH: Alert and oriented x3; speech- coherent , normal rate and volume; able to articulate logical " thoughts, able to abstract reason, no tangential thoughts, no hallucinations or delusions, mentation appears normal, Mood is euthymic. Affect is appropriate for this mood state and bright. Thought content is free of suicidal ideation, hallucinations, and delusions. Dress is adequate and upkept. Eye contact is good during conversation.       Diagnostics; reviewed in Epic         Signed Electronically by: Melva You PA-C

## 2024-10-01 NOTE — PATIENT INSTRUCTIONS
- Do medrol pack      Report to Rene how it went      If doesn't help, trial Lyrica     - Consult with ortho for hip     - Consult with Rheumatology

## 2024-10-02 ENCOUNTER — PATIENT OUTREACH (OUTPATIENT)
Dept: CARE COORDINATION | Facility: CLINIC | Age: 44
End: 2024-10-02
Payer: COMMERCIAL

## 2024-10-04 ENCOUNTER — PATIENT OUTREACH (OUTPATIENT)
Dept: CARE COORDINATION | Facility: CLINIC | Age: 44
End: 2024-10-04
Payer: COMMERCIAL

## 2024-10-09 ENCOUNTER — TELEPHONE (OUTPATIENT)
Dept: ALLERGY | Facility: OTHER | Age: 44
End: 2024-10-09

## 2024-10-09 ENCOUNTER — ALLIED HEALTH/NURSE VISIT (OUTPATIENT)
Dept: ALLERGY | Facility: OTHER | Age: 44
End: 2024-10-09
Payer: COMMERCIAL

## 2024-10-09 DIAGNOSIS — L50.8 CHRONIC URTICARIA: Primary | ICD-10-CM

## 2024-10-09 PROCEDURE — 99207 PR NO CHARGE LOS: CPT

## 2024-10-09 PROCEDURE — 96372 THER/PROPH/DIAG INJ SC/IM: CPT

## 2024-10-09 NOTE — PROGRESS NOTES
Ashley Mcdaniels presents to clinic today at the request of Toñito Desir MD  (ordering provider) for Xolair (omalizumab) injection(s).       This service provided today was under the care of Uvaldo Luna MD; the supervising provider of the day; who was available if needed.      Patient presented after waiting 30 minutes with no reaction to  injections. Discharged from clinic.    Camille Vaca RN

## 2024-10-09 NOTE — TELEPHONE ENCOUNTER
Patient's prior authorization will be expiring 11/1/2024. Next xolair injection due 11/6/2024. Last injection was today 10/9/2024. Please start PA renewal process.    MARCO AllisonN, RN, PHN 10/9/2024 3:35 PM

## 2024-10-15 NOTE — TELEPHONE ENCOUNTER
Voicemail left for patient requesting she look at her [employer] message that was sent.    ERIN Allison, RN, PHN 10/15/2024 8:57 AM

## 2024-10-17 ENCOUNTER — MYC MEDICAL ADVICE (OUTPATIENT)
Dept: FAMILY MEDICINE | Facility: OTHER | Age: 44
End: 2024-10-17
Payer: COMMERCIAL

## 2024-10-17 DIAGNOSIS — M79.7 FIBROMYALGIA: Primary | ICD-10-CM

## 2024-10-17 NOTE — TELEPHONE ENCOUNTER
I spoke to Tia today. She states that per previous visit on 10/1/24 alternate tx was discussed.     I will check with covering provider to see if they are comfortable calling in this medication for her.      CDL note:     Recommend trial of Medrol dose pack to see if bringing inflammation down helps      Reviewed use and side effects      Patient to update provider on if helps or not      If doesn't help, recommend trial of Lyrica as diagnostic therapeutic for possible fibromyalgia      Otherwise pt was ok with waiting for CDL until Monday. I will also CC PCP

## 2024-10-17 NOTE — TELEPHONE ENCOUNTER
PCP is out of the office until next week, can this wait for her evaluation? Otherwise would recommend follow-up appointment.     Gerhard Ugarte PA-C

## 2024-10-17 NOTE — TELEPHONE ENCOUNTER
Rene - would you like to see patient again for returning symptom's after medrol pack done?     Per Office Visit on 10/1:   1 & 2.   - As previous, patient reporting 1 month of all over body aches/pain and fatigue     Also reporting fingers and toes turning purple   - Lab work was done after last visit, showing only borderline positive REJI but no other etiology for her symptoms      Exam normal      Discussed possible etiology could still be autoimmune disorder vs. Fibromyalgia vs. Raynaud's vs. Other   - Recommend trial of Medrol dose pack to see if bringing inflammation down helps      Reviewed use and side effects      Patient to update provider on if helps or not      If doesn't help, recommend trial of Lyrica as diagnostic therapeutic for possible fibromyalgia   - Reviewed medication use and side effects   - Also recommend rheumatology consult, this was ordered

## 2024-10-21 RX ORDER — PREGABALIN 50 MG/1
50 CAPSULE ORAL 2 TIMES DAILY
Qty: 60 CAPSULE | Refills: 2 | Status: SHIPPED | OUTPATIENT
Start: 2024-10-21

## 2024-10-28 NOTE — TELEPHONE ENCOUNTER
Yesenia Vazquez34 minutes ago (9:35 AM)     ANABELLA Gaston is approved from 11/02/24 - 11/01/25 as long as insurance stays the same.    Thank you,    Yesenia LONG, BSN, RN, PHN 10/28/2024 10:10 AM

## 2024-10-29 DIAGNOSIS — K50.80 CROHN'S DISEASE OF BOTH SMALL AND LARGE INTESTINE WITHOUT COMPLICATION (H): Primary | ICD-10-CM

## 2024-11-02 LAB — C DIFF TOX B STL QL: NEGATIVE

## 2024-11-02 PROCEDURE — 87493 C DIFF AMPLIFIED PROBE: CPT | Performed by: NURSE PRACTITIONER

## 2024-11-02 PROCEDURE — 83993 ASSAY FOR CALPROTECTIN FECAL: CPT | Performed by: NURSE PRACTITIONER

## 2024-11-04 LAB — CALPROTECTIN STL-MCNT: 17.9 MG/KG (ref 0–49.9)

## 2024-11-13 ENCOUNTER — VIRTUAL VISIT (OUTPATIENT)
Dept: FAMILY MEDICINE | Facility: OTHER | Age: 44
End: 2024-11-13
Payer: COMMERCIAL

## 2024-11-13 DIAGNOSIS — M79.7 FIBROMYALGIA: Primary | ICD-10-CM

## 2024-11-13 DIAGNOSIS — D89.89 AUTOIMMUNE DISORDER (H): ICD-10-CM

## 2024-11-13 PROCEDURE — G2211 COMPLEX E/M VISIT ADD ON: HCPCS | Mod: 95 | Performed by: PHYSICIAN ASSISTANT

## 2024-11-13 PROCEDURE — 99214 OFFICE O/P EST MOD 30 MIN: CPT | Mod: 95 | Performed by: PHYSICIAN ASSISTANT

## 2024-11-13 ASSESSMENT — PAIN SCALES - PAIN ENJOYMENT GENERAL ACTIVITY SCALE (PEG)
INTERFERED_GENERAL_ACTIVITY: 4
INTERFERED_GENERAL_ACTIVITY: 4
INTERFERED_ENJOYMENT_LIFE: 4
PEG_TOTALSCORE: 4.67
AVG_PAIN_PASTWEEK: 6
AVG_PAIN_PASTWEEK: 6
PEG_TOTALSCORE: 4.67
INTERFERED_ENJOYMENT_LIFE: 4

## 2024-11-13 ASSESSMENT — ANXIETY QUESTIONNAIRES
6. BECOMING EASILY ANNOYED OR IRRITABLE: NOT AT ALL
GAD7 TOTAL SCORE: 1
3. WORRYING TOO MUCH ABOUT DIFFERENT THINGS: NOT AT ALL
GAD7 TOTAL SCORE: 1
8. IF YOU CHECKED OFF ANY PROBLEMS, HOW DIFFICULT HAVE THESE MADE IT FOR YOU TO DO YOUR WORK, TAKE CARE OF THINGS AT HOME, OR GET ALONG WITH OTHER PEOPLE?: NOT DIFFICULT AT ALL
7. FEELING AFRAID AS IF SOMETHING AWFUL MIGHT HAPPEN: NOT AT ALL
4. TROUBLE RELAXING: NOT AT ALL
5. BEING SO RESTLESS THAT IT IS HARD TO SIT STILL: NOT AT ALL
2. NOT BEING ABLE TO STOP OR CONTROL WORRYING: NOT AT ALL
GAD7 TOTAL SCORE: 1
1. FEELING NERVOUS, ANXIOUS, OR ON EDGE: SEVERAL DAYS
IF YOU CHECKED OFF ANY PROBLEMS ON THIS QUESTIONNAIRE, HOW DIFFICULT HAVE THESE PROBLEMS MADE IT FOR YOU TO DO YOUR WORK, TAKE CARE OF THINGS AT HOME, OR GET ALONG WITH OTHER PEOPLE: NOT DIFFICULT AT ALL
7. FEELING AFRAID AS IF SOMETHING AWFUL MIGHT HAPPEN: NOT AT ALL

## 2024-11-13 ASSESSMENT — PATIENT HEALTH QUESTIONNAIRE - PHQ9
SUM OF ALL RESPONSES TO PHQ QUESTIONS 1-9: 6
SUM OF ALL RESPONSES TO PHQ QUESTIONS 1-9: 6
10. IF YOU CHECKED OFF ANY PROBLEMS, HOW DIFFICULT HAVE THESE PROBLEMS MADE IT FOR YOU TO DO YOUR WORK, TAKE CARE OF THINGS AT HOME, OR GET ALONG WITH OTHER PEOPLE: NOT DIFFICULT AT ALL

## 2024-11-13 NOTE — PROGRESS NOTES
Tia is a 44 year old who is being evaluated via a billable video visit.    How would you like to obtain your AVS? MyChart  If the video visit is dropped, the invitation should be resent by: Text to cell phone: [phone]  Will anyone else be joining your video visit? No    Assessment & Plan     ICD-10-CM    1. Fibromyalgia  M79.7       2. Autoimmune disorder (H)  D89.89           - As previous, patient reported several months of all over body aches/pain and fatigue, Lab work was done after last visit, showing only borderline positive REJI but no other etiology for her symptoms   - Needs to set up Rheumatology consult, hasn't done this yet   - Last visit did a trial of Medrol dose pack, which did help for a few days   - After response was relayed, we started Lyrica 50 mg BID      Has only been taking this for 2 days      Noticed very irritable but has only been 2 days, suspect other stressors likely the cause of this   - Recommend keep trying Lyrica at current dose of 50 mg BID for 4-6 weeks   - Patient in agreement for this plan   - Reviewed medication use and side effects        Review of the result(s) of each unique test - See list        9/27/24 - all labs   Diagnosis or treatment significantly limited by social determinants of health - none     15 minutes spent on the date of the encounter doing chart review, history and exam, documentation and further activities as noted above    The patient indicates understanding of these issues and agrees with the plan.    Follow up: 1 month     PA student as below was present and participated in shadow capacity only    JARVIS FranklinS   [facility]     Rene You PA-C  [facility]   Tia is a 44 year old, presenting for the following health issues:  Recheck Medication (Lyrica- Fibromyalgia)      11/13/2024     7:42 AM   Additional Questions   Roomed by Helene IRIZARRY     History of Present Illness        Reason for visit:  Follow up medications   She is taking medications regularly.       Medication Followup of Lyrica  Taking Medication as prescribed: yes- recently but did not start it right away  Side Effects:  None  Medication Helping Symptoms:  Unsure    Patient did want to discuss about possible side effect- extreme irritable she is concerned with      Pain History:  When did you first notice your pain? 3 to 4 months    Have you seen this provider for your pain in the past? Yes   Where in your body do you have pain? Feels it more in the lower extremities but it does also show up all over.   Are you seeing anyone else for your pain? No    - Steroid worked really well, felt by day 2 was super good but then 3-4 days then pain came back   - Noticed mostly in lower extremities   - Took a long time to  lyrica   - Took it for 2 days     - Hasn't scheduled with Rheumatology         Review of Systems  Constitutional, neuro, ENT, endocrine, pulmonary, cardiac, gastrointestinal, genitourinary, musculoskeletal, integument and psychiatric systems are negative, except as otherwise noted.      Objective       Vitals:  No vitals were obtained today due to virtual visit.    Physical Exam   GENERAL: alert and no distress  EYES: Eyes grossly normal to inspection.  No discharge or erythema, or obvious scleral/conjunctival abnormalities.  RESP: No audible wheeze, cough, or visible cyanosis.    SKIN: Visible skin clear. No significant rash, abnormal pigmentation or lesions.  NEURO: Cranial nerves grossly intact.  Mentation and speech appropriate for age.  PSYCH: Appropriate affect, tone, and pace of words    Diagnostics: none       Video-Visit Details    Type of service:  Video Visit   Originating Location (pt. Location): Home  Distant Location (provider location):  Off-site  Platform used for Video Visit: Judah  Signed Electronically by: Melva You PA-C

## 2024-11-18 ENCOUNTER — THERAPY VISIT (OUTPATIENT)
Dept: PHYSICAL THERAPY | Facility: CLINIC | Age: 44
End: 2024-11-18
Attending: PHYSICIAN ASSISTANT
Payer: COMMERCIAL

## 2024-11-18 DIAGNOSIS — I89.0 LYMPHEDEMA: ICD-10-CM

## 2024-11-18 PROCEDURE — 97140 MANUAL THERAPY 1/> REGIONS: CPT | Mod: GP | Performed by: PHYSICAL THERAPIST

## 2024-11-18 PROCEDURE — 97161 PT EVAL LOW COMPLEX 20 MIN: CPT | Mod: GP | Performed by: PHYSICAL THERAPIST

## 2024-11-18 NOTE — PROGRESS NOTES
PHYSICAL THERAPY EVALUATION  Type of Visit: Evaluation             Subjective         Presenting condition or subjective complaint: (Patient-Rptd) lipodema  Pt is a 43 yo female who presents to PT with c/o swelling and discomfort in B LE. Pt was previously seen by Cindy Arce PT, CLT, in September and October of 2023 where she was assisted in obtaining waist high compression. See Progress Note dated 9/12/23 for details of pt condition and sx related to her lipedema. Pt currently reporting painful area at proximal anteromedial tibia, noting some increased swelling in that area with visible venous distension. Pt states she has been told she has venous insufficiency.  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     Crohn's colitis (H)     YUE (generalized anxiety disorder)     GERD (gastroesophageal reflux disease)     Low back pain     Major depressive disorder, recurrent episode, mild (H)        Dates & types of surgery:    Past Surgical History:   Procedure Laterality Date    CHOLECYSTECTOMY  Feb 2007    DILATION AND CURETTAGE  May 2003    Non-OB    HC TOOTH EXTRACTION W/FORCEP  Nov 2006    LAP ADJUSTABLE GASTRIC BAND  May 2007         Prior diagnostic imaging/testing results:       Prior therapy history for the same diagnosis, illness or injury: (Patient-Rptd) Yes      Prior Level of Function  Transfers:   Ambulation:   ADL:   IADL:     Living Environment  Social support: (Patient-Rptd) With a significant other or spouse   Type of home: (Patient-Rptd) House; Multi-level   Stairs to enter the home: (Patient-Rptd) Yes       Ramp: (Patient-Rptd) No   Stairs inside the home: (Patient-Rptd) Yes (Patient-Rptd) 15 Is there a railing: (Patient-Rptd) Yes     Help at home:  (Patient-Rptd) None  Equipment owned:       Employment:      Hobbies/Interests:      Patient goals for therapy: (Patient-Rptd) reduce pain and swelling    Pain assessment:      Objective       EDEMA EVALUATION  Additional history:  Body part affected by edema: (Patient-Rptd) legs  If cancer related, treatment:    If not cancer related, problems with veins or cause of swelling: (Patient-Rptd) told i have insufficiency  Distance able to walk: (Patient-Rptd) depends but sone days can walj a lot others not can walk about a mile without pain  Time able to stand: (Patient-Rptd) about 30 mins before painful  Sensation problems in hands/feet: (Patient-Rptd) Yes (Patient-Rptd) numbing and tingling in both can also eboni severe cramping in feet  Edema etiology: Chronic Venous Insufficiency, lipedema    FUNCTIONAL SCALES       Cognitive Status Examination  Orientation:    Level of Consciousness:   Follows Commands and Answers Questions:   Personal Safety and Judgement:   Memory:     EDEMA  Skin Condition: Intact, Non-pitting, Venous distention  Scar: No  Capillary Refill: Symmetrical  Radial Pulse:   Dorsal Pedal Pulse:   Stemmer Sign: -  Ulceration: No    GIRTH MEASUREMENTS: Refer to separate girth measurement flowsheet for specific measurements.    VOLUME LE  Right LE Total Volume (mL): 38696.44  Left LE Total Volume (mL): 15021.49  LE Limb Comparison:  Identify AFFECTED Limb Volume-Vi (ml): 36386.5  Identify UNAFFECTED Limb Volume-Vu (ml): 40316.4  % LE Swellin.6  LE Limb % Difference: 14.38    RANGE OF MOTION:   STRENGTH:   POSTURE:   PALPATION: Non-pitting noted  ACTIVITIES OF DAILY LIVING:   BED MOBILITY:   TRANSFERS:   GAIT/LOCOMOTION:   BALANCE:   SENSATION:   VASCULAR:   COORDINATION:   MUSCLE TONE:     Assessment & Plan   CLINICAL IMPRESSIONS  Medical Diagnosis: Lymphedema (I89.0)    Treatment Diagnosis: LE Lymphedema   Impression/Assessment:  Pt presents to PT again for assessment of her lipedema condition. Pt  with similar LE volumes compared to previous assessment about 1 year ago. Pt is wanting to return to compression garments. Pt continues with s/s consistent with lipedema dx, e.g. easily bruised skin, discomfort and sensitivity to touch throughout legs, widened hips with larger thighs which have been resistant to dieting efforts. Pt would benefit from skilled PT interventions for re-education, assistance in creating a HEP as needed, and assistance in selection and ordering of custom flat knit garments to best address and manage her lipedema condition for ongoing comfort and greater QOL.    Clinical Decision Making (Complexity):  Clinical Presentation: Stable/Uncomplicated  Clinical Presentation Rationale: based on medical and personal factors listed in PT evaluation  Clinical Decision Making (Complexity): Low complexity    PLAN OF CARE  Treatment Interventions:  Interventions: Manual Therapy, Neuromuscular Re-education, Therapeutic Activity, Therapeutic Exercise, Gradient Compression Bandaging    Long Term Goals     PT Goal 2  Goal Identifier: Garments  Goal Description: Patient will progress to compression garment for long term management of lymphedema and will demonstrate independence with donning/doffing garments.  Target Date: 12/16/24      Frequency of Treatment: 2-3 visits  Duration of Treatment: 4 weeks    Recommended Referrals to Other Professionals:   Education Assessment:   Learner/Method: Patient  Education Comments: Re-educated in lipedema condition and how to best manage with flat knit compression garments being her best option.    Risks and benefits of evaluation/treatment have been explained.   Patient/Family/caregiver agrees with Plan of Care.     Evaluation Time:     PT Eval, Low Complexity Minutes (10007): 15       Signing Clinician: Walker Miranda, PT

## 2024-11-20 ENCOUNTER — ALLIED HEALTH/NURSE VISIT (OUTPATIENT)
Dept: ALLERGY | Facility: OTHER | Age: 44
End: 2024-11-20
Payer: COMMERCIAL

## 2024-11-20 DIAGNOSIS — L50.8 CHRONIC URTICARIA: Primary | ICD-10-CM

## 2024-11-20 DIAGNOSIS — B00.1 RECURRENT COLD SORES: ICD-10-CM

## 2024-11-20 RX ORDER — VALACYCLOVIR HYDROCHLORIDE 500 MG/1
TABLET, FILM COATED ORAL
Qty: 40 TABLET | Refills: 0 | Status: SHIPPED | OUTPATIENT
Start: 2024-11-20

## 2024-12-18 ENCOUNTER — ALLIED HEALTH/NURSE VISIT (OUTPATIENT)
Dept: ALLERGY | Facility: OTHER | Age: 44
End: 2024-12-18
Payer: COMMERCIAL

## 2024-12-18 ENCOUNTER — VIRTUAL VISIT (OUTPATIENT)
Dept: FAMILY MEDICINE | Facility: OTHER | Age: 44
End: 2024-12-18
Payer: COMMERCIAL

## 2024-12-18 DIAGNOSIS — M79.7 FIBROMYALGIA: ICD-10-CM

## 2024-12-18 DIAGNOSIS — L50.8 CHRONIC URTICARIA: Primary | ICD-10-CM

## 2024-12-18 PROCEDURE — 96372 THER/PROPH/DIAG INJ SC/IM: CPT | Performed by: ALLERGY & IMMUNOLOGY

## 2024-12-18 PROCEDURE — 99207 PR NO CHARGE LOS: CPT

## 2024-12-18 PROCEDURE — 99213 OFFICE O/P EST LOW 20 MIN: CPT | Mod: 95 | Performed by: PHYSICIAN ASSISTANT

## 2024-12-18 PROCEDURE — G2211 COMPLEX E/M VISIT ADD ON: HCPCS | Mod: 95 | Performed by: PHYSICIAN ASSISTANT

## 2024-12-18 RX ORDER — PREGABALIN 75 MG/1
75 CAPSULE ORAL 2 TIMES DAILY
Qty: 60 CAPSULE | Refills: 2 | Status: SHIPPED | OUTPATIENT
Start: 2024-12-18

## 2024-12-18 ASSESSMENT — ANXIETY QUESTIONNAIRES
7. FEELING AFRAID AS IF SOMETHING AWFUL MIGHT HAPPEN: SEVERAL DAYS
6. BECOMING EASILY ANNOYED OR IRRITABLE: SEVERAL DAYS
7. FEELING AFRAID AS IF SOMETHING AWFUL MIGHT HAPPEN: SEVERAL DAYS
GAD7 TOTAL SCORE: 13
4. TROUBLE RELAXING: NEARLY EVERY DAY
GAD7 TOTAL SCORE: 13
3. WORRYING TOO MUCH ABOUT DIFFERENT THINGS: NEARLY EVERY DAY
IF YOU CHECKED OFF ANY PROBLEMS ON THIS QUESTIONNAIRE, HOW DIFFICULT HAVE THESE PROBLEMS MADE IT FOR YOU TO DO YOUR WORK, TAKE CARE OF THINGS AT HOME, OR GET ALONG WITH OTHER PEOPLE: SOMEWHAT DIFFICULT
GAD7 TOTAL SCORE: 13
8. IF YOU CHECKED OFF ANY PROBLEMS, HOW DIFFICULT HAVE THESE MADE IT FOR YOU TO DO YOUR WORK, TAKE CARE OF THINGS AT HOME, OR GET ALONG WITH OTHER PEOPLE?: SOMEWHAT DIFFICULT
2. NOT BEING ABLE TO STOP OR CONTROL WORRYING: MORE THAN HALF THE DAYS
5. BEING SO RESTLESS THAT IT IS HARD TO SIT STILL: SEVERAL DAYS
1. FEELING NERVOUS, ANXIOUS, OR ON EDGE: MORE THAN HALF THE DAYS

## 2024-12-18 ASSESSMENT — PATIENT HEALTH QUESTIONNAIRE - PHQ9
SUM OF ALL RESPONSES TO PHQ QUESTIONS 1-9: 9
SUM OF ALL RESPONSES TO PHQ QUESTIONS 1-9: 9
10. IF YOU CHECKED OFF ANY PROBLEMS, HOW DIFFICULT HAVE THESE PROBLEMS MADE IT FOR YOU TO DO YOUR WORK, TAKE CARE OF THINGS AT HOME, OR GET ALONG WITH OTHER PEOPLE: SOMEWHAT DIFFICULT

## 2024-12-18 ASSESSMENT — PAIN SCALES - PAIN ENJOYMENT GENERAL ACTIVITY SCALE (PEG)
INTERFERED_GENERAL_ACTIVITY: 1
INTERFERED_GENERAL_ACTIVITY: 1
PEG_TOTALSCORE: 2
AVG_PAIN_PASTWEEK: 4
INTERFERED_ENJOYMENT_LIFE: 1
AVG_PAIN_PASTWEEK: 4
PEG_TOTALSCORE: 2
INTERFERED_ENJOYMENT_LIFE: 1

## 2024-12-18 NOTE — PROGRESS NOTES
Tia is a 44 year old who is being evaluated via a billable video visit.    How would you like to obtain your AVS? MyChart  If the video visit is dropped, the invitation should be resent by: Text to cell phone: [phone]  Will anyone else be joining your video visit? No    Assessment & Plan     ICD-10-CM    1. Fibromyalgia  M79.7 pregabalin (LYRICA) 75 MG capsule        - As previous, patient reported several months of all over body aches/pain and fatigue, Lab work was done after last visit, showing only borderline positive REJI but no other etiology for her symptoms, was started on Lyrica  - Reports is helping a lot, more functional     Discussed leaving dose vs. Increase     Would like to try increase     Reviewed medication use and side effects   - Recheck 1-2 months       The patient indicates understanding of these issues and agrees with the plan.    Follow up: 1-2 months     Rene Damon-PENNIE Nunez  [facility]         Subjective   Tia is a 44 year old, presenting for the following health issues:  Recheck Medication and Pain        12/18/2024     9:19 AM   Additional Questions   Roomed by Helene IRIZARRY     History of Present Illness       Reason for visit:  Pain She is missing 1 dose(s) of medications per week.  She is not taking prescribed medications regularly due to remembering to take.       Pain History:  When did you first notice your pain? 3 + Months    Have you seen this provider for your pain in the past? Yes   Where in your body do you have pain? All over body more fibromyalgia   Are you seeing anyone else for your pain? Yes - Chiropractor     Medication Followup of Lyrica  Taking Medication as prescribed: yes  Side Effects:  None  Medication Helping Symptoms:  yes    - Lyrica definitely helps   - Still some pain, achy in legs   - Still needs Rheum or Vasc, does feel more like it is circulation issue   - Since Lyrica, not pain in joints as much   - No side effects   -  Feels more functional             9/25/2024     9:05 AM 11/13/2024     7:40 AM 12/18/2024     9:13 AM   PHQ   PHQ-9 Total Score 10 6  9    Q9: Thoughts of better off dead/self-harm past 2 weeks Not at all  Not at all  Not at all        Patient-reported         7/15/2024     1:12 PM 11/13/2024     7:41 AM 12/18/2024     9:13 AM   YUE-7 SCORE   Total Score 8 (mild anxiety) 1 (minimal anxiety) 13 (moderate anxiety)   Total Score 8 1  13        Patient-reported         Review of Systems  Constitutional, neuro, ENT, endocrine, pulmonary, cardiac, gastrointestinal, genitourinary, musculoskeletal, integument and psychiatric systems are negative, except as otherwise noted.      Objective       Vitals:  No vitals were obtained today due to virtual visit.    Physical Exam   GENERAL: alert and no distress  EYES: Eyes grossly normal to inspection.  No discharge or erythema, or obvious scleral/conjunctival abnormalities.  RESP: No audible wheeze, cough, or visible cyanosis.    SKIN: Visible skin clear. No significant rash, abnormal pigmentation or lesions.  NEURO: Cranial nerves grossly intact.  Mentation and speech appropriate for age.  PSYCH: Appropriate affect, tone, and pace of words    Diagnostics; none       Video-Visit Details    Type of service:  Video Visit   Originating Location (pt. Location): Home  Distant Location (provider location):  Off-site  Platform used for Video Visit: Judah  Signed Electronically by: Melva You PA-C

## 2025-01-15 ENCOUNTER — VIRTUAL VISIT (OUTPATIENT)
Dept: FAMILY MEDICINE | Facility: OTHER | Age: 45
End: 2025-01-15
Payer: COMMERCIAL

## 2025-01-15 ENCOUNTER — TELEPHONE (OUTPATIENT)
Dept: FAMILY MEDICINE | Facility: OTHER | Age: 45
End: 2025-01-15

## 2025-01-15 DIAGNOSIS — F90.0 ADHD (ATTENTION DEFICIT HYPERACTIVITY DISORDER), INATTENTIVE TYPE: Primary | ICD-10-CM

## 2025-01-15 PROCEDURE — 98006 SYNCH AUDIO-VIDEO EST MOD 30: CPT | Performed by: PHYSICIAN ASSISTANT

## 2025-01-15 RX ORDER — LISDEXAMFETAMINE DIMESYLATE 10 MG/1
CAPSULE ORAL
Qty: 42 CAPSULE | Refills: 0 | Status: SHIPPED | OUTPATIENT
Start: 2025-01-15 | End: 2025-02-12

## 2025-01-15 NOTE — PROGRESS NOTES
Ashley is a 44 year old who is being evaluated via a billable video visit.    How would you like to obtain your AVS? MyChart  If the video visit is dropped, the invitation should be resent by: Text to cell phone: [phone]  Will anyone else be joining your video visit? No    Assessment & Plan     ICD-10-CM    1. ADHD (attention deficit hyperactivity disorder), inattentive type  F90.0 lisdexamfetamine (VYVANSE) 10 MG capsule            - Diagnosed as an adult, not on medications in many years     Last time addressed with me was 2017 and was on Adderall     This made her anxiety worse so stayed off for years   - Now having a hard time functioning    Wishes to try Vyvanse     Discussed use and side effects     Will taper 10 mg for 2 weeks then can increase to 20 mg   -  reviewed, only 2 Lyrica fills in the last year   - Discussed once stable, will need CSA and Utox       The patient indicates understanding of these issues and agrees with the plan.    Follow up: 1 month     Rene Damon-PENNIE Nunez  [facility]   Ashley is a 44 year old, presenting for the following health issues:  A.D.H.D      1/15/2025    12:43 PM   Additional Questions   Roomed by megan   Accompanied by self   Patient is currently not taking anything for ADHD    A.D.H.D    History of Present Illness       Reason for visit:  Adhd follow up   She is taking medications regularly.         Ashley is here today to recheck ADHD/ADD.    Updates since last visit:   - Diagnosed as an adult   - Not on medication for a long time   - Just not holding it together   - Can't remember anything, jumping all over   - Adderall - made anxiety so bad     Clenching of jaw, tics   - Would like to try Vyvanse             Review of Systems  Constitutional, neuro, ENT, endocrine, pulmonary, cardiac, gastrointestinal, genitourinary, musculoskeletal, integument and psychiatric systems are negative, except as otherwise noted.     Refill Request     CONFIRM preferred pharmacy with the patient. If Mail Order Rx - Pend for 90 day refill. Last Seen: Last Seen Department: 8/8/2022  Last Seen by PCP: 8/8/2022    Last Written: 10/10/2022 135 tablet 1 refills    If no future appointment scheduled:  Review the last OV with PCP and review information for follow-up visit,  Route STAFF MESSAGE with patient name to the [facility] for scheduling with the following information:            -  Timing of next visit           -  Visit type ie Physical, OV, etc           -  Diagnoses/Reason ie. COPD, HTN - Do not use MEDICATION, Follow-up or CHECK UP - Give reason for visit      Next Appointment:   No future appointments. Message sent to [address] [place] [address] to schedule appt with patient?   N/A      Requested Prescriptions     Pending Prescriptions Disp Refills    sertraline (ZOLOFT) 100 MG tablet 135 tablet 1     Sig: Take 1.5 tablets by mouth daily     2   Objective       Vitals:  No vitals were obtained today due to virtual visit.    Physical Exam   GENERAL: alert and no distress  EYES: Eyes grossly normal to inspection.  No discharge or erythema, or obvious scleral/conjunctival abnormalities.  RESP: No audible wheeze, cough, or visible cyanosis.    SKIN: Visible skin clear. No significant rash, abnormal pigmentation or lesions.  NEURO: Cranial nerves grossly intact.  Mentation and speech appropriate for age.  PSYCH: Appropriate affect, tone, and pace of words    Diagnostics: none       Video-Visit Details    Type of service:  Video Visit   Originating Location (pt. Location): Home  Distant Location (provider location):  Off-site  Platform used for Video Visit: Judah  Signed Electronically by: Melva You PA-C

## 2025-01-15 NOTE — TELEPHONE ENCOUNTER
Prior Authorization Retail Medication Request    Medication/Dose: Prior auth needed for Lisdexamfetamine 10mg for 2 caps daily  Diagnosis and ICD code (if different than what is on RX):  --  New/renewal/insurance change PA/secondary ins. PA:  Previously Tried and Failed:  --  Rationale:  Prior auth needed for Lisdexamfetamine 10mg for 2 caps daily    Insurance   Primary: [facility] [phone]  Insurance ID:  [id number]    Secondary (if applicable):--  Insurance ID:  --    Pharmacy Information (if different than what is on RX)  Name:  [facility] [place]  Phone:  [phone]  Fax:[phone]    Clinic Information  Preferred routing pool for dept communication: --

## 2025-01-18 NOTE — TELEPHONE ENCOUNTER
PA Initiation    Medication: VYVANSE 10 MG PO CAPS  Insurance Company: [employer] - Phone [phone] Fax [phone]  [facility] [place] [address] [facility]   Filling Pharmacy Phone: [phone]  Filling Pharmacy Fax:    Start Date: 1/17/2025

## 2025-01-19 NOTE — TELEPHONE ENCOUNTER
Prior Authorization Approval    Medication: VYVANSE 10 MG PO CAPS  Authorization Effective Date: 12/20/2024  Authorization Expiration Date: 2/19/2025  Approved Dose/Quantity: 42 capsules per 28 days  Reference #: [id number]   Insurance Company: [employer] - Phone [phone] Fax [phone]  Which Pharmacy is filling the prescription: [facility] [address]   Pharmacy Notified: YES  Patient Notified: YES

## 2025-01-22 ENCOUNTER — ALLIED HEALTH/NURSE VISIT (OUTPATIENT)
Dept: ALLERGY | Facility: OTHER | Age: 45
End: 2025-01-22
Payer: COMMERCIAL

## 2025-01-22 ENCOUNTER — MYC MEDICAL ADVICE (OUTPATIENT)
Dept: FAMILY MEDICINE | Facility: OTHER | Age: 45
End: 2025-01-22

## 2025-01-22 DIAGNOSIS — L50.8 CHRONIC URTICARIA: Primary | ICD-10-CM

## 2025-01-22 PROCEDURE — 99207 PR NO CHARGE LOS: CPT

## 2025-01-22 PROCEDURE — 96372 THER/PROPH/DIAG INJ SC/IM: CPT | Performed by: ALLERGY & IMMUNOLOGY

## 2025-02-10 ENCOUNTER — VIRTUAL VISIT (OUTPATIENT)
Dept: FAMILY MEDICINE | Facility: OTHER | Age: 45
End: 2025-02-10
Payer: COMMERCIAL

## 2025-02-10 DIAGNOSIS — F90.0 ADHD (ATTENTION DEFICIT HYPERACTIVITY DISORDER), INATTENTIVE TYPE: ICD-10-CM

## 2025-02-10 PROCEDURE — 98006 SYNCH AUDIO-VIDEO EST MOD 30: CPT | Performed by: PHYSICIAN ASSISTANT

## 2025-02-10 RX ORDER — LISDEXAMFETAMINE DIMESYLATE 20 MG/1
20 CAPSULE ORAL EVERY MORNING
Qty: 30 CAPSULE | Refills: 0 | Status: SHIPPED | OUTPATIENT
Start: 2025-02-10

## 2025-02-10 ASSESSMENT — ANXIETY QUESTIONNAIRES
GAD7 TOTAL SCORE: 7
4. TROUBLE RELAXING: SEVERAL DAYS
5. BEING SO RESTLESS THAT IT IS HARD TO SIT STILL: SEVERAL DAYS
IF YOU CHECKED OFF ANY PROBLEMS ON THIS QUESTIONNAIRE, HOW DIFFICULT HAVE THESE PROBLEMS MADE IT FOR YOU TO DO YOUR WORK, TAKE CARE OF THINGS AT HOME, OR GET ALONG WITH OTHER PEOPLE: SOMEWHAT DIFFICULT
7. FEELING AFRAID AS IF SOMETHING AWFUL MIGHT HAPPEN: SEVERAL DAYS
7. FEELING AFRAID AS IF SOMETHING AWFUL MIGHT HAPPEN: SEVERAL DAYS
6. BECOMING EASILY ANNOYED OR IRRITABLE: SEVERAL DAYS
8. IF YOU CHECKED OFF ANY PROBLEMS, HOW DIFFICULT HAVE THESE MADE IT FOR YOU TO DO YOUR WORK, TAKE CARE OF THINGS AT HOME, OR GET ALONG WITH OTHER PEOPLE?: SOMEWHAT DIFFICULT
3. WORRYING TOO MUCH ABOUT DIFFERENT THINGS: SEVERAL DAYS
GAD7 TOTAL SCORE: 7
2. NOT BEING ABLE TO STOP OR CONTROL WORRYING: SEVERAL DAYS
GAD7 TOTAL SCORE: 7
1. FEELING NERVOUS, ANXIOUS, OR ON EDGE: SEVERAL DAYS

## 2025-02-10 ASSESSMENT — PATIENT HEALTH QUESTIONNAIRE - PHQ9
SUM OF ALL RESPONSES TO PHQ QUESTIONS 1-9: 6
10. IF YOU CHECKED OFF ANY PROBLEMS, HOW DIFFICULT HAVE THESE PROBLEMS MADE IT FOR YOU TO DO YOUR WORK, TAKE CARE OF THINGS AT HOME, OR GET ALONG WITH OTHER PEOPLE: SOMEWHAT DIFFICULT
SUM OF ALL RESPONSES TO PHQ QUESTIONS 1-9: 6

## 2025-02-10 NOTE — PROGRESS NOTES
Ashley is a 44 year old who is being evaluated via a billable video visit.    How would you like to obtain your AVS? MyChart  If the video visit is dropped, the invitation should be resent by: Text to cell phone: [phone]  Will anyone else be joining your video visit? No      Assessment & Plan     ICD-10-CM    1. ADHD (attention deficit hyperactivity disorder), inattentive type  F90.0 lisdexamfetamine (VYVANSE) 20 MG capsule          - Diagnosed as an adult, not on medications in many years, was previously on Adderall   - Last visit started trial of Vyvanse     Reports did help at 10 mg but not enough, only a few days at 20 mg now     Minimal side effects, did increase her anxiety a little bit the first week   - Will stay at Vyvanse 20 mg for 1 month     Discussed use and side effects   -  reviewed, no concerns   - Discussed once stable, will need CSA and Utox   - OK'd vacation override for medication     The patient indicates understanding of these issues and agrees with the plan.    Follow up: 1 month     Rene Damon-PENNIE Nunez  [facility]   Ashley is a 44 year old, presenting for the following health issues:  Recheck Medication    History of Present Illness       Reason for visit:  ADHD Med re-eval    She eats 4 or more servings of fruits and vegetables daily.She consumes 0 sweetened beverage(s) daily.She exercises with enough effort to increase her heart rate 10 to 19 minutes per day.  She exercises with enough effort to increase her heart rate 3 or less days per week.        Ashley is here today to recheck ADHD/ADD.    Updates since last visit: Couple of days thought might not work, anxiety, but went away   - Forgot increase, did only increase to 2 last Friday, not really notice change   - At only 1 capsule still forgetful     - Leaving tomorrow for [place] for 2 weeks   - 1 week work and 1 week fun       Routine for taking medicine, including time: 6-6:30 am   Time  medicine wears off: 5-5:30   Issues at school: NA   Issues at home: None   Control of symptoms: Good     Side effects:  Headaches: No  Stomach aches: No  Irritability/mood swings: Yes , anxiety at first week but that went away   Difficulties with sleep: No  Social withdrawal: No  Unusual movements/tics: No  Decreased appetite: No    Other concerns: None         Review of Systems  Constitutional, neuro, ENT, endocrine, pulmonary, cardiac, gastrointestinal, genitourinary, musculoskeletal, integument and psychiatric systems are negative, except as otherwise noted.      Objective       Vitals:  No vitals were obtained today due to virtual visit.    Physical Exam   GENERAL: alert and no distress  EYES: Eyes grossly normal to inspection.  No discharge or erythema, or obvious scleral/conjunctival abnormalities.  RESP: No audible wheeze, cough, or visible cyanosis.    SKIN: Visible skin clear. No significant rash, abnormal pigmentation or lesions.  NEURO: Cranial nerves grossly intact.  Mentation and speech appropriate for age.  PSYCH: Appropriate affect, tone, and pace of words    Diagnostics: reviewed in Epic           Video-Visit Details    Type of service:  Video Visit   Originating Location (pt. Location): Home  Distant Location (provider location):  On-site  Platform used for Video Visit: Judah  Signed Electronically by: Melva You PA-C

## 2025-02-27 DIAGNOSIS — E66.01 MORBID OBESITY WITH BODY MASS INDEX OF 40.0-44.9 IN ADULT (H): Primary | ICD-10-CM

## 2025-02-27 DIAGNOSIS — Z13.228 ENCOUNTER FOR SCREENING FOR METABOLIC DISORDER: ICD-10-CM

## 2025-03-10 ENCOUNTER — VIRTUAL VISIT (OUTPATIENT)
Dept: FAMILY MEDICINE | Facility: OTHER | Age: 45
End: 2025-03-10
Payer: COMMERCIAL

## 2025-03-10 DIAGNOSIS — F90.0 ADHD (ATTENTION DEFICIT HYPERACTIVITY DISORDER), INATTENTIVE TYPE: ICD-10-CM

## 2025-03-10 PROCEDURE — 98006 SYNCH AUDIO-VIDEO EST MOD 30: CPT | Performed by: PHYSICIAN ASSISTANT

## 2025-03-10 PROCEDURE — 1125F AMNT PAIN NOTED PAIN PRSNT: CPT | Mod: 95 | Performed by: PHYSICIAN ASSISTANT

## 2025-03-10 RX ORDER — LISDEXAMFETAMINE DIMESYLATE 20 MG/1
20 CAPSULE ORAL EVERY MORNING
Qty: 30 CAPSULE | Refills: 0 | Status: SHIPPED | OUTPATIENT
Start: 2025-03-12

## 2025-03-10 NOTE — PROGRESS NOTES
Ashley is a 44 year old who is being evaluated via a billable video visit.    How would you like to obtain your AVS? MyChart  If the video visit is dropped, the invitation should be resent by: Text to cell phone: [phone]  Will anyone else be joining your video visit? No      Assessment & Plan     ICD-10-CM    1. ADHD (attention deficit hyperactivity disorder), inattentive type  F90.0 lisdexamfetamine (VYVANSE) 20 MG capsule          - Diagnosed as an adult, not on medications in many years, was previously on Adderall, has been started and titrated on Vyvanse, last visit elected to give more time at 20 mg     - Reports this has gone well, minimal to none side effects     Discussed increase vs. Stable     Patient feels stable     Discussed use and side effects   -  reviewed, no concerns   - Discussed will need CSA and Utox      Scheduled for in person in 1 month to complete these things       The patient indicates understanding of these issues and agrees with the plan.    Follow up: 1 month     Rene Damon-PENNIE Nunez  [facility]   Ashley is a 44 year old, presenting for the following health issues:  MH Follow Up (ADHD)        3/10/2025     8:05 AM   Additional Questions   Roomed by Paola   Accompanied by Self         3/10/2025     8:05 AM   Patient Reported Additional Medications   Patient reports taking the following new medications NA     History of Present Illness       Reason for visit:  ADHD    She eats 2-3 servings of fruits and vegetables daily.She consumes 0 sweetened beverage(s) daily.She exercises with enough effort to increase her heart rate 30 to 60 minutes per day.  She exercises with enough effort to increase her heart rate 3 or less days per week. She is missing 1 dose(s) of medications per week.  She is not taking prescribed medications regularly due to remembering to take.        SUBJECTIVE:  Ashley is here today to recheck ADHD/ADD.    Updates since  "last visit: Feeling less scattered  - Notices once and awhile anxiety mid day but otherwise tolerating   - Able to handle it well   - Can stay on task better   - Weight management through [facility], will be starting Zepbound, going through them because they are monitoring gastric band     Routine for taking medicine, including time: In the morning 6:30-7:30am  Time medicine wears off: 4pm  Issues at school/work: no  Issues at home: no  Control of symptoms: yes, good     Side effects:  Headaches: No  Stomach aches: No  Irritability/mood swings: No  Difficulties with sleep: No  Social withdrawal: No  Unusual movements/tics: No  Decreased appetite: No    Other concerns: None              Review of Systems  Constitutional, neuro, ENT, endocrine, pulmonary, cardiac, gastrointestinal, genitourinary, musculoskeletal, integument and psychiatric systems are negative, except as otherwise noted.      Objective    Vitals - Patient Reported  Weight (Patient Reported): 104.3 kg (230 lb)  Height (Patient Reported): 162.6 cm (5' 4\")  BMI (Based on Pt Reported Ht/Wt): 39.48  Pain Score: Mild Pain (2)  Pain Loc: Low Back    Physical Exam   GENERAL: alert and no distress  EYES: Eyes grossly normal to inspection.  No discharge or erythema, or obvious scleral/conjunctival abnormalities.  RESP: No audible wheeze, cough, or visible cyanosis.    SKIN: Visible skin clear. No significant rash, abnormal pigmentation or lesions.  NEURO: Cranial nerves grossly intact.  Mentation and speech appropriate for age.  PSYCH: Appropriate affect, tone, and pace of words    Diagnostics: none       Video-Visit Details    Type of service:  Video Visit   Originating Location (pt. Location): Home  Distant Location (provider location):  On-site  Platform used for Video Visit: Judah  Signed Electronically by: Melva You PA-C    "

## 2025-03-12 ENCOUNTER — ALLIED HEALTH/NURSE VISIT (OUTPATIENT)
Dept: ALLERGY | Facility: OTHER | Age: 45
End: 2025-03-12
Payer: COMMERCIAL

## 2025-03-12 DIAGNOSIS — L50.8 CHRONIC URTICARIA: Primary | ICD-10-CM

## 2025-03-12 PROCEDURE — 96372 THER/PROPH/DIAG INJ SC/IM: CPT | Performed by: ALLERGY & IMMUNOLOGY

## 2025-03-24 DIAGNOSIS — B00.1 RECURRENT COLD SORES: ICD-10-CM

## 2025-03-24 RX ORDER — VALACYCLOVIR HYDROCHLORIDE 500 MG/1
TABLET, FILM COATED ORAL
Qty: 40 TABLET | Refills: 3 | Status: SHIPPED | OUTPATIENT
Start: 2025-03-24

## 2025-04-03 ENCOUNTER — LAB (OUTPATIENT)
Dept: LAB | Facility: CLINIC | Age: 45
End: 2025-04-03
Payer: COMMERCIAL

## 2025-04-03 DIAGNOSIS — K50.80 CROHN'S DISEASE OF BOTH SMALL AND LARGE INTESTINE WITHOUT COMPLICATION (H): ICD-10-CM

## 2025-04-03 DIAGNOSIS — Z79.69 NEED FOR PROPHYLACTIC CHEMOTHERAPY: ICD-10-CM

## 2025-04-03 DIAGNOSIS — E66.01 MORBID OBESITY WITH BODY MASS INDEX OF 40.0-44.9 IN ADULT (H): ICD-10-CM

## 2025-04-03 DIAGNOSIS — Z13.228 ENCOUNTER FOR SCREENING FOR METABOLIC DISORDER: ICD-10-CM

## 2025-04-03 LAB
ALBUMIN SERPL BCG-MCNC: 4.2 G/DL (ref 3.5–5.2)
ALP SERPL-CCNC: 61 U/L (ref 40–150)
ALT SERPL W P-5'-P-CCNC: 21 U/L (ref 0–50)
ANION GAP SERPL CALCULATED.3IONS-SCNC: 10 MMOL/L (ref 7–15)
AST SERPL W P-5'-P-CCNC: 19 U/L (ref 0–45)
BASOPHILS # BLD AUTO: 0 10E3/UL (ref 0–0.2)
BASOPHILS NFR BLD AUTO: 1 %
BILIRUB DIRECT SERPL-MCNC: 0.12 MG/DL (ref 0–0.3)
BILIRUB SERPL-MCNC: 0.4 MG/DL
BUN SERPL-MCNC: 13.8 MG/DL (ref 6–20)
CALCIUM SERPL-MCNC: 9.3 MG/DL (ref 8.8–10.4)
CHLORIDE SERPL-SCNC: 104 MMOL/L (ref 98–107)
CHOLEST SERPL-MCNC: 145 MG/DL
CREAT SERPL-MCNC: 0.88 MG/DL (ref 0.51–0.95)
EGFRCR SERPLBLD CKD-EPI 2021: 83 ML/MIN/1.73M2
EOSINOPHIL # BLD AUTO: 0.2 10E3/UL (ref 0–0.7)
EOSINOPHIL NFR BLD AUTO: 3 %
ERYTHROCYTE [DISTWIDTH] IN BLOOD BY AUTOMATED COUNT: 12.9 % (ref 10–15)
EST. AVERAGE GLUCOSE BLD GHB EST-MCNC: 105 MG/DL
FASTING STATUS PATIENT QL REPORTED: YES
FASTING STATUS PATIENT QL REPORTED: YES
GLUCOSE SERPL-MCNC: 99 MG/DL (ref 70–99)
HBA1C MFR BLD: 5.3 %
HCO3 SERPL-SCNC: 24 MMOL/L (ref 22–29)
HCT VFR BLD AUTO: 41.8 % (ref 35–47)
HDLC SERPL-MCNC: 50 MG/DL
HGB BLD-MCNC: 13.9 G/DL (ref 11.7–15.7)
IMM GRANULOCYTES # BLD: 0 10E3/UL
IMM GRANULOCYTES NFR BLD: 0 %
INSULIN SERPL-ACNC: 17.9 UU/ML (ref 2.6–24.9)
LDLC SERPL CALC-MCNC: 79 MG/DL
LYMPHOCYTES # BLD AUTO: 1.9 10E3/UL (ref 0.8–5.3)
LYMPHOCYTES NFR BLD AUTO: 32 %
MCH RBC QN AUTO: 28.8 PG (ref 26.5–33)
MCHC RBC AUTO-ENTMCNC: 33.3 G/DL (ref 31.5–36.5)
MCV RBC AUTO: 87 FL (ref 78–100)
MONOCYTES # BLD AUTO: 0.5 10E3/UL (ref 0–1.3)
MONOCYTES NFR BLD AUTO: 9 %
NEUTROPHILS # BLD AUTO: 3.4 10E3/UL (ref 1.6–8.3)
NEUTROPHILS NFR BLD AUTO: 56 %
NONHDLC SERPL-MCNC: 95 MG/DL
NRBC # BLD AUTO: 0 10E3/UL
NRBC BLD AUTO-RTO: 0 /100
PLATELET # BLD AUTO: 337 10E3/UL (ref 150–450)
POTASSIUM SERPL-SCNC: 4.4 MMOL/L (ref 3.4–5.3)
PROT SERPL-MCNC: 7.4 G/DL (ref 6.4–8.3)
RBC # BLD AUTO: 4.83 10E6/UL (ref 3.8–5.2)
SODIUM SERPL-SCNC: 138 MMOL/L (ref 135–145)
TRIGL SERPL-MCNC: 82 MG/DL
TSH SERPL DL<=0.005 MIU/L-ACNC: 1.61 UIU/ML (ref 0.3–4.2)
VIT B12 SERPL-MCNC: 407 PG/ML (ref 232–1245)
WBC # BLD AUTO: 6 10E3/UL (ref 4–11)

## 2025-04-05 LAB
GAMMA INTERFERON BACKGROUND BLD IA-ACNC: 0.04 IU/ML
M TB IFN-G BLD-IMP: NEGATIVE
M TB IFN-G CD4+ BCKGRND COR BLD-ACNC: 9.96 IU/ML
MITOGEN IGNF BCKGRD COR BLD-ACNC: 0.03 IU/ML
MITOGEN IGNF BCKGRD COR BLD-ACNC: 0.06 IU/ML

## 2025-04-07 ENCOUNTER — TELEPHONE (OUTPATIENT)
Dept: OTHER | Facility: CLINIC | Age: 45
End: 2025-04-07

## 2025-04-07 ENCOUNTER — OFFICE VISIT (OUTPATIENT)
Dept: FAMILY MEDICINE | Facility: OTHER | Age: 45
End: 2025-04-07
Payer: COMMERCIAL

## 2025-04-07 VITALS
DIASTOLIC BLOOD PRESSURE: 72 MMHG | TEMPERATURE: 98.8 F | RESPIRATION RATE: 20 BRPM | OXYGEN SATURATION: 99 % | WEIGHT: 228 LBS | SYSTOLIC BLOOD PRESSURE: 108 MMHG | BODY MASS INDEX: 40.4 KG/M2 | HEIGHT: 63 IN | HEART RATE: 71 BPM

## 2025-04-07 DIAGNOSIS — F90.0 ADHD (ATTENTION DEFICIT HYPERACTIVITY DISORDER), INATTENTIVE TYPE: Primary | ICD-10-CM

## 2025-04-07 DIAGNOSIS — K50.80 CROHN'S DISEASE OF BOTH SMALL AND LARGE INTESTINE WITHOUT COMPLICATION (H): ICD-10-CM

## 2025-04-07 DIAGNOSIS — I87.2 VENOUS INSUFFICIENCY OF RIGHT LEG: ICD-10-CM

## 2025-04-07 LAB — CREAT UR-MCNC: 126 MG/DL

## 2025-04-07 PROCEDURE — 1125F AMNT PAIN NOTED PAIN PRSNT: CPT | Performed by: PHYSICIAN ASSISTANT

## 2025-04-07 PROCEDURE — 3074F SYST BP LT 130 MM HG: CPT | Performed by: PHYSICIAN ASSISTANT

## 2025-04-07 PROCEDURE — G2211 COMPLEX E/M VISIT ADD ON: HCPCS | Performed by: PHYSICIAN ASSISTANT

## 2025-04-07 PROCEDURE — 99214 OFFICE O/P EST MOD 30 MIN: CPT | Performed by: PHYSICIAN ASSISTANT

## 2025-04-07 PROCEDURE — 3078F DIAST BP <80 MM HG: CPT | Performed by: PHYSICIAN ASSISTANT

## 2025-04-07 RX ORDER — LISDEXAMFETAMINE DIMESYLATE 20 MG/1
20 CAPSULE ORAL EVERY MORNING
Qty: 30 CAPSULE | Refills: 0 | Status: SHIPPED | OUTPATIENT
Start: 2025-04-15

## 2025-04-07 RX ORDER — LISDEXAMFETAMINE DIMESYLATE 20 MG/1
20 CAPSULE ORAL EVERY MORNING
Qty: 30 CAPSULE | Refills: 0 | Status: SHIPPED | OUTPATIENT
Start: 2025-05-15

## 2025-04-07 RX ORDER — LISDEXAMFETAMINE DIMESYLATE 20 MG/1
20 CAPSULE ORAL EVERY MORNING
Qty: 30 CAPSULE | Refills: 0 | Status: SHIPPED | OUTPATIENT
Start: 2025-06-14

## 2025-04-07 ASSESSMENT — ANXIETY QUESTIONNAIRES
7. FEELING AFRAID AS IF SOMETHING AWFUL MIGHT HAPPEN: SEVERAL DAYS
1. FEELING NERVOUS, ANXIOUS, OR ON EDGE: SEVERAL DAYS
3. WORRYING TOO MUCH ABOUT DIFFERENT THINGS: MORE THAN HALF THE DAYS
8. IF YOU CHECKED OFF ANY PROBLEMS, HOW DIFFICULT HAVE THESE MADE IT FOR YOU TO DO YOUR WORK, TAKE CARE OF THINGS AT HOME, OR GET ALONG WITH OTHER PEOPLE?: NOT DIFFICULT AT ALL
4. TROUBLE RELAXING: NOT AT ALL
GAD7 TOTAL SCORE: 6
7. FEELING AFRAID AS IF SOMETHING AWFUL MIGHT HAPPEN: SEVERAL DAYS
GAD7 TOTAL SCORE: 6
GAD7 TOTAL SCORE: 6
5. BEING SO RESTLESS THAT IT IS HARD TO SIT STILL: NOT AT ALL
IF YOU CHECKED OFF ANY PROBLEMS ON THIS QUESTIONNAIRE, HOW DIFFICULT HAVE THESE PROBLEMS MADE IT FOR YOU TO DO YOUR WORK, TAKE CARE OF THINGS AT HOME, OR GET ALONG WITH OTHER PEOPLE: NOT DIFFICULT AT ALL
6. BECOMING EASILY ANNOYED OR IRRITABLE: SEVERAL DAYS
2. NOT BEING ABLE TO STOP OR CONTROL WORRYING: SEVERAL DAYS

## 2025-04-07 ASSESSMENT — PATIENT HEALTH QUESTIONNAIRE - PHQ9: SUM OF ALL RESPONSES TO PHQ QUESTIONS 1-9: 1

## 2025-04-07 ASSESSMENT — PAIN SCALES - GENERAL: PAINLEVEL_OUTOF10: MODERATE PAIN (4)

## 2025-04-07 NOTE — PROGRESS NOTES
Assessment & Plan     ICD-10-CM    1. ADHD (attention deficit hyperactivity disorder), inattentive type  F90.0 lisdexamfetamine (VYVANSE) 20 MG capsule     Drug Confirmation Panel Urine with Creat - lab collect     Drug Confirmation Panel Urine with Creat - lab collect     lisdexamfetamine (VYVANSE) 20 MG capsule     lisdexamfetamine (VYVANSE) 20 MG capsule      2. Crohn's disease of both small and large intestine without complication (H)  K50.80 Adult GI  Referral - Consult Only      3. Venous insufficiency of right leg  I87.2 Vascular Surgery Referral          - Diagnosed as an adult, not on medications in many years, was previously on Adderall, has been started and titrated on Vyvanse  - Now continued stable     Discussed use and side effects   -  reviewed, no concerns   - CSA discussed and signed today (4/7/25)   - Utox 4/7/25 - await results   - Recheck 3 months     2. Would like to transfer care from [facility], referral placed     3. Would like to return to vascular doctor as pain in leg is getting worse, last saw them 2/2024   - Referral placed       The patient indicates understanding of these issues and agrees with the plan.    Follow up: 3 months     Rene Damon-PENNIE Nunez  [facility] [place]         Subjective   Tia is a 44 year old, presenting for the following health issues:   Follow Up        4/7/2025     2:56 PM   Additional Questions   Roomed by Paola   Accompanied by Self         4/7/2025     2:56 PM   Patient Reported Additional Medications   Patient reports taking the following new medications NA     History of Present Illness       Reason for visit:  Adhd med check    She eats 4 or more servings of fruits and vegetables daily.She consumes 1 sweetened beverage(s) daily.She exercises with enough effort to increase her heart rate 30 to 60 minutes per day.  She exercises with enough effort to increase her heart rate 4 days per week. She is missing 1 dose(s) of  "medications per week.  She is not taking prescribed medications regularly due to remembering to take.          SUBJECTIVE:  Tia is here today to recheck ADHD/ADD.    Updates since last visit: Nothing new    Routine for taking medicine, including time: Morning 6:30am  Time medicine wears off: unknown exact time, but by 4:30pm  Issues at school/work: none  Issues at home: none  Control of symptoms: yes    Side effects:  Headaches: No  Stomach aches: No  Irritability/mood swings: No  Difficulties with sleep: No  Social withdrawal: No  Unusual movements/tics: No  Decreased appetite: No    Other concerns: None          Review of Systems  Constitutional, neuro, ENT, endocrine, pulmonary, cardiac, gastrointestinal, genitourinary, musculoskeletal, integument and psychiatric systems are negative, except as otherwise noted.      Objective    /72   Pulse 71   Temp 98.8  F (37.1  C) (Temporal)   Resp 20   Ht 1.603 m (5' 3.11\")   Wt 103.4 kg (228 lb)   SpO2 99%   BMI 40.25 kg/m    Body mass index is 40.25 kg/m .  Physical Exam   GENERAL APPEARANCE: healthy, alert and no distress  EYES: Eyes grossly normal to inspection, PERRLA, conjunctivae and sclerae without injection or discharge, EOM intact   RESP: Lungs clear to auscultation - no rales, rhonchi or wheezes    CV: Regular rates and rhythm, normal S1 S2, no S3 or S4, no murmur, click or rub, no peripheral edema and peripheral pulses strong and symmetric bilaterally   MS: No musculoskeletal defects are noted and gait is age appropriate without ataxia   SKIN: No suspicious lesions or rashes, hydration status appears adeuqate with normal skin turgor   PSYCH: Alert and oriented x3; speech- coherent , normal rate and volume; able to articulate logical thoughts, able to abstract reason, no tangential thoughts, no hallucinations or delusions, mentation appears normal, Mood is euthymic. Affect is appropriate for this mood state and bright. Thought content is free of " suicidal ideation, hallucinations, and delusions. Dress is adequate and upkept. Eye contact is good during conversation.       Diagnostics; reviewed in Epic         Signed Electronically by: Melva You PA-C

## 2025-04-07 NOTE — TELEPHONE ENCOUNTER
Referral received via [employer] on 4/7/25.    Referred by Melva You PA-C for venous insufficiency     Previous imaging completed (pertinent to referral):  NA    Routing to scheduling to coordinate the following:    NEW VASCULAR PATIENT consult with Vascular Medicine  Please schedule this at next available    Appt note: Referred by Melva You PA-C for venous insufficiency.    Radha KHAN, RN    [facility]  Vascular [facility]  Office: [phone]  Fax: [phone]

## 2025-04-07 NOTE — LETTER
[facility] ELEDIE RIVER  04/07/25  Patient: Ashley Mcdaniels  YOB: 1980  Medical Record Number: [id number]                                                                                  Non-Opioid Controlled Substance Agreement    This is an agreement between you and your provider regarding safe and appropriate use of controlled substances prescribed by your care team. Controlled substances are?medicines that can cause physical and mental dependence (abuse).     There are strict laws about having and using these medicines. We here at [facility] are  committed to working with you in your efforts to get better. To support you in this work, we'll help you schedule regular office appointments for medicine refills. If we must cancel or change your appointment for any reason, we'll make sure you have enough medicine to last until your next appointment.     As a Provider, I will:   Listen carefully to your concerns while treating you with respect.   Recommend a treatment plan that I believe is in your best interest and may involve therapies other than medicine.    Talk with you often about the possible benefits and the risk of harm of any medicine that we prescribe for you.  Assess the safety of this medicine and check how well it works.    Provide a plan on how to taper (discontinue or go off) using this medicine if the decision is made to stop its use.      ::  As a Patient, I understand controlled substances:     Are prescribed by my care provider to help me function or work and manage my condition(s).?  Are strong medicines and can cause serious side effects.     Need to be taken exactly as prescribed.?Combining controlled substances with certain medicines or chemicals (such as illegal drugs, alcohol, sedatives, sleeping pills, and benzodiazepines) can be dangerous or even fatal.? If I stop taking my medicines suddenly, I may have severe withdrawal symptoms.     The risks, benefits, and  side effects of these medicine(s) were explained to me. I agree that:    I will take part in other treatments as advised by my care team. This may be psychiatry or counseling, physical therapy, behavioral therapy, group treatment or a referral to specialist.    I will keep all my appointments and understand this is part of the monitoring of controlled substances.?My care team may require an office visit for EVERY controlled substance refill. If I miss appointments or don t follow instructions, my care team may stop my medicine    I will take my medicines as prescribed. I will not change the dose or schedule unless my care team tells me to. There will be no refills if I run out early.      I may be asked to come to the clinic and complete a urine drug test or complete a pill count. If I don t give a urine sample or participate in a pill count, the care team may stop my medicine.    I will only receive controlled substance prescriptions from this clinic. If I am treated by another provider, I will tell them that I am taking controlled substances and that I have a treatment agreement with this provider. I will inform my [facility] care team within one business day if I am given a prescription for any controlled substance by another healthcare provider. My [facility] care team can contact other providers and pharmacists about my use of any medicines.    It is up to me to make sure that I don't run out of my medicines on weekends or holidays.?If my care team is willing to refill my prescription without a visit, I must request refills only during office hours. Refills may take up to 3 business days to process. I will use one pharmacy to fill all my controlled substance prescriptions. I will notify the clinic about any changes to my insurance or medicine availability.    I am responsible for my prescriptions. If the medicine/prescription is lost, stolen or destroyed, it will not be replaced.?I also agree not  to share controlled substance medicines with anyone.     I am aware I should not use any illegal or recreational drugs. I agree not to drink alcohol unless my care team says I can.     If I enroll in the [place] Medical Cannabis program, I will tell my care team before my next refill.    I will tell my care team right away if I become pregnant, have a new medical problem treated outside of my regular clinic, or have a change in my medicines.     I understand that this medicine can affect my thinking, judgment and reaction time.? Alcohol and drugs affect the brain and body, which can affect the safety of my driving. Being under the influence of alcohol or drugs can affect my decision-making, behaviors, personal safety and the safety of others. Driving while impaired (DWI) can occur if a person is driving, operating or in physical control of a car, motorcycle, boat, snowmobile, ATV, motorbike, off-road vehicle or any other motor vehicle ([place] Statute 169A.20). I understand the risk if I choose to drive or operate any vehicle or machinery.    I understand that if I do not follow any of the conditions above, my prescriptions or treatment may be stopped or changed.   I agree that my provider, clinic care team and pharmacy may work with any city, state or federal law enforcement agency that investigates the misuse, sale or other diversion of my controlled medicine. I will allow my provider to discuss my care with, or share a copy of, this agreement with any other treating provider, pharmacy or emergency room where I receive care.     I have read this agreement and have asked questions about anything I did not understand.    ________________________________________________________  Patient Signature - Ashley Mcdaniels     ___________________                   Date     ________________________________________________________  Provider Signature - Melva You PA-C       ___________________                    Date     ________________________________________________________  Witness Signature (required if provider not present while patient signing)          ___________________                   Date

## 2025-04-10 LAB — PREGABALIN UR QL CFM: PRESENT

## 2025-04-14 DIAGNOSIS — K50.80 REGIONAL ENTERITIS OF SMALL INTESTINE WITH LARGE INTESTINE (H): ICD-10-CM

## 2025-04-14 DIAGNOSIS — Z79.899 POLYPHARMACY: Primary | ICD-10-CM

## 2025-04-15 ENCOUNTER — ALLIED HEALTH/NURSE VISIT (OUTPATIENT)
Dept: ALLERGY | Facility: OTHER | Age: 45
End: 2025-04-15
Payer: COMMERCIAL

## 2025-04-15 DIAGNOSIS — L50.8 CHRONIC URTICARIA: Primary | ICD-10-CM

## 2025-04-15 PROCEDURE — 96372 THER/PROPH/DIAG INJ SC/IM: CPT | Performed by: ALLERGY & IMMUNOLOGY

## 2025-04-15 NOTE — PROGRESS NOTES
Ashley Mcdaniels presents to clinic today at the request of Toñito Desir MD  (ordering provider) for Xolair (omalizumab) injection(s).       This service provided today was under the care of Toñtio Desir MD ; the supervising provider of the day; who was available if needed.      Patient presented after waiting 30 minutes with no reaction to  injections. Discharged from clinic.    Camille Vaca RN

## 2025-04-28 NOTE — TELEPHONE ENCOUNTER
REFERRAL INFORMATION:  Referring Provider:  Melva You PA-C   Referring Clinic:  [facility]   Reason for Visit/Diagnosis: K50.80 (ICD-10-CM) - Crohn's disease of both small and large intestine without complication (H)      FUTURE VISIT INFORMATION:  Appointment Date: 5/15/25     NOTES STATUS DETAILS   OFFICE NOTE from Referring Provider Care Everywhere 3/10/25   OFFICE NOTE from Other Specialist Care Everywhere-[facility] 3/25/24-Thalia Vincent PA-CPO-C-Eqpnaabbtntocojc    10/27/22, 4/16/19-Blaze Cameron Pa-CYp-H-Bsyreljlcdhjkjtt   MEDICATION LIST Internal    PROCEDURES     ENDOSCOPY  Care Everywhere CC: 9/22/15   COLONOSCOPY Care Everywhere CC 3/1/23, 2/19/21,  9/18/18, 5/5/10   STOOL TESTING     C. DIFF Internal 11/2/24   LABS     PERTINENT LABS Internal    PATHOLOGY REPORTS (RELATED) Care Everywhere Colonoscopy 3/1/23, 2/19/21, 9/18/18, 5/5/10   IMAGES     CT In process-received [facility]: 12/26/24-CT enterography   XRAY In process-received rEica: [facility]vin  4/18/25-XR upper GI     Records Requested   April 28, 2025 2:33 PM  [id number]   Facility  [facility]   Outcome Called to request images to be pushed

## 2025-04-30 ENCOUNTER — TELEPHONE (OUTPATIENT)
Dept: OTHER | Facility: CLINIC | Age: 45
End: 2025-04-30

## 2025-04-30 ENCOUNTER — OFFICE VISIT (OUTPATIENT)
Dept: OTHER | Facility: CLINIC | Age: 45
End: 2025-04-30
Attending: INTERNAL MEDICINE
Payer: COMMERCIAL

## 2025-04-30 VITALS
WEIGHT: 227.4 LBS | BODY MASS INDEX: 40.14 KG/M2 | DIASTOLIC BLOOD PRESSURE: 86 MMHG | OXYGEN SATURATION: 99 % | SYSTOLIC BLOOD PRESSURE: 124 MMHG | HEART RATE: 86 BPM

## 2025-04-30 DIAGNOSIS — I87.2 VENOUS (PERIPHERAL) INSUFFICIENCY: ICD-10-CM

## 2025-04-30 DIAGNOSIS — I83.893 VARICOSE VEINS OF BILATERAL LOWER EXTREMITIES WITH OTHER COMPLICATIONS: Primary | ICD-10-CM

## 2025-04-30 DIAGNOSIS — R60.9 LIPEDEMA: ICD-10-CM

## 2025-04-30 DIAGNOSIS — K50.80 CROHN'S DISEASE OF BOTH SMALL AND LARGE INTESTINE WITHOUT COMPLICATION (H): ICD-10-CM

## 2025-04-30 DIAGNOSIS — F17.290 NICOTINE DEPENDENCE DUE TO VAPING TOBACCO PRODUCT: ICD-10-CM

## 2025-04-30 PROCEDURE — 99215 OFFICE O/P EST HI 40 MIN: CPT | Performed by: INTERNAL MEDICINE

## 2025-04-30 PROCEDURE — 99406 BEHAV CHNG SMOKING 3-10 MIN: CPT | Performed by: INTERNAL MEDICINE

## 2025-04-30 PROCEDURE — 3074F SYST BP LT 130 MM HG: CPT | Performed by: INTERNAL MEDICINE

## 2025-04-30 PROCEDURE — 3078F DIAST BP <80 MM HG: CPT | Performed by: INTERNAL MEDICINE

## 2025-04-30 PROCEDURE — 99213 OFFICE O/P EST LOW 20 MIN: CPT | Performed by: INTERNAL MEDICINE

## 2025-04-30 PROCEDURE — G2211 COMPLEX E/M VISIT ADD ON: HCPCS | Performed by: INTERNAL MEDICINE

## 2025-04-30 NOTE — TELEPHONE ENCOUNTER
Routing to scheduling to coordinate the following:    BLE venous competency US  Virtual follow up 2 weeks after US  Please schedule this next available     Appt note: Follow up to 4/30/25    Radha KHAN, RN    [facility] [place]  Office: [phone]  Fax: [phone]

## 2025-04-30 NOTE — PATIENT INSTRUCTIONS
Please go for venous comp studies. Order placed , our staff will call and schedule     Quit vaping     Use nicotrol inhaler, continue Wellbutrin     Take lymphatic formula as advised     Video visit 2 weeks after venous comp studies     Try edema wear yellow stripe thigh high DME Rx given

## 2025-04-30 NOTE — PROGRESS NOTES
[facility]        Patient is here for a consult.    Pt is currently taking no meds that would impact our treatment plan.    /86 (BP Location: Left arm, Patient Position: Chair, Cuff Size: Adult Large)   Pulse 86   Wt 227 lb 6.4 oz (103.1 kg)   SpO2 99%   BMI 40.14 kg/m      The provider has been notified that the patient has no concerns.     Questions patient would like addressed today are: N/A.    Refills are needed: N/A    Has homecare services and agency name:  Myranda Oates MA

## 2025-04-30 NOTE — PROGRESS NOTES
[facility]     Follow-up visit  Known history of lipedema, hyperextensibility of the joints and bilateral lower extremity varicose veins with venous insufficiency, Crohn's disease  Morbid obesity status post gastric banding done more than 15 years ago initially successful weight loss then followed by gained weight  Using Zepbound product some success in weight loss and currently  Nicotine dependence due to vaping  She is taking lymphatic formula  Seen and evaluated by edema therapist  Having issues to wear compression stockings      HPI: Ashley Mcdaniels is a 44 year old very pleasant female with history of Crohn's disease on immunosuppressive medication, generalized anxiety, morbid obesity more than 15 years ago she underwent gastric banding successfully lost weight then followed by regained weight with history of a snoring but no witnessed apneic episodes few years ago underwent venous competency studies for varicose veins she does have right GSV proximal thigh area incompetence but no history of DVT.  She is a former smoker.  She has a easy bruising and hyperextensibility of the joints tender to touch buttocks and thigh area and lumpy bumpy thigh and buttock area.  She gives a history of attaining puberty around age 12 then followed by lower portion of the body was disproportionately larger than upper part and eventually got worse after the pregnancy and childbirth and now upper arms are larger than the forearms.      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     Crohn's colitis (H)     YUE (generalized anxiety disorder)     GERD (gastroesophageal reflux disease)     Low back pain     Major depressive disorder, recurrent  episode, mild        CURRENT MEDICATIONS  Current Outpatient Medications   Medication Sig Dispense Refill    buPROPion (WELLBUTRIN XL) 300 MG 24 hr tablet Take 1 tablet (300 mg) by mouth every morning 90 tablet 3    cetirizine (ZYRTEC) 10 MG tablet Take 1 tablet (10 mg) by mouth daily 30 tablet 0    EPINEPHrine (ANY BX GENERIC EQUIV) 0.3 MG/0.3ML injection 2-pack Inject 0.3 mLs (0.3 mg) into the muscle once as needed for anaphylaxis. 2 each 2    HUMIRA *CF* PEN 40 MG/0.4ML pen kit   1    hydrOXYzine HCl (ATARAX) 50 MG tablet Take 0.5-1 tablets (25-50 mg) by mouth every 6 hours as needed for itching 60 tablet 3    levonorgestrel (MIRENA) 20 MCG/24HR IUD 1 each (20 mcg) by Intrauterine route continuous      lisdexamfetamine (VYVANSE) 20 MG capsule Take 1 capsule (20 mg) by mouth every morning. 30 capsule 0    nicotine (NICOTROL) 10 MG inhaler Use 1 cartridge as needed for urge to smoke by puffing over course of 20min.  Use 6-16 cart/day; reduce number of cart/day over 6-12 weeks. 6 each 3    pregabalin (LYRICA) 75 MG capsule Take 1 capsule (75 mg) by mouth 2 times daily. 60 capsule 2    valACYclovir (VALTREX) 500 MG tablet TAKE ONE TABLET BY MOUTH TWICE A DAY FOR 3-4 DAYS 40 tablet 3     Current Facility-Administered Medications   Medication Dose Route Frequency Provider Last Rate Last Admin    omalizumab (XOLAIR) injection 300 mg  300 mg Subcutaneous Q28 Days    300 mg at 04/15/25 1531       PAST SURGICAL HISTORY:  Past Surgical History:   Procedure Laterality Date    CHOLECYSTECTOMY  Feb 2007    DILATION AND CURETTAGE  May 2003    Non-OB    HC TOOTH EXTRACTION W/FORCEP  Nov 2006    LAP ADJUSTABLE GASTRIC BAND  May 2007       ALLERGIES     Allergies   Allergen Reactions    Azathioprine Other (See Comments)     pancreatitis    No Clinical Screening - See Comments Hives     From stress       FAMILY HISTORY  Family History   Problem Relation Age of Onset    Arthritis Mother     Deep Vein Thrombosis Mother     Snoring  Mother     Heart Disease Father         CAD, CHF    Alcoholism Father     Asthma Father     Hypertension Father     Hyperlipidemia Father     Pancreatic Cancer Father     Liver Cancer Father     Snoring Father     Mental Illness Brother     Substance Abuse Brother     Diabetes Maternal Grandmother     Substance Abuse Maternal Grandmother     Hyperlipidemia Maternal Grandmother     Hypertension Maternal Grandmother     Substance Abuse Maternal Grandfather     Asthma Paternal Grandmother     Heart Disease Paternal Grandmother     Diabetes Paternal Grandmother     Hypertension Paternal Grandmother     Substance Abuse Paternal Grandfather        VASCULAR FAMILY HISTORY  1st order relative with atherosclerotic PAD: No  1st order relative with AAA: No  Family history of Familial Hyperlipidemia No  Family History of Hypercoagulable state:No    VASCULAR RISK FACTORS  1. Diabetes:No   2. Smoking: quit smoking some time ago.  3. HTN: normotensive  4.Hyperlipidemia: No      SOCIAL HISTORY  Social History     Socioeconomic History    Marital status:      Spouse name: Not on file    Number of children: Not on file    Years of education: Not on file    Highest education level: Not on file   Occupational History     Comment: consulting   Tobacco Use    Smoking status: Never     Passive exposure: Never    Smokeless tobacco: Never    Tobacco comments:     Vapes daily   Vaping Use    Vaping status: Every Day    Substances: Nicotine, Flavoring   Substance and Sexual Activity    Alcohol use: Yes     Comment: 2x a week    Drug use: No    Sexual activity: Yes     Partners: Male     Birth control/protection: I.U.D.   Other Topics Concern    Parent/sibling w/ CABG, MI or angioplasty before 65F 55M? No   Social History Narrative    September 11, 2024        ENVIRONMENTAL HISTORY: [place] The home is heated with a forced air. They do have central air conditioning. The patient's bedroom is  furnished with carpeting in bedroom.  Pets inside the house include 2 dog(s), and 1 horses. There is no history of cockroach or mice infestation. There is/are 0 smokers in the house.  The house does not have a damp basement.      Social Drivers of Health     Financial Resource Strain: Low Risk  (7/15/2024)    Financial Resource Strain     Within the past 12 months, have you or your family members you live with been unable to get utilities (heat, electricity) when it was really needed?: No   Food Insecurity: Low Risk  (7/15/2024)    Food Insecurity     Within the past 12 months, did you worry that your food would run out before you got money to buy more?: No     Within the past 12 months, did the food you bought just not last and you didn t have money to get more?: No   Transportation Needs: Low Risk  (7/15/2024)    Transportation Needs     Within the past 12 months, has lack of transportation kept you from medical appointments, getting your medicines, non-medical meetings or appointments, work, or from getting things that you need?: No   Physical Activity: Unknown (7/15/2024)    Exercise Vital Sign     Days of Exercise per Week: 2 days     Minutes of Exercise per Session: Not on file   Stress: Stress Concern Present (7/15/2024)    [employer] - Occupational Stress Questionnaire     Feeling of Stress : Rather much   Social Connections: Unknown (7/15/2024)    Social Connection and Isolation Panel [NHANES]     Frequency of Communication with Friends and Family: Not on file     Frequency of Social Gatherings with Friends and Family: Once a week     Attends [employer] Services: Not on file     Active Member of Clubs or Organizations: Not on file     Attends Club or Organization Meetings: Not on file     Marital Status: Not on file   Interpersonal Safety: Low Risk  (11/18/2024)    Interpersonal Safety     Do you feel physically and emotionally safe where you currently live?: Yes     Within the  past 12 months, have you been hit, slapped, kicked or otherwise physically hurt by someone?: No     Within the past 12 months, have you been humiliated or emotionally abused in other ways by your partner or ex-partner?: No   Housing Stability: Low Risk  (7/15/2024)    Housing Stability     Do you have housing? : Yes     Are you worried about losing your housing?: No       ROS:   General: No change in weight, sleep or appetite.  Normal energy.  No fever or chills  Eyes: Negative for vision changes or eye problems  ENT: No problems with ears, nose or throat.  No difficulty swallowing.  Resp: No coughing, wheezing or shortness of breath  CV: No chest pains or palpitations  GI: No nausea, vomiting,  heartburn, abdominal pain, diarrhea, constipation or change in bowel habits  : No urinary frequency or dysuria, bladder or kidney problems  Musculoskeletal: No significant muscle or joint pains  Neurologic: No headaches, numbness, tingling, weakness, problems with balance or coordination  Psychiatric: No problems with anxiety, depression or mental health  Heme/immune/allergy: No history of bleeding or clotting problems or anemia.  No allergies or immune system problems  Endocrine: No history of thyroid disease, diabetes or other endocrine disorders  Skin: No rashes,worrisome lesions or skin problems  Vascular: Bilateral leg heaviness, fullness fatigue and tiredness for many years  Lower portion of the body is disproportionately larger than upper portion of the body mainly buttocks and thighs since the puberty then followed by childbirth  Easy bruising  Hyperextensibility of the joints    EXAM:  /86 (BP Location: Left arm, Patient Position: Chair, Cuff Size: Adult Large)   Pulse 86   Wt 227 lb 6.4 oz (103.1 kg)   SpO2 99%   BMI 40.14 kg/m    In general, the patient is a pleasant female in no apparent distress.    HEENT: NC/AT.  PERRLA.  EOMI.  Sclerae white, not injected.  Nares clear.  Pharynx without erythema or  exudate.  Dentition intact.    Neck: No adenopathy.  No thyromegaly. Carotids +2/2 bilaterally without bruits.  No jugular venous distension.   Heart: RRR. Normal S1, S2 splits physiologically. No murmur, rub, click, or gallop. The PMI is in the 5th ICS in the midclavicular line. There is no heave.    Lungs: CTA.  No ronchi, wheezes, rales.  No dullness to percussion.   Abdomen: Soft, nontender, nondistended. No organomegaly. No AAA.  No bruits.   Extremities: Vascular:  She has a good palpable symmetrical peripheral pulses in both lower extremities and upper extremities  Bilateral lower extremity varicose veins CEAP 2 CVI  No foot ulcers or leg ulcers  Mattress phenomenon of buttocks and thighs  Tender to touch thighs and buttocks area  Hyperextensibility of the knees and elbows  Stemmer sign negative  Fat overhanging knee area  Lower legs are not involved      Labs:  LIPID RESULTS:  Lab Results   Component Value Date    CHOL 145 04/03/2025    CHOL 187 07/06/2020    HDL 50 04/03/2025    HDL 60 07/06/2020    LDL 79 04/03/2025    LDL 81 07/06/2020    TRIG 82 04/03/2025    TRIG 228 (H) 07/06/2020       LIVER ENZYME RESULTS:  Lab Results   Component Value Date    AST 19 04/03/2025    AST 17 12/04/2020    ALT 21 04/03/2025    ALT 22 12/04/2020       CBC RESULTS:  Lab Results   Component Value Date    WBC 6.0 04/03/2025    WBC 12.2 (H) 12/04/2020    RBC 4.83 04/03/2025    RBC 4.50 12/04/2020    HGB 13.9 04/03/2025    HGB 13.7 12/04/2020    HCT 41.8 04/03/2025    HCT 41.1 12/04/2020    MCV 87 04/03/2025    MCV 91 12/04/2020    MCH 28.8 04/03/2025    MCH 30.4 12/04/2020    MCHC 33.3 04/03/2025    MCHC 33.3 12/04/2020    RDW 12.9 04/03/2025    RDW 13.5 12/04/2020     04/03/2025     12/04/2020       BMP RESULTS:  Lab Results   Component Value Date     04/03/2025     01/10/2019    POTASSIUM 4.4 04/03/2025    POTASSIUM 4.1 01/10/2019    CHLORIDE 104 04/03/2025    CHLORIDE 103 01/10/2019    CO2 24  04/03/2025    CO2 30 01/10/2019    ANIONGAP 10 04/03/2025    ANIONGAP 4 01/10/2019    GLC 99 04/03/2025    GLC 87 01/10/2019    BUN 13.8 04/03/2025    BUN 10 01/10/2019    CR 0.88 04/03/2025    CR 0.78 12/04/2020    GFRESTIMATED 83 04/03/2025    GFRESTIMATED >90 12/04/2020    GFRESTBLACK >90 12/04/2020    JOVANNI 9.3 04/03/2025    JOVANNI 8.7 01/10/2019        A1C RESULTS:  Lab Results   Component Value Date    A1C 5.3 04/03/2025       THYROID RESULTS:  Lab Results   Component Value Date    TSH 1.61 04/03/2025    TSH 2.03 11/29/2021    TSH 1.20 07/06/2020       Procedures:   VENOUS ULTRASOUND BILATERAL LEG(S)  1/2/2019 11:59 AM      HISTORY: Bilateral lower extremity pain.     COMPARISON: None.     FINDINGS: Examination of the deep veins with graded compression and  color flow Doppler with spectral wave form analysis was performed.  Images show no evidence of thrombus in the bilateral common femoral  vein, femoral vein, popliteal vein or calf veins.     Right: There is deep venous insufficiency in the right external iliac  and common femoral vein with a reflux time of 3.4 seconds. There is  superficial venous insufficiency in the right great saphenous vein at  the level of the proximal thigh with a reflux time of 0.9 seconds. No  superficial venous insufficiency in the right small saphenous or  anterior accessory saphenous veins.     Left: No deep venous insufficiency. No superficial venous  insufficiency in the left great saphenous or small saphenous veins.  The anterior accessory saphenous vein was not visualized.                                                                      IMPRESSION:  1. No deep vein thrombosis in either lower extremity.  2. Deep venous insufficiency in the right external iliac and common  femoral veins.  3. Superficial venous insufficiency in the right great saphenous vein  at the level of proximal thigh.  4. No deep or superficial venous insufficiency in the left.     JOAQUIN BABB  DO        Courtesy from fat disorders.org    Assessment and Plan:       1. Venous (peripheral) insufficiency RT GSV incompetence proximal thigh jan 2019 study    2. Lipedema    3. Morbid obesity (H) BMI 40.5    4. Crohn's disease of both small and large intestine without complication (H)    5. IUD (intrauterine device) in place    6. Immunosuppressed status (H)      This is a very pleasant 44-year-old female with multiple medical issues including the Crohn's disease currently on immunosuppressive medication, morbid obese, snoring with no formal diagnosis of sleep apnea, bilateral lower extremity varicose veins underwent venous competency study in 2019 which showed right GSV incompetence in proximal thigh area no DVT and no SVT and she has a IUD in place she has a 2 grownup children's age 20 and 17.  She gives a history of ongoing leg heaviness, fatigue Green and tiredness for many years.  Lower body disproportionately larger than upper body since the puberty and got worsened after the pregnancy and childbirth.  Her exam is consistent with lipedema and venous insufficiency  She also has easy bruising, hyperextensibility of the joints and mattress phenomenon  Negative Stemmer sign no clinical evidence of lymphedema    I had a lengthy discussion with the patient and shared about diagram from [email or web address] and reviewed multimodality approach of lipedema    Improve the lymphatic flow with compression, MLD, wraps, vibration, pump therapy etc.    Water aerobics, cycling, Pilates, lymphatic yoga etc. discussed with the patient    Suggested taking lymphatic formula 1000 as advised    Muscle toning exercises discussed    I will arrange bilateral lower extremity venous competency studies order placed  Since having issues with compression stockings she will benefit with edema wear yellow stripe thigh-high new DME prescription given  She was advised to quit vaping  She could not handle patches  She already taking Wellbutrin   daily  Will prescribe Nicotrol inhaler and slowly wean off all the nicotine products  More than 3 minutes of smoking cessation counseling was done  Video visit with me 2 weeks after completion of the venous comp studies    44 minutes spent on the date of the encounter doing chart review, review of previous evaluation, history, exam, documentation and addressed above-mentioned issues venous comp studies was ordered more than 3 minutes of smoking cessation counseling was done AVS with written instructions given new prescription sent  She had a lot of questions all of them were answered     The longitudinal care of plan for the above diagnoses was addressed during this visit. Due to added complexity of care, we will continue to supprt Ashley Mcdaniels and the subsequent management of this/these conditions and with ongoing continuity of care for this/these conditions.     This note was dictated by lysing Dragon software  Copy of this note to primary care provider    Julio Savage MD,FAHA,FSVM,FNLA, FACP  Vascular Medicine  Clinical Hypertension Specialist   Clinical Lipidologist

## 2025-04-30 NOTE — TELEPHONE ENCOUNTER
Spoke with patient. She is calling central scheduling to schedule her US closer to home. Will call back to schedule follow up.      Anne Garcia MA

## 2025-05-05 DIAGNOSIS — M79.7 FIBROMYALGIA: ICD-10-CM

## 2025-05-05 RX ORDER — PREGABALIN 75 MG/1
75 CAPSULE ORAL 2 TIMES DAILY
Qty: 60 CAPSULE | Refills: 2 | Status: SHIPPED | OUTPATIENT
Start: 2025-05-05

## 2025-05-05 NOTE — TELEPHONE ENCOUNTER
reviewed. Due for routine fill. E-prescribed.     Rene You PA-C  [facility]

## 2025-05-12 NOTE — TELEPHONE ENCOUNTER
Left voicemail for patient to schedule appointment(s), stated this our 2nd attempt at scheduling and provided [facility] telephone number for patient to call back to schedule.

## 2025-05-15 ENCOUNTER — OFFICE VISIT (OUTPATIENT)
Dept: GASTROENTEROLOGY | Facility: CLINIC | Age: 45
End: 2025-05-15
Payer: COMMERCIAL

## 2025-05-15 ENCOUNTER — PRE VISIT (OUTPATIENT)
Dept: GASTROENTEROLOGY | Facility: CLINIC | Age: 45
End: 2025-05-15

## 2025-05-15 VITALS
DIASTOLIC BLOOD PRESSURE: 87 MMHG | SYSTOLIC BLOOD PRESSURE: 130 MMHG | HEIGHT: 64 IN | BODY MASS INDEX: 38.89 KG/M2 | WEIGHT: 227.8 LBS | OXYGEN SATURATION: 100 % | HEART RATE: 82 BPM

## 2025-05-15 DIAGNOSIS — K58.2 IRRITABLE BOWEL SYNDROME WITH MIXED BOWEL HABITS: ICD-10-CM

## 2025-05-15 DIAGNOSIS — K50.80 CROHN'S DISEASE OF BOTH SMALL AND LARGE INTESTINE WITHOUT COMPLICATION (H): Primary | ICD-10-CM

## 2025-05-15 ASSESSMENT — PAIN SCALES - GENERAL: PAINLEVEL_OUTOF10: NO PAIN (0)

## 2025-05-15 NOTE — PROGRESS NOTES
IBD CLINIC VISIT    CC/REFERRING MD:  Melva Damon-*    REASON FOR CONSULTATION: Transfer care IBD clinic    ASSESSMENT/PLAN:    Crohn's ileocolitis  -current regimen - Adalimumab q week  -current clinical status - remission (I think mild symptoms consistent with IBS)  -last endoscopic status - TI and colon normal - random colon biopsies normal 2023    Doing well on adalimumab. CTE and FCP recently normal.    - continue adalimumab q week  - check adalimumab level with next set of labs  - maintenance labs q 3 months  - due for surveillance colonoscopy  - arrange refill of adalimumab in our system  - MTM referral    2. IBS - mixed. Symptoms consistent with this  -start citrucel fiber powder  -in future can try dicyclomine if needed - symptoms so short lived now this is not needed      IBD HISTORY  Age at diagnosis: 30 (2010)  Extent of disease: ileum and predominantly the sigmoid colon  Disease phenotype: inflammatory  Inocencia-anal disease: none  Current CD medications: Humira (adalimuma) ?  Prior IBD surgeries: none  Prior IBD Medications:  -prednisone - last used 2018  -azathioprine (pancreatitis)  -methotrexate    DRUG MONITORING  TPMT enzyme activity: NA (pancreatitis on aza)    6-TGN/6-MMPN levels: --    Biologic concentration:    DISEASE ASSESSMENT  Labs  Recent Labs   Lab Test 04/03/25  0813 12/14/18  1644 05/09/18  0834 10/31/17  1318   CRP  --   --  6.9 22.7*   SED  --   --   --  18   HGB 13.9   < > 14.1 13.9    < > = values in this interval not displayed.     Fecal calprotectin: normal - 12/2025  Endoscopy: Endoscopic healing of ileum and colon 2023 - prior colonoscopies have shown varying degrees of left sided colitis to pancolitis and some ileitis (2010s)  Enterography: normal CTE 12/2025  C diff: NA    sIBDQ:   IBDQ Score Date IBDQ - Total Score  (Higher score better)   5/14/2025   3:58 PM 54       IBD Health Care Maintenance:    Vaccinations:  All patients on biologics should avoid live vaccines.     -- Influenza (every year)  -- TdaP (every 10 years)  -- Pneumococcal Pneumonia (once plus booster at 5 years)  -- Yearly assessment for latent Tb (verbal screening and exam, PPD or QuantiFERON-Tb testing)    One time confirmation of immunity or serologies:  -- Hepatitis A (serologies or immunizations)  -- Hepatitis B (serologies or immunizations)  -- Varicella  -- MMR  -- HPV (all aged 18-26)  -- Meningococcal meningitis (all patients at risk for meningitis)  -- Due to the immunosuppression in this patient, I would not advise administration of live vaccines such as varicella/VZV, intranasal influenza, MMR, or yellow fever vaccine (if travelling).      Bone mineral density screening   -- Recommend all patients supplement with calcium and vitamin D  -- Given prior steroid use recommend DEXA if not already done - will discuss at future visit    Cancer Screening:  Colon cancer screening:  Given colonic disease, recommend patient undergo regular dysplasia surveillance   Next dysplasia screening is recommended now.    Cervical cancer screening: Annual due to immunosupression    Skin cancer screening: Annual visual exam of skin by dermatologist since patient is immunocompromised    Depression Screening:  -- Over the last month, have you felt down, depressed, or hopeless? no  -- Over the last month, have you felt little interest or pleasure doing things? no    Misc:  -- Avoid tobacco use  -- Avoid NSAIDs as there is potentially a 25% chance of causing an IBD flare    Return to clinic in 6 months with IBD PA and 12 months with Dr. Magallon    Thank you for this consultation.  It was a pleasure to participate in the care of this patient; please contact us with any further questions.  I spent a total of 40 minutes during the day of encounter performed chart review, meeting with patient, patient counseling, care coordination, and documentation.    The longitudinal plan of care for the diagnosis(es)/condition(s) as documented  were addressed during this visit. Due to the added complexity in care, I will continue to support this patient in the subsequent management and with the ongoing continuity of care    This note was created with voice recognition software, and while reviewed for accuracy, typos may remain.     Robert Magallon MD  Division of Gastroenterology, Hepatology and Nutrition  [facility]  Pager: [phone]      HPI:   Currently, here to establish care. Previously seen at [facility]. Diagnosed with ileo-colitis in 2010. Has been on intermittent prednisone over the years (last 2018). Then started on adalimumab (now weekly) and has done well since then.    Last colonoscopy 2023 with mucosal healing.    Overall doing well. Has some cramping a little before BM - resolves quickly with BM. Some irregularity in stool. Has 1-2 per day. Ranges from [place] 3-6. Rarely 7. No blood.     No urgency or tenesmus. No actual pain. Weight is stable    No joint pains, mouth sores, eye pain, eye redness    ROS:    No fevers or chills  No weight loss  No blurry vision, double vision or change in vision  No sore throat  No lymphadenopathy  No headache, paraesthesias, or weakness in a limb  No shortness of breath or wheezing  No chest pain or pressure  No arthralgias or myalgias  No rashes or skin changes  No odynophagia or dysphagia  No BRBPR, hematochezia, melena  No dysuria, frequency or urgency  No hot/cold intolerance or polyria  No anxiety or depression    Extra intestinal manifestations of IBD:  No uveitis/episcleritis  No aphthous ulcers   No arthritis   No erythema nodosum/pyoderma gangrenosum.     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     Crohn's colitis (H)     YUE (generalized anxiety disorder)     GERD (gastroesophageal reflux disease)     Low back pain     Major depressive disorder, recurrent  episode, mild        PREVIOUS SURGERIES:  Past Surgical History:   Procedure Laterality Date    CHOLECYSTECTOMY  Feb 2007    DILATION AND CURETTAGE  May 2003    Non-OB    HC TOOTH EXTRACTION W/FORCEP  Nov 2006    LAP ADJUSTABLE GASTRIC BAND  May 2007       PREVIOUS ENDOSCOPY:  No results found for this or any previous visit.]    ALLERGIES:     Allergies   Allergen Reactions    Azathioprine Other (See Comments)     pancreatitis    No Clinical Screening - See Comments Hives     From stress       PERTINENT MEDICATIONS:    Current Outpatient Medications:     buPROPion (WELLBUTRIN XL) 300 MG 24 hr tablet, Take 1 tablet (300 mg) by mouth every morning, Disp: 90 tablet, Rfl: 3    cetirizine (ZYRTEC) 10 MG tablet, Take 1 tablet (10 mg) by mouth daily, Disp: 30 tablet, Rfl: 0    EPINEPHrine (ANY BX GENERIC EQUIV) 0.3 MG/0.3ML injection 2-pack, Inject 0.3 mLs (0.3 mg) into the muscle once as needed for anaphylaxis., Disp: 2 each, Rfl: 2    HUMIRA *CF* PEN 40 MG/0.4ML pen kit, , Disp: , Rfl: 1    hydrOXYzine HCl (ATARAX) 50 MG tablet, Take 0.5-1 tablets (25-50 mg) by mouth every 6 hours as needed for itching, Disp: 60 tablet, Rfl: 3    levonorgestrel (MIRENA) 20 MCG/24HR IUD, 1 each (20 mcg) by Intrauterine route continuous, Disp: , Rfl:     lisdexamfetamine (VYVANSE) 20 MG capsule, Take 1 capsule (20 mg) by mouth every morning., Disp: 30 capsule, Rfl: 0    nicotine (NICOTROL) 10 MG inhaler, Use 1 cartridge as needed for urge to smoke by puffing over course of 20min.  Use 6-16 cart/day; reduce number of cart/day over 6-12 weeks., Disp: 6 each, Rfl: 3    pregabalin (LYRICA) 75 MG capsule, TAKE 1 CAPSULE (75 MG) BY MOUTH 2 TIMES DAILY., Disp: 60 capsule, Rfl: 2    valACYclovir (VALTREX) 500 MG tablet, TAKE ONE TABLET BY MOUTH TWICE A DAY FOR 3-4 DAYS, Disp: 40 tablet, Rfl: 3    Current Facility-Administered Medications:     omalizumab (XOLAIR) injection 300 mg, 300 mg, Subcutaneous, Q28 Days, , 300 mg at 04/15/25 1531    SOCIAL  HISTORY:  Social History     Socioeconomic History    Marital status:      Spouse name: Not on file    Number of children: Not on file    Years of education: Not on file    Highest education level: Not on file   Occupational History     Comment: consulting   Tobacco Use    Smoking status: Never     Passive exposure: Never    Smokeless tobacco: Never    Tobacco comments:     Vapes daily   Vaping Use    Vaping status: Every Day    Substances: Nicotine, Flavoring   Substance and Sexual Activity    Alcohol use: Yes     Comment: 2x a week    Drug use: No    Sexual activity: Yes     Partners: Male     Birth control/protection: I.U.D.   Other Topics Concern    Parent/sibling w/ CABG, MI or angioplasty before 65F 55M? No   Social History Narrative    September 11, 2024        ENVIRONMENTAL HISTORY: [place] The home is heated with a forced air. They do have central air conditioning. The patient's bedroom is furnished with carpeting in bedroom.  Pets inside the house include 2 dog(s), and 1 horses. There is no history of cockroach or mice infestation. There is/are 0 smokers in the house.  The house does not have a damp basement.      Social Drivers of Health     Financial Resource Strain: Low Risk  (7/15/2024)    Financial Resource Strain     Within the past 12 months, have you or your family members you live with been unable to get utilities (heat, electricity) when it was really needed?: No   Food Insecurity: Low Risk  (7/15/2024)    Food Insecurity     Within the past 12 months, did you worry that your food would run out before you got money to buy more?: No     Within the past 12 months, did the food you bought just not last and you didn t have money to get more?: No   Transportation Needs: Low Risk  (7/15/2024)    Transportation Needs     Within the past 12 months, has lack of transportation kept you from medical appointments, getting your medicines, non-medical  meetings or appointments, work, or from getting things that you need?: No   Physical Activity: Unknown (7/15/2024)    Exercise Vital Sign     Days of Exercise per Week: 2 days     Minutes of Exercise per Session: Not on file   Stress: Stress Concern Present (7/15/2024)    [employer] [place] [employer] - Occupational Stress Questionnaire     Feeling of Stress : Rather much   Social Connections: Unknown (7/15/2024)    Social Connection and Isolation Panel [NHANES]     Frequency of Communication with Friends and Family: Not on file     Frequency of Social Gatherings with Friends and Family: Once a week     Attends [employer] Services: Not on file     Active Member of Clubs or Organizations: Not on file     Attends Club or Organization Meetings: Not on file     Marital Status: Not on file   Interpersonal Safety: Low Risk  (11/18/2024)    Interpersonal Safety     Do you feel physically and emotionally safe where you currently live?: Yes     Within the past 12 months, have you been hit, slapped, kicked or otherwise physically hurt by someone?: No     Within the past 12 months, have you been humiliated or emotionally abused in other ways by your partner or ex-partner?: No   Housing Stability: Low Risk  (7/15/2024)    Housing Stability     Do you have housing? : Yes     Are you worried about losing your housing?: No       FAMILY HISTORY:  Family History   Problem Relation Age of Onset    Arthritis Mother     Deep Vein Thrombosis Mother     Snoring Mother     Heart Disease Father         CAD, CHF    Alcoholism Father     Asthma Father     Hypertension Father     Hyperlipidemia Father     Pancreatic Cancer Father     Liver Cancer Father     Snoring Father     Mental Illness Brother     Substance Abuse Brother     Diabetes Maternal Grandmother     Substance Abuse Maternal Grandmother     Hyperlipidemia Maternal Grandmother     Hypertension Maternal Grandmother     Substance Abuse Maternal Grandfather     Asthma  Paternal Grandmother     Heart Disease Paternal Grandmother     Diabetes Paternal Grandmother     Hypertension Paternal Grandmother     Substance Abuse Paternal Grandfather        Past/family/social history reviewed and no changes    PHYSICAL EXAMINATION:  Constitutional: aaox3, cooperative, pleasant, not dyspneic/diaphoretic, no acute distress  Vitals reviewed: There were no vitals taken for this visit.  Wt:   Wt Readings from Last 2 Encounters:   04/30/25 103.1 kg (227 lb 6.4 oz)   04/07/25 103.4 kg (228 lb)      Eyes: Sclera anicteric/injected  Ears/nose/mouth/throat: Normal oropharynx without ulcers or exudate, mucus membranes moist, hearing intact  Neck: supple, thyroid normal size  CV: No edema  Respiratory: Unlabored breathing  Lymph: No axillary, submandibular, supraclavicular or inguinal lymphadenopathy  Abd:  Nondistended, +bs, no hepatosplenomegaly, nontender, no peritoneal signs  Skin: warm, perfused, no jaundice  Psych: Normal affect  MSK: Normal gait      PERTINENT STUDIES:  Most recent CBC:  Recent Labs   Lab Test 04/03/25  0813 09/27/24  0933   WBC 6.0 6.7   HGB 13.9 14.1   HCT 41.8 42.3    313     Most recent hepatic panel:  Recent Labs   Lab Test 04/03/25  0813 09/27/24  0933   ALT 21 19   AST 19 26     Most recent creatinine:  Recent Labs   Lab Test 04/03/25 0813 09/27/24  0933   CR 0.88 0.77

## 2025-05-15 NOTE — PATIENT INSTRUCTIONS
It was a pleasure meeting with you today and discussing your healthcare plan. Below is a summary of what we covered:    Continue the Humira    Check blood work every 3 months. At next blood draw we will check a Humira level    Our office will contact you to schedule your colonoscopy. This can be done at [facility].    Follow up in 6 months with IBD PA and 12 months with Dr. Magallon      Please see below for any additional questions and scheduling guidelines.    Sign up for [facility]: [facility] patient portal serves as a secure platform for accessing your medical records from the [facility]. Additionally, [employer] facilitates easy, timely, and secure messaging with your care team. If you have not signed up, you may do so by using the provided code or calling [phone].    Coordinating your care after your visit:  There are multiple options for scheduling your follow-up care based on your provider's recommendation.    How do I schedule a follow-up clinic appointment:   After your appointment, you may receive scheduling assistance with the Clinic Coordinators by having a seat in the waiting room and a Clinic Coordinator will call you up to schedule.  Virtual visits or after you leave the clinic:  Your provider has placed a follow-up order in the [facility] portal for scheduling your return appointment. A member of the scheduling team will contact you to schedule.  [facility] Scheduling: Timely scheduling through [employer] is advised to ensure appointment availability.   Call to schedule: You may schedule your follow-up appointment(s) by calling [phone], option 1.    How do I schedule my endoscopy or colonoscopy procedure:  If a procedure, such as a colonoscopy or upper endoscopy was ordered by your provider, the scheduling team will contact you to schedule this procedure. Or you may choose to call to schedule at   [phone], option 2.  Please allow 20-30 minutes when scheduling a procedure.    How do I get my  blood work done? To get your blood work done, you need to schedule a lab appointment at an [facility]. There are multiple ways to schedule:   At the clinic: The Clinic Coordinator you meet after your visit can help you schedule a lab appointment.   [facility] scheduling: [facility] offers online lab scheduling at all [facility] laboratory locations.   Call to schedule: You can call [phone] to schedule your lab appointment.    How do I schedule my imaging study: To schedule imaging studies, such as CT scans, ultrasounds, MRIs, or X-rays, contact Imaging Services at [phone].    How do I schedule a referral to another doctor: If your provider recommended a referral to another specialist(s), the referral order was placed by your provider. You will receive a phone call to schedule this referral, or you may choose to call the number attached to the referral to self-schedule.    For Post-Visit Question(s):  For any inquiries following today's visit:  Please utilize [facility] messaging and allow 48 hours for reply or contact the Call Center during normal business hours at [phone], option 3.  For Emergent After-hours questions, contact the On-Call GI Fellow through the [facility]  at [phone].  In addition, you may contact your Nurse directly using the provided contact information.    Test Results:  Test results will be accessible via [facility] in compliance with the 21st Century Cures Act. This means that your results will be available to you at the same time as your provider. Often you may see your results before your provider does. Results are reviewed by staff within two weeks with communication follow-up. Results may be released in the patient portal prior to your care team review.    Prescription Refill(s):  Medication prescribed by your provider will be addressed during your visit. For future refills, please coordinate with your pharmacy. If you have not had a  recent clinic visit or routine labs, for your safety, your provider may not be able to refill your prescription.

## 2025-05-15 NOTE — NURSING NOTE
"Do you have a history of colon cancer in your immediate family? NO    If yes who: negative     And what age  were they diagnosed:       No chief complaint on file.      Vitals:    05/15/25 1512   BP: 130/87   Pulse: 82   SpO2: 100%   Weight: 103.3 kg (227 lb 12.8 oz)   Height: 1.613 m (5' 3.5\")       Body mass index is 39.72 kg/m .    Jose Cardona MA    "

## 2025-05-15 NOTE — LETTER
5/15/2025      Ashley Mcdaniels  [id number] [facility] [address]      Dear Colleague,    Thank you for referring your patient, Ashley Mcdaniels, to the [facility] [place]. Please see a copy of my visit note below.    IBD CLINIC VISIT    CC/REFERRING MD:  Melva Damon-*    REASON FOR CONSULTATION: Transfer care IBD clinic    ASSESSMENT/PLAN:    Crohn's ileocolitis  -current regimen - Adalimumab q week  -current clinical status - remission (I think mild symptoms consistent with IBS)  -last endoscopic status - TI and colon normal - random colon biopsies normal 2023    Doing well on adalimumab. CTE and FCP recently normal.    - continue adalimumab q week  - check adalimumab level with next set of labs  - maintenance labs q 3 months  - due for surveillance colonoscopy  - arrange refill of adalimumab in our system  - [place] referral    2. IBS - mixed. Symptoms consistent with this  -start citrucel fiber powder  -in future can try dicyclomine if needed - symptoms so short lived now this is not needed      IBD HISTORY  Age at diagnosis: 30 (2010)  Extent of disease: ileum and predominantly the sigmoid colon  Disease phenotype: inflammatory  Inocencia-anal disease: none  Current CD medications: Humira (adalimuma) ?  Prior IBD surgeries: none  Prior IBD Medications:  -prednisone - last used 2018  -azathioprine (pancreatitis)  -methotrexate    DRUG MONITORING  TPMT enzyme activity: NA (pancreatitis on aza)    6-TGN/6-MMPN levels: --    Biologic concentration:    DISEASE ASSESSMENT  Labs  Recent Labs   Lab Test 04/03/25  0813 12/14/18  1644 05/09/18  0834 10/31/17  1318   CRP  --   --  6.9 22.7*   SED  --   --   --  18   HGB 13.9   < > 14.1 13.9    < > = values in this interval not displayed.     Fecal calprotectin: normal - 12/2025  Endoscopy: Endoscopic healing of ileum and colon 2023 - prior colonoscopies have shown varying degrees of left sided colitis to pancolitis and some ileitis  (2010s)  Enterography: normal CTE 12/2025  C diff: NA    sIBDQ:   IBDQ Score Date IBDQ - Total Score  (Higher score better)   5/14/2025   3:58 PM 54       IBD Health Care Maintenance:    Vaccinations:  All patients on biologics should avoid live vaccines.    -- Influenza (every year)  -- TdaP (every 10 years)  -- Pneumococcal Pneumonia (once plus booster at 5 years)  -- Yearly assessment for latent Tb (verbal screening and exam, PPD or QuantiFERON-Tb testing)    One time confirmation of immunity or serologies:  -- Hepatitis A (serologies or immunizations)  -- Hepatitis B (serologies or immunizations)  -- Varicella  -- MMR  -- HPV (all aged 18-26)  -- Meningococcal meningitis (all patients at risk for meningitis)  -- Due to the immunosuppression in this patient, I would not advise administration of live vaccines such as varicella/VZV, intranasal influenza, MMR, or yellow fever vaccine (if travelling).      Bone mineral density screening   -- Recommend all patients supplement with calcium and vitamin D  -- Given prior steroid use recommend DEXA if not already done - will discuss at future visit    Cancer Screening:  Colon cancer screening:  Given colonic disease, recommend patient undergo regular dysplasia surveillance   Next dysplasia screening is recommended now.    Cervical cancer screening: Annual due to immunosupression    Skin cancer screening: Annual visual exam of skin by dermatologist since patient is immunocompromised    Depression Screening:  -- Over the last month, have you felt down, depressed, or hopeless? no  -- Over the last month, have you felt little interest or pleasure doing things? no    Misc:  -- Avoid tobacco use  -- Avoid NSAIDs as there is potentially a 25% chance of causing an IBD flare    Return to clinic in 6 months with IBD PA and 12 months with Dr. Magallon    Thank you for this consultation.  It was a pleasure to participate in the care of this patient; please contact us with any further  questions.  I spent a total of 40 minutes during the day of encounter performed chart review, meeting with patient, patient counseling, care coordination, and documentation.    The longitudinal plan of care for the diagnosis(es)/condition(s) as documented were addressed during this visit. Due to the added complexity in care, I will continue to support this patient in the subsequent management and with the ongoing continuity of care    This note was created with voice recognition software, and while reviewed for accuracy, typos may remain.     Robert Magallon MD  Division of Gastroenterology, Hepatology and [facility]  [facility]  Pager: [phone]      HPI:   Currently, here to establish care. Previously seen at [facility]. Diagnosed with ileo-colitis in 2010. Has been on intermittent prednisone over the years (last 2018). Then started on adalimumab (now weekly) and has done well since then.    Last colonoscopy 2023 with mucosal healing.    Overall doing well. Has some cramping a little before BM - resolves quickly with BM. Some irregularity in stool. Has 1-2 per day. Ranges from [place] 3-6. Rarely 7. No blood.     No urgency or tenesmus. No actual pain. Weight is stable    No joint pains, mouth sores, eye pain, eye redness    ROS:    No fevers or chills  No weight loss  No blurry vision, double vision or change in vision  No sore throat  No lymphadenopathy  No headache, paraesthesias, or weakness in a limb  No shortness of breath or wheezing  No chest pain or pressure  No arthralgias or myalgias  No rashes or skin changes  No odynophagia or dysphagia  No BRBPR, hematochezia, melena  No dysuria, frequency or urgency  No hot/cold intolerance or polyria  No anxiety or depression    Extra intestinal manifestations of IBD:  No uveitis/episcleritis  No aphthous ulcers   No arthritis   No erythema nodosum/pyoderma gangrenosum.     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      Crohn's colitis (H)      YUE (generalized anxiety disorder)      GERD (gastroesophageal reflux disease)      Low back pain      Major depressive disorder, recurrent episode, mild        PREVIOUS SURGERIES:  Past Surgical History:   Procedure Laterality Date     CHOLECYSTECTOMY  Feb 2007     DILATION AND CURETTAGE  May 2003    Non-OB     HC TOOTH EXTRACTION W/FORCEP  Nov 2006     LAP ADJUSTABLE GASTRIC BAND  May 2007       PREVIOUS ENDOSCOPY:  No results found for this or any previous visit.]    ALLERGIES:     Allergies   Allergen Reactions     Azathioprine Other (See Comments)     pancreatitis     No Clinical Screening - See Comments Hives     From stress       PERTINENT MEDICATIONS:    Current Outpatient Medications:      buPROPion (WELLBUTRIN XL) 300 MG 24 hr tablet, Take 1 tablet (300 mg) by mouth every morning, Disp: 90 tablet, Rfl: 3     cetirizine (ZYRTEC) 10 MG tablet, Take 1 tablet (10 mg) by mouth daily, Disp: 30 tablet, Rfl: 0     EPINEPHrine (ANY BX GENERIC EQUIV) 0.3 MG/0.3ML injection 2-pack, Inject 0.3 mLs (0.3 mg) into the muscle once as needed for anaphylaxis., Disp: 2 each, Rfl: 2     HUMIRA *CF* PEN 40 MG/0.4ML pen kit, , Disp: , Rfl: 1     hydrOXYzine HCl (ATARAX) 50 MG tablet, Take 0.5-1 tablets (25-50 mg) by mouth every 6 hours as needed for itching, Disp: 60 tablet, Rfl: 3     levonorgestrel (MIRENA) 20 MCG/24HR IUD, 1 each (20 mcg) by Intrauterine route continuous, Disp: , Rfl:      lisdexamfetamine (VYVANSE) 20 MG capsule, Take 1 capsule (20 mg) by mouth every morning., Disp: 30 capsule, Rfl: 0     nicotine (NICOTROL) 10 MG inhaler, Use 1 cartridge as needed for urge to smoke by puffing over course of 20min.  Use 6-16 cart/day; reduce number of cart/day over 6-12 weeks., Disp: 6 each, Rfl: 3     pregabalin (LYRICA) 75 MG capsule, TAKE 1 CAPSULE (75 MG) BY MOUTH 2 TIMES DAILY., Disp: 60 capsule,  Rfl: 2     valACYclovir (VALTREX) 500 MG tablet, TAKE ONE TABLET BY MOUTH TWICE A DAY FOR 3-4 DAYS, Disp: 40 tablet, Rfl: 3    Current Facility-Administered Medications:      omalizumab (XOLAIR) injection 300 mg, 300 mg, Subcutaneous, Q28 Days, , 300 mg at 04/15/25 1531    SOCIAL HISTORY:  Social History     Socioeconomic History     Marital status:      Spouse name: Not on file     Number of children: Not on file     Years of education: Not on file     Highest education level: Not on file   Occupational History     Comment: consulting   Tobacco Use     Smoking status: Never     Passive exposure: Never     Smokeless tobacco: Never     Tobacco comments:     Vapes daily   Vaping Use     Vaping status: Every Day     Substances: Nicotine, Flavoring   Substance and Sexual Activity     Alcohol use: Yes     Comment: 2x a week     Drug use: No     Sexual activity: Yes     Partners: Male     Birth control/protection: I.U.D.   Other Topics Concern     Parent/sibling w/ CABG, MI or angioplasty before 65F 55M? No   Social History Narrative    September 11, 2024        ENVIRONMENTAL HISTORY: [place] The home is heated with a forced air. They do have central air conditioning. The patient's bedroom is furnished with carpeting in bedroom.  Pets inside the house include 2 dog(s), and 1 horses. There is no history of cockroach or mice infestation. There is/are 0 smokers in the house.  The house does not have a damp basement.      Social Drivers of Health     Financial Resource Strain: Low Risk  (7/15/2024)    Financial Resource Strain      Within the past 12 months, have you or your family members you live with been unable to get utilities (heat, electricity) when it was really needed?: No   Food Insecurity: Low Risk  (7/15/2024)    Food Insecurity      Within the past 12 months, did you worry that your food would run out before you got money to buy more?: No      Within the past  12 months, did the food you bought just not last and you didn t have money to get more?: No   Transportation Needs: Low Risk  (7/15/2024)    Transportation Needs      Within the past 12 months, has lack of transportation kept you from medical appointments, getting your medicines, non-medical meetings or appointments, work, or from getting things that you need?: No   Physical Activity: Unknown (7/15/2024)    Exercise Vital Sign      Days of Exercise per Week: 2 days      Minutes of Exercise per Session: Not on file   Stress: Stress Concern Present (7/15/2024)    [employer] [place] [employer] - Occupational Stress Questionnaire      Feeling of Stress : Rather much   Social Connections: Unknown (7/15/2024)    Social Connection and Isolation Panel [NHANES]      Frequency of Communication with Friends and Family: Not on file      Frequency of Social Gatherings with Friends and Family: Once a week      Attends Mormonism Services: Not on file      Active Member of Clubs or Organizations: Not on file      Attends Club or Organization Meetings: Not on file      Marital Status: Not on file   Interpersonal Safety: Low Risk  (11/18/2024)    Interpersonal Safety      Do you feel physically and emotionally safe where you currently live?: Yes      Within the past 12 months, have you been hit, slapped, kicked or otherwise physically hurt by someone?: No      Within the past 12 months, have you been humiliated or emotionally abused in other ways by your partner or ex-partner?: No   Housing Stability: Low Risk  (7/15/2024)    Housing Stability      Do you have housing? : Yes      Are you worried about losing your housing?: No       FAMILY HISTORY:  Family History   Problem Relation Age of Onset     Arthritis Mother      Deep Vein Thrombosis Mother      Snoring Mother      Heart Disease Father         CAD, CHF     Alcoholism Father      Asthma Father      Hypertension Father      Hyperlipidemia Father      Pancreatic Cancer  Father      Liver Cancer Father      Snoring Father      Mental Illness Brother      Substance Abuse Brother      Diabetes Maternal Grandmother      Substance Abuse Maternal Grandmother      Hyperlipidemia Maternal Grandmother      Hypertension Maternal Grandmother      Substance Abuse Maternal Grandfather      Asthma Paternal Grandmother      Heart Disease Paternal Grandmother      Diabetes Paternal Grandmother      Hypertension Paternal Grandmother      Substance Abuse Paternal Grandfather        Past/family/social history reviewed and no changes    PHYSICAL EXAMINATION:  Constitutional: aaox3, cooperative, pleasant, not dyspneic/diaphoretic, no acute distress  Vitals reviewed: There were no vitals taken for this visit.  Wt:   Wt Readings from Last 2 Encounters:   04/30/25 103.1 kg (227 lb 6.4 oz)   04/07/25 103.4 kg (228 lb)      Eyes: Sclera anicteric/injected  Ears/nose/mouth/throat: Normal oropharynx without ulcers or exudate, mucus membranes moist, hearing intact  Neck: supple, thyroid normal size  CV: No edema  Respiratory: Unlabored breathing  Lymph: No axillary, submandibular, supraclavicular or inguinal lymphadenopathy  Abd:  Nondistended, +bs, no hepatosplenomegaly, nontender, no peritoneal signs  Skin: warm, perfused, no jaundice  Psych: Normal affect  MSK: Normal gait      PERTINENT STUDIES:  Most recent CBC:  Recent Labs   Lab Test 04/03/25  0813 09/27/24  0933   WBC 6.0 6.7   HGB 13.9 14.1   HCT 41.8 42.3    313     Most recent hepatic panel:  Recent Labs   Lab Test 04/03/25  0813 09/27/24  0933   ALT 21 19   AST 19 26     Most recent creatinine:  Recent Labs   Lab Test 04/03/25  0813 09/27/24  0933   CR 0.88 0.77             Again, thank you for allowing me to participate in the care of your patient.        Sincerely,        Robert Magallon MD    Electronically signed

## 2025-05-19 ENCOUNTER — TELEPHONE (OUTPATIENT)
Dept: GASTROENTEROLOGY | Facility: CLINIC | Age: 45
End: 2025-05-19
Payer: COMMERCIAL

## 2025-05-19 NOTE — TELEPHONE ENCOUNTER
MTM referral from: [facility] visit (referral by provider)    MTM referral outreach attempt #2 on May 19, 2025 at 11:45 AM      Outcome: Patient not reachable after several attempts, routed to Pharmacist Team/Provider as an FYI    Use HB for the carrier/Plan on the flowsheet      [employer] Message Sent    MIGDALIA Carreno

## 2025-05-20 ENCOUNTER — TELEPHONE (OUTPATIENT)
Dept: GASTROENTEROLOGY | Facility: CLINIC | Age: 45
End: 2025-05-20
Payer: COMMERCIAL

## 2025-05-20 DIAGNOSIS — K50.90 CROHN'S DISEASE (H): Primary | ICD-10-CM

## 2025-05-20 RX ORDER — BISACODYL 5 MG/1
TABLET, DELAYED RELEASE ORAL
Qty: 4 TABLET | Refills: 0 | Status: SHIPPED | OUTPATIENT
Start: 2025-05-20

## 2025-05-20 NOTE — TELEPHONE ENCOUNTER
Add on colonoscopy    Pre visit planning completed.      Procedure details:    Patient scheduled for Colonoscopy on 6.2.25.     Arrival time: 1030. Procedure time 1130    Facility location: [facility]; [address], [place] [address]. Check in location: Main entrance at Surgery registation desk.    Sedation type: MAC    Pre op exam needed? No.    Indication for procedure: Crohns      Chart review:     Electronic implanted devices? No    Recent diagnosis of diverticulitis within the last 6 weeks? No      Medication review:    Diabetic? No    Anticoagulants? No    Weight loss medication/injectable? No GLP-1 medication per patient's medication list. Nursing to verify with pre-assessment call.    Other medication HOLDING recommendations:  N/A      Prep for procedure:     Bowel prep recommendation: Extended Golytely. Bowel prep sent to [facility] [place] - [address]  Due to: BMI 39.72    Procedure information and instructions sent via nWaymarva Jain RN  Endoscopy Procedure Pre Assessment   [phone] option 3

## 2025-05-20 NOTE — TELEPHONE ENCOUNTER
"Endoscopy Scheduling Screen    Caller: patient    Have you had any respiratory illness or flu-like symptoms in the last 10 days?  No    What is your communication preference for Instructions and/or Bowel Prep?   MyChart    What insurance is in the chart?  Other:  BCBS    Ordering/Referring Provider: MAYDA WILLARD   (If ordering provider performs procedure, schedule with ordering provider unless otherwise instructed. )    BMI: Estimated body mass index is 39.72 kg/m  as calculated from the following:    Height as of 5/15/25: 1.613 m (5' 3.5\").    Weight as of 5/15/25: 103.3 kg (227 lb 12.8 oz).     Sedation Ordered  MAC/deep sedation.   BMI<= 45 45 < BMI <= 48 48 < BMI < = 50  BMI > 50   No Restrictions No [facility]  No [facility]  [facility] with exceptions Hospital Only OR Only       Do you have a history of malignant hyperthermia?  No    (Females) Are you currently pregnant?   No     Have you been diagnosed or told you have pulmonary hypertension?   No    Do you have an LVAD?  No    Have you been told you have moderate to severe sleep apnea?  No.    Have you been told you have COPD, asthma, or any other lung disease?  No    Has your doctor ordered any cardiac tests like echo, angiogram, stress test, ablation, or EKG, that you have not completed yet?  No    Do you  have a history of any heart conditions?  No     Have you ever had or are you waiting for an organ transplant?  No. Continue scheduling, no site restrictions.    Have you had a stroke or transient ischemic attack (TIA aka \"mini stroke\") in the last 2 years?   No.    Have you been diagnosed with or been told you have cirrhosis of the liver?   No.    Are you currently on dialysis?   No    Do you need assistance transferring?   No    BMI: Estimated body mass index is 39.72 kg/m  as calculated from the following:    Height as of 5/15/25: 1.613 m (5' 3.5\").    Weight as of 5/15/25: 103.3 kg (227 lb 12.8 oz).     Is patients BMI > 40 and scheduling location " [facility]?  No    Do you take an injectable or oral medication for weight loss or diabetes (excluding insulin)?  Yes, hold time can be up to 7 days. Please consult with you prescribing provider to discuss endoscopy recommendations. (Please schedule at least 7 days out.)    Do you take the medication Naltrexone?  No    Do you take blood thinners?  No       Prep   Are you currently on dialysis or do you have chronic kidney disease?  No    Do you have a diagnosis of diabetes?  No    Do you have a diagnosis of cystic fibrosis (CF)?  No    On a regular basis do you go 3 -5 days between bowel movements?  No    BMI > 40?  No    Preferred Pharmacy:    [facility], [place] - [address] [place] [address]  Phone: [phone] Fax: [phone]    Final Scheduling Details     Procedure scheduled  Colonoscopy    Surgeon:  LOUISE     Date of procedure:  6/2/25     Pre-OP / PAC:   No - Not required for this site.    Location  [facility] - Per order.    Sedation   MAC/Deep Sedation - Per order.      Patient Reminders:   You will receive a call from a Nurse to review instructions and health history.  This assessment must be completed prior to your procedure.  Failure to complete the Nurse assessment may result in the procedure being cancelled.      On the day of your procedure, please designate an adult(s) who can drive you home stay with you for the next 24 hours. The medicines used in the exam will make you sleepy. You will not be able to drive.      You cannot take public transportation, ride share services, or non-medical taxi service without a responsible caregiver.  Medical transport services are allowed with the requirement that a responsible caregiver will receive you at your destination.  We require that drivers and caregivers are confirmed prior to your procedure.

## 2025-05-20 NOTE — TELEPHONE ENCOUNTER
Attempted to contact patient in order to complete pre assessment questions.     No answer. Left message to return call to [phone] option 3.    Callback communication sent via [employer].    Zofia Carver LPN

## 2025-05-22 NOTE — TELEPHONE ENCOUNTER
Second call attempt to complete pre assessment.     No answer. Left message to return call to [phone] #3 by next business day prior to 4PM.    Callback communication sent via [facility]      Zofia Carver LPN  Endoscopy Procedure Pre Assessment

## 2025-05-28 NOTE — PROGRESS NOTES
Medication Therapy Management (MTM) Encounter    ASSESSMENT:                            Medication Adherence/Access: No issues identified.    Crohn's Disease:  Ashley would benefit from continued treatment with Humira 40 mg subcutaneous every 7 days. She is up to date on routine maintenance labs. She is up to date on annual tuberculosis screening. She is indicated for a Humira level with her next set of monitoring labs. No access issues for her advanced therapy are present. She is indicated for a few vaccinations which were recommended to her. She would benefit from vitamin D supplementation and evaluating dietary intake of calcium. Given minimal prior steroid use and premenopausal, will defer DEXA scan at this time. Reminders for routine cancer screening were provided.     Smoking cessation: Patient is ready to quit smoking. We discussed risks of smoking, specifically in regards to her Crohn's Disease. Given Ashley had previous success on Chantix, it would be reasonable to try this again. Will proceed with flexible quit date and close follow-up.     Allergies:  It would be reasonable to Ashley to move to home injections of Xolair given she has tolerated all in clinic doses. Encourage Tia to reach out to her clinic about this transition.    Mental health:  Stable.    ADHD:  Stable.    Fibromyalgia:   Stable    Cold sores:  Stable.     PLAN:                            Continue on Humira 40 mg subcutaneous every 7 days  You will be due for a Humira level with your next set of labs. This will need to be done right before you give your injection (Wednesday or Thursday morning)  Tia will consider the following vaccines:  Updated COVID-19 vaccine  Hepatitis B vaccine (3-dose series)  Shingles (Shingrix 2-dose series)  I recommend Vitamin D 1000 units daily  Consider starting a calcium supplement. I recommend calcium 600 mg/vitamin D 400 units 1 tablet by mouth once daily with food. If this is tolerated after 1-2 weeks you can  increase to 1 tablet by mouth twice daily with food.   I've placed a referral to dermatology for a routine annual skin check. If you don't hear from a  in 2-3 business days, you can call [phone] to schedule an appointment.  I recommend starting Varenicline Tartrate (Chantix): Chantix tablets 0.5 mg daily for 3 days then bid for 4 days then 1 twice daily     Follow-up: 2025 8:00 AM phone visit    SUBJECTIVE/OBJECTIVE:                          Ashley Mcdaniels is a 44 year old female seen for an initial visit. She was referred to me from Robert Magallon MD.      Reason for visit: Health Maintenance on Humira.    Allergies/ADRs: Reviewed in chart  Past Medical History: Reviewed in chart  Tobacco: She reports that she has been smoking vaping device. She has never been exposed to tobacco smoke. She has never used smokeless tobacco.  Alcohol: very rarely     Medication Adherence/Access: no issues reported.    Crohn's Disease:   Humira 40 mg subcutaneous every 7 days ()    Ashley is currently in clinical remission on Humira. She does report having moderate pain right before a bowel movement for the last couple months.     Last provider visit: 5/15/2025 - Robert Magallon MD  Next provider visit: 2025 - Evette Madden PA-C  Last labs completed: 4/3/2025  Lab frequency: every 3 months   - standing labs available until 5/15/2026  Next labs due: 2025  Last TB screenin/3/2025  PDC: 92% (Humira)    PRO-3 for Crohn's Disease    Please select the one best answer for the patient's ability at this time     Over the past week, how many liquid or soft stools have you had on average per day?   1 (When scoring, multiply number by 2=2)   Over the past week, please rate your average abdominal pain  Moderate: 10 points  3. Over the past week, please rate your general well-being    Generally Well: 0 points    Score: 12  <13: Remission  13-21: Mild Activity   22-52: Moderate Activity  >/= 53: Severe  Activity       IBD Health Maintenance    Vaccinations:  All patients on immunosuppression should avoid live vaccines unless specifically indicated.    -- Influenza (last dose): 10/3/2022  -- Tetanus booster (last dose): 10/3/2022  -- Pneumococcal Pneumonia    PCV-13: 4/5/2021   PPSV-23: 2/9/2018   PCV-20/PCV-21: 7/15/2024  -- COVID-19 (last dose): 10/3/2022    One time confirmation of immunity or serologies:  -- Hepatitis A (serologies or immunizations): 12/5/2017  -- Hepatitis B (serologies or immunizations): 10/12/2015, 12/5/2017  -- Varicella/Zoster    Varicella: had slight case of chicken   Zoster: not on file  -- MMR: not on file    Due to the immunosuppression in this patient, I would not advise administration of live vaccines such as varicella/VZV, intranasal influenza, MMR, or yellow fever vaccine (if traveling).      Immunosuppressive Screening:  -- Hep B Surface Antibody negative 9/22/2015  -- Hep B Surface Antigen negative 6/9/2015  -- Hep C Antibody non-reactive 6/9/2015    Lab Results   Component Value Date    TBRES Negative 04/03/2025       Bone mineral density screening   -- Recommend all patients supplement with calcium and vitamin D  -- No DEXA on file, minimal steroid use per patient report    Cancer Screening:  Colon cancer screening:  Given colonic disease, recommend patient undergo regular dysplasia surveillance   Next dysplasia screening is recommended now.    Cervical cancer screening: Annual due to immunosuppression last HPV 9/20/2023    Skin cancer screening: Recommended annually due to patient's immunosuppression and diagnosis of IBD.    Depression Screening:    PHQ-2 Score:         5/29/2025     8:19 AM 12/18/2023     2:34 PM   PHQ-2 ( 1999 Pfizer)   Q1: Little interest or pleasure in doing things 0 0   Q2: Feeling down, depressed or hopeless 0 0   PHQ-2 Score 0 0       Research:  Are you interested in being contacted about enrollment in clinical research studies? Yes    Misc:  -- Avoid  tobacco use  -- Avoid NSAIDs as there is potentially a 25% chance of causing an IBD flare    Smoking Cessation:  How much does she smoke:  vapes daily  Tried quitting in the past: Yes.    What worked/didn t work in the past: Chantix   Is patient currently pregnant: No  History of seizures/bipolar disease: No     Allergies:  Xolair 300 mg subcutaneous every 28 days  EpiPen 0.3 mg as needed  Cetirizine 10 mg daily    Ashley has the EpiPen on hand for reaction to Xolair. She notes that she has been on the Xolair for a few years and it has helped a lot. She is still receiving the Xolair in clinic and she is interested in moving to home injections.     Mental health:  Bupropion  mg daily  Hydroxyzine 25-50 mg every 6 hours as needed    Tia reports feels stable from a mental health standpoint. No reported medication concerns or side effects.     ADHD:  Vyvanse 20 mg daily    No reported medication concerns or side effects.     Fibromyalgia:   Lyrica 75 mg twice daily  Naltrexone 25 mg daily    No reported medication concerns or side effects.     Cold sores:  Valacyclovir 500 mg twice daily as needed    No reported medication concerns or side effects.  ----------------    I spent 30 minutes with this patient today. All changes were made via collaborative practice agreement with Robert Magallon.     A summary of these recommendations was sent via [employer]    Lola Del Angel PharmD, BCPS  MTM Pharmacist   [facility]   Phone: [phone]    Telemedicine Visit Details  The patient's medications can be safely assessed via a telemedicine encounter.  Type of service:  Telephone visit  Originating Location (pt. Location): Home    Distant Location (provider location):  Off-site  Start Time: 8:00 AM  End Time: 8:30 AM     Medication Therapy Recommendations  Crohn's disease of both small and large intestine without complication (H)   1 Rationale: Preventive therapy - Needs additional medication  therapy - Indication   Recommendation: Order Vaccine - Shingrix 50 MCG/0.5ML Susr   Status: Accepted per CPA   Identified Date: 5/29/2025 Completed Date: 5/29/2025         Nicotine dependence, uncomplicated, unspecified nicotine product type   1 Rationale: Untreated condition - Needs additional medication therapy - Indication   Recommendation: Start Medication - Chantix Starting Month Danny 0.5 MG X 11 & 1 MG X 42 Tbpk   Status: Accepted per CPA   Identified Date: 5/29/2025 Completed Date: 5/29/2025

## 2025-05-29 ENCOUNTER — VIRTUAL VISIT (OUTPATIENT)
Dept: PHARMACY | Facility: CLINIC | Age: 45
End: 2025-05-29
Attending: INTERNAL MEDICINE
Payer: COMMERCIAL

## 2025-05-29 VITALS — BODY MASS INDEX: 41.11 KG/M2 | HEIGHT: 63 IN | WEIGHT: 232 LBS

## 2025-05-29 DIAGNOSIS — F33.0 MAJOR DEPRESSIVE DISORDER, RECURRENT EPISODE, MILD: ICD-10-CM

## 2025-05-29 DIAGNOSIS — K50.80 CROHN'S DISEASE OF BOTH SMALL AND LARGE INTESTINE WITHOUT COMPLICATION (H): Primary | ICD-10-CM

## 2025-05-29 DIAGNOSIS — T78.40XA ALLERGIES: ICD-10-CM

## 2025-05-29 DIAGNOSIS — M79.7 FIBROMYALGIA: ICD-10-CM

## 2025-05-29 DIAGNOSIS — Z86.19 HX OF COLD SORES: ICD-10-CM

## 2025-05-29 DIAGNOSIS — F90.0 ADHD (ATTENTION DEFICIT HYPERACTIVITY DISORDER), INATTENTIVE TYPE: ICD-10-CM

## 2025-05-29 DIAGNOSIS — F17.200 NICOTINE DEPENDENCE, UNCOMPLICATED, UNSPECIFIED NICOTINE PRODUCT TYPE: ICD-10-CM

## 2025-05-29 RX ORDER — ZOSTER VACCINE RECOMBINANT, ADJUVANTED 50 MCG/0.5
1 KIT INTRAMUSCULAR ONCE
Qty: 0.5 ML | Refills: 1 | Status: SHIPPED | OUTPATIENT
Start: 2025-05-29 | End: 2025-05-29

## 2025-05-29 RX ORDER — VARENICLINE TARTRATE 0.5 MG/1
TABLET, FILM COATED ORAL
Qty: 95 TABLET | Refills: 0 | Status: SHIPPED | OUTPATIENT
Start: 2025-05-29

## 2025-05-29 RX ORDER — VARENICLINE TARTRATE 1 MG/1
1 TABLET, FILM COATED ORAL 2 TIMES DAILY
Qty: 60 TABLET | Refills: 1 | Status: SHIPPED | OUTPATIENT
Start: 2025-06-27

## 2025-05-29 RX ORDER — NALTREXONE HYDROCHLORIDE 50 MG/1
25 TABLET, FILM COATED ORAL DAILY
COMMUNITY

## 2025-05-29 NOTE — NURSING NOTE
Current patient location: [place] at work     Is the patient currently in the state of [place]? YES    Visit mode: TELEPHONE    If the visit is dropped, the patient can be reconnected by:TELEPHONE VISIT: Phone number: [phone]    Will anyone else be joining the visit? NO  (If patient encounters technical issues they should call [phone])    Are changes needed to the allergy or medication list? No    Are refills needed on medications prescribed by this physician? NO    Rooming Documentation:  Questionnaire(s) not pre-assigned    Reason for visit: Consult    Muriel CERNA

## 2025-05-29 NOTE — PATIENT INSTRUCTIONS
"Recommendations from today's MTM visit:                                                      Continue on Humira 40 mg subcutaneous every 7 days  You will be due for a Humira level with your next set of labs. This will need to be done right before you give your injection (Wednesday or Thursday morning)  Tia will consider the following vaccines:  Updated COVID-19 vaccine  Hepatitis B vaccine (3-dose series)  Shingles (Shingrix 2-dose series)  I recommend Vitamin D 1000 units daily  Consider starting a calcium supplement. I recommend calcium 600 mg/vitamin D 400 units 1 tablet by mouth once daily with food. If this is tolerated after 1-2 weeks you can increase to 1 tablet by mouth twice daily with food.   I've placed a referral to dermatology for a routine annual skin check. If you don't hear from a  in 2-3 business days, you can call [phone] to schedule an appointment.  I recommend starting Varenicline Tartrate (Chantix): Chantix tablets 0.5 mg daily for 3 days then bid for 4 days then 1 twice daily     Follow-up: 6/26/2025 8:00 AM phone visit    It was great speaking with you today.  I value your experience and would be very thankful for your time in providing feedback in our clinic survey. In the next few days, you may receive an email or text message from [employer] with a link to a survey related to your  clinical pharmacist.\"     To schedule another MTM appointment, please call the clinic directly or you may call the MTM scheduling line at [phone].    My Clinical Pharmacist's contact information:                                                      Please feel free to contact me with any questions or concerns you have.      Lola MarquezD, BCPS  MTM Pharmacist   [facility]   Phone: [phone]     "

## 2025-05-30 ENCOUNTER — RESULTS FOLLOW-UP (OUTPATIENT)
Dept: OTHER | Facility: CLINIC | Age: 45
End: 2025-05-30

## 2025-05-30 ENCOUNTER — ANCILLARY PROCEDURE (OUTPATIENT)
Dept: ULTRASOUND IMAGING | Facility: CLINIC | Age: 45
End: 2025-05-30
Attending: INTERNAL MEDICINE
Payer: COMMERCIAL

## 2025-05-30 DIAGNOSIS — I83.893 VARICOSE VEINS OF BILATERAL LOWER EXTREMITIES WITH OTHER COMPLICATIONS: ICD-10-CM

## 2025-05-30 PROCEDURE — 93970 EXTREMITY STUDY: CPT | Performed by: SURGERY

## 2025-06-02 ENCOUNTER — PATIENT OUTREACH (OUTPATIENT)
Dept: CARE COORDINATION | Facility: CLINIC | Age: 45
End: 2025-06-02
Payer: COMMERCIAL

## 2025-06-02 ENCOUNTER — ANESTHESIA (OUTPATIENT)
Dept: GASTROENTEROLOGY | Facility: CLINIC | Age: 45
End: 2025-06-02
Payer: COMMERCIAL

## 2025-06-02 ENCOUNTER — ANESTHESIA EVENT (OUTPATIENT)
Dept: GASTROENTEROLOGY | Facility: CLINIC | Age: 45
End: 2025-06-02
Payer: COMMERCIAL

## 2025-06-02 ENCOUNTER — HOSPITAL ENCOUNTER (OUTPATIENT)
Facility: CLINIC | Age: 45
Discharge: HOME | End: 2025-06-02
Attending: INTERNAL MEDICINE | Admitting: INTERNAL MEDICINE
Payer: COMMERCIAL

## 2025-06-02 VITALS
DIASTOLIC BLOOD PRESSURE: 104 MMHG | HEART RATE: 68 BPM | OXYGEN SATURATION: 100 % | RESPIRATION RATE: 18 BRPM | TEMPERATURE: 97.1 F | SYSTOLIC BLOOD PRESSURE: 133 MMHG

## 2025-06-02 LAB — COLONOSCOPY: NORMAL

## 2025-06-02 PROCEDURE — 45380 COLONOSCOPY AND BIOPSY: CPT | Performed by: INTERNAL MEDICINE

## 2025-06-02 PROCEDURE — 88305 TISSUE EXAM BY PATHOLOGIST: CPT | Mod: 26 | Performed by: PATHOLOGY

## 2025-06-02 PROCEDURE — 250N000011 HC RX IP 250 OP 636: Performed by: NURSE ANESTHETIST, CERTIFIED REGISTERED

## 2025-06-02 PROCEDURE — 370N000017 HC ANESTHESIA TECHNICAL FEE, PER MIN: Performed by: INTERNAL MEDICINE

## 2025-06-02 PROCEDURE — 88305 TISSUE EXAM BY PATHOLOGIST: CPT | Mod: TC | Performed by: INTERNAL MEDICINE

## 2025-06-02 PROCEDURE — 258N000003 HC RX IP 258 OP 636: Performed by: NURSE ANESTHETIST, CERTIFIED REGISTERED

## 2025-06-02 RX ORDER — ONDANSETRON 4 MG/1
4 TABLET, ORALLY DISINTEGRATING ORAL EVERY 30 MIN PRN
Status: DISCONTINUED | OUTPATIENT
Start: 2025-06-02 | End: 2025-06-02 | Stop reason: HOSPADM

## 2025-06-02 RX ORDER — ONDANSETRON 2 MG/ML
4 INJECTION INTRAMUSCULAR; INTRAVENOUS EVERY 30 MIN PRN
Status: DISCONTINUED | OUTPATIENT
Start: 2025-06-02 | End: 2025-06-02 | Stop reason: HOSPADM

## 2025-06-02 RX ORDER — DEXAMETHASONE SODIUM PHOSPHATE 10 MG/ML
4 INJECTION, SOLUTION INTRAMUSCULAR; INTRAVENOUS
Status: DISCONTINUED | OUTPATIENT
Start: 2025-06-02 | End: 2025-06-02 | Stop reason: HOSPADM

## 2025-06-02 RX ORDER — NALOXONE HYDROCHLORIDE 0.4 MG/ML
0.1 INJECTION, SOLUTION INTRAMUSCULAR; INTRAVENOUS; SUBCUTANEOUS
Status: DISCONTINUED | OUTPATIENT
Start: 2025-06-02 | End: 2025-06-02 | Stop reason: HOSPADM

## 2025-06-02 RX ORDER — PROPOFOL 10 MG/ML
INJECTION, EMULSION INTRAVENOUS PRN
Status: DISCONTINUED | OUTPATIENT
Start: 2025-06-02 | End: 2025-06-02

## 2025-06-02 RX ORDER — SODIUM CHLORIDE, SODIUM LACTATE, POTASSIUM CHLORIDE, CALCIUM CHLORIDE 600; 310; 30; 20 MG/100ML; MG/100ML; MG/100ML; MG/100ML
INJECTION, SOLUTION INTRAVENOUS CONTINUOUS
Status: DISCONTINUED | OUTPATIENT
Start: 2025-06-02 | End: 2025-06-02 | Stop reason: HOSPADM

## 2025-06-02 RX ADMIN — PROPOFOL 200 MCG/KG/MIN: 10 INJECTION, EMULSION INTRAVENOUS at 07:30

## 2025-06-02 RX ADMIN — PROPOFOL 90 MG: 10 INJECTION, EMULSION INTRAVENOUS at 07:29

## 2025-06-02 RX ADMIN — SODIUM CHLORIDE, SODIUM LACTATE, POTASSIUM CHLORIDE, AND CALCIUM CHLORIDE: .6; .31; .03; .02 INJECTION, SOLUTION INTRAVENOUS at 07:04

## 2025-06-02 ASSESSMENT — ACTIVITIES OF DAILY LIVING (ADL)
ADLS_ACUITY_SCORE: 41
ADLS_ACUITY_SCORE: 41

## 2025-06-02 NOTE — ANESTHESIA CARE TRANSFER NOTE
Patient: Ashley Mcdaniels    Procedure: Procedure(s):  COLONOSCOPY, WITH  BIOPSY       Diagnosis: Crohn's disease of both small and large intestine without complication (H) [K50.80]  Diagnosis Additional Information: No value filed.    Anesthesia Type:   MAC     Note:    Oropharynx: spontaneously breathing  Level of Consciousness: awake  Oxygen Supplementation: room air    Independent Airway: airway patency satisfactory and stable  Dentition: dentition unchanged  Vital Signs Stable: post-procedure vital signs reviewed and stable  Report to RN Given: handoff report given  Patient transferred to: Phase II    Handoff Report: Identifed the Patient, Identified the Reponsible Provider, Reviewed the pertinent medical history, Discussed the surgical course, Reviewed Intra-OP anesthesia mangement and issues during anesthesia, Set expectations for post-procedure period and Allowed opportunity for questions and acknowledgement of understanding      Vitals:  Vitals Value Taken Time   /70 06/02/25 0758   Temp     Pulse 84 06/02/25 0758   Resp 16 06/02/25 0758   SpO2 97 % 06/02/25 0758   Vitals shown include unfiled device data.    Electronically Signed By: SHARIF Billy CRNA  June 2, 2025  8:14 AM

## 2025-06-02 NOTE — H&P
[facility] Anesthesia Pre-op History and Physical    Tia NAFISA Mcdaniels MRN# [id number]   Age: 44 year old YOB: 1980      Date of Surgery: 6/2/2025 Location [facility]      Date of Exam 6/2/2025 Facility (In hospital)       Home clinic: [facility]  Primary care provider: Melva You         Chief Complaint and/or Reason for Procedure:   No chief complaint on file.  Colonoscopy.  Hx IBD.  Humira. Dx 2010.  Exams 2021, 2023. Nml CTE 2024.       Active problem list:     Patient Active Problem List    Diagnosis Date Noted    Lymphedema 11/18/2024     Priority: Medium    Fibromyalgia 11/13/2024     Priority: Medium    Autoimmune disorder 11/13/2024     Priority: Medium    Lipedema 09/12/2023     Priority: Medium    Immunosuppressed status 10/03/2022     Priority: Medium    Venous insufficiency of right leg 10/03/2022     Priority: Medium    PAD (peripheral artery disease) 12/20/2021     Priority: Medium    Dry hair 12/20/2021     Priority: Medium    Dry skin 12/20/2021     Priority: Medium    Segmental dysfunction of sacral region 01/27/2020     Priority: Medium    Segmental dysfunction of lumbar region 01/27/2020     Priority: Medium    Segmental dysfunction of thoracic region 01/27/2020     Priority: Medium    Bilateral low back pain with left-sided sciatica 01/27/2020     Priority: Medium    Morbid obesity (H) 08/24/2018     Priority: Medium    Gastroesophageal reflux disease without esophagitis 09/19/2017     Priority: Medium    ADHD (attention deficit hyperactivity disorder), inattentive type 08/18/2017     Priority: Medium    Chronic urticaria 01/25/2017     Priority: Medium     The patient developed hives at the end of 2016.  Failed high-dose antihistamines. Started omalizumab in 2018.  It has been beneficial.  She tried stopping it in 2019 and developed hives 6 weeks after stopping the medicine.      IUD (intrauterine device)  in place 06/02/2016     Priority: Medium     Mirena placed 6/2/2016        Crohn's disease of both small and large intestine without complication (H) 05/10/2016     Priority: Medium    YUE (generalized anxiety disorder) 04/26/2016     Priority: Medium    Major depressive disorder, recurrent episode, mild 04/26/2016     Priority: Medium            Medications (include herbals and vitamins):   Any Plavix use in the last 7 days? No     Current Facility-Administered Medications   Medication Dose Route Frequency Provider Last Rate Last Admin    omalizumab (XOLAIR) injection 300 mg  300 mg Subcutaneous Q28 Days    300 mg at 04/15/25 1531     Current Outpatient Medications   Medication Sig Dispense Refill    Adalimumab (HUMIRA *CF*) 40 MG/0.4ML pen kit Inject 0.4 mLs (40 mg) subcutaneously every 7 days. 1.6 mL 5    bisacodyl (DULCOLAX) 5 MG EC tablet Two days prior to exam take two (2) tablets at 4pm. One day prior to exam take two (2) tablets at 4pm 4 tablet 0    buPROPion (WELLBUTRIN XL) 300 MG 24 hr tablet Take 1 tablet (300 mg) by mouth every morning 90 tablet 3    cetirizine (ZYRTEC) 10 MG tablet Take 1 tablet (10 mg) by mouth daily 30 tablet 0    EPINEPHrine (ANY BX GENERIC EQUIV) 0.3 MG/0.3ML injection 2-pack Inject 0.3 mLs (0.3 mg) into the muscle once as needed for anaphylaxis. 2 each 2    hydrOXYzine HCl (ATARAX) 50 MG tablet Take 0.5-1 tablets (25-50 mg) by mouth every 6 hours as needed for itching 60 tablet 3    levonorgestrel (MIRENA) 20 MCG/24HR IUD 1 each (20 mcg) by Intrauterine route continuous      lisdexamfetamine (VYVANSE) 20 MG capsule Take 1 capsule (20 mg) by mouth every morning. 30 capsule 0    naltrexone (DEPADE/REVIA) 50 MG tablet Take 25 mg by mouth daily.      polyethylene glycol (GOLYTELY) 236 g suspension Two days before procedure at 5PM fill first container with water. Mix and drink an 8 oz glass every 15 minutes until HALF of the container is gone. Place the remainder in the refrigerator.  One day before procedure at 5PM drink second half of bowel prep. Drink an 8 oz glass every 15 minutes until it is gone. Day of procedure 6 hours before arrival time fill the 2nd container with water. Mix and drink an 8 oz glass every 15 minutes until HALF of the container is gone. Discard the remaining solution. 8000 mL 0    pregabalin (LYRICA) 75 MG capsule TAKE 1 CAPSULE (75 MG) BY MOUTH 2 TIMES DAILY. 60 capsule 2    valACYclovir (VALTREX) 500 MG tablet TAKE ONE TABLET BY MOUTH TWICE A DAY FOR 3-4 DAYS 40 tablet 3    varenicline (CHANTIX) 0.5 MG tablet Take 0.5 mg (1 tablet) once a day for 3 days, THEN 0.5 mg (1 tablet) twice daily for 4 days, THEN 1.0 mg (2 tablets) twice daily 95 tablet 0    [START ON 6/27/2025] varenicline (CHANTIX) 1 MG tablet Take 1 tablet (1 mg) by mouth 2 times daily. 60 tablet 1             Allergies:      Allergies   Allergen Reactions    Azathioprine Other (See Comments)     pancreatitis    No Clinical Screening - See Comments Hives     From stress     Allergy to Latex? No  Allergy to tape?   No  Intolerances:             Physical Exam:   All vitals have been reviewed  No data found.  No intake/output data recorded.  Lungs:   No increased work of breathing, good air exchange, clear to auscultation bilaterally, no crackles or wheezing     Cardiovascular:   Normal apical impulse, regular rate and rhythm, normal S1 and S2, no S3 or S4, and no murmur noted             Lab / Radiology Results:            Anesthetic risk and/or ASA classification:       Vikas Carlson MD

## 2025-06-02 NOTE — DISCHARGE INSTRUCTIONS
[facility]    Home Care Following Endoscopy          Activity:  You have just undergone an endoscopic procedure usually performed with conscious sedation.  Do not work or operate machinery (including a car) for at least 12 hours.    I encourage you to walk and attempt to pass this air as soon as possible.    Diet:  Return to the diet you were on before your procedure but eat lightly for the first 12-24 hours.  Drink plenty of water.  Resume any regular medications unless otherwise advised by your physician.  Please begin any new medication prescribed as a result of your procedure as directed by your physician.   If you had any biopsy or polyp removed please refrain from aspirin or aspirin products for 2 days.  If on Coumadin please restart as instructed by your physician.   Pain:  You may take Tylenol as needed for pain.  Expected Recovery:  You can expect some mild abdominal fullness and/or discomfort due to the air used to inflate your intestinal tract. It is also normal to have a mild sore throat after upper endoscopy.    Call Your Physician if You Have:  After Colonoscopy:  Worsening persisting abdominal pain which is worse with activity.  Fevers (>101 degrees F), chills or shakes.  Passage of continued blood with bowel movements.     Any questions or concerns about your recovery, please call [phone] or after hours [phone] ([phone]) Nurse Advice Line.    Follow-up Care:  You did have polyps/biopsy tissue sample(s) removed.  The polyps/biopsy tissue sample(s) will be sent to pathology.    You should receive letter in your My Chart from  with your results within 1-2 weeks. If you do not participate in My Chart a physical letter will come in the mail in 2-3 weeks.  Please call if you have not received a notification of your results.  If asked to return to clinic please make an appointment 1 week after your procedure.  Call [phone].

## 2025-06-02 NOTE — ANESTHESIA PREPROCEDURE EVALUATION
Anesthesia Pre-Procedure Evaluation    Patient: Ashley Mcdaniels   MRN: [id number] : 1980          Procedure : Procedure(s):  Colonoscopy, Screening, High Risk         Past Medical History:   Diagnosis Date    Acquired hypothyroidism     ADHD (attention deficit hyperactivity disorder)     Inattentive, dx 2012     Crohn's colitis (H)     YUE (generalized anxiety disorder)     GERD (gastroesophageal reflux disease)     Low back pain     Major depressive disorder, recurrent episode, mild       Past Surgical History:   Procedure Laterality Date    CHOLECYSTECTOMY  2007    DILATION AND CURETTAGE  May 2003    Non-OB    HC TOOTH EXTRACTION W/FORCEP  2006    LAP ADJUSTABLE GASTRIC BAND  May 2007      Allergies   Allergen Reactions    Azathioprine Other (See Comments)     pancreatitis    No Clinical Screening - See Comments Hives     From stress      Social History     Tobacco Use    Smoking status: Every Day     Types: Vaping Device     Passive exposure: Never    Smokeless tobacco: Never    Tobacco comments:     Vapes daily   Substance Use Topics    Alcohol use: Yes     Comment: 2x a week      Wt Readings from Last 1 Encounters:   25 105.2 kg (232 lb)        Anesthesia Evaluation   Pt has had prior anesthetic. Type: General and MAC.    No history of anesthetic complications       ROS/MED HX  ENT/Pulmonary:       Neurologic:  - neg neurologic ROS     Cardiovascular:     (+)  - Peripheral Vascular Disease-   -  - -                                      METS/Exercise Tolerance:     Hematologic:       Musculoskeletal:       GI/Hepatic:     (+) GERD,       bowel prep,            Renal/Genitourinary:       Endo:     (+)          thyroid problem, hypothyroidism,    Obesity,       Psychiatric/Substance Use:     (+) psychiatric history anxiety and depression       Infectious Disease:  - neg infectious disease ROS     Malignancy:  - neg malignancy ROS     Other:  - neg other ROS    (+)  , H/O Chronic Pain  "(fibromyalgia),           Physical Exam  Airway  Mallampati: II  TM distance: >3 FB  Neck ROM: full  Upper bite lip test: I  Mouth opening: >= 4 cm    Cardiovascular - normal exam  Rhythm: regular  Rate: normal rate     Dental   (+) Completely normal teeth      Pulmonary Breath sounds clear to auscultation        Neurological - normal exam  She appears awake, alert and oriented x3.    Other Findings       OUTSIDE LABS:  CBC:   Lab Results   Component Value Date    WBC 6.0 04/03/2025    WBC 6.7 09/27/2024    HGB 13.9 04/03/2025    HGB 14.1 09/27/2024    HCT 41.8 04/03/2025    HCT 42.3 09/27/2024     04/03/2025     09/27/2024     BMP:   Lab Results   Component Value Date     04/03/2025     09/27/2024    POTASSIUM 4.4 04/03/2025    POTASSIUM 4.3 09/27/2024    CHLORIDE 104 04/03/2025    CHLORIDE 108 (H) 09/27/2024    CO2 24 04/03/2025    CO2 22 09/27/2024    BUN 13.8 04/03/2025    BUN 10.2 09/27/2024    CR 0.88 04/03/2025    CR 0.77 09/27/2024    GLC 99 04/03/2025    GLC 91 09/27/2024     COAGS:   Lab Results   Component Value Date    INR 0.97 09/27/2024     POC: No results found for: \"BGM\", \"HCG\", \"HCGS\"  HEPATIC:   Lab Results   Component Value Date    ALBUMIN 4.2 04/03/2025    PROTTOTAL 7.4 04/03/2025    ALT 21 04/03/2025    AST 19 04/03/2025    ALKPHOS 61 04/03/2025    BILITOTAL 0.4 04/03/2025     OTHER:   Lab Results   Component Value Date    A1C 5.3 04/03/2025    JOVANNI 9.3 04/03/2025    LIPASE 204 01/10/2019    AMYLASE 82 01/10/2019    TSH 1.61 04/03/2025    T4 0.99 07/06/2020    T3 147 04/10/2018    CRP 6.9 05/09/2018    SED 18 10/31/2017       Anesthesia Plan    ASA Status:  3      NPO Status: NPO Appropriate   Anesthesia Type: MAC.  Airway: natural airway.  Induction: intravenous.  Maintenance: TIVA.   Techniques and Equipment:       - Monitoring Plan: standard ASA monitoring     Consents    Anesthesia Plan(s) and associated risks, benefits, and realistic alternatives discussed. " "Questions answered and patient/representative(s) expressed understanding.     - Discussed: proceduralist     - Discussed with:  Patient        - Pt is DNR/DNI Status: no DNR     Blood Consent:      - Discussed with: patient.     Postoperative Care         Comments:                   SHARIF Billy CRNA    I have reviewed the pertinent notes and labs in the chart from the past 30 days and (re)examined the patient.  Any updates or changes from those notes are reflected in this note.    Clinically Significant Risk Factors Present on Admission                             # Morbid Obesity: Estimated body mass index is 41.1 kg/m  as calculated from the following:    Height as of 5/29/25: 1.6 m (5' 3\").    Weight as of 5/29/25: 105.2 kg (232 lb).                    "

## 2025-06-02 NOTE — ANESTHESIA POSTPROCEDURE EVALUATION
Patient: Ashley Mcdaniels    Procedure: Procedure(s):  COLONOSCOPY, WITH  BIOPSY       Anesthesia Type:  MAC    Note:  Disposition: Outpatient   Postop Pain Control: Uneventful            Sign Out: Well controlled pain   PONV: No   Neuro/Psych: Uneventful            Sign Out: Acceptable/Baseline neuro status   Airway/Respiratory: Uneventful            Sign Out: Acceptable/Baseline resp. status   CV/Hemodynamics: Uneventful            Sign Out: Acceptable CV status; No obvious hypovolemia; No obvious fluid overload   Other NRE: NONE   DID A NON-ROUTINE EVENT OCCUR? No           Last vitals:  Vitals Value Taken Time   /95 06/02/25 08:20   Temp     Pulse 59 06/02/25 08:20   Resp 18 06/02/25 08:10   SpO2 100 % 06/02/25 08:21   Vitals shown include unfiled device data.    Electronically Signed By: SHARIF Billy CRNA  June 2, 2025  9:11 AM

## 2025-06-03 LAB
PATH REPORT.COMMENTS IMP SPEC: NORMAL
PATH REPORT.COMMENTS IMP SPEC: NORMAL
PATH REPORT.FINAL DX SPEC: NORMAL
PATH REPORT.GROSS SPEC: NORMAL
PATH REPORT.MICROSCOPIC SPEC OTHER STN: NORMAL
PATH REPORT.RELEVANT HX SPEC: NORMAL
PHOTO IMAGE: NORMAL

## 2025-06-04 ENCOUNTER — ALLIED HEALTH/NURSE VISIT (OUTPATIENT)
Dept: ALLERGY | Facility: OTHER | Age: 45
End: 2025-06-04
Payer: COMMERCIAL

## 2025-06-04 ENCOUNTER — RESULTS FOLLOW-UP (OUTPATIENT)
Dept: GASTROENTEROLOGY | Facility: CLINIC | Age: 45
End: 2025-06-04

## 2025-06-04 DIAGNOSIS — L50.8 CHRONIC URTICARIA: Primary | ICD-10-CM

## 2025-06-04 PROCEDURE — 96372 THER/PROPH/DIAG INJ SC/IM: CPT | Performed by: ALLERGY & IMMUNOLOGY

## 2025-06-04 PROCEDURE — 99207 PR NO CHARGE LOS: CPT

## 2025-06-04 NOTE — PROGRESS NOTES
Ashley Mcdaniels presents to clinic today at the request of Toñito Desir MD  (ordering provider) for Xolair (omalizumab) injection(s).       This service provided today was under the care of Toñito Desir MD ; the supervising provider of the day; who was available if needed.      Patient presented after waiting 30 minutes with no reaction to  injections. Discharged from clinic.    Discussed with patient that epi pen that was brought with today expires at the end of the month. Per documentation from last injection, patient has epi pen that does not  soon on hand already. Patient is aware to check expiration on epi pens prior to next appointment as she will not be able to receive Xolair if epi is .     Camille Vaca RN

## 2025-06-04 NOTE — PROGRESS NOTES
Clinic Administered Medication Documentation      Injectable Medication Documentation      Patient was given 300 mg . Prior to medication administration, verified patient's identity using patient s name and date of birth. Please see MAR and medication order for additional information. Patient instructed to remain in clinic for 30 minutes, report any adverse reaction to staff immediately, and must check in with staff prior to discharge from clinic.    Was this medication supplied by the patient? No

## 2025-06-10 DIAGNOSIS — F41.1 GAD (GENERALIZED ANXIETY DISORDER): ICD-10-CM

## 2025-06-10 DIAGNOSIS — F33.0 MAJOR DEPRESSIVE DISORDER, RECURRENT EPISODE, MILD: ICD-10-CM

## 2025-06-10 RX ORDER — BUPROPION HYDROCHLORIDE 300 MG/1
300 TABLET ORAL
Qty: 90 TABLET | Refills: 1 | Status: SHIPPED | OUTPATIENT
Start: 2025-06-10

## 2025-06-12 ENCOUNTER — ANCILLARY PROCEDURE (OUTPATIENT)
Dept: GENERAL RADIOLOGY | Facility: CLINIC | Age: 45
End: 2025-06-12
Attending: STUDENT IN AN ORGANIZED HEALTH CARE EDUCATION/TRAINING PROGRAM
Payer: COMMERCIAL

## 2025-06-12 ENCOUNTER — OFFICE VISIT (OUTPATIENT)
Dept: RHEUMATOLOGY | Facility: CLINIC | Age: 45
End: 2025-06-12
Attending: STUDENT IN AN ORGANIZED HEALTH CARE EDUCATION/TRAINING PROGRAM
Payer: COMMERCIAL

## 2025-06-12 ENCOUNTER — LAB (OUTPATIENT)
Dept: LAB | Facility: CLINIC | Age: 45
End: 2025-06-12
Payer: COMMERCIAL

## 2025-06-12 ENCOUNTER — VIRTUAL VISIT (OUTPATIENT)
Dept: OTHER | Facility: CLINIC | Age: 45
End: 2025-06-12
Attending: INTERNAL MEDICINE
Payer: COMMERCIAL

## 2025-06-12 VITALS
BODY MASS INDEX: 39.5 KG/M2 | WEIGHT: 223 LBS | OXYGEN SATURATION: 99 % | DIASTOLIC BLOOD PRESSURE: 78 MMHG | HEART RATE: 75 BPM | SYSTOLIC BLOOD PRESSURE: 123 MMHG

## 2025-06-12 DIAGNOSIS — I83.893 VARICOSE VEINS OF BILATERAL LOWER EXTREMITIES WITH OTHER COMPLICATIONS: Primary | ICD-10-CM

## 2025-06-12 DIAGNOSIS — F17.290 NICOTINE DEPENDENCE DUE TO VAPING TOBACCO PRODUCT: ICD-10-CM

## 2025-06-12 DIAGNOSIS — M54.50 LUMBAR BACK PAIN: ICD-10-CM

## 2025-06-12 DIAGNOSIS — R76.0 ABNORMAL ANTINUCLEAR ANTIBODY TITER: ICD-10-CM

## 2025-06-12 DIAGNOSIS — R60.9 LIPEDEMA: ICD-10-CM

## 2025-06-12 DIAGNOSIS — R76.0 ABNORMAL ANTINUCLEAR ANTIBODY TITER: Primary | ICD-10-CM

## 2025-06-12 DIAGNOSIS — M79.10 MYALGIA: ICD-10-CM

## 2025-06-12 DIAGNOSIS — M70.62 TROCHANTERIC BURSITIS OF BOTH HIPS: ICD-10-CM

## 2025-06-12 DIAGNOSIS — M70.61 TROCHANTERIC BURSITIS OF BOTH HIPS: ICD-10-CM

## 2025-06-12 DIAGNOSIS — I87.2 VENOUS (PERIPHERAL) INSUFFICIENCY: ICD-10-CM

## 2025-06-12 PROCEDURE — 72100 X-RAY EXAM L-S SPINE 2/3 VWS: CPT | Performed by: STUDENT IN AN ORGANIZED HEALTH CARE EDUCATION/TRAINING PROGRAM

## 2025-06-12 PROCEDURE — 72200 X-RAY EXAM SI JOINTS: CPT | Performed by: RADIOLOGY

## 2025-06-12 ASSESSMENT — PAIN SCALES - GENERAL: PAINLEVEL_OUTOF10: MODERATE PAIN (4)

## 2025-06-12 NOTE — PROGRESS NOTES
Virtual Visit Details    Type of service:  Video Visit     Originating Location (pt. Location): Home  Distant Location (provider location):  On-site  Platform used for Video Visit: Judah Oates MA    Provider visit note:    Follow-up visit  Known history of lipedema, hyperextensibility of the joints and bilateral lower extremity varicose veins with venous insufficiency, Crohn's disease  Morbid obesity status post gastric banding done more than 15 years ago initially successful weight loss then followed by gained weight  Using Zepbound product some success in weight loss and currently  Nicotine dependence due to vaping  She is taking lymphatic formula  Seen and evaluated by edema therapist  Having issues to wear compression stockings  Have not venous competency studies results as delineated below        HPI: Ashley Mcdaniels is a 44 year old very pleasant female with history of Crohn's disease on immunosuppressive medication, generalized anxiety, morbid obesity more than 15 years ago she underwent gastric banding successfully lost weight then followed by regained weight with history of a snoring but no witnessed apneic episodes few years ago underwent venous competency studies for varicose veins she does have right GSV proximal thigh area incompetence but no history of DVT.  She is a former smoker.  She has a easy bruising and hyperextensibility of the joints tender to touch buttocks and thigh area and lumpy bumpy thigh and buttock area.  She gives a history of attaining puberty around age 12 then followed by lower portion of the body was disproportionately larger than upper part and eventually got worse after the pregnancy and childbirth and now upper arms are larger than the forearms.        Family history significant for mother and maternal aunts similar body habitus     She has a Mirena IUD in place    Review of systems: Reviewed all 12 point review of systems as per HPI otherwise unremarkable    Physical  exam:( no physical exam done this is virtual visit)    Reviewed recent laboratory tests, imaging studies in the epic and updated chart    Bilateral Venous Insufficiency Ultrasound (Date: 05/30/25)    Bilateral Lower Extremity    Examined by:  YADIRA Malloy RVT  Indication: Varicose veins of bilateral lower extremities with other complications, ceap 4 CVI [I83.893 (ICD-10-CM)]      BP:  107/69  HR: 74     Technique:   Supine and Reverse Trendelenburg Ultrasound of the Deep and Superficial Veins with Valsalva and Compression Augmentation Maneuvers. Duplex Imaging is performed utilizing gray-scale, Two-dimensional images, color-flow imaging, Doppler waveform analysis, and Spectral doppler imaging done with provocative maneuvers.      Incompetency Criteria:  Deep vein reflux reported when greater than 1.0 sec flow reversal. Superficial vein reflux reported when greater than 0.5 sec flow reversal.  vein reflux reported as greater than 0.5 sec flow reversal.     Scan Position for Deep and Superficial Insufficiency Evaluation: Reverse Trendelenburg     Right  Leg Deep Veins    Thrombus Phasic Reflux Time (sec)   CFV        -       +         1.9   Femoral V Prox        -       +         -   Femoral V Mid         -       +         -   Femoral V Dist        -       +         -   Popliteal V        -       +         -   PTV'S        -       Peroneal V'S        -          Right Leg Superficial Veins    AP (mm) Depth (mm) Thrombus Reflux Time (sec)   SFJ      7.2      20.3         -       -   AASV @ SFJ      2.9      16.5         -       -   AASV Prox Thigh                                           GSV Prox Thigh      5.1      24.0         -       6.7   GSV Mid Thigh      4.9      24.6         -       -   GSV Dist Thigh      5.1      31.1         -       -   GSV Knee      4.7      28.3         -       -   GSV Prox Calf      3.4      9.5         -       -   GSV Mid Calf      2.7      12.3         -       -   GSV Dist  Calf      2.7      8.9         -       -        AP (mm) Depth (mm) Thrombus Reflux Time (sec)   GIACOMINI V Dist     NV      NV        NV      NV   GIACOMINI V Mid                            GIACOMINI V Prox                                        SPJ      NV      NV        NV      NV   SSV Prox Calf      NV      NV        NV      NV   SSV Mid Calf      1.3      9.0        -      -   SSV Dist Calf      1.2      7.5        -      -      Comments:   Perforators: There is an incompetent  vein ( 3.2 mm) at medial calf approximately 7 cm from knee crease that communicates with varicose veins measuring 2.7 mm that demonstrate 3.5 seconds of reflux.      Pelvic source varicose veins measuring 4.1 mm course down the lateral thigh to the posterior calf, with a reflux time of 10.9 seconds.        Left  Leg Deep Veins    Thrombus Phasic Reflux Time (sec)   CFV        -       +         6.6   Femoral V Prox        -       +         -   Femoral V Mid         -       +         -   Femoral V Dist        -       +         -   Popliteal V        -       +         -   PTV'S        -       Peroneal V'S        -          Left Leg Superficial Veins    AP (mm) Depth (mm) Thrombus Reflux Time (sec)   SFJ      6.4      19.7        -       -   AASV @ SFJ      3.3      19.6        -       -   AASV Prox Thigh                                          GSV Prox Thigh      3.3      20.6        -       -   GSV Mid Thigh      2.5      22.6        -       -   GSV Dist Thigh      3.1      29.3        -       -   GSV Knee      4.1      17.0        -       -   GSV Prox Calf      2.7      11.9        -       -   GSV Mid Calf      2.0      11.8        -       -   GSV Dist Calf      1.9      7.2        -       -        AP (mm) Depth (mm) Thrombus Reflux Time (sec)   GIACOMINI V Dist      NV      NV         NV       NV   GIACOMINI V Mid                               GIACOMINI V Prox                                           SPJ      NV       NV          NV       NV   SSV Prox Calf      NV       NV         NV       NV   SSV Mid Calf      1.7       14.1         -       -   SSV Dist Calf      2.1       8.8         -       -      Comments:   No evidence for incompetent varicose veins.     Perforators: There is no evidence of incompetent  veins at any level.         Impression:       Right Deep Vein Findings: Patent deep venous system with no evidence of DVT.  Segmental incompetence of the right common femoral vein.  Remainder of the deep veins of the right lower extremity are competent.       Left Deep Vein Findings: Patent deep venous system with no evidence of DVT.  Segmental incompetence of the left common femoral vein.  Remainder of the deep veins of the left lower extremity are competent.      Superficial Vein Findings:      Right Great Saphenous Vein: Segmental incompetence of the right great saphenous vein at the level of the proximal thigh.  Remainder of the right great saphenous vein is competent..     Right Anterior Accessory Saphenous Vein: Patent Anterior Accessory Saphenous Vein without evidence of reflux.     Right Giacomini Vein: Not visualized.     Right Small Saphenous Vein: The right saphenous popliteal junction and proximal calf small saphenous vein are not visualized.  The small saphenous vein is patent and competent in the mid and distal calf..     Right Perforating and Accessory Veins: There is an incompetent  (3.2 mm) at medial calf approximately 7 cm from knee crease that communicates with varicose veins measuring 2.7 mm that demonstrate 3500 ms of reflux.     Pelvic source varicose veins measuring 4.1 mm coursed down the lateral thigh to the posterior calf with a reflux time of 10,900 ms.     Left Great Saphenous Vein: Patent Greater Saphenous Vein without evidence of reflux.     Left Anterior Accessory Saphenous Vein: Patent Anterior Accessory Saphenous Vein without evidence of reflux.     Left Giacomini Vein: Not  visualized.     Left Small Saphenous Vein: The left saphenofemoral popliteal junction and proximal calf small saphenous vein are not visualized.  The small saphenous vein is patent and competent in the mid and distal calf..     Left Perforating and Accessory Veins: None seen.     Reference:     Venous Doppler: (+) = Present  (-) = Absent  (D) = Decreased, (NV) = Not Visualized     Reflux:  (-) Competent, (NV) = Not Visualized     Interpretation criteria:          Duration of Retrograde flow (milliseconds)  Category Deep Veins Superficial Veins  veins   Competent < 1.0 s < 0.5 s < 0.5 s   Incompetent > 1.0 s > 0.5 s > 0.5 s      ALFREDO VICENTE M.D., FACS, RPVI         Video Visit Details    Type of Service: Video Visit    Video Start Time: 4:00 PM  Video End Time: 4:30 PM    Total 33 minutes spent on the date of the encounter doing chart review, review of recent venous competency studies, previous evaluation, history, documentation and addressed above-mentioned issues AVS with the detailed instructions done referral to see vascular surgery arranged  Answered all her questions    The longitudinal care of plan for the above diagnoses was addressed during this visit. Due to added complexity of care, we will continue to supprt Ashley Mcdaniels and the subsequent management of this/these conditions and with ongoing continuity of care for this/these conditions.       Originating Location (patient location): Home/she was inside her car    Distant Location (provider location): [facility]    Mode of Communication:  Video Conference via [employer]        This visit is being conducted as a virtual visit due to the emphasis on mitigation of the COVID-19 virus pandemic. The clinician has decided that the risk of an in-office visit outweighs the benefit for this patient.      Assessment and Plan:         1. Venous (peripheral) insufficiency RT GSV incompetence     2. Lipedema     3. Morbid obesity (H) BMI 40.5     4. Crohn's  disease of both small and large intestine without complication (H)     5. IUD (intrauterine device) in place     6. Immunosuppressed status (H)        This is a very pleasant 44-year-old female with multiple medical issues including the Crohn's disease currently on immunosuppressive medication, morbid obese, snoring with no formal diagnosis of sleep apnea, bilateral lower extremity varicose veins underwent venous competency study in 2019 which showed right GSV incompetence in proximal thigh area no DVT and no SVT and she has a IUD in place she has a 2 grownup children's age 20 and 17.  She gives a history of ongoing leg heaviness, fatigue [place] and tiredness for many years.  Lower body disproportionately larger than upper body since the puberty and got worsened after the pregnancy and childbirth.  Her exam is consistent with lipedema and venous insufficiency  She also has easy bruising, hyperextensibility of the joints and mattress phenomenon  Negative Stemmer sign no clinical evidence of lymphedema     I had a lengthy discussion with the patient and shared about diagram from fat disorders.org and reviewed multimodality approach of lipedema     Improve the lymphatic flow with compression, MLD, wraps, vibration, pump therapy etc.     Water aerobics, cycling, Pilates, lymphatic yoga etc. discussed with the patient     Suggested taking lymphatic formula 1000 as advised     Muscle toning exercises discussed  Reviewed recent bilateral lower extremity venous competency results with the patient  Continue to utilize compression stockings  She was advised to quit vaping  She could not handle patches  She already taking Wellbutrin  daily  Recently initiated Chantix by other doctors continue to use  More than 3 minutes of smoking cessation counseling was done  Arrange referral to see [facility] at [facility] and she prefers medical group order placed  Follow-up with me in 3 to 4 months virtual video or office  visit    Julio Savage MD,CRISTINO,FSVM,FNLA, FACP  Vascular Medicine  Clinical Hypertension Specialist   Clinical Lipidologist

## 2025-06-12 NOTE — PROGRESS NOTES
Rheumatology Outpatient Consult Note    DATE OF SERVICE: 6/12/2025    REQUESTING PHYSICIAN:  Melva Damon-*      CHIEF COMPLAINT:  Chief Complaint   Patient presents with    Consult     Abnormal antinuclear antibody titer  Myalgia        New Patient     Referred by: Melva You PA-C       Ashley is a 44 year old patient who presents today to the  Rheumatology Clinic at the request of Melva Damon-*  for consultation of +REJI 1:80, myalgias .    Subjective     HISTORY OF PRESENT ILLNESS     has a past medical history of Acquired hypothyroidism, ADHD (attention deficit hyperactivity disorder), Crohn's colitis (H), YUE (generalized anxiety disorder), GERD (gastroesophageal reflux disease), Low back pain, and Major depressive disorder, recurrent episode, mild.   has a past surgical history that includes lap adjustable gastric band (May 2007); Cholecystectomy (Feb 2007); Dilation and curettage (May 2003); TOOTH EXTRACTION W/FORCEP (Nov 2006); and Colonoscopy (N/A, 6/2/2025).      Patient reports 2 to 3-year history of joint pains.  Describes pains which affect all over specifically ankles hips knees and elbows.  She does note the pain with has been worse in the past 3 months.  She has started Lyrica 3 months ago which has been helping she also started naltrexone 2 months ago which also has helped.  When she uses Tylenol or NSAIDs it only reduces her pain by 10%.  She describes her pains worse in the evening worse in the thighs and her right hip being quite painful that she avoid sleeping on it.  Her other past medical history does include Crohn's disease in remission currently on Humira, chronic urticaria on Xolair, recurrent uveitis unilateral 5 years ago was her last flare.  She does also describe chronic lumbar back pain for many years.  The back pain does tend to be worse when she wakes up in the morning.  She denies any swelling other than of the ankles at times.    RHEUMATOLOGY  REVIEW OF SYSTEMS:     No history seizures  + inflammatory eye disease, iritis or uveitis.   No dry eyes,  no dry mouth  + mouth sores  + skin rashes, no psoriasis   photosensitivity, no hair loss,  No Raynaud's phenomenon  No history of clotting episodes: DVT, PE, no miscarriages, CVA.    I have reviewed medical records and performed history and exam.    ASSESSMENT:  1. Abnormal antinuclear antibody titer    2. Myalgia    3. Lumbar back pain    4. Trochanteric bursitis of both hips        PLAN:    (R76.0) Abnormal antinuclear antibody titer  (primary encounter diagnosis)  Comment: +REJI 1:80, low titer in setting of known Crohn's, thyroid disease, Uveitis, chronic Urticaria. She does not meet clinical criteria for SLE at this time. Will complete work up with labs.   Plan: JG Panel (RNP, SMITH, Scleroderma, SSAB,         SSBB); JG, Antibodies, Panel, Histone antibody        IgG            (M79.10) Myalgia  Comment: Likely has some component of fibromyalgia/myofascial pain  Plan:   -Continue per other providers with lyrica and naltrexone which have been beneficial  -She does have some vague features of recurrent R elbow pain/tendonitis but this has also resolved on treatment.     (M54.50) Lumbar back pain  Comment: R/o inflammatory back pain/changes on imaging -- will obtain follow up MRI of SI joint if normal due to in setting of Crohn's and Uveitis.    Plan: XR Lumbar Spine 2-3 Views, XR Sacroiliac Joint         1/2 Views            (M70.61,  M70.62) Trochanteric bursitis of both hips  Comment:   Plan: Orthopedic  Referral, Physical Therapy         Referral            Follow up in 6mo    Thank you very much for allowing me to participate in the care of  Tia Please call with any questions.      Leslie Mcpherson MD  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     Crohn's colitis (H)     YUE (generalized anxiety disorder)     GERD (gastroesophageal reflux disease)     Low back pain     Major depressive disorder, recurrent episode, mild     reviewed  Past Surgical History:   Procedure Laterality Date    CHOLECYSTECTOMY  Feb 2007    COLONOSCOPY N/A 6/2/2025    Procedure: COLONOSCOPY, WITH  BIOPSY;  Surgeon: Vikas Carlson MD;  Location: [facility]    DILATION AND CURETTAGE  May 2003    Non-OB    HC TOOTH EXTRACTION W/FORCEP  Nov 2006    LAP ADJUSTABLE GASTRIC BAND  May 2007    reviewed    MEDICATIONS  Current Outpatient Medications   Medication Sig Dispense Refill    Adalimumab (HUMIRA *CF*) 40 MG/0.4ML pen kit Inject 0.4 mLs (40 mg) subcutaneously every 7 days. 1.6 mL 5    bisacodyl (DULCOLAX) 5 MG EC tablet Two days prior to exam take two (2) tablets at 4pm. One day prior to exam take two (2) tablets at 4pm 4 tablet 0    buPROPion (WELLBUTRIN XL) 300 MG 24 hr tablet Take 1 tablet (300 mg) by mouth daily at 2 pm. 90 tablet 1    cetirizine (ZYRTEC) 10 MG tablet Take 1 tablet (10 mg) by mouth daily 30 tablet 0    EPINEPHrine (ANY BX GENERIC EQUIV) 0.3 MG/0.3ML injection 2-pack Inject 0.3 mLs (0.3 mg) into the muscle once as needed for anaphylaxis. 2 each 2    hydrOXYzine HCl (ATARAX) 50 MG tablet Take 0.5-1 tablets (25-50 mg) by mouth every 6 hours as needed for itching 60 tablet 3    levonorgestrel (MIRENA) 20 MCG/24HR IUD 1 each (20 mcg) by Intrauterine route continuous      lisdexamfetamine (VYVANSE) 20 MG capsule Take 1 capsule (20 mg) by mouth every morning. 30 capsule 0    naltrexone (DEPADE/REVIA) 50 MG tablet Take 25 mg by mouth daily.      polyethylene glycol (GOLYTELY) 236 g suspension Two days before procedure at 5PM fill first container with water. Mix and drink an 8 oz glass every 15 minutes until HALF of the container is gone. Place the remainder in the refrigerator. One day before procedure at 5PM drink second half of bowel prep. Drink an 8  oz glass every 15 minutes until it is gone. Day of procedure 6 hours before arrival time fill the 2nd container with water. Mix and drink an 8 oz glass every 15 minutes until HALF of the container is gone. Discard the remaining solution. 8000 mL 0    pregabalin (LYRICA) 75 MG capsule TAKE 1 CAPSULE (75 MG) BY MOUTH 2 TIMES DAILY. 60 capsule 2    valACYclovir (VALTREX) 500 MG tablet TAKE ONE TABLET BY MOUTH TWICE A DAY FOR 3-4 DAYS 40 tablet 3    varenicline (CHANTIX) 0.5 MG tablet Take 0.5 mg (1 tablet) once a day for 3 days, THEN 0.5 mg (1 tablet) twice daily for 4 days, THEN 1.0 mg (2 tablets) twice daily (Patient not taking: Reported on 6/12/2025) 95 tablet 0    [START ON 6/27/2025] varenicline (CHANTIX) 1 MG tablet Take 1 tablet (1 mg) by mouth 2 times daily. (Patient not taking: Reported on 6/12/2025) 60 tablet 1       ALLERGIES  Allergies   Allergen Reactions    Azathioprine Other (See Comments)     pancreatitis    No Clinical Screening - See Comments Hives     From stress       SOCIAL HISTORY  Social History     Socioeconomic History    Marital status:      Spouse name: Not on file    Number of children: Not on file    Years of education: Not on file    Highest education level: Not on file   Occupational History     Comment: consulting   Tobacco Use    Smoking status: Every Day     Types: Vaping Device     Passive exposure: Never    Smokeless tobacco: Never    Tobacco comments:     Vapes daily   Vaping Use    Vaping status: Every Day    Substances: Nicotine, Flavoring   Substance and Sexual Activity    Alcohol use: Yes     Comment: 2x a week    Drug use: No    Sexual activity: Yes     Partners: Male     Birth control/protection: I.U.D.   Other Topics Concern    Parent/sibling w/ CABG, MI or angioplasty before 65F 55M? No   Social History Narrative    September 11, 2024        ENVIRONMENTAL HISTORY: [place] The home is heated with a forced air. They do have  central air conditioning. The patient's bedroom is furnished with carpeting in bedroom.  Pets inside the house include 2 dog(s), and 1 horses. There is no history of cockroach or mice infestation. There is/are 0 smokers in the house.  The house does not have a damp basement.      Social Drivers of Health     Financial Resource Strain: Low Risk  (7/15/2024)    Financial Resource Strain     Within the past 12 months, have you or your family members you live with been unable to get utilities (heat, electricity) when it was really needed?: No   Food Insecurity: Low Risk  (7/15/2024)    Food Insecurity     Within the past 12 months, did you worry that your food would run out before you got money to buy more?: No     Within the past 12 months, did the food you bought just not last and you didn t have money to get more?: No   Transportation Needs: Low Risk  (7/15/2024)    Transportation Needs     Within the past 12 months, has lack of transportation kept you from medical appointments, getting your medicines, non-medical meetings or appointments, work, or from getting things that you need?: No   Physical Activity: Unknown (7/15/2024)    Exercise Vital Sign     Days of Exercise per Week: 2 days     Minutes of Exercise per Session: Not on file   Stress: Stress Concern Present (7/15/2024)    [employer] [place] [employer] - Occupational Stress Questionnaire     Feeling of Stress : Rather much   Social Connections: Unknown (7/15/2024)    Social Connection and Isolation Panel [NHANES]     Frequency of Communication with Friends and Family: Not on file     Frequency of Social Gatherings with Friends and Family: Once a week     Attends Druze Services: Not on file     Active Member of Clubs or Organizations: Not on file     Attends Club or Organization Meetings: Not on file     Marital Status: Not on file   Interpersonal Safety: Low Risk  (6/2/2025)    Interpersonal Safety     Do you feel physically and emotionally  safe where you currently live?: Yes     Within the past 12 months, have you been hit, slapped, kicked or otherwise physically hurt by someone?: No     Within the past 12 months, have you been humiliated or emotionally abused in other ways by your partner or ex-partner?: No   Housing Stability: Low Risk  (7/15/2024)    Housing Stability     Do you have housing? : Yes     Are you worried about losing your housing?: No    reviewed    FAMILY HISTORY  Family History   Problem Relation Age of Onset    Arthritis Mother     Deep Vein Thrombosis Mother     Snoring Mother     Heart Disease Father         CAD, CHF    Alcoholism Father     Asthma Father     Hypertension Father     Hyperlipidemia Father     Pancreatic Cancer Father     Liver Cancer Father     Snoring Father     Mental Illness Brother     Substance Abuse Brother     Diabetes Maternal Grandmother     Substance Abuse Maternal Grandmother     Hyperlipidemia Maternal Grandmother     Hypertension Maternal Grandmother     Substance Abuse Maternal Grandfather     Asthma Paternal Grandmother     Heart Disease Paternal Grandmother     Diabetes Paternal Grandmother     Hypertension Paternal Grandmother     Substance Abuse Paternal Grandfather    : reviewed .         Objective   PHYSICAL EXAMINATION  /78   Pulse 75   Wt 101.2 kg (223 lb)   SpO2 99%   BMI 39.50 kg/m  : reviewed    Physical Exam    GENERAL: Pleasant and cooperative, in no distress. Alert and Oriented x 3.  SKIN: no psoriasis. No Raynaud's.    MUSCULOSKELETAL:  Shoulders: Normal ROM.  Elbows:  No extension deficits or contractures. No tophi or rheumatoid  nodules.  Wrists:  No Tenosynovitis dorsum of wrist. Full ROM.  Hands:  No Synovitis noted MCP, PIP joints. Good .  Hips: +pain over trochanteric bursas R>L  Knees: No crepitus.  No swelling or effusion, or bulge sign.  Ankles:  No swelling. Normal achilles tendon. No pain over plantar fascia.  Feet: No pain on metatarsal .  Back: No  scoliosis, diffuse tenderness of lumbar spine and lower paraspinal muscles   +15 Myofascial tender points.    LABS: Past labs reviewed and discussed with the patient.        Latest Ref Rng & Units 6/28/2024     9:06 AM 9/27/2024     9:33 AM 4/3/2025     8:13 AM   RHEUM RESULTS   Albumin 3.5 - 5.2 g/dL 4.2  4.1  4.2    ALT 0 - 50 U/L 26  19  21    AST 0 - 45 U/L 24  26  19    REJI interpretation Negative  Borderline Positive     REJI pattern 1   Dense fine speckled     REJI titer 1   1:80     Creatinine 0.51 - 0.95 mg/dL 0.82  0.77  0.88    GFR Estimate >60 mL/min/1.73m2 >90  >90  83    Hematocrit 35.0 - 47.0 % 41.9  42.3  41.8    Hemoglobin 11.7 - 15.7 g/dL 14.1  14.1  13.9    WBC 4.0 - 11.0 10e3/uL 9.0  6.7  6.0    RBC Count 3.80 - 5.20 10e6/uL 4.65  4.67  4.83    RDW 10.0 - 15.0 % 13.3  12.9  12.9    MCHC 31.5 - 36.5 g/dL 33.7  33.3  33.3    MCV 78 - 100 fL 90  91  87    Platelet Count 150 - 450 10e3/uL 316  313  337    Quantiferon-TB Gold Plus Result Negative  Negative  Negative      Last three Labs:  Hemoglobin   Date Value Ref Range Status   04/03/2025 13.9 11.7 - 15.7 g/dL Final   09/27/2024 14.1 11.7 - 15.7 g/dL Final   06/28/2024 14.1 11.7 - 15.7 g/dL Final   12/04/2020 13.7 11.7 - 15.7 g/dL Final   09/04/2020 13.8 11.7 - 15.7 g/dL Final   05/26/2020 14.2 11.7 - 15.7 g/dL Final     Urea Nitrogen   Date Value Ref Range Status   04/03/2025 13.8 6.0 - 20.0 mg/dL Final   09/27/2024 10.2 6.0 - 20.0 mg/dL Final   01/10/2019 10 7 - 30 mg/dL Final   10/31/2017 7 7 - 30 mg/dL Final   05/26/2017 8 7 - 30 mg/dL Final     Sed Rate   Date Value Ref Range Status   10/31/2017 18 0 - 20 mm/h Final     CRP Inflammation   Date Value Ref Range Status   05/09/2018 6.9 0.0 - 8.0 mg/L Final   10/31/2017 22.7 (H) 0.0 - 8.0 mg/L Final     AST   Date Value Ref Range Status   04/03/2025 19 0 - 45 U/L Final   09/27/2024 26 0 - 45 U/L Final   06/28/2024 24 0 - 45 U/L Final     Comment:     Reference intervals for this test were updated on  6/12/2023 to more accurately reflect our healthy population. There may be differences in the flagging of prior results with similar values performed with this method. Interpretation of those prior results can be made in the context of the updated reference intervals.   12/04/2020 17 0 - 45 U/L Final   09/04/2020 16 0 - 45 U/L Final   05/26/2020 18 0 - 45 U/L Final     Albumin   Date Value Ref Range Status   04/03/2025 4.2 3.5 - 5.2 g/dL Final   09/27/2024 4.1 3.5 - 5.2 g/dL Final   06/28/2024 4.2 3.5 - 5.2 g/dL Final   10/12/2022 3.5 3.4 - 5.0 g/dL Final   04/18/2022 3.4 3.4 - 5.0 g/dL Final   11/29/2021 3.4 3.4 - 5.0 g/dL Final   12/04/2020 3.7 3.4 - 5.0 g/dL Final   09/04/2020 3.5 3.4 - 5.0 g/dL Final   05/26/2020 3.6 3.4 - 5.0 g/dL Final     Alkaline Phosphatase   Date Value Ref Range Status   04/03/2025 61 40 - 150 U/L Final   09/27/2024 66 40 - 150 U/L Final   06/28/2024 68 40 - 150 U/L Final   12/04/2020 59 40 - 150 U/L Final   09/04/2020 66 40 - 150 U/L Final   05/26/2020 66 40 - 150 U/L Final     ALT   Date Value Ref Range Status   04/03/2025 21 0 - 50 U/L Final   09/27/2024 19 0 - 50 U/L Final   06/28/2024 26 0 - 50 U/L Final     Comment:     Reference intervals for this test were updated on 6/12/2023 to more accurately reflect our healthy population. There may be differences in the flagging of prior results with similar values performed with this method. Interpretation of those prior results can be made in the context of the updated reference intervals.     12/04/2020 22 0 - 50 U/L Final   09/04/2020 22 0 - 50 U/L Final   05/26/2020 19 0 - 50 U/L Final     Rheumatoid Factor   Date Value Ref Range Status   09/27/2024 <10 <14 IU/mL Final         IMAGING: Reviewed      Study Result    Narrative & Impression   XR PELVIS AND HIP LEFT 2 VIEWS   10/1/2024 4:11 PM      HISTORY: Hip pain, left  COMPARISON: None.                                                                       IMPRESSION:      Negative for  acute left hip fracture or dislocation. Normal hip joint  spacing. Mild degenerative changes in the lower lumbar spine and at  the sacroiliac joints. A T-shaped intrauterine device projects over  the pelvis.     JUSTA GAMEZ MD      Order  MR Knee Right w/o & w Contrast [[id number]] (Order [id number])  Exam Information    Exam Date Exam Time Accession # Performing Department Results    8/13/21  8:56 AM [id number] [facility]      PACS Images     Show images for MR Knee Right w/o & w Contrast     Study Result    Narrative & Impression   MR right knee with and withoutcontrast 8/13/2021 8:56 AM     Techniques: Multiplanar multisequence imaging of the right knee was  obtained with and without intravenous contrast using routing protocol.  Contrast: 10mL Gadavist     History: see above; Acute pain of right knee; Palpable mass of soft  tissue of knee     Additional History from EMR: 41-year-old woman with a painful lump on  the medial aspect of the right knee beginning approximately 4-6 weeks  ago. No acute injury reported. No prior surgery reported     Comparison: No relevant prior images available     Findings:  Marker on the anterior/medial aspect of the knee approximately 3 cm  above the femoral tibial joint line. Asymmetric adipose tissue noted  on the medial aspect of the knee. No focal mass or abnormal  enhancement.     MENISCI:  Medial meniscus: Intact.  Lateral meniscus: Horizontal oblique tear involving the posterior body  and horn. The posterior root attachment appears intact. Lobular  fluid-filled structures arising from the body of the meniscus,  consistent with apparent meniscal cyst. There is adjacent fluid  dissecting approximately along the lateral aspect of the femur.     LIGAMENTS  Cruciate ligaments: Intact.  Medial supporting structures: Intact.  Lateral supporting structures: Intact.     EXTENSOR MECHANISM  Intact.     FLUID  Small joint effusion. No substantial  Baker's cyst.     OSSEOUS and ARTICULAR STRUCTURES  Bones: No fracture, contusion, or osseous lesion is seen.     Patellofemoral compartment: No hyaline cartilage disease.     Medial compartment: No hyaline cartilage disease.     Lateral compartment: No hyaline cartilage disease.     ANCILLARY FINDINGS  None.                                                                      Impression:  1. Asymmetric adipose tissue on the medial aspect of the knee. No  suspicious soft tissue mass.     2. Horizontal oblique tear of the posterior body and horn of the  lateral meniscus with meniscal cysts arising from the body, and  synovial fluid dissecting along the lateral aspect of the femur.     3. The cruciate ligaments, medial and lateral supporting structures  are intact.     I have personally reviewed the examination and initial interpretation  and I agree with the findings.     JUTTA ELLERMANN, MD Sumi G. Patel, MD  [facility]

## 2025-06-12 NOTE — PATIENT INSTRUCTIONS
As we discussed today,     Please see vascular [facility] at [facility] [place] referral was done they will call you to schedule for options of ablation due to symptomatic varicose veins    Congratulations for continuous weight loss and continue the same    Quit vaping products    Agree with utilizing Chantix    Continue to utilize [employer] compression pants    Take lymphatic formula 1000    Virtual video or office visit with me in 3 to 4 months

## 2025-06-13 ENCOUNTER — TELEPHONE (OUTPATIENT)
Dept: VASCULAR SURGERY | Facility: CLINIC | Age: 45
End: 2025-06-13
Payer: COMMERCIAL

## 2025-06-13 NOTE — TELEPHONE ENCOUNTER
6/13/25 LVM for patient to call and schedule consult for FLO VV and Venous Insufficiency per referral from Dr. Savage. FLO TIMMY COMP completed on 5/30/25.

## 2025-06-14 LAB
ENA SM IGG SER IA-ACNC: <0.7 U/ML
ENA SM IGG SER IA-ACNC: NEGATIVE
ENA SS-A AB SER IA-ACNC: <0.5 U/ML
ENA SS-A AB SER IA-ACNC: NEGATIVE
ENA SS-B IGG SER IA-ACNC: <0.6 U/ML
ENA SS-B IGG SER IA-ACNC: NEGATIVE
U1 SNRNP IGG SER IA-ACNC: 2.8 U/ML
U1 SNRNP IGG SER IA-ACNC: NEGATIVE

## 2025-06-15 LAB — HISTONE IGG SER IA-ACNC: 0.2 UNITS

## 2025-06-16 ENCOUNTER — PATIENT OUTREACH (OUTPATIENT)
Dept: CARE COORDINATION | Facility: CLINIC | Age: 45
End: 2025-06-16
Payer: COMMERCIAL

## 2025-06-16 ENCOUNTER — RESULTS FOLLOW-UP (OUTPATIENT)
Dept: RHEUMATOLOGY | Facility: CLINIC | Age: 45
End: 2025-06-16

## 2025-06-16 ENCOUNTER — TELEPHONE (OUTPATIENT)
Dept: RHEUMATOLOGY | Facility: CLINIC | Age: 45
End: 2025-06-16
Payer: COMMERCIAL

## 2025-06-16 ENCOUNTER — PATIENT OUTREACH (OUTPATIENT)
Dept: GASTROENTEROLOGY | Facility: CLINIC | Age: 45
End: 2025-06-16
Payer: COMMERCIAL

## 2025-06-16 NOTE — TELEPHONE ENCOUNTER
Received from Dr. Mcpherson on 6/16:  She has SI joint damage which we do see in autoimmune disease of spine (ankylosing spondylitis ) which is associated with crohn's family of disease. She will need an MRI of the SI joint now to see if there is active inflammation still even on Humira. (Humira does treat this condition but not always 100% effective).      I placed the MRI order.     Call to Casper ALBARADO and instructed her to read [employer] message.

## 2025-06-18 ENCOUNTER — MYC MEDICAL ADVICE (OUTPATIENT)
Dept: ALLERGY | Facility: OTHER | Age: 45
End: 2025-06-18
Payer: COMMERCIAL

## 2025-06-20 ENCOUNTER — MYC REFILL (OUTPATIENT)
Dept: FAMILY MEDICINE | Facility: OTHER | Age: 45
End: 2025-06-20
Payer: COMMERCIAL

## 2025-06-20 DIAGNOSIS — F90.0 ADHD (ATTENTION DEFICIT HYPERACTIVITY DISORDER), INATTENTIVE TYPE: ICD-10-CM

## 2025-06-23 RX ORDER — LISDEXAMFETAMINE DIMESYLATE 20 MG/1
20 CAPSULE ORAL EVERY MORNING
Qty: 30 CAPSULE | Refills: 0 | Status: SHIPPED | OUTPATIENT
Start: 2025-06-23

## 2025-06-25 NOTE — PROGRESS NOTES
Medication Therapy Management (MTM) Encounter    ASSESSMENT:                            Medication Adherence/Access: No issues identified.    Crohn's Disease:  Ashley would benefit from continued treatment with Humira 40 mg subcutaneous every 7 days. She is up to date on routine maintenance labs. She is up to date on annual tuberculosis screening. She is indicated for a Humira level with her next set of monitoring labs. No access issues for her advanced therapy are present.     Smoking cessation:  Congratulated Ashley on reducing her vaping and setting a quit date. Side effects are currently manageable per her report. No changes to medication therapy at this time. Reviewed option for extended maintenance if she feels like that would be beneficial to maintain smoking cessation.     PLAN:                            Great work on reducing your vaping and setting a quit date! Continue Chantix 1 mg twice daily. Let me know if you feel like it would be beneficial to continue Chantix beyond the planned 12 weeks.  Continue on Humira 40 mg subcutaneous every 7 days  You will be due for a Humira level with your next set of labs. This will need to be done right before you give your injection (Wednesday or Thursday morning)    Follow-up: MyChart check-in 1 week after quit date, 6 months Humira check-in    SUBJECTIVE/OBJECTIVE:                          Ashley Mcdaniels is a 44 year old female seen for a follow-up visit.       Reason for visit: Humira/ Chantix check-in.    Allergies/ADRs: Reviewed in chart  Past Medical History: Reviewed in chart  Tobacco: She reports that she has been smoking vaping device. She has never been exposed to tobacco smoke. She has never used smokeless tobacco.Nicotine/Tobacco Cessation Plan  Pharmacotherapies : varenicline (Chantix)  Alcohol: very rarely    Medication Adherence/Access: no issues reported.    Crohn's Disease:   Humira 40 mg subcutaneous every 7 days    Ashley reports things have been stable on  Humira. Recent colonoscopy showed endoscopic remission. She does have some abdominal pain prior to bowel movements. She does note that she is supposed to be on a fiber supplement. She reports she does noticing improvement when she remembers to take it.     Last provider visit: 5/15/2025 - Robert Magallon MD  Next provider visit: 2025 - Evette Madden PA-C  Last labs completed: 4/3/2025  Lab frequency: every 3 months              - standing labs available until 5/15/2026  Next labs due: 2025  Last TB screenin/3/2025  PDC: 81% (Humira)    PRO-3 for Crohn's Disease    Please select the one best answer for the patient's ability at this time     Over the past week, how many liquid or soft stools have you had on average per day?   0 (When scoring, multiply number by 2= 0)   Over the past week, please rate your average abdominal pain  Moderate: 10 points  3. Over the past week, please rate your general well-being    Generally Well: 0 points    Score: 10  <13: Remission  13-21: Mild Activity   22-52: Moderate Activity  >/= 53: Severe Activity     Last endoscopic evaluation/MRE: Colonoscopy 2025    Impression:    - The examined portion of the ileum was normal.                          - The entire examined colon is normal. Biopsied.                          - Simple Endoscopic Score for Crohn's Disease: 0,                          mucosal inflammatory changes secondary to Crohn's                          disease, in remission.                          - The distal rectum and anal verge are normal on                          retroflexion view.     Smoking Cessation:  Chantix 1 twice daily     Tia just started 1 mg twice daily on Monday. She does report that she vomited the first time she took it but reports that taking it with food helps with this. She also has noticed a slight increase in headaches but this is tolerable.     She reports she is vaping less. She is noticing less cravings on the Chantix but she is  also make a more concerted effort to quit. Stress is her biggest trigger. She has a planned quit date of July 4th.     ----------------    I spent 10 minutes with this patient today. All changes were made via collaborative practice agreement with Robert Magallon.     A summary of these recommendations was sent via [employer].    Lola De lAngel PharmD, BCPS  MT Pharmacist   [facility]   Phone: [phone]    Telemedicine Visit Details  The patient's medications can be safely assessed via a telemedicine encounter.  Type of service:  Telephone visit  Originating Location (pt. Location): Home    Distant Location (provider location):  Off-site  Start Time: 8:04 AM  End Time: 8:14 AM     Medication Therapy Recommendations  No medication therapy recommendations to display

## 2025-06-26 ENCOUNTER — OFFICE VISIT (OUTPATIENT)
Dept: VASCULAR SURGERY | Facility: CLINIC | Age: 45
End: 2025-06-26
Payer: COMMERCIAL

## 2025-06-26 ENCOUNTER — VIRTUAL VISIT (OUTPATIENT)
Dept: PHARMACY | Facility: CLINIC | Age: 45
End: 2025-06-26
Attending: INTERNAL MEDICINE
Payer: COMMERCIAL

## 2025-06-26 DIAGNOSIS — K50.80 CROHN'S DISEASE OF BOTH SMALL AND LARGE INTESTINE WITHOUT COMPLICATION (H): Primary | ICD-10-CM

## 2025-06-26 DIAGNOSIS — F17.200 NICOTINE DEPENDENCE, UNCOMPLICATED, UNSPECIFIED NICOTINE PRODUCT TYPE: ICD-10-CM

## 2025-06-26 DIAGNOSIS — I87.2 VENOUS (PERIPHERAL) INSUFFICIENCY: ICD-10-CM

## 2025-06-26 DIAGNOSIS — I83.893 VARICOSE VEINS OF BILATERAL LOWER EXTREMITIES WITH OTHER COMPLICATIONS: ICD-10-CM

## 2025-06-26 PROCEDURE — 99203 OFFICE O/P NEW LOW 30 MIN: CPT | Performed by: SURGERY

## 2025-06-26 NOTE — LETTER
6/26/2025      Ashley Mcdaniels  [address] [facility] [address]      Dear Colleague,    Thank you for referring your patient, Ashley Mcdaniels, to the [facility] [place]. Please see a copy of my visit note below.    VEIN SOLUTIONS CONSULT    Impression:  1.  Bilateral lower extremity swelling secondary to lipedema.    2.  No clinically significant deep or superficial venous incompetence bilaterally.    3.  Obesity with BMI of 41.  History of gastric banding more than 15 years ago.    4.  Crohn's disease on immunosuppressive medications.    5.  Abnormal antinuclear antibody titer with myalgias.    Plan:  I had a detailed conversation with Ashley explaining that she does not have treatable venous disease.  She has only segmental incompetence of the right greater saphenous vein in the proximal thigh.  The remainder of her bilateral superficial venous systems are competent.  We would never consider ablation procedures for such a limited disease.  She has a 3.7 mm pelvic source varicosity coursing laterally down her right leg.  I do not believe this is the source of her discomfort when lying on her right side.  That varicosity is not large enough to be considered for ultrasound-guided sclerotherapy.  Her pain is far more likely coming from her lipedema and her rheumatologic condition.  She is fairly well versed in the treatment of lipedema having discussed it previously with Dr. Savage and having read about it herself.  I recommended that she dedicate herself to her weight loss goals and to be fully compliant with compression therapy.  She does have custom pantyhose variety compression stockings but does not care for their appearance.  Follow-up through this office will be on an as-needed basis.    Total length of this encounter was 30 minutes with time spent reviewing records and studies, interviewing and examining the patient, answering questions, coordinating the treatment plan.        HPI:   Ashley NAFISA  Enedelia is a 44-year-old female with lipedema, obesity, Crohn's disease, and nicotine dependence referred to me for evaluation of possible venous insufficiency.  She has a strong familial history of both lipedema and venous disease.  The patient is status post 2 pregnancies.  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  She did have gastric banding back in 2006.  At one time her weight was as low as 135 pounds.  She currently weighs 218 pounds.  Her goal weight is somewhere in the 160 range.  She intermittently uses compression stockings.  She does have a pair of custom pantyhose stockings but admits to not caring for the way they look.    Her past medical history is also notable for currently undergoing a rheumatologic workup for an abnormal antinuclear antibody titer with myalgia.  I believe the diagnosis of fibromyalgia is being discussed.  She also has a component of chronic lumbar back pain.      CURRENT MEDICATIONS  Current Outpatient Medications   Medication Sig Dispense Refill     Adalimumab (HUMIRA *CF*) 40 MG/0.4ML pen kit Inject 0.4 mLs (40 mg) subcutaneously every 7 days. 1.6 mL 5     buPROPion (WELLBUTRIN XL) 300 MG 24 hr tablet Take 1 tablet (300 mg) by mouth daily at 2 pm. 90 tablet 1     cetirizine (ZYRTEC) 10 MG tablet Take 1 tablet (10 mg) by mouth daily 30 tablet 0     EPINEPHrine (ANY BX GENERIC EQUIV) 0.3 MG/0.3ML injection 2-pack Inject 0.3 mLs (0.3 mg) into the muscle once as needed for anaphylaxis. 2 each 2     hydrOXYzine HCl (ATARAX) 50 MG tablet Take 0.5-1 tablets (25-50 mg) by mouth every 6 hours as needed for itching 60 tablet 3     levonorgestrel (MIRENA) 20 MCG/24HR IUD 1 each (20 mcg) by Intrauterine route continuous       lisdexamfetamine (VYVANSE) 20 MG capsule Take 1 capsule (20 mg) by mouth every morning. 30 capsule 0     naltrexone (DEPADE/REVIA) 50 MG tablet Take 25 mg by mouth daily.       pregabalin (LYRICA) 75 MG capsule TAKE 1 CAPSULE (75 MG) BY MOUTH 2 TIMES DAILY. 60 capsule 2      valACYclovir (VALTREX) 500 MG tablet TAKE ONE TABLET BY MOUTH TWICE A DAY FOR 3-4 DAYS 40 tablet 3     varenicline (CHANTIX) 0.5 MG tablet Take 0.5 mg (1 tablet) once a day for 3 days, THEN 0.5 mg (1 tablet) twice daily for 4 days, THEN 1.0 mg (2 tablets) twice daily 95 tablet 0     [START ON 6/27/2025] varenicline (CHANTIX) 1 MG tablet Take 1 tablet (1 mg) by mouth 2 times daily. 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      Crohn's colitis (H)      YUE (generalized anxiety disorder)      GERD (gastroesophageal reflux disease)      Low back pain      Major depressive disorder, recurrent episode, mild          PAST SURGICAL HISTORY:  Past Surgical History:   Procedure Laterality Date     CHOLECYSTECTOMY  Feb 2007     COLONOSCOPY N/A 6/2/2025    Procedure: COLONOSCOPY, WITH  BIOPSY;  Surgeon: Vikas Carlson MD;  Location: [facility]     DILATION AND CURETTAGE  May 2003    Non-OB     HC TOOTH EXTRACTION W/FORCEP  Nov 2006     LAP ADJUSTABLE GASTRIC BAND  May 2007       ALLERGIES     Allergies   Allergen Reactions     Azathioprine Other (See Comments)     pancreatitis     No Clinical Screening - See Comments Hives     From stress       FAMILY HISTORY  Family History   Problem Relation Age of Onset     Arthritis Mother      Deep Vein Thrombosis Mother      Snoring Mother      Heart Disease Father         CAD, CHF     Alcoholism Father      Asthma Father      Hypertension Father      Hyperlipidemia Father      Pancreatic Cancer Father      Liver Cancer Father      Snoring Father      Mental Illness Brother      Substance Abuse  Brother      Diabetes Maternal Grandmother      Substance Abuse Maternal Grandmother      Hyperlipidemia Maternal Grandmother      Hypertension Maternal Grandmother      Substance Abuse Maternal Grandfather      Asthma Paternal Grandmother      Heart Disease Paternal Grandmother      Diabetes Paternal Grandmother      Hypertension Paternal Grandmother      Substance Abuse Paternal Grandfather        SOCIAL HISTORY  Social History     Tobacco Use     Smoking status: Every Day     Types: Vaping Device     Passive exposure: Never     Smokeless tobacco: Never     Tobacco comments:     Vapes daily   Vaping Use     Vaping status: Every Day     Substances: Nicotine, Flavoring   Substance Use Topics     Alcohol use: Yes     Comment: 2x a week     Drug use: No       ROS:   Review of Systems   Constitutional: Negative.   HENT: Negative.     Eyes: Negative.    Cardiovascular: Negative.    Respiratory: Negative.     Endocrine: Negative.    Hematologic/Lymphatic: Bruises/bleeds easily.   Musculoskeletal:  Positive for muscle cramps.        Hyperextensibility of joints.   Gastrointestinal: Negative.    Genitourinary: Negative.    Psychiatric/Behavioral:  Positive for depression. The patient is nervous/anxious.    Allergic/Immunologic: Negative.          EXAM:  There were no vitals taken for this visit.  Physical Exam  Constitutional:       General: She is not in acute distress.     Appearance: She is obese.   HENT:      Head: Normocephalic and atraumatic.   Eyes:      General: No scleral icterus.     Extraocular Movements: Extraocular movements intact.      Pupils: Pupils are equal, round, and reactive to light.   Cardiovascular:      Pulses:           Dorsalis pedis pulses are 2+ on the right side and 2+ on the left side.   Musculoskeletal:         General: Normal range of motion.      Cervical back: Normal range of motion.      Right lower leg: No edema.      Left lower leg: No edema.   Skin:     Comments: Mattress phenomenon  of bilateral thighs with fat overhanging her knees.  The lower legs and ankles are not involved.  No clinically significant varicosities.   Neurological:      General: No focal deficit present.      Mental Status: She is alert and oriented to person, place, and time. Mental status is at baseline.   Psychiatric:         Mood and Affect: Mood normal.         Behavior: Behavior normal.         Thought Content: Thought content normal.         Judgment: Judgment normal.           Labs:  LIPID RESULTS:  Lab Results   Component Value Date    CHOL 145 04/03/2025    CHOL 187 07/06/2020    HDL 50 04/03/2025    HDL 60 07/06/2020    LDL 79 04/03/2025    LDL 81 07/06/2020    TRIG 82 04/03/2025    TRIG 228 (H) 07/06/2020       CBC RESULTS:  Lab Results   Component Value Date    WBC 6.0 04/03/2025    WBC 12.2 (H) 12/04/2020    RBC 4.83 04/03/2025    RBC 4.50 12/04/2020    HGB 13.9 04/03/2025    HGB 13.7 12/04/2020    HCT 41.8 04/03/2025    HCT 41.1 12/04/2020    MCV 87 04/03/2025    MCV 91 12/04/2020    MCH 28.8 04/03/2025    MCH 30.4 12/04/2020    MCHC 33.3 04/03/2025    MCHC 33.3 12/04/2020    RDW 12.9 04/03/2025    RDW 13.5 12/04/2020     04/03/2025     12/04/2020       BMP RESULTS:  Lab Results   Component Value Date     04/03/2025     01/10/2019    POTASSIUM 4.4 04/03/2025    POTASSIUM 4.1 01/10/2019    CHLORIDE 104 04/03/2025    CHLORIDE 103 01/10/2019    CO2 24 04/03/2025    CO2 30 01/10/2019    ANIONGAP 10 04/03/2025    ANIONGAP 4 01/10/2019    GLC 99 04/03/2025    GLC 87 01/10/2019    BUN 13.8 04/03/2025    BUN 10 01/10/2019    CR 0.88 04/03/2025    CR 0.78 12/04/2020    GFRESTIMATED 83 04/03/2025    GFRESTIMATED >90 12/04/2020    GFRESTBLACK >90 12/04/2020    JOVANNI 9.3 04/03/2025    JOVANNI 8.7 01/10/2019        A1C RESULTS:  Lab Results   Component Value Date    A1C 5.3 04/03/2025         Imaging:  I reviewed a bilateral venous competency study from 5/30/2025 showing no clinically significant deep or  superficial venous incompetence in either leg.      Geovani Rodriguez MD    Again, thank you for allowing me to participate in the care of your patient.        Sincerely,        Geovani Rodriguez MD    Electronically signed

## 2025-06-26 NOTE — PATIENT INSTRUCTIONS
"Recommendations from today's MTM visit:                                                      Great work on reducing your vaping and setting a quit date! Continue Chantix 1 mg twice daily. Let me know if you feel like it would be beneficial to continue Chantix beyond the planned 12 weeks.  Continue on Humira 40 mg subcutaneous every 7 days  You will be due for a Humira level with your next set of labs. This will need to be done right before you give your injection (Wednesday or Thursday morning)    Follow-up: MyChart check-in 1 week after quit date, 6 months Humira check-in    It was great speaking with you today.  I value your experience and would be very thankful for your time in providing feedback in our clinic survey. In the next few days, you may receive an email or text message from [employer] with a link to a survey related to your  clinical pharmacist.\"     To schedule another MTM appointment, please call the clinic directly or you may call the MTM scheduling line at [phone].    My Clinical Pharmacist's contact information:                                                      Please feel free to contact me with any questions or concerns you have.      oLla Del Angel PharmD, BCPS  MTM Pharmacist   [facility]   Phone: [phone]     "

## 2025-06-26 NOTE — PROGRESS NOTES
VEIN SOLUTIONS CONSULT    Impression:  1.  Bilateral lower extremity swelling secondary to lipedema.    2.  No clinically significant deep or superficial venous incompetence bilaterally.    3.  Obesity with BMI of 41.  History of gastric banding more than 15 years ago.    4.  Crohn's disease on immunosuppressive medications.    5.  Abnormal antinuclear antibody titer with myalgias.    Plan:  I had a detailed conversation with Ashley explaining that she does not have treatable venous disease.  She has only segmental incompetence of the right greater saphenous vein in the proximal thigh.  The remainder of her bilateral superficial venous systems are competent.  We would never consider ablation procedures for such a limited disease.  She has a 3.7 mm pelvic source varicosity coursing laterally down her right leg.  I do not believe this is the source of her discomfort when lying on her right side.  That varicosity is not large enough to be considered for ultrasound-guided sclerotherapy.  Her pain is far more likely coming from her lipedema and her rheumatologic condition.  She is fairly well versed in the treatment of lipedema having discussed it previously with Dr. Savage and having read about it herself.  I recommended that she dedicate herself to her weight loss goals and to be fully compliant with compression therapy.  She does have custom pantyhose variety compression stockings but does not care for their appearance.  Follow-up through this office will be on an as-needed basis.    Total length of this encounter was 30 minutes with time spent reviewing records and studies, interviewing and examining the patient, answering questions, coordinating the treatment plan.        HPI:   Ashley Mcdaniels is a 44-year-old female with lipedema, obesity, Crohn's disease, and nicotine dependence referred to me for evaluation of possible venous insufficiency.  She has a strong familial history of both lipedema and venous disease.   The patient is status post 2 pregnancies.  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  She did have gastric banding back in 2006.  At one time her weight was as low as 135 pounds.  She currently weighs 218 pounds.  Her goal weight is somewhere in the 160 range.  She intermittently uses compression stockings.  She does have a pair of custom pantyhose stockings but admits to not caring for the way they look.    Her past medical history is also notable for currently undergoing a rheumatologic workup for an abnormal antinuclear antibody titer with myalgia.  I believe the diagnosis of fibromyalgia is being discussed.  She also has a component of chronic lumbar back pain.      CURRENT MEDICATIONS  Current Outpatient Medications   Medication Sig Dispense Refill    Adalimumab (HUMIRA *CF*) 40 MG/0.4ML pen kit Inject 0.4 mLs (40 mg) subcutaneously every 7 days. 1.6 mL 5    buPROPion (WELLBUTRIN XL) 300 MG 24 hr tablet Take 1 tablet (300 mg) by mouth daily at 2 pm. 90 tablet 1    cetirizine (ZYRTEC) 10 MG tablet Take 1 tablet (10 mg) by mouth daily 30 tablet 0    EPINEPHrine (ANY BX GENERIC EQUIV) 0.3 MG/0.3ML injection 2-pack Inject 0.3 mLs (0.3 mg) into the muscle once as needed for anaphylaxis. 2 each 2    hydrOXYzine HCl (ATARAX) 50 MG tablet Take 0.5-1 tablets (25-50 mg) by mouth every 6 hours as needed for itching 60 tablet 3    levonorgestrel (MIRENA) 20 MCG/24HR IUD 1 each (20 mcg) by Intrauterine route continuous      lisdexamfetamine (VYVANSE) 20 MG capsule Take 1 capsule (20 mg) by mouth every morning. 30 capsule 0    naltrexone (DEPADE/REVIA) 50 MG tablet Take 25 mg by mouth daily.      pregabalin (LYRICA) 75 MG capsule TAKE 1 CAPSULE (75 MG) BY MOUTH 2 TIMES DAILY. 60 capsule 2    valACYclovir (VALTREX) 500 MG tablet TAKE ONE TABLET BY MOUTH TWICE A DAY FOR 3-4 DAYS 40 tablet 3    varenicline (CHANTIX) 0.5 MG tablet Take 0.5 mg (1 tablet) once a day for 3 days, THEN 0.5 mg (1 tablet) twice daily for 4 days, THEN 1.0  mg (2 tablets) twice daily 95 tablet 0    [START ON 6/27/2025] varenicline (CHANTIX) 1 MG tablet Take 1 tablet (1 mg) by mouth 2 times daily. 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     Crohn's colitis (H)     YUE (generalized anxiety disorder)     GERD (gastroesophageal reflux disease)     Low back pain     Major depressive disorder, recurrent episode, mild          PAST SURGICAL HISTORY:  Past Surgical History:   Procedure Laterality Date    CHOLECYSTECTOMY  Feb 2007    COLONOSCOPY N/A 6/2/2025    Procedure: COLONOSCOPY, WITH  BIOPSY;  Surgeon: Vikas Carlson MD;  Location: [facility]    DILATION AND CURETTAGE  May 2003    Non-OB    HC TOOTH EXTRACTION W/FORCEP  Nov 2006    LAP ADJUSTABLE GASTRIC BAND  May 2007       ALLERGIES     Allergies   Allergen Reactions    Azathioprine Other (See Comments)     pancreatitis    No Clinical Screening - See Comments Hives     From stress       FAMILY HISTORY  Family History   Problem Relation Age of Onset    Arthritis Mother     Deep Vein Thrombosis Mother     Snoring Mother     Heart Disease Father         CAD, CHF    Alcoholism Father     Asthma Father     Hypertension Father     Hyperlipidemia Father     Pancreatic Cancer Father     Liver Cancer Father     Snoring Father     Mental Illness Brother     Substance Abuse Brother     Diabetes Maternal Grandmother     Substance Abuse Maternal Grandmother     Hyperlipidemia Maternal Grandmother     Hypertension Maternal Grandmother     Substance Abuse Maternal Grandfather     Asthma Paternal Grandmother     Heart Disease Paternal Grandmother      Diabetes Paternal Grandmother     Hypertension Paternal Grandmother     Substance Abuse Paternal Grandfather        SOCIAL HISTORY  Social History     Tobacco Use    Smoking status: Every Day     Types: Vaping Device     Passive exposure: Never    Smokeless tobacco: Never    Tobacco comments:     Vapes daily   Vaping Use    Vaping status: Every Day    Substances: Nicotine, Flavoring   Substance Use Topics    Alcohol use: Yes     Comment: 2x a week    Drug use: No       ROS:   Review of Systems   Constitutional: Negative.   HENT: Negative.     Eyes: Negative.    Cardiovascular: Negative.    Respiratory: Negative.     Endocrine: Negative.    Hematologic/Lymphatic: Bruises/bleeds easily.   Musculoskeletal:  Positive for muscle cramps.        Hyperextensibility of joints.   Gastrointestinal: Negative.    Genitourinary: Negative.    Psychiatric/Behavioral:  Positive for depression. The patient is nervous/anxious.    Allergic/Immunologic: Negative.          EXAM:  There were no vitals taken for this visit.  Physical Exam  Constitutional:       General: She is not in acute distress.     Appearance: She is obese.   HENT:      Head: Normocephalic and atraumatic.   Eyes:      General: No scleral icterus.     Extraocular Movements: Extraocular movements intact.      Pupils: Pupils are equal, round, and reactive to light.   Cardiovascular:      Pulses:           Dorsalis pedis pulses are 2+ on the right side and 2+ on the left side.   Musculoskeletal:         General: Normal range of motion.      Cervical back: Normal range of motion.      Right lower leg: No edema.      Left lower leg: No edema.   Skin:     Comments: Mattress phenomenon of bilateral thighs with fat overhanging her knees.  The lower legs and ankles are not involved.  No clinically significant varicosities.   Neurological:      General: No focal deficit present.      Mental Status: She is alert and oriented to person, place, and time. Mental status is at  baseline.   Psychiatric:         Mood and Affect: Mood normal.         Behavior: Behavior normal.         Thought Content: Thought content normal.         Judgment: Judgment normal.           Labs:  LIPID RESULTS:  Lab Results   Component Value Date    CHOL 145 04/03/2025    CHOL 187 07/06/2020    HDL 50 04/03/2025    HDL 60 07/06/2020    LDL 79 04/03/2025    LDL 81 07/06/2020    TRIG 82 04/03/2025    TRIG 228 (H) 07/06/2020       CBC RESULTS:  Lab Results   Component Value Date    WBC 6.0 04/03/2025    WBC 12.2 (H) 12/04/2020    RBC 4.83 04/03/2025    RBC 4.50 12/04/2020    HGB 13.9 04/03/2025    HGB 13.7 12/04/2020    HCT 41.8 04/03/2025    HCT 41.1 12/04/2020    MCV 87 04/03/2025    MCV 91 12/04/2020    MCH 28.8 04/03/2025    MCH 30.4 12/04/2020    MCHC 33.3 04/03/2025    MCHC 33.3 12/04/2020    RDW 12.9 04/03/2025    RDW 13.5 12/04/2020     04/03/2025     12/04/2020       BMP RESULTS:  Lab Results   Component Value Date     04/03/2025     01/10/2019    POTASSIUM 4.4 04/03/2025    POTASSIUM 4.1 01/10/2019    CHLORIDE 104 04/03/2025    CHLORIDE 103 01/10/2019    CO2 24 04/03/2025    CO2 30 01/10/2019    ANIONGAP 10 04/03/2025    ANIONGAP 4 01/10/2019    GLC 99 04/03/2025    GLC 87 01/10/2019    BUN 13.8 04/03/2025    BUN 10 01/10/2019    CR 0.88 04/03/2025    CR 0.78 12/04/2020    GFRESTIMATED 83 04/03/2025    GFRESTIMATED >90 12/04/2020    GFRESTBLACK >90 12/04/2020    JOVANNI 9.3 04/03/2025    JOVANNI 8.7 01/10/2019        A1C RESULTS:  Lab Results   Component Value Date    A1C 5.3 04/03/2025         Imaging:  I reviewed a bilateral venous competency study from 5/30/2025 showing no clinically significant deep or superficial venous incompetence in either leg.      Geovani Rodriguez MD

## 2025-06-28 ASSESSMENT — ENCOUNTER SYMPTOMS
CARDIOVASCULAR NEGATIVE: 1
EYES NEGATIVE: 1
NERVOUS/ANXIOUS: 1
ENDOCRINE NEGATIVE: 1
ALLERGIC/IMMUNOLOGIC NEGATIVE: 1
CONSTITUTIONAL NEGATIVE: 1
BRUISES/BLEEDS EASILY: 1
MUSCLE CRAMPS: 1
RESPIRATORY NEGATIVE: 1
DEPRESSION: 1
GASTROINTESTINAL NEGATIVE: 1

## 2025-06-30 ENCOUNTER — PATIENT OUTREACH (OUTPATIENT)
Dept: CARE COORDINATION | Facility: CLINIC | Age: 45
End: 2025-06-30
Payer: COMMERCIAL

## 2025-07-02 ENCOUNTER — ALLIED HEALTH/NURSE VISIT (OUTPATIENT)
Dept: ALLERGY | Facility: OTHER | Age: 45
End: 2025-07-02
Payer: COMMERCIAL

## 2025-07-02 DIAGNOSIS — L50.8 CHRONIC URTICARIA: Primary | ICD-10-CM

## 2025-07-02 PROCEDURE — 96372 THER/PROPH/DIAG INJ SC/IM: CPT | Performed by: ALLERGY & IMMUNOLOGY

## 2025-07-02 PROCEDURE — 99207 PR NO CHARGE LOS: CPT

## 2025-07-02 NOTE — PROGRESS NOTES
Ashley Mcdaniels presents to clinic today at the request of Toñito Desir MD  (ordering provider) for Xolair (omalizumab) injection(s).       This service provided today was under the care of Cathy Muhammad MD; the supervising provider of the day; who was available if needed.      Patient presented after waiting 30 minutes with no reaction to  injections. Discharged from clinic.    Camille Vaca RN

## 2025-07-30 ENCOUNTER — MYC MEDICAL ADVICE (OUTPATIENT)
Dept: FAMILY MEDICINE | Facility: OTHER | Age: 45
End: 2025-07-30
Payer: COMMERCIAL

## 2025-08-05 ENCOUNTER — ALLIED HEALTH/NURSE VISIT (OUTPATIENT)
Dept: ALLERGY | Facility: OTHER | Age: 45
End: 2025-08-05
Payer: COMMERCIAL

## 2025-08-05 DIAGNOSIS — L50.8 CHRONIC URTICARIA: Primary | ICD-10-CM

## 2025-08-05 DIAGNOSIS — L50.8 CHRONIC URTICARIA: ICD-10-CM

## 2025-08-05 PROCEDURE — 96372 THER/PROPH/DIAG INJ SC/IM: CPT | Performed by: ALLERGY & IMMUNOLOGY

## 2025-08-07 ENCOUNTER — VIRTUAL VISIT (OUTPATIENT)
Dept: FAMILY MEDICINE | Facility: OTHER | Age: 45
End: 2025-08-07
Payer: COMMERCIAL

## 2025-08-07 DIAGNOSIS — F33.1 MODERATE RECURRENT MAJOR DEPRESSION (H): ICD-10-CM

## 2025-08-07 DIAGNOSIS — M79.7 FIBROMYALGIA: ICD-10-CM

## 2025-08-07 DIAGNOSIS — F41.1 GAD (GENERALIZED ANXIETY DISORDER): ICD-10-CM

## 2025-08-07 DIAGNOSIS — R41.3 MEMORY CHANGES: Primary | ICD-10-CM

## 2025-08-13 ENCOUNTER — HOSPITAL ENCOUNTER (OUTPATIENT)
Dept: MRI IMAGING | Facility: CLINIC | Age: 45
Discharge: HOME | End: 2025-08-13
Attending: STUDENT IN AN ORGANIZED HEALTH CARE EDUCATION/TRAINING PROGRAM
Payer: COMMERCIAL

## 2025-08-13 ENCOUNTER — MYC REFILL (OUTPATIENT)
Dept: FAMILY MEDICINE | Facility: OTHER | Age: 45
End: 2025-08-13

## 2025-08-13 ENCOUNTER — MYC REFILL (OUTPATIENT)
Dept: PHARMACY | Facility: CLINIC | Age: 45
End: 2025-08-13

## 2025-08-13 ENCOUNTER — LAB (OUTPATIENT)
Dept: LAB | Facility: CLINIC | Age: 45
End: 2025-08-13
Payer: COMMERCIAL

## 2025-08-13 DIAGNOSIS — M54.50 LUMBAR BACK PAIN: ICD-10-CM

## 2025-08-13 DIAGNOSIS — K50.80 CROHN'S DISEASE OF BOTH SMALL AND LARGE INTESTINE WITHOUT COMPLICATION (H): ICD-10-CM

## 2025-08-13 DIAGNOSIS — M79.7 FIBROMYALGIA: ICD-10-CM

## 2025-08-13 DIAGNOSIS — R41.3 MEMORY CHANGES: ICD-10-CM

## 2025-08-13 DIAGNOSIS — F90.0 ADHD (ATTENTION DEFICIT HYPERACTIVITY DISORDER), INATTENTIVE TYPE: ICD-10-CM

## 2025-08-13 DIAGNOSIS — F33.1 MODERATE RECURRENT MAJOR DEPRESSION (H): ICD-10-CM

## 2025-08-13 DIAGNOSIS — F41.1 GAD (GENERALIZED ANXIETY DISORDER): ICD-10-CM

## 2025-08-13 DIAGNOSIS — F17.200 NICOTINE DEPENDENCE, UNCOMPLICATED, UNSPECIFIED NICOTINE PRODUCT TYPE: ICD-10-CM

## 2025-08-13 LAB
ALBUMIN SERPL BCG-MCNC: 4.4 G/DL (ref 3.5–5.2)
ALP SERPL-CCNC: 71 U/L (ref 40–150)
ALT SERPL W P-5'-P-CCNC: 22 U/L (ref 0–50)
ANION GAP SERPL CALCULATED.3IONS-SCNC: 12 MMOL/L (ref 7–15)
AST SERPL W P-5'-P-CCNC: 22 U/L (ref 0–45)
BASOPHILS # BLD AUTO: 0.05 10E3/UL (ref 0–0.2)
BASOPHILS NFR BLD AUTO: 0.5 %
BILIRUB DIRECT SERPL-MCNC: 0.08 MG/DL (ref 0–0.3)
BILIRUB SERPL-MCNC: 0.2 MG/DL
BUN SERPL-MCNC: 9.9 MG/DL (ref 6–20)
CALCIUM SERPL-MCNC: 9.5 MG/DL (ref 8.8–10.4)
CHLORIDE SERPL-SCNC: 100 MMOL/L (ref 98–107)
CREAT SERPL-MCNC: 0.85 MG/DL (ref 0.51–0.95)
CRP SERPL-MCNC: 8.58 MG/L
EGFRCR SERPLBLD CKD-EPI 2021: 86 ML/MIN/1.73M2
EOSINOPHIL # BLD AUTO: 0.17 10E3/UL (ref 0–0.7)
EOSINOPHIL NFR BLD AUTO: 1.6 %
ERYTHROCYTE [DISTWIDTH] IN BLOOD BY AUTOMATED COUNT: 13.7 % (ref 10–15)
GLUCOSE SERPL-MCNC: 91 MG/DL (ref 70–99)
HCO3 SERPL-SCNC: 25 MMOL/L (ref 22–29)
HCT VFR BLD AUTO: 40.7 % (ref 35–47)
HGB BLD-MCNC: 14 G/DL (ref 11.7–15.7)
IMM GRANULOCYTES # BLD: 0.03 10E3/UL
IMM GRANULOCYTES NFR BLD: 0.3 %
LYMPHOCYTES # BLD AUTO: 2.54 10E3/UL (ref 0.8–5.3)
LYMPHOCYTES NFR BLD AUTO: 24.6 %
MCH RBC QN AUTO: 29.7 PG (ref 26.5–33)
MCHC RBC AUTO-ENTMCNC: 34.4 G/DL (ref 31.5–36.5)
MCV RBC AUTO: 86.4 FL (ref 78–100)
MONOCYTES # BLD AUTO: 0.73 10E3/UL (ref 0–1.3)
MONOCYTES NFR BLD AUTO: 7.1 %
NEUTROPHILS # BLD AUTO: 6.81 10E3/UL (ref 1.6–8.3)
NEUTROPHILS NFR BLD AUTO: 65.9 %
NRBC # BLD AUTO: <0.03 10E3/UL
NRBC BLD AUTO-RTO: 0 /100
PLATELET # BLD AUTO: 313 10E3/UL (ref 150–450)
POTASSIUM SERPL-SCNC: 4 MMOL/L (ref 3.4–5.3)
PROT SERPL-MCNC: 8 G/DL (ref 6.4–8.3)
RBC # BLD AUTO: 4.71 10E6/UL (ref 3.8–5.2)
SODIUM SERPL-SCNC: 137 MMOL/L (ref 135–145)
TSH SERPL DL<=0.005 MIU/L-ACNC: 1.49 UIU/ML (ref 0.3–4.2)
WBC # BLD AUTO: 10.33 10E3/UL (ref 4–11)

## 2025-08-13 PROCEDURE — 72197 MRI PELVIS W/O & W/DYE: CPT | Mod: 26 | Performed by: RADIOLOGY

## 2025-08-13 PROCEDURE — 255N000002 HC RX 255 OP 636: Performed by: STUDENT IN AN ORGANIZED HEALTH CARE EDUCATION/TRAINING PROGRAM

## 2025-08-13 PROCEDURE — 36415 COLL VENOUS BLD VENIPUNCTURE: CPT

## 2025-08-13 PROCEDURE — 84443 ASSAY THYROID STIM HORMONE: CPT

## 2025-08-13 PROCEDURE — 83001 ASSAY OF GONADOTROPIN (FSH): CPT

## 2025-08-13 PROCEDURE — 99000 SPECIMEN HANDLING OFFICE-LAB: CPT

## 2025-08-13 PROCEDURE — 82542 COL CHROMOTOGRAPHY QUAL/QUAN: CPT | Mod: 90

## 2025-08-13 PROCEDURE — 86140 C-REACTIVE PROTEIN: CPT

## 2025-08-13 PROCEDURE — 82248 BILIRUBIN DIRECT: CPT

## 2025-08-13 PROCEDURE — 82670 ASSAY OF TOTAL ESTRADIOL: CPT

## 2025-08-13 PROCEDURE — A9585 GADOBUTROL INJECTION: HCPCS | Performed by: STUDENT IN AN ORGANIZED HEALTH CARE EDUCATION/TRAINING PROGRAM

## 2025-08-13 PROCEDURE — 85025 COMPLETE CBC W/AUTO DIFF WBC: CPT

## 2025-08-13 PROCEDURE — 72197 MRI PELVIS W/O & W/DYE: CPT

## 2025-08-13 PROCEDURE — 80053 COMPREHEN METABOLIC PANEL: CPT

## 2025-08-13 PROCEDURE — 80145 DRUG ASSAY ADALIMUMAB: CPT | Mod: 90

## 2025-08-13 PROCEDURE — 82166 ASSAY ANTI-MULLERIAN HORM: CPT

## 2025-08-13 RX ORDER — GADOBUTROL 604.72 MG/ML
10 INJECTION INTRAVENOUS ONCE
Status: COMPLETED | OUTPATIENT
Start: 2025-08-13 | End: 2025-08-13

## 2025-08-13 RX ADMIN — GADOBUTROL 10 ML: 604.72 INJECTION INTRAVENOUS at 17:28

## 2025-08-14 LAB
ESTRADIOL SERPL-MCNC: 65 PG/ML
FSH SERPL IRP2-ACNC: 8.8 MIU/ML
MIS SERPL-MCNC: 0.42 NG/ML

## 2025-08-14 RX ORDER — LISDEXAMFETAMINE DIMESYLATE 20 MG/1
20 CAPSULE ORAL EVERY MORNING
Qty: 30 CAPSULE | Refills: 0 | Status: SHIPPED | OUTPATIENT
Start: 2025-08-14

## 2025-08-14 RX ORDER — PREGABALIN 75 MG/1
75 CAPSULE ORAL 2 TIMES DAILY
Qty: 180 CAPSULE | Refills: 3 | Status: SHIPPED | OUTPATIENT
Start: 2025-08-14

## 2025-08-14 RX ORDER — VARENICLINE TARTRATE 0.5 MG/1
TABLET, FILM COATED ORAL
Qty: 95 TABLET | Refills: 0 | Status: SHIPPED | OUTPATIENT
Start: 2025-08-14

## 2025-08-15 ENCOUNTER — HOSPITAL ENCOUNTER (OUTPATIENT)
Dept: MRI IMAGING | Facility: CLINIC | Age: 45
Discharge: HOME | End: 2025-08-15
Attending: PHYSICIAN ASSISTANT
Payer: COMMERCIAL

## 2025-08-15 ENCOUNTER — HOSPITAL ENCOUNTER (OUTPATIENT)
Dept: MAMMOGRAPHY | Facility: CLINIC | Age: 45
Discharge: HOME | End: 2025-08-15
Attending: PHYSICIAN ASSISTANT
Payer: COMMERCIAL

## 2025-08-15 DIAGNOSIS — F41.1 GAD (GENERALIZED ANXIETY DISORDER): ICD-10-CM

## 2025-08-15 DIAGNOSIS — Z12.31 VISIT FOR SCREENING MAMMOGRAM: ICD-10-CM

## 2025-08-15 DIAGNOSIS — F33.1 MODERATE RECURRENT MAJOR DEPRESSION (H): ICD-10-CM

## 2025-08-15 DIAGNOSIS — R41.3 MEMORY CHANGES: ICD-10-CM

## 2025-08-15 DIAGNOSIS — M79.7 FIBROMYALGIA: ICD-10-CM

## 2025-08-15 PROCEDURE — 77063 BREAST TOMOSYNTHESIS BI: CPT

## 2025-08-15 PROCEDURE — 70551 MRI BRAIN STEM W/O DYE: CPT

## 2025-08-18 LAB
ADALIMUMAB AB SERPL IA-MCNC: <1.7 U/ML
ADALIMUMAB SERPL-MCNC: 15.7 UG/ML

## 2025-08-28 ENCOUNTER — OFFICE VISIT (OUTPATIENT)
Dept: FAMILY MEDICINE | Facility: OTHER | Age: 45
End: 2025-08-28
Payer: COMMERCIAL

## 2025-08-28 VITALS
WEIGHT: 219.5 LBS | SYSTOLIC BLOOD PRESSURE: 104 MMHG | TEMPERATURE: 98 F | HEIGHT: 63 IN | DIASTOLIC BLOOD PRESSURE: 70 MMHG | HEART RATE: 69 BPM | RESPIRATION RATE: 18 BRPM | BODY MASS INDEX: 38.89 KG/M2 | OXYGEN SATURATION: 94 %

## 2025-08-28 DIAGNOSIS — N95.1 MENOPAUSAL SYNDROME (HOT FLASHES): ICD-10-CM

## 2025-08-28 DIAGNOSIS — Z00.00 ROUTINE GENERAL MEDICAL EXAMINATION AT A HEALTH CARE FACILITY: Primary | ICD-10-CM

## 2025-08-28 DIAGNOSIS — Z23 NEED FOR HEPATITIS B BOOSTER VACCINATION: ICD-10-CM

## 2025-08-28 DIAGNOSIS — I89.0 LYMPHEDEMA: ICD-10-CM

## 2025-08-28 DIAGNOSIS — F90.0 ADHD (ATTENTION DEFICIT HYPERACTIVITY DISORDER), INATTENTIVE TYPE: ICD-10-CM

## 2025-08-28 RX ORDER — NORETHINDRONE ACETATE AND ETHINYL ESTRADIOL .5; 2.5 MG/1; UG/1
1 TABLET ORAL DAILY
Qty: 30 TABLET | Refills: 2 | Status: SHIPPED | OUTPATIENT
Start: 2025-08-28

## 2025-08-28 RX ORDER — LISDEXAMFETAMINE DIMESYLATE 20 MG/1
20 CAPSULE ORAL EVERY MORNING
Qty: 30 CAPSULE | Refills: 0 | Status: SHIPPED | OUTPATIENT
Start: 2025-09-13

## 2025-08-28 SDOH — HEALTH STABILITY: PHYSICAL HEALTH: ON AVERAGE, HOW MANY DAYS PER WEEK DO YOU ENGAGE IN MODERATE TO STRENUOUS EXERCISE (LIKE A BRISK WALK)?: 3 DAYS

## 2025-08-28 ASSESSMENT — PATIENT HEALTH QUESTIONNAIRE - PHQ9: SUM OF ALL RESPONSES TO PHQ QUESTIONS 1-9: 0

## 2025-08-28 ASSESSMENT — PAIN SCALES - GENERAL: PAINLEVEL_OUTOF10: MODERATE PAIN (4)

## (undated) DEVICE — SOLUTION WATER 1000ML BOTTLE R5000-01

## (undated) DEVICE — ENDO FORCEP ENDOJAW BIOPSY 2.8MMX230CM FB-220U

## (undated) DEVICE — KIT ENDO TURNOVER/PROCEDURE CARRY-ON 101822

## (undated) DEVICE — SUCTION MANIFOLD NEPTUNE 2 SYS 1 PORT 702-025-000

## (undated) DEVICE — TUBING SUCTION 6"X3/16" N56A

## (undated) RX ORDER — PROPOFOL 10 MG/ML
INJECTION, EMULSION INTRAVENOUS
Status: DISPENSED
Start: 2025-06-02

## (undated) RX ORDER — ONDANSETRON 2 MG/ML
INJECTION INTRAMUSCULAR; INTRAVENOUS
Status: DISPENSED
Start: 2025-06-02